# Patient Record
Sex: FEMALE | Race: WHITE | NOT HISPANIC OR LATINO | Employment: OTHER | URBAN - METROPOLITAN AREA
[De-identification: names, ages, dates, MRNs, and addresses within clinical notes are randomized per-mention and may not be internally consistent; named-entity substitution may affect disease eponyms.]

---

## 2023-06-05 ENCOUNTER — APPOINTMENT (OUTPATIENT)
Dept: CT IMAGING | Facility: CLINIC | Age: 63
End: 2023-06-05
Attending: EMERGENCY MEDICINE
Payer: COMMERCIAL

## 2023-06-05 ENCOUNTER — HOSPITAL ENCOUNTER (INPATIENT)
Facility: CLINIC | Age: 63
LOS: 13 days | Discharge: HOME | End: 2023-06-18
Attending: EMERGENCY MEDICINE | Admitting: INTERNAL MEDICINE
Payer: COMMERCIAL

## 2023-06-05 DIAGNOSIS — R11.0 NAUSEA: ICD-10-CM

## 2023-06-05 DIAGNOSIS — K56.600 PARTIAL SMALL BOWEL OBSTRUCTION (H): ICD-10-CM

## 2023-06-05 DIAGNOSIS — K52.9 IBD (INFLAMMATORY BOWEL DISEASE): Primary | ICD-10-CM

## 2023-06-05 DIAGNOSIS — R10.31 RLQ ABDOMINAL PAIN: ICD-10-CM

## 2023-06-05 LAB
ALBUMIN SERPL BCG-MCNC: 4.2 G/DL (ref 3.5–5.2)
ALP SERPL-CCNC: 81 U/L (ref 35–104)
ALT SERPL W P-5'-P-CCNC: 12 U/L (ref 10–35)
ANION GAP SERPL CALCULATED.3IONS-SCNC: 14 MMOL/L (ref 7–15)
AST SERPL W P-5'-P-CCNC: 13 U/L (ref 10–35)
BASOPHILS # BLD AUTO: 0.1 10E3/UL (ref 0–0.2)
BASOPHILS NFR BLD AUTO: 0 %
BILIRUB SERPL-MCNC: 0.3 MG/DL
BUN SERPL-MCNC: 14.7 MG/DL (ref 8–23)
CALCIUM SERPL-MCNC: 9.1 MG/DL (ref 8.8–10.2)
CHLORIDE SERPL-SCNC: 102 MMOL/L (ref 98–107)
CREAT SERPL-MCNC: 1.26 MG/DL (ref 0.51–0.95)
DEPRECATED HCO3 PLAS-SCNC: 24 MMOL/L (ref 22–29)
EOSINOPHIL # BLD AUTO: 0 10E3/UL (ref 0–0.7)
EOSINOPHIL NFR BLD AUTO: 0 %
ERYTHROCYTE [DISTWIDTH] IN BLOOD BY AUTOMATED COUNT: 15 % (ref 10–15)
GFR SERPL CREATININE-BSD FRML MDRD: 48 ML/MIN/1.73M2
GLUCOSE SERPL-MCNC: 131 MG/DL (ref 70–99)
HCT VFR BLD AUTO: 42.1 % (ref 35–47)
HGB BLD-MCNC: 13.1 G/DL (ref 11.7–15.7)
HOLD SPECIMEN: NORMAL
HOLD SPECIMEN: NORMAL
IMM GRANULOCYTES # BLD: 0.2 10E3/UL
IMM GRANULOCYTES NFR BLD: 2 %
LYMPHOCYTES # BLD AUTO: 1.2 10E3/UL (ref 0.8–5.3)
LYMPHOCYTES NFR BLD AUTO: 8 %
MCH RBC QN AUTO: 27 PG (ref 26.5–33)
MCHC RBC AUTO-ENTMCNC: 31.1 G/DL (ref 31.5–36.5)
MCV RBC AUTO: 87 FL (ref 78–100)
MONOCYTES # BLD AUTO: 0.5 10E3/UL (ref 0–1.3)
MONOCYTES NFR BLD AUTO: 4 %
NEUTROPHILS # BLD AUTO: 12.2 10E3/UL (ref 1.6–8.3)
NEUTROPHILS NFR BLD AUTO: 86 %
NRBC # BLD AUTO: 0 10E3/UL
NRBC BLD AUTO-RTO: 0 /100
PLATELET # BLD AUTO: 404 10E3/UL (ref 150–450)
POTASSIUM SERPL-SCNC: 4.7 MMOL/L (ref 3.4–5.3)
PROT SERPL-MCNC: 6.8 G/DL (ref 6.4–8.3)
RBC # BLD AUTO: 4.85 10E6/UL (ref 3.8–5.2)
SODIUM SERPL-SCNC: 140 MMOL/L (ref 136–145)
WBC # BLD AUTO: 14.2 10E3/UL (ref 4–11)

## 2023-06-05 PROCEDURE — 250N000011 HC RX IP 250 OP 636: Performed by: EMERGENCY MEDICINE

## 2023-06-05 PROCEDURE — 36415 COLL VENOUS BLD VENIPUNCTURE: CPT | Performed by: EMERGENCY MEDICINE

## 2023-06-05 PROCEDURE — 85025 COMPLETE CBC W/AUTO DIFF WBC: CPT | Performed by: EMERGENCY MEDICINE

## 2023-06-05 PROCEDURE — 74177 CT ABD & PELVIS W/CONTRAST: CPT

## 2023-06-05 PROCEDURE — 80053 COMPREHEN METABOLIC PANEL: CPT | Performed by: EMERGENCY MEDICINE

## 2023-06-05 PROCEDURE — 120N000004 HC R&B MS OVERFLOW

## 2023-06-05 PROCEDURE — 86704 HEP B CORE ANTIBODY TOTAL: CPT | Performed by: NURSE PRACTITIONER

## 2023-06-05 PROCEDURE — 86706 HEP B SURFACE ANTIBODY: CPT | Performed by: NURSE PRACTITIONER

## 2023-06-05 PROCEDURE — 258N000003 HC RX IP 258 OP 636: Performed by: EMERGENCY MEDICINE

## 2023-06-05 PROCEDURE — 99285 EMERGENCY DEPT VISIT HI MDM: CPT | Mod: 25

## 2023-06-05 PROCEDURE — 99223 1ST HOSP IP/OBS HIGH 75: CPT | Performed by: INTERNAL MEDICINE

## 2023-06-05 PROCEDURE — 250N000009 HC RX 250: Performed by: EMERGENCY MEDICINE

## 2023-06-05 PROCEDURE — 87340 HEPATITIS B SURFACE AG IA: CPT | Performed by: NURSE PRACTITIONER

## 2023-06-05 RX ORDER — MORPHINE SULFATE 4 MG/ML
4 INJECTION, SOLUTION INTRAMUSCULAR; INTRAVENOUS ONCE
Status: COMPLETED | OUTPATIENT
Start: 2023-06-05 | End: 2023-06-05

## 2023-06-05 RX ORDER — PROCHLORPERAZINE 25 MG
25 SUPPOSITORY, RECTAL RECTAL EVERY 12 HOURS PRN
Status: DISCONTINUED | OUTPATIENT
Start: 2023-06-05 | End: 2023-06-18 | Stop reason: HOSPADM

## 2023-06-05 RX ORDER — ENOXAPARIN SODIUM 100 MG/ML
40 INJECTION SUBCUTANEOUS EVERY 24 HOURS
Status: DISCONTINUED | OUTPATIENT
Start: 2023-06-06 | End: 2023-06-06

## 2023-06-05 RX ORDER — METHYLPREDNISOLONE SODIUM SUCCINATE 125 MG/2ML
60 INJECTION, POWDER, LYOPHILIZED, FOR SOLUTION INTRAMUSCULAR; INTRAVENOUS EVERY 8 HOURS
Status: COMPLETED | OUTPATIENT
Start: 2023-06-06 | End: 2023-06-08

## 2023-06-05 RX ORDER — ONDANSETRON 2 MG/ML
4 INJECTION INTRAMUSCULAR; INTRAVENOUS ONCE
Status: COMPLETED | OUTPATIENT
Start: 2023-06-05 | End: 2023-06-05

## 2023-06-05 RX ORDER — LIDOCAINE 40 MG/G
CREAM TOPICAL
Status: DISCONTINUED | OUTPATIENT
Start: 2023-06-05 | End: 2023-06-18 | Stop reason: HOSPADM

## 2023-06-05 RX ORDER — SODIUM CHLORIDE 9 MG/ML
INJECTION, SOLUTION INTRAVENOUS CONTINUOUS
Status: DISCONTINUED | OUTPATIENT
Start: 2023-06-06 | End: 2023-06-09

## 2023-06-05 RX ORDER — IOPAMIDOL 755 MG/ML
500 INJECTION, SOLUTION INTRAVASCULAR ONCE
Status: COMPLETED | OUTPATIENT
Start: 2023-06-05 | End: 2023-06-05

## 2023-06-05 RX ADMIN — ONDANSETRON 4 MG: 2 INJECTION INTRAMUSCULAR; INTRAVENOUS at 22:36

## 2023-06-05 RX ADMIN — IOPAMIDOL 75 ML: 755 INJECTION, SOLUTION INTRAVENOUS at 22:51

## 2023-06-05 RX ADMIN — MORPHINE SULFATE 4 MG: 4 INJECTION, SOLUTION INTRAMUSCULAR; INTRAVENOUS at 22:42

## 2023-06-05 RX ADMIN — SODIUM CHLORIDE 1000 ML: 9 INJECTION, SOLUTION INTRAVENOUS at 22:45

## 2023-06-05 RX ADMIN — SODIUM CHLORIDE 59 ML: 9 INJECTION, SOLUTION INTRAVENOUS at 22:51

## 2023-06-05 RX ADMIN — MORPHINE SULFATE 4 MG: 4 INJECTION, SOLUTION INTRAMUSCULAR; INTRAVENOUS at 23:38

## 2023-06-05 ASSESSMENT — ACTIVITIES OF DAILY LIVING (ADL): ADLS_ACUITY_SCORE: 35

## 2023-06-05 NOTE — ED TRIAGE NOTES
Diarrhea and lower abdominal pain. Colonoscopy done last month that shows crohns. Sent here for IV steroids.

## 2023-06-06 LAB
ALBUMIN SERPL BCG-MCNC: 3.6 G/DL (ref 3.5–5.2)
ALP SERPL-CCNC: 68 U/L (ref 35–104)
ALT SERPL W P-5'-P-CCNC: 8 U/L (ref 10–35)
ANION GAP SERPL CALCULATED.3IONS-SCNC: 10 MMOL/L (ref 7–15)
AST SERPL W P-5'-P-CCNC: 13 U/L (ref 10–35)
BILIRUB SERPL-MCNC: 0.2 MG/DL
BUN SERPL-MCNC: 16.9 MG/DL (ref 8–23)
CALCIUM SERPL-MCNC: 8.7 MG/DL (ref 8.8–10.2)
CHLORIDE SERPL-SCNC: 105 MMOL/L (ref 98–107)
CREAT SERPL-MCNC: 1.1 MG/DL (ref 0.51–0.95)
DEPRECATED HCO3 PLAS-SCNC: 25 MMOL/L (ref 22–29)
ERYTHROCYTE [DISTWIDTH] IN BLOOD BY AUTOMATED COUNT: 15.1 % (ref 10–15)
GFR SERPL CREATININE-BSD FRML MDRD: 56 ML/MIN/1.73M2
GLUCOSE SERPL-MCNC: 142 MG/DL (ref 70–99)
HCT VFR BLD AUTO: 39.8 % (ref 35–47)
HGB BLD-MCNC: 12.1 G/DL (ref 11.7–15.7)
MCH RBC QN AUTO: 26.8 PG (ref 26.5–33)
MCHC RBC AUTO-ENTMCNC: 30.4 G/DL (ref 31.5–36.5)
MCV RBC AUTO: 88 FL (ref 78–100)
PLATELET # BLD AUTO: 313 10E3/UL (ref 150–450)
POTASSIUM SERPL-SCNC: 5.1 MMOL/L (ref 3.4–5.3)
PROT SERPL-MCNC: 5.7 G/DL (ref 6.4–8.3)
RBC # BLD AUTO: 4.52 10E6/UL (ref 3.8–5.2)
SODIUM SERPL-SCNC: 140 MMOL/L (ref 136–145)
WBC # BLD AUTO: 12.3 10E3/UL (ref 4–11)

## 2023-06-06 PROCEDURE — 250N000011 HC RX IP 250 OP 636: Performed by: INTERNAL MEDICINE

## 2023-06-06 PROCEDURE — 250N000009 HC RX 250: Performed by: INTERNAL MEDICINE

## 2023-06-06 PROCEDURE — 36415 COLL VENOUS BLD VENIPUNCTURE: CPT | Performed by: INTERNAL MEDICINE

## 2023-06-06 PROCEDURE — 80053 COMPREHEN METABOLIC PANEL: CPT | Performed by: INTERNAL MEDICINE

## 2023-06-06 PROCEDURE — 250N000011 HC RX IP 250 OP 636: Performed by: HOSPITALIST

## 2023-06-06 PROCEDURE — 86481 TB AG RESPONSE T-CELL SUSP: CPT | Performed by: NURSE PRACTITIONER

## 2023-06-06 PROCEDURE — 120N000004 HC R&B MS OVERFLOW

## 2023-06-06 PROCEDURE — 258N000003 HC RX IP 258 OP 636: Performed by: INTERNAL MEDICINE

## 2023-06-06 PROCEDURE — 36415 COLL VENOUS BLD VENIPUNCTURE: CPT | Performed by: NURSE PRACTITIONER

## 2023-06-06 PROCEDURE — 99232 SBSQ HOSP IP/OBS MODERATE 35: CPT | Performed by: INTERNAL MEDICINE

## 2023-06-06 PROCEDURE — 85027 COMPLETE CBC AUTOMATED: CPT | Performed by: INTERNAL MEDICINE

## 2023-06-06 RX ORDER — NALOXONE HYDROCHLORIDE 0.4 MG/ML
0.2 INJECTION, SOLUTION INTRAMUSCULAR; INTRAVENOUS; SUBCUTANEOUS
Status: DISCONTINUED | OUTPATIENT
Start: 2023-06-06 | End: 2023-06-18 | Stop reason: HOSPADM

## 2023-06-06 RX ORDER — NORTRIPTYLINE HCL 25 MG
25 CAPSULE ORAL AT BEDTIME
COMMUNITY

## 2023-06-06 RX ORDER — SENNOSIDES 8.6 MG
650 CAPSULE ORAL AT BEDTIME
Status: ON HOLD | COMMUNITY
End: 2024-03-23

## 2023-06-06 RX ORDER — ALBUTEROL SULFATE 90 UG/1
2 AEROSOL, METERED RESPIRATORY (INHALATION) EVERY 6 HOURS PRN
COMMUNITY

## 2023-06-06 RX ORDER — NALOXONE HYDROCHLORIDE 0.4 MG/ML
0.4 INJECTION, SOLUTION INTRAMUSCULAR; INTRAVENOUS; SUBCUTANEOUS
Status: DISCONTINUED | OUTPATIENT
Start: 2023-06-06 | End: 2023-06-18 | Stop reason: HOSPADM

## 2023-06-06 RX ORDER — ESOMEPRAZOLE MAGNESIUM 40 MG/1
40 CAPSULE, DELAYED RELEASE ORAL
COMMUNITY

## 2023-06-06 RX ORDER — OXYCODONE HYDROCHLORIDE 5 MG/1
5 TABLET ORAL EVERY 4 HOURS PRN
Status: DISCONTINUED | OUTPATIENT
Start: 2023-06-06 | End: 2023-06-09

## 2023-06-06 RX ORDER — PREDNISONE 5 MG/1
TABLET ORAL SEE ADMIN INSTRUCTIONS
Status: ON HOLD | COMMUNITY
End: 2023-06-18

## 2023-06-06 RX ORDER — MONTELUKAST SODIUM 10 MG/1
10 TABLET ORAL AT BEDTIME
COMMUNITY

## 2023-06-06 RX ORDER — HYDROMORPHONE HYDROCHLORIDE 1 MG/ML
0.3 INJECTION, SOLUTION INTRAMUSCULAR; INTRAVENOUS; SUBCUTANEOUS
Status: DISCONTINUED | OUTPATIENT
Start: 2023-06-06 | End: 2023-06-10

## 2023-06-06 RX ORDER — LIDOCAINE 40 MG/G
CREAM TOPICAL DAILY PRN
Status: COMPLETED | OUTPATIENT
Start: 2023-06-06 | End: 2023-06-06

## 2023-06-06 RX ORDER — NORTRIPTYLINE HCL 25 MG
25 CAPSULE ORAL AT BEDTIME
Status: DISCONTINUED | OUTPATIENT
Start: 2023-06-06 | End: 2023-06-18 | Stop reason: HOSPADM

## 2023-06-06 RX ORDER — TRAZODONE HYDROCHLORIDE 100 MG/1
200 TABLET ORAL AT BEDTIME
COMMUNITY

## 2023-06-06 RX ORDER — TRAZODONE HYDROCHLORIDE 100 MG/1
200 TABLET ORAL AT BEDTIME
Status: DISCONTINUED | OUTPATIENT
Start: 2023-06-06 | End: 2023-06-18 | Stop reason: HOSPADM

## 2023-06-06 RX ORDER — ONDANSETRON 4 MG/1
4-8 TABLET, ORALLY DISINTEGRATING ORAL EVERY 8 HOURS PRN
Status: ON HOLD | COMMUNITY
End: 2023-07-13

## 2023-06-06 RX ORDER — FAMOTIDINE 40 MG/1
40 TABLET, FILM COATED ORAL AT BEDTIME
COMMUNITY

## 2023-06-06 RX ADMIN — PROCHLORPERAZINE EDISYLATE 10 MG: 5 INJECTION INTRAMUSCULAR; INTRAVENOUS at 22:26

## 2023-06-06 RX ADMIN — METHYLPREDNISOLONE SODIUM SUCCINATE 62.5 MG: 125 INJECTION INTRAMUSCULAR; INTRAVENOUS at 01:22

## 2023-06-06 RX ADMIN — HYDROMORPHONE HYDROCHLORIDE 0.3 MG: 1 INJECTION, SOLUTION INTRAMUSCULAR; INTRAVENOUS; SUBCUTANEOUS at 14:26

## 2023-06-06 RX ADMIN — HYDROMORPHONE HYDROCHLORIDE 0.3 MG: 1 INJECTION, SOLUTION INTRAMUSCULAR; INTRAVENOUS; SUBCUTANEOUS at 22:19

## 2023-06-06 RX ADMIN — METHYLPREDNISOLONE SODIUM SUCCINATE 62.5 MG: 125 INJECTION INTRAMUSCULAR; INTRAVENOUS at 17:09

## 2023-06-06 RX ADMIN — SODIUM CHLORIDE: 900 INJECTION INTRAVENOUS at 16:35

## 2023-06-06 RX ADMIN — PROCHLORPERAZINE EDISYLATE 10 MG: 5 INJECTION INTRAMUSCULAR; INTRAVENOUS at 01:18

## 2023-06-06 RX ADMIN — HYDROMORPHONE HYDROCHLORIDE 0.3 MG: 1 INJECTION, SOLUTION INTRAMUSCULAR; INTRAVENOUS; SUBCUTANEOUS at 04:43

## 2023-06-06 RX ADMIN — HYDROMORPHONE HYDROCHLORIDE 0.3 MG: 1 INJECTION, SOLUTION INTRAMUSCULAR; INTRAVENOUS; SUBCUTANEOUS at 16:35

## 2023-06-06 RX ADMIN — METHYLPREDNISOLONE SODIUM SUCCINATE 62.5 MG: 125 INJECTION INTRAMUSCULAR; INTRAVENOUS at 09:07

## 2023-06-06 RX ADMIN — LIDOCAINE: 40 CREAM TOPICAL at 21:05

## 2023-06-06 RX ADMIN — ENOXAPARIN SODIUM 40 MG: 40 INJECTION SUBCUTANEOUS at 08:30

## 2023-06-06 RX ADMIN — SODIUM CHLORIDE: 900 INJECTION INTRAVENOUS at 09:08

## 2023-06-06 RX ADMIN — HYDROMORPHONE HYDROCHLORIDE 0.3 MG: 1 INJECTION, SOLUTION INTRAMUSCULAR; INTRAVENOUS; SUBCUTANEOUS at 09:16

## 2023-06-06 RX ADMIN — SODIUM CHLORIDE: 900 INJECTION INTRAVENOUS at 00:21

## 2023-06-06 ASSESSMENT — ACTIVITIES OF DAILY LIVING (ADL)
ADLS_ACUITY_SCORE: 20
ADLS_ACUITY_SCORE: 35
ADLS_ACUITY_SCORE: 20

## 2023-06-06 NOTE — ED PROVIDER NOTES
"  History     Chief Complaint:  Abdominal Pain       The history is provided by the patient.      Devi Salas is a 62 year old female with history of Crohn's disease who presents with lower abdominal pain and diarrhea for a few weeks now with 14 episodes of diarrhea today. She was seen for a follow-up at [facility] today and referred here for IV steroids. She has been on 40 mg prednisone for 10 days without relief. Denies blood in stool. Reports when she eats, she immediately passes watery stool. Denies vomiting.    Independent Historian:    None - Patient Only    Review of External Notes:  Reviewed [facility] notes. Reviewed ED note from [facility] on 5/15. She had imaging performed at that time, results summarized below.    CT Abdomen Pelvis from 5/15/23 showed mcuosal hyperemia of small bowel suggesting chronic inflammatory bowel disease.     ROS:  See HPI    Allergies:  Demerol Hcl [Meperidine]  Oxycodone  Percocet [Oxycodone-Acetaminophen]     Physical Exam     Patient Vitals for the past 24 hrs:   BP Temp Temp src Pulse Resp SpO2 Height Weight   06/06/23 0052 (!) 141/92 98  F (36.7  C) Oral 82 18 98 % 1.6 m (5' 3\") 68.9 kg (152 lb)   06/05/23 1847 (!) 140/98 98.2  F (36.8  C) Temporal 100 20 98 % -- --        Physical Exam  Constitutional: Vital signs reviewed as above.   HENT:    Head: No external signs of trauma noted.   Eyes: Conjunctivae are normal. Pupils are equal, round, and reactive to light.   Cardiovascular:    Tachycardic rate, regular rhythm and normal heart sounds.     Exam reveals no friction rub.     No murmur heard.  Pulmonary/Chest:    Effort normal and breath sounds normal.    No respiratory distress.    There are no wheezes.    There are no rales.   Gastrointestinal:    Soft.    Bowel sounds normal.    There is no distension.    There is significant RLQ tenderness.    There is no rebound or guarding.   Musculoskeletal:    Normal range of motion.    Normal Tone  Neurological: " Patient is alert and oriented to person, place, and time.   Skin: Skin is warm and dry. Patient is not diaphoretic  Psychiatric: The patient appears calm      Emergency Department Course   Imaging:  CT Abdomen Pelvis w Contrast   Final Result   IMPRESSION:    1.  There are segments of narrowed distal small bowel causing a partial small bowel obstruction. No definite active inflammation. No abscess formation.         Report per radiology    Laboratory:  Labs Ordered and Resulted from Time of ED Arrival to Time of ED Departure   COMPREHENSIVE METABOLIC PANEL - Abnormal       Result Value    Sodium 140      Potassium 4.7      Chloride 102      Carbon Dioxide (CO2) 24      Anion Gap 14      Urea Nitrogen 14.7      Creatinine 1.26 (*)     Calcium 9.1      Glucose 131 (*)     Alkaline Phosphatase 81      AST 13      ALT 12      Protein Total 6.8      Albumin 4.2      Bilirubin Total 0.3      GFR Estimate 48 (*)    CBC WITH PLATELETS AND DIFFERENTIAL - Abnormal    WBC Count 14.2 (*)     RBC Count 4.85      Hemoglobin 13.1      Hematocrit 42.1      MCV 87      MCH 27.0      MCHC 31.1 (*)     RDW 15.0      Platelet Count 404      % Neutrophils 86      % Lymphocytes 8      % Monocytes 4      % Eosinophils 0      % Basophils 0      % Immature Granulocytes 2      NRBCs per 100 WBC 0      Absolute Neutrophils 12.2 (*)     Absolute Lymphocytes 1.2      Absolute Monocytes 0.5      Absolute Eosinophils 0.0      Absolute Basophils 0.1      Absolute Immature Granulocytes 0.2      Absolute NRBCs 0.0        Emergency Department Course & Assessments:       Interventions:  Medications   lidocaine 1 % 0.1-1 mL (has no administration in time range)   lidocaine (LMX4) cream (has no administration in time range)   sodium chloride (PF) 0.9% PF flush 3 mL (3 mLs Intracatheter Not Given 6/6/23 0000)   sodium chloride (PF) 0.9% PF flush 3 mL (has no administration in time range)   melatonin tablet 1 mg (has no administration in time range)    enoxaparin ANTICOAGULANT (LOVENOX) injection 40 mg (has no administration in time range)   sodium chloride 0.9% infusion ( Intravenous $New Bag 6/6/23 0021)   methylPREDNISolone sodium succinate (solu-MEDROL) injection 62.5 mg (has no administration in time range)   prochlorperazine (COMPAZINE) injection 10 mg (has no administration in time range)     Or   prochlorperazine (COMPAZINE) suppository 25 mg (has no administration in time range)   0.9% sodium chloride BOLUS (0 mLs Intravenous Stopped 6/5/23 2358)   morphine (PF) injection 4 mg (4 mg Intravenous $Given 6/5/23 2242)   ondansetron (ZOFRAN) injection 4 mg (4 mg Intravenous $Given 6/5/23 2236)   iopamidol (ISOVUE-370) solution 500 mL (75 mLs Intravenous $Given 6/5/23 2251)   CT scan flush (59 mLs Intravenous $Given 6/5/23 2251)   morphine (PF) injection 4 mg (4 mg Intravenous $Given 6/5/23 2338)        Assessments, Independent Interpretation, Consult/Discussion of ManagementTests:  ED Course as of 06/06/23 0110   Mon Jun 05, 2023 2220 I obtained the history and examined the patient as noted above.    2228 D/W Dr. Huggins ([facility]). Would like repeat CT. If no abscess/surgical problem, would start solumedrol 20mg IV q8h   2235 I consulted with Dr. Bruce, of the hospitalist team, regarding the patient. They accepted the patient for admission.    2306 I rechecked and updated the patient.     2325 I rechecked and updated the patient.    2332 I spoke with Dr. Bruce once again.       Social Determinants of Health affecting care:  None    Disposition:  The patient was admitted to the hospital under the care of Dr. rBuce.     Impression & Plan    CMS Diagnoses: None    Code Status: Full Code    Medical Decision Making:  This 62-year-old female patient presents to the ED due to abdominal pain.  Please see the HPI and exam for specifics.  The patient was referred here from GI due to concerns for terminal ileitis.  Lab results and CT results are noted above.  Findings are  most notable for leukocytosis, increased creatinine, and a partial small bowel obstruction.  I discussed the case with gastroenterology and the hospitalist service and we will admit the patient for ongoing monitoring and care.    Critical Care time:  was 0 minutes for this patient excluding procedures.    Diagnosis:    ICD-10-CM    1. RLQ abdominal pain  R10.31       2. Partial small bowel obstruction (H)  K56.600           Scribe Disclosure:  I, Jacqueline Alarcon, am serving as a scribe at 10:15 PM on 6/5/2023 to document services personally performed by Familia Grigsby DO based on my observations and the provider's statements to me.    6/5/2023   Familia Grigsby DO Burns, Bradley Joseph, DO  06/06/23 0111

## 2023-06-06 NOTE — CONSULTS
GASTROENTEROLOGY CONSULTATION      Devi Salas  [address] [address]  62 year old female     Admission Date/Time: 6/5/2023  Primary Care Provider: No Ref-Primary, Physician     We were asked to see the patient in consultation by Dr. Bruce for evaluation of Crohn's disease.    CC:  Abdominal pain, diarrhea      HPI:  Devi Salas is a 62 year old female who recently underwent work-up at [facility] with findings suggestive of Crohn's disease and was seen in IBD clinic yesterday where she was advised to come to the emergency room due to the severity of her symptoms and treatment failure with oral steroids.  CT scan upon arrival noted segments of narrowed distal small bowel causing a partial small bowel obstruction.  No definite active inflammation or abscess formation noted.  CBC with mildly elevated WBC at 14.2, hemoglobin normal at 13.1.    The patient began having symptoms of nausea, vomiting, diarrhea on March 23.  She underwent a colonoscopy on May 23 which showed inflammation with ulceration and stenosis at the terminal ileum.  Terminal ileum biopsy showed nonspecific mild active chronic ileitis.  Right and left colon biopsies were normal.  Following her procedure she was started on prednisone 40 mg daily which she took for 10 days.  Unfortunately she did not find any improvement with the prednisone.  In fact her symptoms have continued to worsen.  Over the past several weeks she reports an average of 10-15 urgent and urgent and watery bowel movements per day.  Denies blood in her stool. She also has had significant and severe abdominal pain that is diffuse and exacerbated by oral intake.  She has not yet have a bowel movement today.  Her pain has improved with analgesics.    Of note, she had been using Aleve twice daily for years but discontinued this as advised in April.    She does have a nephew and nieces with Crohn's disease.    She has undergone an extensive work-up for her symptoms as  outlined below:    EGD 4/7/2023 was endoscopically normal.  Esophageal and duodenal biopsies normal.  Gastric biopsies showed chronic gastritis, negative for H. Pylori.    CT A/P on 4/4/2023 showed wall thickening in the distal and terminal ileum with some mucosal hyperemia, evidence of prior cholecystectomy with minimal dilation of biliary tree and borderline diameter of CBD.    CT A/P on 5/15/2023 showed stable hyperemia of the distal small bowel loops suggestive of chronic inflammation, fluid-filled colon suggestive of diarrheal illness, enlarged mesenteric lymph nodes that were stable, improved appearance of biliary tree without CBD stone.    Prior negative enteric pathogen panel and C. Diff testing.     PAST MEDICAL HISTORY:  Patient Active Problem List    Diagnosis Date Noted     RLQ abdominal pain 06/05/2023     Priority: Medium          ROS: A comprehensive ten point review of systems was negative aside from those in mentioned in the HPI.       MEDICATIONS:   Prior to Admission medications    Medication Sig Start Date End Date Taking? Authorizing Provider   acetaminophen (TYLENOL) 650 MG CR tablet Take 650 mg by mouth At Bedtime   Yes Unknown, Entered By History   albuterol (PROAIR HFA/PROVENTIL HFA/VENTOLIN HFA) 108 (90 Base) MCG/ACT inhaler Inhale 2 puffs into the lungs every 6 hours as needed for shortness of breath, wheezing or cough   Yes Unknown, Entered By History   esomeprazole (NEXIUM) 40 MG DR capsule Take 40 mg by mouth 2 times daily (before meals) Take 30-60 minutes before eating.   Yes Unknown, Entered By History   famotidine (PEPCID) 40 MG tablet Take 40 mg by mouth At Bedtime   Yes Unknown, Entered By History   montelukast (SINGULAIR) 10 MG tablet Take 10 mg by mouth At Bedtime   Yes Unknown, Entered By History   nortriptyline (PAMELOR) 25 MG capsule Take 25 mg by mouth At Bedtime   Yes Unknown, Entered By History   ondansetron (ZOFRAN ODT) 4 MG ODT tab Take 4-8 mg by mouth every 8 hours as  "needed for nausea   Yes Unknown, Entered By History   predniSONE (DELTASONE) 5 MG tablet Take by mouth See Admin Instructions   Yes Unknown, Entered By History   traZODone (DESYREL) 100 MG tablet Take 200 mg by mouth At Bedtime   Yes Unknown, Entered By History        ALLERGIES:   Allergies   Allergen Reactions     Latex Hives     Penicillin G Anaphylaxis     Sumatriptan Palpitations     Aspirin Nausea and Vomiting     Demerol Hcl [Meperidine] Nausea and Vomiting     N/V     Oxycodone Nausea and Vomiting     N/V     Percocet [Oxycodone-Acetaminophen] Nausea and Vomiting     N/V     Codeine Nausea and Vomiting     Mold         SOCIAL HISTORY:        FAMILY HISTORY:  No family history on file.     PHYSICAL EXAM:   /77   Pulse 78   Temp 97.8  F (36.6  C) (Oral)   Resp 16   Ht 1.6 m (5' 3\")   Wt 68.9 kg (152 lb)   SpO2 97%   BMI 26.93 kg/m       PHYSICAL EXAM:  General: alert, oriented, NAD  SKIN: no suspicious lesions, rashes, jaundice, or spider angiomas  HEAD: Normocephalic. No masses, lesions, tenderness or abnormalities  EYES: No scleral icterus  ENT: ENT exam normal, no neck nodes or sinus tenderness  RESPIRATORY: negative, Good diaphragmatic excursion. Lungs clear  CARDIOVASCULAR: negative, PMI normal. No lifts, heaves, or thrills. RRR. No murmurs, clicks gallops or rub  GASTROINTESTINAL: abdomen rounded, soft, marked tenderness to palpation of lower quadrants, hypoactive bowel sounds.  JOINT/EXTREMITIES: extremities normal- no gross deformities noted, gait normal and normal muscle tone  NEURO: alert and oriented   PSYCH: appropriate      LABS:  I reviewed the patient's new clinical lab test results.   Recent Labs   Lab Test 06/06/23  0700 06/05/23  1854   WBC 12.3* 14.2*   HGB 12.1 13.1   MCV 88 87    404     Recent Labs   Lab Test 06/06/23  0700 06/05/23  1854    140   POTASSIUM 5.1 4.7   CHLORIDE 105 102   CO2 25 24   BUN 16.9 14.7   ANIONGAP 10 14   GIORGIO 8.7* 9.1     Recent Labs   Lab " Test 06/06/23  0700 06/05/23  1854   ALBUMIN 3.6 4.2   BILITOTAL 0.2 0.3   ALT 8* 12   AST 13 13   ALKPHOS 68 81     IMAGING  I personally reviewed the patient's new imaging results    CT A/P 6/5/2023  1.  There are segments of narrowed distal small bowel causing a partial small bowel obstruction. No definite active inflammation. No abscess formation.      CONSULTATION ASSESSMENT AND PLAN:    Crohn's Disease  Abdominal Pain  Diarrhea  Partial SBO    This patient is a 62-year-old female who has had gastrointestinal symptoms including nausea, vomiting, unintended weight loss, abdominal pain, and diarrhea in March for which she has undergone an extensive work-up.  Recent colonoscopy was performed that showed stenosis as well as an ulceration at the ileum, pathology showed nonspecific mildly active chronic ileitis.  Presentation and work-up is most consistent with ileal Crohn's disease, though do note that she had been using NSAIDs regularly which may affect findings.    She was advised to come to the ED yesterday due to the severity of her symptoms and as she had not responded to oral prednisone.  Imaging on arrival showed narrowed distal small bowel causing a partial small bowel obstruction. No definite active inflammation noted on CT scan, though she has had inflammation noted on recent imaging.  It is likely that her symptoms are a combination of inflammation related to her underlying Crohn's disease as well as ileal stenosis seen during her colonoscopy. She may benefit from colorectal surgery consult if she does not respond to steroids.  We did briefly discuss long-term management of Crohn's disease with biologic therapy as well today.    Plan  --NPO for now.  --Pain management per primary team.   --IV Solumedrol Q8H.  --Hepatitis B serologies, quantiferon gold in anticipation of starting biologic therapy.  --Consider Colorectal Surgery Consultation if she does not respond to steroids.   --We will continue to  follow.     I will discuss her case with Dr. Mathur, GI staff physician.    Total time spent:  Approximately, 45 minutes was spent providing patient care, including patient evaluation, reviewing documentation/test results, and . Thank you for asking us to participate in the care of this patient.      Shwetha Lei, CNP  [facility] ([facility])  [phone]

## 2023-06-06 NOTE — PLAN OF CARE
Shift Summary 0100-0730-  Pt reports she has not slept well since arriving to floor, but has been trying to rest. Dilaudid given x1 for abdominal discomfort. Compazine given x1 for nausea. No diarrhea episodes. VSS. Afebrile. Pt remains NPO. Will continue to monitor pts comfort level, intake/output, vital signs, and provide interventions as needed.

## 2023-06-06 NOTE — PROGRESS NOTES
SPIRITUAL HEALTH SERVICES Progress Note  Peds (Adult patient)    Saw pt Devi Salas per admission request for visit.  Daughter present.    Patient/Family Understanding of Illness and Goals of Care - Patient understands that she is at  due to complications from Crohn's disease.    Distress and Loss - She is concerned that she might need surgery.    Strengths, Coping, and Resources -    Her daughters are loving and supportive.    Meaning, Beliefs and Spirituality - She is [employer] and finds comfort in reading scripture.    Plan of Care - No spiritual needs at this time.  SHS Remains available.    Rev. CHRIS Nieto.Ansley.  Staff   Phone [phone]

## 2023-06-06 NOTE — PROVIDER NOTIFICATION
Paged at 6900: Pt requesting her Trazodone and Nortiptyline scheduled at 2100 d/t these are home medications she takes consistently to help her sleep and with her headaches.

## 2023-06-06 NOTE — PROGRESS NOTES
"[facility]    Medicine Progress Note - Hospitalist Service    Date of Admission:  6/5/2023    Assessment & Plan     Devi Salas is a 62 year old female with a past medical history significant for Crohn's disease, asthma, and migraines.  She presented to emergency room with diarrhea, nausea, and abdominal pain.  Found to have acute Crohn's exacerbation causing partial small bowel obstruction.    Partial small bowel obstruction.  -NPO.  -Continuous IV fluids.  -Gastroenterology consult.    Acute Crohn's exacerbation.  -Gastroenterology consult.  -IV fluids.  -IV methylprednisolone.  -Pain medications as needed.    Acute kidney injury.  -Creatinine initially elevated at 1.3.  -Likely prerenal due to dehydration from profuse diarrhea.  -Creatinine improved 1.1 today.  -Continue IV fluids.  -Avoid nephrotoxins as able.  -Recheck metabolic panel tomorrow.       Diet: NPO for Medical/Clinical Reasons Except for: Ice Chips    DVT Prophylaxis: Pneumatic Compression Devices  Monae Catheter: Not present  Lines: None     Cardiac Monitoring: None  Code Status: Full Code      Clinically Significant Risk Factors Present on Admission                       # Overweight: Estimated body mass index is 26.93 kg/m  as calculated from the following:    Height as of this encounter: 1.6 m (5' 3\").    Weight as of this encounter: 68.9 kg (152 lb).            Disposition Plan      Expected Discharge Date: 06/07/2023                  Markel Lamas,   Hospitalist Service  [facility]  Securely message with [employer] (more info)  Text page via [employer] Paging/Directory   ______________________________________________________________________    Interval History   Having abdominal pain.  No diarrhea since last night.  Occasional nausea.  Felt chills.  Denies chest pain, shortness of breath, fevers.    Physical Exam   Vital Signs: Temp: 97.8  F (36.6  C) Temp src: Oral BP: 132/77 Pulse: 78   Resp: " 16 SpO2: 97 % O2 Device: None (Room air)    Weight: 152 lbs 0 oz    Gen:  NAD, A&Ox3.  Eyes:  PERRL, sclera anicteric.  OP:  MMM, no lesions.  Neck:  Supple.  CV:  Regular, no murmurs.  Lung:  CTA b/l, normal effort.  Ab:  +BS, soft.  Skin:  Warm, dry to touch.  No rash.  Ext:  No pitting edema LE b/l.      Medical Decision Making       32 MINUTES SPENT BY ME on the date of service doing chart review, history, exam, documentation & further activities per the note.      Data     I have personally reviewed the following data over the past 24 hrs:    12.3 (H)  \   12.1   / 313     140 105 16.9 /  142 (H)   5.1 25 1.10 (H) \       ALT: 8 (L) AST: 13 AP: 68 TBILI: 0.2   ALB: 3.6 TOT PROTEIN: 5.7 (L) LIPASE: N/A       Imaging results reviewed over the past 24 hrs:   Recent Results (from the past 24 hour(s))   CT Abdomen Pelvis w Contrast    Narrative    EXAM: CT ABDOMEN PELVIS W CONTRAST  LOCATION: [facility]  DATE/TIME: 6/5/2023 10:57 PM CDT    INDICATION: RLQ pain; Crohn's.  COMPARISON: None.  TECHNIQUE: CT scan of the abdomen and pelvis was performed following injection of IV contrast. Multiplanar reformats were obtained. Dose reduction techniques were used.  CONTRAST: 75 mL Isovue 370    FINDINGS:   LOWER CHEST: Normal.    HEPATOBILIARY: The gallbladder is absent.    PANCREAS: Normal.    SPLEEN: Normal.    ADRENAL GLANDS: Normal.    KIDNEYS/BLADDER: The right kidney is absent. The left kidney is unremarkable.    BOWEL: There are several narrowed segments of ileum with resulting small bowel dilatation proximally. No definite active inflammatory changes. There is no free intraperitoneal gas. Trace amount of free pelvic fluid.    LYMPH NODES: Normal.    VASCULATURE: Atherosclerotic calcification of the aorta and its branches. No aneurysm.    PELVIC ORGANS: The uterus is absent. There is no adnexal mass.    MUSCULOSKELETAL: Degenerative disease in the spine.      Impression    IMPRESSION:   1.   There are segments of narrowed distal small bowel causing a partial small bowel obstruction. No definite active inflammation. No abscess formation.

## 2023-06-06 NOTE — PHARMACY-ADMISSION MEDICATION HISTORY
Pharmacist Admission Medication History    Admission medication history is complete. The information provided in this note is only as accurate as the sources available at the time of the update.    Medication reconciliation/reorder completed by provider prior to medication history? No    Information Source(s): Patient via written list    Changes made to PTA medication list:    Added: all medications    Deleted: None    Changed: None    Medication Affordability:  Not including over the counter (OTC) medications, was there a time in the past 3 months when you did not take your medications as prescribed because of cost?: No    Allergies reviewed with patient and updates made in EHR: yes    Medication History Completed By: Hossein Way [facility] 6/6/2023 11:44 AM    Prior to Admission medications    Medication Sig Last Dose Taking? Auth Provider Long Term End Date   acetaminophen (TYLENOL) 650 MG CR tablet Take 650 mg by mouth At Bedtime 6/4/2023 at hs Yes Unknown, Entered By History     albuterol (PROAIR HFA/PROVENTIL HFA/VENTOLIN HFA) 108 (90 Base) MCG/ACT inhaler Inhale 2 puffs into the lungs every 6 hours as needed for shortness of breath, wheezing or cough  Yes Unknown, Entered By History Yes    esomeprazole (NEXIUM) 40 MG DR capsule Take 40 mg by mouth 2 times daily (before meals) Take 30-60 minutes before eating. 6/5/2023 at am Yes Unknown, Entered By History     famotidine (PEPCID) 40 MG tablet Take 40 mg by mouth At Bedtime 6/4/2023 at hs Yes Unknown, Entered By History     montelukast (SINGULAIR) 10 MG tablet Take 10 mg by mouth At Bedtime 6/4/2023 at hs Yes Unknown, Entered By History Yes    nortriptyline (PAMELOR) 25 MG capsule Take 25 mg by mouth At Bedtime 6/4/2023 at hs Yes Unknown, Entered By History Yes    ondansetron (ZOFRAN ODT) 4 MG ODT tab Take 4-8 mg by mouth every 8 hours as needed for nausea  Yes Unknown, Entered By History     predniSONE (DELTASONE) 5 MG tablet Take by mouth See Admin  Instructions 6/5/2023 at am Yes Unknown, Entered By History     traZODone (DESYREL) 100 MG tablet Take 200 mg by mouth At Bedtime 6/4/2023 at hs Yes Unknown, Entered By History Yes

## 2023-06-06 NOTE — PLAN OF CARE
Goal Outcome Evaluation:    Orientation: Alert and oriented x4.   VSS. 97% on room air.   LS: Clear and equal bilaterally.   GI: Patient is not passing gas. Last BM yesterday per patient. Denies N/V.   : Adequate urine output. Tracking input and output.   Skin: C/d/I.   Activity: SBA. Pt ambulated to bathroom with SBA as needed.   Pain: 2-7/10 pain in lower abdomen. PRN Dilaudid given x2 with relief per pt. Patient complaining of hunger frequently, this nurse provided education on NPO status and patient verbalized understanding.   Updates/Plan: Continue NPO status. IVF. Manage pain. IV Solumedrol Q8H. Continue with current cares.

## 2023-06-06 NOTE — H&P
[facility]    History and Physical - Hospitalist Service       Date of Admission:  6/5/2023    Assessment & Plan      Devi Salas is a 62 year old female admitted on 6/5/2023.     Moderately severe exacerbation of Crohn's disease  CT scan shows segments of narrowing distal small bowel causing a partial bowel obstruction  Patient comes with nausea vomiting abdominal pain and diarrhea  Has failed oral prednisone therapy of 40 mg daily as outpatient and continue to have up to 14 loose stools.  Will give IV Solu-Medrol  IV fluids  Nausea medication Compazine which could also help in her headache  Pain medication  Keep patient n.p.o.  Consult GI  Give Lovenox for DVT prophylax    Asthma  Without any acute exacerbation  Give ProAir as needed    Migraine  We will give Compazine which will have both nausea as well as migraine    Acute kidney injury  Probably from dehydration from having 14 loose stools    Insomnia  Depression  Trazodone will be continued after medication reconciliation is done and patient can eat    Medication reconciliation has not been completed as yet     Diet:  NPO  DVT Prophylaxis: Enoxaparin (Lovenox) SQ  Monae Catheter: Not present  Lines: None     Cardiac Monitoring: None  Code Status:  Full code    Clinically Significant Risk Factors Present on Admission                                Disposition Plan    Inpatient admission    Josh Bruce MD  Hospitalist Service  [facility]  Securely message with [employer] (more info)  Text page via [employer] Paging/Directory     ______________________________________________________________________    Chief Complaint   Diarrhea    History is obtained from the patient, medical records and my discussion with emergency department physician      History of Present Illness   Dvei Salas is a 62 year old lady with history of migraine, asthma,?  Depression/insomnia came into [facility] 6/5/2023 with symptoms  of diarrhea which has been ongoing for the past 10 days for more.  She was started on prednisone 10 days ago increased from 5 mg daily to 40 mg daily.  This has not decreased her symptoms but rather has increased abdominal pain which she describes as 10/10 in the lower abdomen suprapubic area.  She had 14 loose stools on the day of admission.  There was no blood in it it was somewhat dark in color.  She denies any fever because she does not check it nevertheless had chills on and off.  She has nausea and vomited many times since March.  She vomited a day prior to admission.      Past Medical History    Asthma  Migraine  Crohn's disease diagnosed in colonoscopy that was done 5/24/2023    Past Surgical History   No past surgical history on file.    Prior to Admission Medications   Patient said that she takes Lasix 40 mg daily  Nortriptyline 25 mg daily for migraine prednisone 5 mg daily that was increased as mentioned above  ProAir inhaler for asthma  Zofran as needed  Trazodone 200 mg daily            Review of Systems    Has had a headache especially since contrast was given for CT scan, there is no blurring of vision or double vision, neck pain jaw pain or shoulder pain, chest pain, shortness of breath, cough or increased sputum production, otherwise as mentioned above has nausea, vomiting, abdominal pain, diarrhea.  Denies any urinary symptoms of burning or increased frequency. Denies any weakness of upper or lower extremities or any seizure activity. Fever but has chills.  Denies bleeding from anywhere else.  No rashes or cellulitis.      Social History   Denies any smoking rarely drinks alcohol lives with her     Family History   Patient's nieces and nephews have Crohn's disease    Allergies   Allergies   Allergen Reactions     Demerol Hcl [Meperidine]      N/V     Oxycodone      N/V     Percocet [Oxycodone-Acetaminophen]      N/V        Physical Exam   Vital Signs: Temp: 98.2  F (36.8  C) Temp src:  Temporal BP: (!) 140/98 Pulse: 100   Resp: 20 SpO2: 98 %      Weight: 0 lbs 0 oz      GENERAL: Patient does not look in any acute distress  HEENT: EOM+, Conjunctiva is clear   NECK:  no Jugular Venous distention  HEART: S1 S2 regular  Rate and Rhythm,  no murmur,    LUNGS: Respirations are not laboured, Lungs are clear to auscultation Crepitations or Wheezing   ABDOMEN: Soft, there is RLQ  tenderness, Bowel Sounds are Positive   LOWER LIMBS: no Pedal Edema  Bilaterally   CNS:  Alert,  Oriented x 3, Moving all the Four Limbs         Data   Imaging results reviewed over the past 24 hrs:   Recent Results (from the past 24 hour(s))   CT Abdomen Pelvis w Contrast    Narrative    EXAM: CT ABDOMEN PELVIS W CONTRAST  LOCATION: [facility]  DATE/TIME: 6/5/2023 10:57 PM CDT    INDICATION: RLQ pain; Crohn's.  COMPARISON: None.  TECHNIQUE: CT scan of the abdomen and pelvis was performed following injection of IV contrast. Multiplanar reformats were obtained. Dose reduction techniques were used.  CONTRAST: 75 mL Isovue 370    FINDINGS:   LOWER CHEST: Normal.    HEPATOBILIARY: The gallbladder is absent.    PANCREAS: Normal.    SPLEEN: Normal.    ADRENAL GLANDS: Normal.    KIDNEYS/BLADDER: The right kidney is absent. The left kidney is unremarkable.    BOWEL: There are several narrowed segments of ileum with resulting small bowel dilatation proximally. No definite active inflammatory changes. There is no free intraperitoneal gas. Trace amount of free pelvic fluid.    LYMPH NODES: Normal.    VASCULATURE: Atherosclerotic calcification of the aorta and its branches. No aneurysm.    PELVIC ORGANS: The uterus is absent. There is no adnexal mass.    MUSCULOSKELETAL: Degenerative disease in the spine.      Impression    IMPRESSION:   1.  There are segments of narrowed distal small bowel causing a partial small bowel obstruction. No definite active inflammation. No abscess formation.     Most Recent 3 CBC's:Recent  Labs   Lab Test 06/05/23 1854   WBC 14.2*   HGB 13.1   MCV 87        Most Recent 3 BMP's:Recent Labs   Lab Test 06/05/23 1854      POTASSIUM 4.7   CHLORIDE 102   CO2 24   BUN 14.7   CR 1.26*   ANIONGAP 14   GIORGIO 9.1   *     Most Recent 2 LFT's:Recent Labs   Lab Test 06/05/23 1854   AST 13   ALT 12   ALKPHOS 81   BILITOTAL 0.3

## 2023-06-06 NOTE — ED NOTES
[facility]  ED Nurse Handoff Report    ED Chief complaint: Abdominal Pain  . ED Diagnosis:   Final diagnoses:   RLQ abdominal pain       Allergies:   Allergies   Allergen Reactions     Demerol Hcl [Meperidine]      N/V     Oxycodone      N/V     Percocet [Oxycodone-Acetaminophen]      N/V       Code Status: Full Code    Activity level - Baseline/Home:  independent.  Activity Level - Current:   assist of 1.   Lift room needed: No.   Bariatric: No   Needed: No   Isolation: No.   Infection: Not Applicable.     Respiratory status: Room air    Vital Signs (within 30 minutes):   Vitals:    06/05/23 1847   BP: (!) 140/98   Pulse: 100   Resp: 20   Temp: 98.2  F (36.8  C)   TempSrc: Temporal   SpO2: 98%       Cardiac Rhythm:  ,      Pain level:    Patient confused: No.   Patient Falls Risk: nonskid shoes/slippers when out of bed and patient and family education.   Elimination Status: Has voided     Patient Report - Initial Complaint: Abdominal pain.   Focused Assessment:   ED arrival note:  Diarrhea and lower abdominal pain. Colonoscopy done last month that shows crohns. Sent here for IV steroids.    Devi Salas is a 62 year old female with history of Crohn's disease who presents with lower abdominal pain and diarrhea for a few weeks now with 14 episodes of diarrhea today. She was seen for a follow-up at [facility] today and referred here for IV steroids. She has been on 40 mg prednisone for 10 days without relief. Denies blood in stool. Reports when she eats, she immediately passes watery stool. Denies vomiting.    Abnormal Results:   Labs Ordered and Resulted from Time of ED Arrival to Time of ED Departure   COMPREHENSIVE METABOLIC PANEL - Abnormal       Result Value    Sodium 140      Potassium 4.7      Chloride 102      Carbon Dioxide (CO2) 24      Anion Gap 14      Urea Nitrogen 14.7      Creatinine 1.26 (*)     Calcium 9.1      Glucose 131 (*)     Alkaline Phosphatase 81      AST 13       ALT 12      Protein Total 6.8      Albumin 4.2      Bilirubin Total 0.3      GFR Estimate 48 (*)    CBC WITH PLATELETS AND DIFFERENTIAL - Abnormal    WBC Count 14.2 (*)     RBC Count 4.85      Hemoglobin 13.1      Hematocrit 42.1      MCV 87      MCH 27.0      MCHC 31.1 (*)     RDW 15.0      Platelet Count 404      % Neutrophils 86      % Lymphocytes 8      % Monocytes 4      % Eosinophils 0      % Basophils 0      % Immature Granulocytes 2      NRBCs per 100 WBC 0      Absolute Neutrophils 12.2 (*)     Absolute Lymphocytes 1.2      Absolute Monocytes 0.5      Absolute Eosinophils 0.0      Absolute Basophils 0.1      Absolute Immature Granulocytes 0.2      Absolute NRBCs 0.0          CT Abdomen Pelvis w Contrast    (Results Pending)       Treatments provided: See MAR  Family Comments:  at bedside  OBS brochure/video discussed/provided to patient:  Yes  ED Medications:   Medications   0.9% sodium chloride BOLUS (1,000 mLs Intravenous $New Bag 6/5/23 2241)   iopamidol (ISOVUE-370) solution 500 mL (has no administration in time range)   CT scan flush (has no administration in time range)   morphine (PF) injection 4 mg (4 mg Intravenous $Given 6/5/23 2242)   ondansetron (ZOFRAN) injection 4 mg (4 mg Intravenous $Given 6/5/23 2236)       Drips infusing:  No  For the majority of the shift this patient was Green.   Interventions performed were N/A.    Sepsis treatment initiated: No    Cares/treatment/interventions/medications to be completed following ED care: N/A    ED Nurse Name: Corin Busch RN  10:49 PM  RECEIVING UNIT ED HANDOFF REVIEW    Above ED Nurse Handoff Report was reviewed: Yes  Reviewed by: Francisca Lazo RN on June 6, 2023 at 12:29 AM

## 2023-06-06 NOTE — PROGRESS NOTES
Pt arrived to floor, from ED, at this time. Pt transported via cart and escorted by ED staff. Pt ambulated from cart to scale, then to bed, independently. Pt rating abdominal pain 5 (0-10 scale), and reports it is less than earlier. Pt is complaining of intermittent nausea. Pt oriented to room, floor, and plan for shift. Pt given scheduled solumedrol and prn compazine. Pts allergies reviewed and allergy band placed on right wrist. Pt became tearful when she stated it was recently her 20th anniversary and they were to be in [place] at this time. Pt accepted spiritual health services visit while here. Will continue to monitor pt comfort level, intake/output, vital signs, and provide interventions as needed.

## 2023-06-07 LAB
ANION GAP SERPL CALCULATED.3IONS-SCNC: 9 MMOL/L (ref 7–15)
BUN SERPL-MCNC: 14.2 MG/DL (ref 8–23)
C DIFF TOX B STL QL: NEGATIVE
CALCIUM SERPL-MCNC: 9.3 MG/DL (ref 8.8–10.2)
CHLORIDE SERPL-SCNC: 105 MMOL/L (ref 98–107)
CREAT SERPL-MCNC: 0.93 MG/DL (ref 0.51–0.95)
DEPRECATED HCO3 PLAS-SCNC: 26 MMOL/L (ref 22–29)
ERYTHROCYTE [DISTWIDTH] IN BLOOD BY AUTOMATED COUNT: 14.6 % (ref 10–15)
GFR SERPL CREATININE-BSD FRML MDRD: 69 ML/MIN/1.73M2
GLUCOSE SERPL-MCNC: 130 MG/DL (ref 70–99)
HBV CORE AB SERPL QL IA: NONREACTIVE
HBV SURFACE AB SERPL IA-ACNC: 0.79 M[IU]/ML
HBV SURFACE AB SERPL IA-ACNC: NONREACTIVE M[IU]/ML
HBV SURFACE AG SERPL QL IA: NONREACTIVE
HCT VFR BLD AUTO: 41.2 % (ref 35–47)
HGB BLD-MCNC: 12.9 G/DL (ref 11.7–15.7)
MCH RBC QN AUTO: 26.8 PG (ref 26.5–33)
MCHC RBC AUTO-ENTMCNC: 31.3 G/DL (ref 31.5–36.5)
MCV RBC AUTO: 86 FL (ref 78–100)
PLATELET # BLD AUTO: 363 10E3/UL (ref 150–450)
POTASSIUM SERPL-SCNC: 5.2 MMOL/L (ref 3.4–5.3)
RBC # BLD AUTO: 4.82 10E6/UL (ref 3.8–5.2)
SODIUM SERPL-SCNC: 140 MMOL/L (ref 136–145)
WBC # BLD AUTO: 15.2 10E3/UL (ref 4–11)

## 2023-06-07 PROCEDURE — 87493 C DIFF AMPLIFIED PROBE: CPT | Performed by: HOSPITALIST

## 2023-06-07 PROCEDURE — 250N000011 HC RX IP 250 OP 636: Performed by: INTERNAL MEDICINE

## 2023-06-07 PROCEDURE — 80048 BASIC METABOLIC PNL TOTAL CA: CPT | Performed by: INTERNAL MEDICINE

## 2023-06-07 PROCEDURE — 250N000011 HC RX IP 250 OP 636: Performed by: HOSPITALIST

## 2023-06-07 PROCEDURE — 120N000004 HC R&B MS OVERFLOW

## 2023-06-07 PROCEDURE — 36415 COLL VENOUS BLD VENIPUNCTURE: CPT | Performed by: INTERNAL MEDICINE

## 2023-06-07 PROCEDURE — 258N000003 HC RX IP 258 OP 636: Performed by: INTERNAL MEDICINE

## 2023-06-07 PROCEDURE — 99232 SBSQ HOSP IP/OBS MODERATE 35: CPT | Performed by: HOSPITALIST

## 2023-06-07 PROCEDURE — 85027 COMPLETE CBC AUTOMATED: CPT | Performed by: INTERNAL MEDICINE

## 2023-06-07 RX ORDER — DIPHENHYDRAMINE HYDROCHLORIDE 50 MG/ML
25 INJECTION INTRAMUSCULAR; INTRAVENOUS EVERY 6 HOURS PRN
Status: DISCONTINUED | OUTPATIENT
Start: 2023-06-07 | End: 2023-06-10

## 2023-06-07 RX ADMIN — SODIUM CHLORIDE: 900 INJECTION INTRAVENOUS at 05:26

## 2023-06-07 RX ADMIN — SODIUM CHLORIDE: 900 INJECTION INTRAVENOUS at 15:00

## 2023-06-07 RX ADMIN — SODIUM CHLORIDE: 900 INJECTION INTRAVENOUS at 14:41

## 2023-06-07 RX ADMIN — DIPHENHYDRAMINE HYDROCHLORIDE 25 MG: 50 INJECTION INTRAMUSCULAR; INTRAVENOUS at 12:31

## 2023-06-07 RX ADMIN — PROCHLORPERAZINE EDISYLATE 10 MG: 5 INJECTION INTRAMUSCULAR; INTRAVENOUS at 21:06

## 2023-06-07 RX ADMIN — HYDROMORPHONE HYDROCHLORIDE 0.3 MG: 1 INJECTION, SOLUTION INTRAMUSCULAR; INTRAVENOUS; SUBCUTANEOUS at 15:37

## 2023-06-07 RX ADMIN — PROCHLORPERAZINE EDISYLATE 10 MG: 5 INJECTION INTRAMUSCULAR; INTRAVENOUS at 15:36

## 2023-06-07 RX ADMIN — DIPHENHYDRAMINE HYDROCHLORIDE 25 MG: 50 INJECTION INTRAMUSCULAR; INTRAVENOUS at 21:06

## 2023-06-07 RX ADMIN — PROCHLORPERAZINE EDISYLATE 10 MG: 5 INJECTION INTRAMUSCULAR; INTRAVENOUS at 08:47

## 2023-06-07 RX ADMIN — METHYLPREDNISOLONE SODIUM SUCCINATE 62.5 MG: 125 INJECTION INTRAMUSCULAR; INTRAVENOUS at 08:48

## 2023-06-07 RX ADMIN — METHYLPREDNISOLONE SODIUM SUCCINATE 62.5 MG: 125 INJECTION INTRAMUSCULAR; INTRAVENOUS at 16:54

## 2023-06-07 RX ADMIN — METHYLPREDNISOLONE SODIUM SUCCINATE 62.5 MG: 125 INJECTION INTRAMUSCULAR; INTRAVENOUS at 01:19

## 2023-06-07 ASSESSMENT — ACTIVITIES OF DAILY LIVING (ADL)
ADLS_ACUITY_SCORE: 20

## 2023-06-07 NOTE — PROGRESS NOTES
[facility]    Medicine Progress Note - Hospitalist Service    Date of Admission:  6/5/2023    Assessment & Plan   Devi Salas is a 62 year old female with a past medical history significant for Crohn's disease, asthma, and migraines.  She presented to emergency room with diarrhea, nausea, and abdominal pain.  Found to have acute Crohn's exacerbation causing partial small bowel obstruction.     Partial small bowel obstruction.  -noted on CT, tolerating some ice chips  -GI following, advance diet per them     Acute Crohn's exacerbation.  -Gastroenterology following  -tolerating IV steroids well, await further improvement     Acute kidney injury.  -Creatinine initially elevated at 1.3.  -Likely prerenal due to dehydration from profuse diarrhea.  -now improved, cont IVF until oral intake improved    Leukocytosis  -likely from steroids, ruling out C dif as below  -afebrile, monitor off antibiotics for now    Diarrhea  -check C dif     Diet: NPO for Medical/Clinical Reasons Except for: Ice Chips    DVT Prophylaxis: Pneumatic Compression Devices  Monae Catheter: Not present  Lines: None     Cardiac Monitoring: None  Code Status: Full Code      Disposition Plan   Await improvement, likely another 1-2 days at least       Puma Madera DO  Hospitalist Service  [facility]  Securely message with [employer] (more info)  Text page via [employer] Paging/Directory   ______________________________________________________________________    Interval History   She's hungry but only tolerating ice chips currently. Severe abd pain with palpation but not in much pain at rest. Afebrile overnight, did have some diarrhea overnight-appx 8 stools and C dif is ordered    Physical Exam   Vital Signs: Temp: 98.1  F (36.7  C) Temp src: Oral BP: (!) 153/90 Pulse: 76   Resp: 16 SpO2: 98 % O2 Device: None (Room air)    Weight: 147 lbs 12.8 oz    Constitutional: awake, alert and cooperative  Eyes: pupils  equal, round and reactive to light and conjunctiva normal  ENT: normocepalic, without obvious abnormality, atramatic  Respiratory: no increased work of breathing, good air exchange and clear to auscultation  Cardiovascular: regular rate and rhythm and no murmur noted  GI: exquisitely tender to palpation rather diffusely, decreased but present bowel sounds  Skin: no bruising or bleeding  Neurologic: alert, oriented, no focal deficits    45 MINUTES SPENT BY ME on the date of service doing chart review, history, exam, documentation & further activities per the note.      Data   ------------------------- PAST 24 HR DATA REVIEWED -----------------------------------------------    I have personally reviewed the following data over the past 24 hrs:    15.2 (H)  \   12.9   / 363     140 105 14.2 /  130 (H)   5.2 26 0.93 \       Imaging results reviewed over the past 24 hrs:   No results found for this or any previous visit (from the past 24 hour(s)).

## 2023-06-07 NOTE — PROGRESS NOTES
"[facility]  [facility]  Gastroenterology Progress note    Interval History:    Patient seen in the morning. RLQ pain persists but has improved. 8 bowel movements since 1 AM (notes that as an outpatient she had been having 15 BMs per 24 hours). Some waves of nausea, no vomiting. Ice chips with some increased pain.     Vital Signs:      /83   Pulse 88   Temp 98  F (36.7  C) (Oral)   Resp 16   Ht 1.6 m (5' 3\")   Wt 68.9 kg (152 lb)   SpO2 98%   BMI 26.93 kg/m    Temp (24hrs), Av.1  F (36.7  C), Min:97.8  F (36.6  C), Max:98.2  F (36.8  C)    Patient Vitals for the past 72 hrs:   Weight   23 0052 68.9 kg (152 lb)       Intake/Output Summary (Last 24 hours) at 2023 1012  Last data filed at 2023 0527  Gross per 24 hour   Intake 2201.25 ml   Output 2300 ml   Net -98.75 ml         Constitutional: NAD, comfortable, laying in bed, wearing street clothes  Cardiovascular: RRR   Respiratory: non-labored  Abdomen: soft, non-distended, +BS, tender RLQ    Additional Comments:  ROS, FH, SH: See initial GI consult for details.    Laboratory Data:  Recent Labs   Lab Test 23  0858 23  0700 23  1854   WBC 15.2* 12.3* 14.2*   HGB 12.9 12.1 13.1   MCV 86 88 87    313 404     Recent Labs   Lab Test 23  0858 23  0700 23  1854    140 140   POTASSIUM 5.2 5.1 4.7   CHLORIDE 105 105 102   CO2 26 25 24   BUN 14.2 16.9 14.7   CR 0.93 1.10* 1.26*   ANIONGAP 9 10 14   GIORGIO 9.3 8.7* 9.1     Recent Labs   Lab Test 23  0700 23  1854   ALBUMIN 3.6 4.2   BILITOTAL 0.2 0.3   ALT 8* 12   AST 13 13   ALKPHOS 68 81     CT A/P 2023  1.  There are segments of narrowed distal small bowel causing a partial small bowel obstruction. No definite active inflammation. No abscess formation.     ASSESSMENT AND PLAN:    Crohn's Disease  Abdominal Pain  Diarrhea  Partial SBO     This patient is a 62-year-old female who has had " gastrointestinal symptoms including nausea, vomiting, unintended weight loss, abdominal pain, and diarrhea in March for which she has undergone an extensive work-up.  Recent colonoscopy was performed that showed stenosis as well as an ulceration at the ileum, pathology showed nonspecific mildly active chronic ileitis.  Presentation and work-up is most consistent with ileal Crohn's disease, though do note that she had been using NSAIDs regularly which may affect findings.     She was advised to come to the ED yesterday due to the severity of her symptoms and as she had not responded to oral prednisone.  Imaging on arrival showed narrowed distal small bowel causing a partial small bowel obstruction. No definite active inflammation noted on CT scan, though she has had inflammation noted on recent imaging.  It is likely that her symptoms are a combination of inflammation related to her underlying Crohn's disease as well as ileal stenosis seen during her colonoscopy. She may benefit from colorectal surgery consult if she does not respond to steroids.  We did briefly discuss long-term management of Crohn's disease with biologic therapy as well today.    IV steroids started on 6/6.     Plan  --NPO for now.  --C diff pending.   --Pain management per primary team.   --IV Solumedrol Q8H.  --Hepatitis B serologies, quantiferon gold pending   --Will consider CRS consult if she does not respond to steroids- would give the IV steroids at least another day.    Will discuss with Dr. Mathur.    20 minutes spent on patient care including chart review, patient visit, documentation, coordination.     Millicent Swain PA-C  [facility]  Cell: [phone] until 12PM  Office: [phone]

## 2023-06-07 NOTE — PROGRESS NOTES
Orientation: Alert and oriented.  VSS. RA.   LS: Clear  GI: Abdominal discomfort/tenderness. Not Passing gas. No BM. Denies N/V.   : Adequate urine output.  Skin: WDL  Activity: Independent. SBA with unplugging PIV pole.  Pain: 3-6/10. 0.3 mg IV dilaudid x1. Heat applied.  Updates/Plan: IVF. IV solumedrol. Continue with current cares.

## 2023-06-07 NOTE — PROVIDER NOTIFICATION
Paged at [phone]: PATO when trouble shooting pt IV, flushed and pt had severe pain at insertion site that traveled up her arm about 2 inches. IV removed with pain intermittently present. Cold applied intermittently. New IV placed.

## 2023-06-07 NOTE — PROGRESS NOTES
Shift Summary 1900-0730-  Pt vital sign stable and afebrile. Pt has appeared to have rested throughout shift, but reports she was unable to sleep much at all. Pt did receive a new PIV to right hand, and IV fluids continue to infuse per orders. Pt remains NPO, except for ice chips. Pt received dilaudid x1 and compazine x1 this shift. Many visitors at beginning of shift, but no calls/visits since 2030. Passing flatus. Pt up to bathroom to void and reports several loose/liqud stools since around 0100. Will continue to monitor pts comfort level, intake/output, vital signs, and provide interventions as needed.

## 2023-06-08 LAB
CREAT SERPL-MCNC: 0.99 MG/DL (ref 0.51–0.95)
GAMMA INTERFERON BACKGROUND BLD IA-ACNC: 0 IU/ML
GFR SERPL CREATININE-BSD FRML MDRD: 64 ML/MIN/1.73M2
M TB IFN-G BLD-IMP: NEGATIVE
M TB IFN-G CD4+ BCKGRND COR BLD-ACNC: 3.46 IU/ML
MITOGEN IGNF BCKGRD COR BLD-ACNC: 0 IU/ML
MITOGEN IGNF BCKGRD COR BLD-ACNC: 0 IU/ML
PLATELET # BLD AUTO: 327 10E3/UL (ref 150–450)
QUANTIFERON MITOGEN: 3.46 IU/ML
QUANTIFERON NIL TUBE: 0 IU/ML
QUANTIFERON TB1 TUBE: 0 IU/ML
QUANTIFERON TB2 TUBE: 0

## 2023-06-08 PROCEDURE — 120N000004 HC R&B MS OVERFLOW

## 2023-06-08 PROCEDURE — 82565 ASSAY OF CREATININE: CPT | Performed by: INTERNAL MEDICINE

## 2023-06-08 PROCEDURE — 250N000011 HC RX IP 250 OP 636: Performed by: HOSPITALIST

## 2023-06-08 PROCEDURE — 258N000003 HC RX IP 258 OP 636: Performed by: INTERNAL MEDICINE

## 2023-06-08 PROCEDURE — 36415 COLL VENOUS BLD VENIPUNCTURE: CPT | Performed by: INTERNAL MEDICINE

## 2023-06-08 PROCEDURE — 85049 AUTOMATED PLATELET COUNT: CPT | Performed by: INTERNAL MEDICINE

## 2023-06-08 PROCEDURE — 250N000011 HC RX IP 250 OP 636: Performed by: INTERNAL MEDICINE

## 2023-06-08 PROCEDURE — 99232 SBSQ HOSP IP/OBS MODERATE 35: CPT | Performed by: HOSPITALIST

## 2023-06-08 PROCEDURE — 250N000013 HC RX MED GY IP 250 OP 250 PS 637: Performed by: INTERNAL MEDICINE

## 2023-06-08 PROCEDURE — 258N000003 HC RX IP 258 OP 636: Performed by: HOSPITALIST

## 2023-06-08 RX ADMIN — DIPHENHYDRAMINE HYDROCHLORIDE 25 MG: 50 INJECTION INTRAMUSCULAR; INTRAVENOUS at 22:04

## 2023-06-08 RX ADMIN — PROCHLORPERAZINE EDISYLATE 10 MG: 5 INJECTION INTRAMUSCULAR; INTRAVENOUS at 04:44

## 2023-06-08 RX ADMIN — SODIUM CHLORIDE: 900 INJECTION INTRAVENOUS at 03:04

## 2023-06-08 RX ADMIN — METHYLPREDNISOLONE SODIUM SUCCINATE 62.5 MG: 125 INJECTION INTRAMUSCULAR; INTRAVENOUS at 01:24

## 2023-06-08 RX ADMIN — HYDROMORPHONE HYDROCHLORIDE 0.3 MG: 1 INJECTION, SOLUTION INTRAMUSCULAR; INTRAVENOUS; SUBCUTANEOUS at 04:45

## 2023-06-08 RX ADMIN — METHYLPREDNISOLONE SODIUM SUCCINATE 62.5 MG: 125 INJECTION INTRAMUSCULAR; INTRAVENOUS at 17:55

## 2023-06-08 RX ADMIN — SODIUM CHLORIDE: 900 INJECTION INTRAVENOUS at 18:11

## 2023-06-08 RX ADMIN — NORTRIPTYLINE HYDROCHLORIDE 25 MG: 25 CAPSULE ORAL at 22:03

## 2023-06-08 RX ADMIN — METHYLPREDNISOLONE SODIUM SUCCINATE 62.5 MG: 125 INJECTION INTRAMUSCULAR; INTRAVENOUS at 09:10

## 2023-06-08 RX ADMIN — PROCHLORPERAZINE EDISYLATE 10 MG: 5 INJECTION INTRAMUSCULAR; INTRAVENOUS at 22:04

## 2023-06-08 ASSESSMENT — ACTIVITIES OF DAILY LIVING (ADL)
ADLS_ACUITY_SCORE: 20

## 2023-06-08 NOTE — PLAN OF CARE
VS: Afebrile.   Respiratory: WDL.   GI:  Audible bowel sounds. 2 loose stools overnight per pt; passing gas. Intermittent nausea; IV compazine x1 given. Abdominal discomfort.   : Voiding.  Activity: Independent in room.  Pain: Rated pain 4-6. IV Dilaudid x1 given.   Lines: L PIV infusing NS @ 75 mL/hr.   Plan: Pain management. IV fluids. Promote pt comfort.

## 2023-06-08 NOTE — PLAN OF CARE
Goal Outcome Evaluation [id number]:    Orientation: Alert and oriented x4.   VSS except for hypertension. 99% on room air.   LS: Clear and equal bilaterally.   GI: Patient is passing gas. No BM this shift. Denies N/V. Audible bowel sounds. Advanced to a clear liquid diet.   : Adequate urine output. Patient ambulating to the bathroom independently as needed.   Skin: C/d/i.   Activity: Independent. Patient took a shower this shift. Pt rested comfortably throughout shift between cares.   Pain: 0/10. Patient denies any pain.    Updates/Plan: Advanced to clear liquid diet, monitor tolerance. IV steroids. Monitor vital signs. Manage pain. Continue with current cares.

## 2023-06-08 NOTE — PROGRESS NOTES
"  [facility]  [facility]  Gastroenterology Progress note    Interval History:    Doing much better- feels \"like a new person\". No abdominal pain even with palpation. Stools seem to be forming up somewhat. About 8 bowel movements per 24 hours. No bloody or black stools. No n/v.     Vital Signs:      BP (!) 145/86 (BP Location: Right arm)   Pulse 74   Temp 98.2  F (36.8  C) (Oral)   Resp 13   Ht 1.6 m (5' 3\")   Wt 67 kg (147 lb 12.8 oz)   SpO2 97%   BMI 26.18 kg/m    Temp (24hrs), Av.1  F (36.7  C), Min:98.1  F (36.7  C), Max:98.2  F (36.8  C)    Patient Vitals for the past 72 hrs:   Weight   23 1203 67 kg (147 lb 12.8 oz)   23 0052 68.9 kg (152 lb)       Intake/Output Summary (Last 24 hours) at 2023 1254  Last data filed at 2023 0400  Gross per 24 hour   Intake --   Output 1000 ml   Net -1000 ml         Constitutional: NAD, comfortable, sitting up in bed. Is in street clothes- t shirt and shorts.   Cardiovascular: RRR   Respiratory: Non-labored  Abdomen: soft, non-tender, nondistended, +BS    Additional Comments:  ROS, FH, SH: See initial GI consult for details.    Laboratory Data:  Recent Labs   Lab Test 23  0656 23  0858 23  0700 23  1854   WBC  --  15.2* 12.3* 14.2*   HGB  --  12.9 12.1 13.1   MCV  --  86 88 87    363 313 404     Recent Labs   Lab Test 23  0656 23  0858 23  0700 23  1854   NA  --  140 140 140   POTASSIUM  --  5.2 5.1 4.7   CHLORIDE  --  105 105 102   CO2  --  26 25 24   BUN  --  14.2 16.9 14.7   CR 0.99* 0.93 1.10* 1.26*   ANIONGAP  --  9 10 14   GIORGIO  --  9.3 8.7* 9.1     Recent Labs   Lab Test 23  0700 23  1854   ALBUMIN 3.6 4.2   BILITOTAL 0.2 0.3   ALT 8* 12   AST 13 13   ALKPHOS 68 81       CT A/P 2023  1.  There are segments of narrowed distal small bowel causing a partial small bowel obstruction. No definite active inflammation. No abscess " formation.     ASSESSMENT AND PLAN:    Crohn's Disease  Abdominal Pain  Diarrhea  Partial SBO     This patient is a 62-year-old female who has had gastrointestinal symptoms including nausea, vomiting, unintended weight loss, abdominal pain, and diarrhea in March for which she has undergone an extensive work-up.  Recent colonoscopy was performed that showed stenosis as well as an ulceration at the ileum, pathology showed nonspecific mildly active chronic ileitis.  Presentation and work-up is most consistent with ileal Crohn's disease, though do note that she had been using NSAIDs regularly which may affect findings.     She was advised to come to the ED due to the severity of her symptoms and as she had not responded to oral prednisone.  Imaging on arrival showed narrowed distal small bowel causing a partial small bowel obstruction. No definite active inflammation noted on CT scan, though she has had inflammation noted on recent imaging.  It is likely that her symptoms are a combination of inflammation related to her underlying Crohn's disease as well as ileal stenosis seen during her colonoscopy. She may benefit from colorectal surgery consult if she does not respond to steroids-- hepatitis B negative, TB quant pending. C. Diff negative.  Steroids started 6/6 with resolution of abdominal pain and improvement of diarrhea.      Plan  --Advance to clear liquids and if going well, can advance as tolerated to low fiber  --Pain management per primary team.   --IV Solumedrol Q8H. Will likely switch to oral steroid taper tomorrow.   --Quantiferon gold pending     Will discuss with Dr. Mathur.    20 minutes spent on patient care including chart review, patient visit, documentation, coordination.     Millicent Swain PA-C  [facility]  Cell: [phone] until 12PM  Office: [phone]

## 2023-06-08 NOTE — PROGRESS NOTES
[id number]  Orientation: Alert and oriented.  VSS. Htn. RA.   LS: Clear  GI: Abdominal discomfort and tenderness. Per pt improved since yesterday. Passing gas. BM x5. Continuous nausea. Compazine x3 with relief. Per pt when has ice chips she feels she has a BM shortly after. C-diff tested, negative.  : Adequate urine output.  Skin: WDL  Activity: Independent. Pt attempting to sleep between cares. IV benadryl x2 for sleep.  Pain: 2-6/10. IV dilaudid x1  Updates/Plan: Pt got new IV in left forearm, intermittently tender. Hot compress applied with decrease in IV rate to 75 mL/hr with relief. Continue with IVF, Q8 IV solumedrol. Pain/nausea management. Monitor I&Os.

## 2023-06-08 NOTE — PROGRESS NOTES
[facility]    Medicine Progress Note - Hospitalist Service    Date of Admission:  6/5/2023    Assessment & Plan   Devi Salas is a 62 year old female with a past medical history significant for Crohn's disease, asthma, and migraines.  She presented to emergency room with diarrhea, nausea, and abdominal pain.  Found to have acute Crohn's exacerbation causing partial small bowel obstruction.     Partial small bowel obstruction.  -noted on CT, 2/2 acute crohns dx  -seems to be slowly improving as abd pain much improved although nausea and diarrhea persist  -GI advanced diet to clears today, monitor response  -decrease IVF to 75 until consistently tolerating oral intake     Acute Crohn's exacerbation.  -Gastroenterology following  -tolerating IV steroids well, await further improvement     Acute kidney injury.  -Creatinine initially elevated at 1.3.  -Likely prerenal due to dehydration from profuse diarrhea  -CLD and fluids as above    Leukocytosis  -likely from steroids, ruling out C dif as below  -afebrile, monitor off antibiotics for now    Diarrhea  -negative for C dif     Diet: NPO for Medical/Clinical Reasons Except for: Ice Chips    DVT Prophylaxis: Pneumatic Compression Devices  Monae Catheter: Not present  Lines: None     Cardiac Monitoring: None  Code Status: Full Code      Disposition Plan    await improvement, unclear timetable    Puma Madera,   Hospitalist Service  [facility]  Securely message with [email or web address] (more info)  Text page via [employer] Paging/Directory   ______________________________________________________________________    Interval History   No significant overnight events, still with some pain but seems more positional and able to press on her own abdomen today. Still with some persistent nausea and diarrhea but no vomiting. No fever or chills    Physical Exam   Vital Signs: Temp: 98.2  F (36.8  C) Temp src: Oral BP: (!) 145/86 Pulse: 74   Resp:  13 SpO2: 97 % O2 Device: None (Room air)    Weight: 147 lbs 12.8 oz  Constitutional: awake, alert and cooperative  Eyes: pupils equal, round and reactive to light and conjunctiva normal  ENT: normocepalic, without obvious abnormality, atramatic  Respiratory: no increased work of breathing, good air exchange and clear to auscultation  Cardiovascular: regular rate and rhythm and no murmur noted  GI: markedly improved abd tenderness, no rebound, improved bowel sounds  Skin: no bruising or bleeding  Neurologic: alert, oriented, no focal deficits    55 MINUTES SPENT BY ME on the date of service doing chart review, history, exam, documentation & further activities per the note.      Data   ------------------------- PAST 24 HR DATA REVIEWED -----------------------------------------------    I have personally reviewed the following data over the past 24 hrs:    N/A  \   N/A   / 327     N/A N/A N/A /  N/A   N/A N/A 0.99 (H) \       Imaging results reviewed over the past 24 hrs:   No results found for this or any previous visit (from the past 24 hour(s)).

## 2023-06-09 LAB
ANION GAP SERPL CALCULATED.3IONS-SCNC: 8 MMOL/L (ref 7–15)
BUN SERPL-MCNC: 18.1 MG/DL (ref 8–23)
CALCIUM SERPL-MCNC: 9 MG/DL (ref 8.8–10.2)
CHLORIDE SERPL-SCNC: 106 MMOL/L (ref 98–107)
CREAT SERPL-MCNC: 1.06 MG/DL (ref 0.51–0.95)
DEPRECATED HCO3 PLAS-SCNC: 26 MMOL/L (ref 22–29)
ERYTHROCYTE [DISTWIDTH] IN BLOOD BY AUTOMATED COUNT: 14.6 % (ref 10–15)
GFR SERPL CREATININE-BSD FRML MDRD: 59 ML/MIN/1.73M2
GLUCOSE SERPL-MCNC: 100 MG/DL (ref 70–99)
HCT VFR BLD AUTO: 39.3 % (ref 35–47)
HGB BLD-MCNC: 12.4 G/DL (ref 11.7–15.7)
MCH RBC QN AUTO: 27.1 PG (ref 26.5–33)
MCHC RBC AUTO-ENTMCNC: 31.6 G/DL (ref 31.5–36.5)
MCV RBC AUTO: 86 FL (ref 78–100)
PLATELET # BLD AUTO: 323 10E3/UL (ref 150–450)
POTASSIUM SERPL-SCNC: 4.2 MMOL/L (ref 3.4–5.3)
RBC # BLD AUTO: 4.57 10E6/UL (ref 3.8–5.2)
SODIUM SERPL-SCNC: 140 MMOL/L (ref 136–145)
WBC # BLD AUTO: 13.5 10E3/UL (ref 4–11)

## 2023-06-09 PROCEDURE — 85018 HEMOGLOBIN: CPT | Performed by: HOSPITALIST

## 2023-06-09 PROCEDURE — 250N000011 HC RX IP 250 OP 636: Performed by: HOSPITALIST

## 2023-06-09 PROCEDURE — 250N000013 HC RX MED GY IP 250 OP 250 PS 637: Performed by: STUDENT IN AN ORGANIZED HEALTH CARE EDUCATION/TRAINING PROGRAM

## 2023-06-09 PROCEDURE — 80048 BASIC METABOLIC PNL TOTAL CA: CPT | Performed by: HOSPITALIST

## 2023-06-09 PROCEDURE — 36415 COLL VENOUS BLD VENIPUNCTURE: CPT | Performed by: HOSPITALIST

## 2023-06-09 PROCEDURE — 120N000004 HC R&B MS OVERFLOW

## 2023-06-09 PROCEDURE — 99232 SBSQ HOSP IP/OBS MODERATE 35: CPT | Performed by: HOSPITALIST

## 2023-06-09 PROCEDURE — 250N000013 HC RX MED GY IP 250 OP 250 PS 637: Performed by: INTERNAL MEDICINE

## 2023-06-09 PROCEDURE — 250N000012 HC RX MED GY IP 250 OP 636 PS 637: Performed by: PHYSICIAN ASSISTANT

## 2023-06-09 RX ORDER — PREDNISONE 20 MG/1
40 TABLET ORAL DAILY
Status: DISCONTINUED | OUTPATIENT
Start: 2023-06-09 | End: 2023-06-12

## 2023-06-09 RX ORDER — ONDANSETRON 2 MG/ML
4 INJECTION INTRAMUSCULAR; INTRAVENOUS EVERY 6 HOURS PRN
Status: DISCONTINUED | OUTPATIENT
Start: 2023-06-09 | End: 2023-06-10

## 2023-06-09 RX ORDER — ACETAMINOPHEN 325 MG/1
650 TABLET ORAL EVERY 4 HOURS PRN
Status: DISCONTINUED | OUTPATIENT
Start: 2023-06-09 | End: 2023-06-17

## 2023-06-09 RX ADMIN — ONDANSETRON 4 MG: 2 INJECTION INTRAMUSCULAR; INTRAVENOUS at 22:36

## 2023-06-09 RX ADMIN — ONDANSETRON 4 MG: 2 INJECTION INTRAMUSCULAR; INTRAVENOUS at 16:11

## 2023-06-09 RX ADMIN — TRAZODONE HYDROCHLORIDE 200 MG: 100 TABLET ORAL at 22:28

## 2023-06-09 RX ADMIN — PREDNISONE 40 MG: 20 TABLET ORAL at 12:25

## 2023-06-09 RX ADMIN — ACETAMINOPHEN 650 MG: 325 TABLET, FILM COATED ORAL at 22:32

## 2023-06-09 RX ADMIN — NORTRIPTYLINE HYDROCHLORIDE 25 MG: 25 CAPSULE ORAL at 22:29

## 2023-06-09 ASSESSMENT — ACTIVITIES OF DAILY LIVING (ADL)
ADLS_ACUITY_SCORE: 20

## 2023-06-09 NOTE — PROGRESS NOTES
"[facility]  [facility]  Gastroenterology Progress note    Interval History:    No abdominal pain. 4 small loose (not liquid) bowel movements. No rectal bleeding or melena. No n/v. Tolerating low fiber diet.     Vital Signs:      BP (!) 146/76 (BP Location: Left arm)   Pulse 69   Temp 97.9  F (36.6  C) (Oral)   Resp 16   Ht 1.6 m (5' 3\")   Wt 67 kg (147 lb 12.8 oz)   SpO2 97%   BMI 26.18 kg/m    Temp (24hrs), Av  F (36.7  C), Min:97.9  F (36.6  C), Max:98.1  F (36.7  C)    Patient Vitals for the past 72 hrs:   Weight   23 1203 67 kg (147 lb 12.8 oz)       Intake/Output Summary (Last 24 hours) at 2023 1001  Last data filed at 2023 0732  Gross per 24 hour   Intake 680 ml   Output 600 ml   Net 80 ml         Constitutional: NAD, comfortable, just showered, sitting in bed in street clothes   Respiratory: non-labored  Abdomen: soft, non-tender, nondistended, +BS    Additional Comments:  ROS, FH, SH: See initial GI consult for details.    Laboratory Data:  Recent Labs   Lab Test 23  0639 23  0656 23  0858 23  0700   WBC 13.5*  --  15.2* 12.3*   HGB 12.4  --  12.9 12.1   MCV 86  --  86 88    327 363 313     Recent Labs   Lab Test 23  0639 23  0656 23  0858 23  0700     --  140 140   POTASSIUM 4.2  --  5.2 5.1   CHLORIDE 106  --  105 105   CO2 26  --  26 25   BUN 18.1  --  14.2 16.9   CR 1.06* 0.99* 0.93 1.10*   ANIONGAP 8  --  9 10   GIORGIO 9.0  --  9.3 8.7*     Recent Labs   Lab Test 23  0700 23  1854   ALBUMIN 3.6 4.2   BILITOTAL 0.2 0.3   ALT 8* 12   AST 13 13   ALKPHOS 68 81     CT A/P 2023  1.  There are segments of narrowed distal small bowel causing a partial small bowel obstruction. No definite active inflammation. No abscess formation.     ASSESSMENT AND PLAN:    Crohn's Disease  Abdominal Pain  Diarrhea  Partial SBO     This patient is a 62-year-old female who has had " gastrointestinal symptoms including nausea, vomiting, unintended weight loss, abdominal pain, and diarrhea in March for which she has undergone an extensive work-up.  Recent colonoscopy was performed that showed stenosis as well as an ulceration at the ileum, pathology showed nonspecific mildly active chronic ileitis.  Presentation and work-up is most consistent with ileal Crohn's disease, though do note that she had been using NSAIDs regularly which may affect findings.      She was advised to come to the ED due to the severity of her symptoms and as she had not responded to oral prednisone.  Imaging on arrival showed narrowed distal small bowel causing a partial small bowel obstruction. No definite active inflammation noted on CT scan, though she has had inflammation noted on recent imaging.  It is likely that her symptoms are a combination of inflammation related to her underlying Crohn's disease as well as ileal stenosis seen during her colonoscopy.     Hepatitis B and TB quant gold negative. C. Diff negative.  Steroids started 6/6 with resolution of abdominal pain and improvement of diarrhea-- switched back to oral steroid taper on 6/9     Plan  -- Will start on oral prednisone taper-- start with 40mg daily x 7 days and decrease by 5mg every 7 days.   -- Has [facility] follow up already scheduled at the end of June, continue with this visit.   -- Ok for discharge from GI perspective. GI will sign off, please call with questions/concerns.     Discussed with Dr. Mathur.    15 minutes spent on patient care including chart review, patient visit, documentation, coordination.     Millicent Swain PA-C  [facility]  Cell: [phone] until 12PM  Office: [phone]

## 2023-06-09 NOTE — PROGRESS NOTES
[facility]    Medicine Progress Note - Hospitalist Service    Date of Admission:  6/5/2023    Assessment & Plan   Devi Salas is a 62 year old female with a past medical history significant for Crohn's disease, asthma, and migraines.  She presented to emergency room with diarrhea, nausea, and abdominal pain.  Found to have acute Crohn's exacerbation causing partial small bowel obstruction.     Partial small bowel obstruction.  -noted on CT, 2/2 acute crohns dx  -seems to be slowly improving as abd pain much improved although nausea and diarrhea persist  -GI advanced diet to clears, patient has been hesitant to advance further so far but obstructive symptoms seem to be resolved  -fluids stopped     Acute Crohn's exacerbation.  -Gastroenterology following  -completed 3 days IV steroids, defer start of oral steroids to GI team     Acute kidney injury.  -Creatinine initially elevated at 1.3.  -Likely prerenal due to dehydration from profuse diarrhea  -oral intake improving, stop fluids and follow-up BMP in am    Leukocytosis  -likely from steroids, ruling out C dif as below  -afebrile, monitor off antibiotics for now    Diarrhea  -negative for C dif, sounds like chronic issue for her w/SBO possibly contributing     Diet: Advance Diet as Tolerated: Low Fiber; Low Fiber    DVT Prophylaxis: Pneumatic Compression Devices  Monae Catheter: Not present  Lines: None     Cardiac Monitoring: None  Code Status: Full Code         Disposition Plan   Possibly next 1-3 days       Puma Madera DO  Hospitalist Service  [facility]  Securely message with [employer] (more info)  Text page via [employer] Paging/Directory   ______________________________________________________________________    Interval History   No overnight events. Passing gas and liquid stool but no significant abd pain. Tolerating clear liquids, some nausea but seemed more psychological as didn't like the chicken broth  apparently.     Physical Exam   Vital Signs: Temp: 97.9  F (36.6  C) Temp src: Oral BP: (!) 146/76 Pulse: 69   Resp: 16 SpO2: 97 % O2 Device: None (Room air)    Weight: 147 lbs 12.8 oz  Constitutional: awake, alert and cooperative  Eyes: pupils equal, round and reactive to light and conjunctiva normal  ENT: normocepalic, without obvious abnormality, atramatic  Respiratory: no increased work of breathing, good air exchange and clear to auscultation  Cardiovascular: regular rate and rhythm and no murmur noted  GI: markedly improved abd tenderness, no rebound, improved bowel sounds  Skin: no bruising or bleeding  Neurologic: alert, oriented, no focal deficits    45 MINUTES SPENT BY ME on the date of service doing chart review, history, exam, documentation & further activities per the note.      Data   ------------------------- PAST 24 HR DATA REVIEWED -----------------------------------------------    I have personally reviewed the following data over the past 24 hrs:    13.5 (H)  \   12.4   / 323     140 106 18.1 /  100 (H)   4.2 26 1.06 (H) \       Imaging results reviewed over the past 24 hrs:   No results found for this or any previous visit (from the past 24 hour(s)).

## 2023-06-09 NOTE — PLAN OF CARE
Goal Outcome Evaluation: Pt up ad patrick in halls. Denies pain or nausea. Able to tolerate clears today and small amount low fiber foods for late breakfast. Hyper bowel sounds. Voiding well and 1 loose stool today with with red tinge but had red jello so is switching to orange jello. PO prednisone begun today. Alert and oriented, pleasant. Showered today. Will monitor.

## 2023-06-09 NOTE — PROGRESS NOTES
"CLINICAL NUTRITION SERVICES  -  ASSESSMENT NOTE    Recommendations Ordered by Registered Dietitian (RD):   Diet per GI - currently on a low fiber diet    Malnutrition Diagnosis: Unable to determine due to lack of weight hx, nutrition hx and nutrition focused physical exam        REASON FOR ASSESSMENT  Devi Salas is a 62 year old female seen by Registered Dietitian for Admission Nutrition Risk Screen for positive    Past medical history: migraine, asthma,?  Depression/insomnia     Admitted for: Moderately severe exacerbation of Crohn's disease    NUTRITION HISTORY  - Information obtained from chart - attempted to see patient x 2, patient not available   - Food allergies: NKFA    CURRENT NUTRITION ORDERS  Diet Order:     ADAT: Low Fiber     Current Intake/Tolerance:  No information recorded in the flowsheets to comment on. NPO/clear liquids x 2-3 days.     Obtained from Chart/Interdisciplinary Team:  - GI following   - Reviewed stooling patterns  - No documented PI    ANTHROPOMETRICS  Height: 5' 3\"  Weight: 67 kg ( 147 lbs 12.8 oz)   Body mass index is 26.18 kg/m .  Weight Status:  Overweight BMI 25-29.9  Weight History: No weight history to comment   Wt Readings from Last 10 Encounters:   06/07/23 67 kg (147 lb 12.8 oz)     LABS  Labs reviewed    Labs:  Electrolytes  Potassium (mmol/L)   Date Value   06/09/2023 4.2   06/07/2023 5.2   06/06/2023 5.1    Blood Glucose  Glucose (mg/dL)   Date Value   06/09/2023 100 (H)   06/07/2023 130 (H)   06/06/2023 142 (H)   06/05/2023 131 (H)    Inflammatory Markers  WBC Count (10e3/uL)   Date Value   06/09/2023 13.5 (H)   06/07/2023 15.2 (H)   06/06/2023 12.3 (H)     Albumin (g/dL)   Date Value   06/06/2023 3.6   06/05/2023 4.2      Sodium (mmol/L)   Date Value   06/09/2023 140   06/07/2023 140   06/06/2023 140    Renal  Urea Nitrogen (mg/dL)   Date Value   06/09/2023 18.1   06/07/2023 14.2   06/06/2023 16.9     Creatinine (mg/dL)   Date Value   06/09/2023 1.06 (H) "   06/08/2023 0.99 (H)   06/07/2023 0.93     Additional  Ketones Urine (mg/dL)   Date Value   03/25/2005 Negative        MEDICATIONS  Medications reviewed      nortriptyline  25 mg Oral At Bedtime     sodium chloride (PF)  3 mL Intracatheter Q8H     traZODone  200 mg Oral At Bedtime         diphenhydrAMINE, HYDROmorphone, lidocaine 4%, lidocaine (buffered or not buffered), melatonin, naloxone **OR** naloxone **OR** naloxone **OR** naloxone, oxyCODONE, prochlorperazine **OR** prochlorperazine, sodium chloride (PF)     ASSESSED NUTRITION NEEDS PER APPROVED PRACTICE GUIDELINES:    Dosing Weight 67 kg   Estimated Energy Needs: 2106-3665 kcals (25-30 Kcal/Kg)  Justification: maintenance  Estimated Protein Needs: 67-80 grams protein (1-1.2 g pro/Kg)  Justification: maintenance  Estimated Fluid Needs: per MD     NUTRITION DIAGNOSIS:  Predicted inadequate nutrient intake related to potential for PO intake to decline pending clinical course - diet tolerance     NUTRITION INTERVENTIONS  Recommendations / Nutrition Prescription  Diet per GI - currently on a low fiber diet     Implementation  Nutrition education: patient not available in the room with 2 attempts, left low fiber handouts in her room.     Nutrition Goals  Patient to consume 75% of meals or oral nutritional supplements ordered TID    MONITORING AND EVALUATION:  Progress towards goals will be monitored and evaluated per protocol and Practice Guidelines    Helena Rivero RD, LD  Clinical Dietitian     3rd floor/ICU: [phone]  All other floors: [phone]  Weekend/holiday: [phone]  Office: [phone]

## 2023-06-09 NOTE — PLAN OF CARE
Goal Outcome Evaluation:  Updates: VSS, Bps slighlty elevated. Independent in room. Ambulated in dietz x1. Pt rested comfortably between cares. Took PTA nortriptyline for HA this evening. Continues to decline trazodone until she is able to tolerate more PO, so IV benadryl given at bedtime at pt's request. Reports back soreness, requested staff give back rub.  Resp: WDL  GI/: + flatus, 1 loose BM overnight at 2100, hyperactive BS. Abdominal tenderness to R/LLQ on palpation, nondistended. Mild nausea, compazine given x1 with good effect. Tolerating clear liquid diet ok, some increased abdominal pain after lemon ice and taking too large of sips of water. Tolerated about 500mL throughout the night. Voiding, UO WDL.  IV: New IV placed by flyer in R forearm. WDL, infusing NS@50.  Pain/Comfort: 0/10.  Skin: WDL  Plan: Possible oral steroids today, goal is to ADAT to low fiber, AM labs pending, and provide for comfort and needs.

## 2023-06-09 NOTE — PLAN OF CARE
Goal Outcome Evaluation:      Plan of Care Reviewed With: patient    Overall Patient Progress: improvingOverall Patient Progress: improving         Elevated BP.  Denies pain.  Tolerating clears.  IV solumedrol complete; plan to transition to orals in morning.

## 2023-06-10 LAB
ANION GAP SERPL CALCULATED.3IONS-SCNC: 10 MMOL/L (ref 7–15)
BUN SERPL-MCNC: 22.6 MG/DL (ref 8–23)
CALCIUM SERPL-MCNC: 8.7 MG/DL (ref 8.8–10.2)
CHLORIDE SERPL-SCNC: 104 MMOL/L (ref 98–107)
CREAT SERPL-MCNC: 1.16 MG/DL (ref 0.51–0.95)
DEPRECATED HCO3 PLAS-SCNC: 24 MMOL/L (ref 22–29)
ERYTHROCYTE [DISTWIDTH] IN BLOOD BY AUTOMATED COUNT: 14.6 % (ref 10–15)
GFR SERPL CREATININE-BSD FRML MDRD: 53 ML/MIN/1.73M2
GLUCOSE SERPL-MCNC: 115 MG/DL (ref 70–99)
HCT VFR BLD AUTO: 39.9 % (ref 35–47)
HGB BLD-MCNC: 12.5 G/DL (ref 11.7–15.7)
MCH RBC QN AUTO: 27.1 PG (ref 26.5–33)
MCHC RBC AUTO-ENTMCNC: 31.3 G/DL (ref 31.5–36.5)
MCV RBC AUTO: 87 FL (ref 78–100)
PLATELET # BLD AUTO: 303 10E3/UL (ref 150–450)
POTASSIUM SERPL-SCNC: 3.2 MMOL/L (ref 3.4–5.3)
RBC # BLD AUTO: 4.61 10E6/UL (ref 3.8–5.2)
SODIUM SERPL-SCNC: 138 MMOL/L (ref 136–145)
WBC # BLD AUTO: 14.3 10E3/UL (ref 4–11)

## 2023-06-10 PROCEDURE — 36415 COLL VENOUS BLD VENIPUNCTURE: CPT | Performed by: HOSPITALIST

## 2023-06-10 PROCEDURE — 99232 SBSQ HOSP IP/OBS MODERATE 35: CPT | Performed by: HOSPITALIST

## 2023-06-10 PROCEDURE — 250N000011 HC RX IP 250 OP 636: Performed by: HOSPITALIST

## 2023-06-10 PROCEDURE — 85014 HEMATOCRIT: CPT | Performed by: HOSPITALIST

## 2023-06-10 PROCEDURE — 250N000013 HC RX MED GY IP 250 OP 250 PS 637: Performed by: STUDENT IN AN ORGANIZED HEALTH CARE EDUCATION/TRAINING PROGRAM

## 2023-06-10 PROCEDURE — 250N000013 HC RX MED GY IP 250 OP 250 PS 637: Performed by: HOSPITALIST

## 2023-06-10 PROCEDURE — 250N000012 HC RX MED GY IP 250 OP 636 PS 637: Performed by: PHYSICIAN ASSISTANT

## 2023-06-10 PROCEDURE — 250N000013 HC RX MED GY IP 250 OP 250 PS 637: Performed by: INTERNAL MEDICINE

## 2023-06-10 PROCEDURE — 80048 BASIC METABOLIC PNL TOTAL CA: CPT | Performed by: HOSPITALIST

## 2023-06-10 PROCEDURE — 250N000011 HC RX IP 250 OP 636: Performed by: INTERNAL MEDICINE

## 2023-06-10 PROCEDURE — 120N000004 HC R&B MS OVERFLOW

## 2023-06-10 RX ORDER — TRAMADOL HYDROCHLORIDE 50 MG/1
50 TABLET ORAL EVERY 6 HOURS PRN
Status: DISCONTINUED | OUTPATIENT
Start: 2023-06-10 | End: 2023-06-18 | Stop reason: HOSPADM

## 2023-06-10 RX ORDER — MONTELUKAST SODIUM 10 MG/1
10 TABLET ORAL AT BEDTIME
Status: DISCONTINUED | OUTPATIENT
Start: 2023-06-10 | End: 2023-06-18 | Stop reason: HOSPADM

## 2023-06-10 RX ORDER — FAMOTIDINE 20 MG/1
40 TABLET, FILM COATED ORAL AT BEDTIME
Status: DISCONTINUED | OUTPATIENT
Start: 2023-06-10 | End: 2023-06-11

## 2023-06-10 RX ORDER — ONDANSETRON 4 MG/1
4 TABLET, ORALLY DISINTEGRATING ORAL EVERY 6 HOURS PRN
Status: DISCONTINUED | OUTPATIENT
Start: 2023-06-10 | End: 2023-06-18 | Stop reason: HOSPADM

## 2023-06-10 RX ADMIN — PROCHLORPERAZINE EDISYLATE 10 MG: 5 INJECTION INTRAMUSCULAR; INTRAVENOUS at 00:18

## 2023-06-10 RX ADMIN — NORTRIPTYLINE HYDROCHLORIDE 25 MG: 25 CAPSULE ORAL at 22:04

## 2023-06-10 RX ADMIN — ONDANSETRON 4 MG: 4 TABLET, ORALLY DISINTEGRATING ORAL at 22:18

## 2023-06-10 RX ADMIN — MONTELUKAST 10 MG: 10 TABLET, FILM COATED ORAL at 22:01

## 2023-06-10 RX ADMIN — ACETAMINOPHEN 650 MG: 325 TABLET, FILM COATED ORAL at 11:40

## 2023-06-10 RX ADMIN — HYDROMORPHONE HYDROCHLORIDE 0.3 MG: 1 INJECTION, SOLUTION INTRAMUSCULAR; INTRAVENOUS; SUBCUTANEOUS at 00:19

## 2023-06-10 RX ADMIN — ONDANSETRON 4 MG: 4 TABLET, ORALLY DISINTEGRATING ORAL at 11:40

## 2023-06-10 RX ADMIN — FAMOTIDINE 40 MG: 20 TABLET, FILM COATED ORAL at 22:01

## 2023-06-10 RX ADMIN — TRAZODONE HYDROCHLORIDE 200 MG: 100 TABLET ORAL at 22:03

## 2023-06-10 RX ADMIN — PREDNISONE 40 MG: 20 TABLET ORAL at 09:32

## 2023-06-10 ASSESSMENT — ACTIVITIES OF DAILY LIVING (ADL)
ADLS_ACUITY_SCORE: 20

## 2023-06-10 NOTE — PLAN OF CARE
Goal Outcome Evaluation:      Plan of Care Reviewed With: patient    Overall Patient Progress: improvingOverall Patient Progress: improving  Vital Signs: Afebrile, VSS  Pain/Comfort: Having some abdominal cramping after eating low fiber diet, tyl given x1  Assessment: having intermittent nausea, zofran IV given x2 for good relief, rolo low fiber diet although having some cramping after eating, liq stool x1, good bowel sounds heard although states is not passing gas  Diet:ate about half of low fiber diet  Output:voiding well, stool x1  Activity/Ambulation: Up ad patrick in halls and in room  Social: family here to visit  Plan: will continue to monitor pain, toleration of diet

## 2023-06-10 NOTE — PLAN OF CARE
"Goal Outcome Evaluation:    Orientation: Alert and oriented x4  VSS. 99% on RA. Afebrile.   LS: clear and equal bilaterally.   GI: denies Passing gas. no BM. Intermittent nausea. Compazine x1 with relief in symptoms  : Adequate urine output.   Skin: intact  Activity: Independent. Pt slept comfortably throughout shift.   Pain: 5/10 abdominal pain. Well controlled with IV pain medication. Dilaudid x1 with relief in symptoms.  Updates/Plan: new IV placed and set to TKO to maintain patency of line. Pain control and nausea control as needed. Pt states increased pain attributed to \"overdoing it at dinner\". Continue with current cares.     "

## 2023-06-10 NOTE — PLAN OF CARE
"Goal Outcome Evaluation: Pt up ad patrick in room. Alert and oriented. IV at TKO to maintain access. Oral zofran today and also tylenol for headache with relief. She feels like she needs her nexium and pepcid added to meds as is feeling some acid reflux with eating and so MD restarted pepcid.  present today. Tolerating low fiber diet. She feels \"off\" today and that she wants to stay one more night due to symptoms. Tolerating her own bottled water as she does not like the water at the hospital. No loose stools. Will monitor.                        "

## 2023-06-10 NOTE — PROGRESS NOTES
[facility]    Medicine Progress Note - Hospitalist Service    Date of Admission:  6/5/2023    Assessment & Plan   Devi Salas is a 62 year old female with a past medical history significant for Crohn's disease, asthma, and migraines.  She presented to emergency room with diarrhea, nausea, and abdominal pain.  Found to have acute Crohn's exacerbation causing partial small bowel obstruction.     Partial small bowel obstruction.  -noted on CT, 2/2 acute crohns dx  -seems to be slowly improving as abd pain much improved although nausea and diarrhea persist  -diet advanced and overall tolerating well, some mild discomfort while eating rapidly  -ok to discharge when patient agreeable, wishes to wait additional day  -stop IV narcotics, due to drug allergies ok for tramadol     Acute Crohn's exacerbation.  -Gastroenterology following  -completed 3 days IV steroids, started on long oral prednisone taper 6/9  -on PPI     Acute kidney injury.  -Creatinine initially elevated at 1.3.  -Likely prerenal due to dehydration from profuse diarrhea  -off fluids, Cr stable    Leukocytosis  -likely from steroids, c dif negative    Diarrhea  -negative for C dif, sounds like chronic issue for her w/SBO possibly contributing     Diet: Advance Diet as Tolerated: Low Fiber; Low Fiber    DVT Prophylaxis: Pneumatic Compression Devices  Monae Catheter: Not present  Lines: None     Cardiac Monitoring: None  Code Status: Full Code      Disposition Plan   Ok to discharge from medical perspective, patient wishes to be monitored additional day       Puma Madera DO  Hospitalist Service  [facility]  Securely message with [employer] (more info)  Text page via [employer] Paging/Directory   ______________________________________________________________________    Interval History   Mild abd pain, occasional nausea but tolerating diet ok. Reports eating too hastily last night and had some recurrence of  discomfort. Afebrile and tolerating steroids well    Physical Exam   Vital Signs: Temp: 97.7  F (36.5  C) Temp src: Oral BP: 111/82 Pulse: 76   Resp: 20 SpO2: 98 % O2 Device: None (Room air)    Weight: 147 lbs 12.8 oz  Constitutional: awake, alert and cooperative  Eyes: pupils equal, round and reactive to light and conjunctiva normal  ENT: normocepalic, without obvious abnormality, atramatic  Respiratory: no increased work of breathing, good air exchange and clear to auscultation  Cardiovascular: regular rate and rhythm and no murmur noted  GI: markedly improved abd tenderness, no rebound, improved bowel sounds  Skin: no bruising or bleeding  Neurologic: alert, oriented, no focal deficits    45 MINUTES SPENT BY ME on the date of service doing chart review, history, exam, documentation & further activities per the note.      Data   ------------------------- PAST 24 HR DATA REVIEWED -----------------------------------------------    I have personally reviewed the following data over the past 24 hrs:    14.3 (H)  \   12.5   / 303     138 104 22.6 /  115 (H)   3.2 (L) 24 1.16 (H) \       Imaging results reviewed over the past 24 hrs:   No results found for this or any previous visit (from the past 24 hour(s)).

## 2023-06-11 LAB
CREAT SERPL-MCNC: 1.13 MG/DL (ref 0.51–0.95)
GFR SERPL CREATININE-BSD FRML MDRD: 54 ML/MIN/1.73M2
PLATELET # BLD AUTO: 283 10E3/UL (ref 150–450)

## 2023-06-11 PROCEDURE — 120N000004 HC R&B MS OVERFLOW

## 2023-06-11 PROCEDURE — 85049 AUTOMATED PLATELET COUNT: CPT | Performed by: INTERNAL MEDICINE

## 2023-06-11 PROCEDURE — 99232 SBSQ HOSP IP/OBS MODERATE 35: CPT | Performed by: HOSPITALIST

## 2023-06-11 PROCEDURE — 250N000012 HC RX MED GY IP 250 OP 636 PS 637: Performed by: PHYSICIAN ASSISTANT

## 2023-06-11 PROCEDURE — 250N000013 HC RX MED GY IP 250 OP 250 PS 637: Performed by: STUDENT IN AN ORGANIZED HEALTH CARE EDUCATION/TRAINING PROGRAM

## 2023-06-11 PROCEDURE — 250N000013 HC RX MED GY IP 250 OP 250 PS 637: Performed by: INTERNAL MEDICINE

## 2023-06-11 PROCEDURE — 250N000013 HC RX MED GY IP 250 OP 250 PS 637: Performed by: HOSPITALIST

## 2023-06-11 PROCEDURE — 82565 ASSAY OF CREATININE: CPT | Performed by: INTERNAL MEDICINE

## 2023-06-11 PROCEDURE — 36415 COLL VENOUS BLD VENIPUNCTURE: CPT | Performed by: INTERNAL MEDICINE

## 2023-06-11 RX ORDER — FAMOTIDINE 20 MG/1
20 TABLET, FILM COATED ORAL AT BEDTIME
Status: DISCONTINUED | OUTPATIENT
Start: 2023-06-11 | End: 2023-06-17

## 2023-06-11 RX ADMIN — NORTRIPTYLINE HYDROCHLORIDE 25 MG: 25 CAPSULE ORAL at 21:03

## 2023-06-11 RX ADMIN — MONTELUKAST 10 MG: 10 TABLET, FILM COATED ORAL at 21:03

## 2023-06-11 RX ADMIN — TRAMADOL HYDROCHLORIDE 50 MG: 50 TABLET, COATED ORAL at 09:45

## 2023-06-11 RX ADMIN — PREDNISONE 40 MG: 20 TABLET ORAL at 08:38

## 2023-06-11 RX ADMIN — TRAMADOL HYDROCHLORIDE 50 MG: 50 TABLET, COATED ORAL at 01:07

## 2023-06-11 RX ADMIN — ACETAMINOPHEN 650 MG: 325 TABLET, FILM COATED ORAL at 18:00

## 2023-06-11 RX ADMIN — FAMOTIDINE 20 MG: 20 TABLET, FILM COATED ORAL at 21:03

## 2023-06-11 RX ADMIN — TRAZODONE HYDROCHLORIDE 200 MG: 100 TABLET ORAL at 21:03

## 2023-06-11 RX ADMIN — TRAMADOL HYDROCHLORIDE 50 MG: 50 TABLET, COATED ORAL at 21:24

## 2023-06-11 ASSESSMENT — ACTIVITIES OF DAILY LIVING (ADL)
ADLS_ACUITY_SCORE: 20

## 2023-06-11 NOTE — PLAN OF CARE
Goal Outcome Evaluation:      Plan of Care Reviewed With: patient    Vital Signs: VSS. Afebrile.  Pain/Comfort: Pain this morning a 6/10 after breakfast and 3 cups of coffee.  Ultram given. Pain now 3/10.  Assessment: Bowel sounds active in upper quadrants. Not audible in lower quadrants. Abdomen soft, slightly rounded. Tender.   Diet: Tolerating low fiber diet without nausea.  Output: Voiding. No gas. No stool  Activity/Ambulation: Ambulating frequently. Sitting up in chair.  Social:  here to visit.

## 2023-06-11 NOTE — PROGRESS NOTES
GI Brief Note    Contacted this evening about request for GI re-evaluation. GI had signed off 6/9.  Patient admitted 6/6 with new, recent diagnosis of Crohn's and CT showing partial SBO.  Transitioned to oral steroids 6/9, patient with minimal stool output and flatus still having abdominal pain. Is tolerating some low fiber diet and ambulating.  Notes reviewed, ongoing abdominal tenderness.     Last dilaudid 6/10.       Cr trending up slightly.  May be having some ileus from narcotics v. Ongoing symptoms from Crohns.     - if worsening of pain tonight of fever make NPO and consider repeat imaging  - repeat BMP and CBC with morning labs  - if no improvement in symptoms tomorrow and creatinine stable could consider repeating imaging    We will plan to formally see patient tomorrow, please call in the meantime with questions.    Umu Castellon MD  [facility]  [phone]

## 2023-06-11 NOTE — PLAN OF CARE
"Goal Outcome Evaluation:      Plan of Care Reviewed With: patient    Overall Patient Progress: no changeOverall Patient Progress: no change  Vital Signs: afebrile, VSS  Pain/Comfort:Is having mild discomfort after eating supper, feels like it is \"blocked\" in mid upper abdomen. Bowel sounds present but not passing gas  Assessment: continuing to have some abdominal pain after eating and has stated she will eat very light tomorrow, zofran x1 this evening for nausea (2830)  Diet: mild discomfort after eating low fiber diet, good fluid intake  Output: No stools, voiding well  Activity/Ambulation:up frequently this evening walking in halls  Plan: will continue with present cares, update pt on plan of care           "

## 2023-06-11 NOTE — PROGRESS NOTES
[facility]    Medicine Progress Note - Hospitalist Service    Date of Admission:  6/5/2023    Assessment & Plan   Devi Salas is a 62 year old female with a past medical history significant for Crohn's disease, asthma, and migraines.  She presented to emergency room with diarrhea, nausea, and abdominal pain.  Found to have acute Crohn's exacerbation causing partial small bowel obstruction.    Admitted and initiated on IV solumedrol and had been improving and transitioned to oral prednisone on 6/9. Since then has had increased post prandial abd pain, nausea and decreased gas and no BM since Friday. GI signed off Friday but will reconsult today     Partial small bowel obstruction.  -noted on CT, 2/2 acute crohns dx  -stop IV narcotics, due to drug allergies ok for tramadol  -seemed to have resolved but worsening symptoms past 48 hours are concerning     Acute Crohn's exacerbation.  -Gastroenterology previously following, on PPI  -completed 3 days IV steroids, started on long oral prednisone taper 6/9 but has had worsening symptoms since transition. Preceding this admission had treatment failure after transitioning to oral prednisone and is concerned could be happening again  -reconsult GI to evaluate, unclear if developing treatment failure. ? If repeat CT would be beneficial    Acute kidney injury.  -Creatinine initially elevated at 1.3.  -Likely prerenal due to dehydration from profuse diarrhea  -off fluids, Cr stable    Leukocytosis  -likely from steroids, c dif negative    Diarrhea  -negative for C dif, sounds like chronic issue for her w/SBO possibly contributing     Diet: Advance Diet as Tolerated: Low Fiber; Low Fiber    DVT Prophylaxis: Pneumatic Compression Devices  Monae Catheter: Not present  Lines: None     Cardiac Monitoring: None  Code Status: Full Code      Disposition Plan    await GI reevaluation and improvement, unclear timetable    Puma Madera DO  Hospitalist  [facility]  Securely message with [employer] (more info)  Text page via [facility] Paging/Directory   ______________________________________________________________________    Interval History   Continues to have some post prandial abd discomfort, nausea but no vomiting. Oral intake has been variable but poorer last 24 hours. No fever or chills but has not been passing gas and no BM for a few days. She is concerned about going home on the oral steroids as she had to be readmitted last time and symptoms have gotten worse since we transitioned from IV to PO steroids.    Physical Exam   Vital Signs: Temp: (P) 98.8  F (37.1  C) Temp src: (P) Oral BP: (P) 119/70 Pulse: (P) 83   Resp: (P) 18 SpO2: (P) 96 % O2 Device: (P) None (Room air)    Weight: 147 lbs 12.8 oz  Constitutional: awake, alert and cooperative  Eyes: pupils equal, round and reactive to light and conjunctiva normal  ENT: normocepalic, without obvious abnormality, atramatic  Respiratory: no increased work of breathing, good air exchange and clear to auscultation  Cardiovascular: regular rate and rhythm and no murmur noted  GI: exquisitely tender to palpation RLQ and diffusely as well, rebound tenderness, diminished bowel sounds  Skin: no bruising or bleeding  Neurologic: alert, oriented, no focal deficits    45 MINUTES SPENT BY ME on the date of service doing chart review, history, exam, documentation & further activities per the note.      Data   ------------------------- PAST 24 HR DATA REVIEWED -----------------------------------------------    I have personally reviewed the following data over the past 24 hrs:    N/A  \   N/A   / 283     N/A N/A N/A /  N/A   N/A N/A 1.13 (H) \       Imaging results reviewed over the past 24 hrs:   No results found for this or any previous visit (from the past 24 hour(s)).

## 2023-06-11 NOTE — PLAN OF CARE
Goal Outcome Evaluation:    Orientation: Alert and oriented x4  VSS. 97% on RA. Afebrile.   LS: clear and equal bilaterally.   GI: denies Passing gas. no BM. Denies N/V.   : Adequate urine output.   Skin: scattered bruising to bilateral forearms from prior IV sites.   Activity: Independent. Pt slept comfortably throughout shift.   Pain: 4/10 abdominal pain radiating to back. Tramadol x1.   Updates/Plan: possible discharge home later today. Pt anxious about this as she has not passed gas yet. Continue with current cares.

## 2023-06-12 ENCOUNTER — APPOINTMENT (OUTPATIENT)
Dept: GENERAL RADIOLOGY | Facility: CLINIC | Age: 63
End: 2023-06-12
Attending: NURSE PRACTITIONER
Payer: COMMERCIAL

## 2023-06-12 ENCOUNTER — APPOINTMENT (OUTPATIENT)
Dept: GENERAL RADIOLOGY | Facility: CLINIC | Age: 63
End: 2023-06-12
Payer: COMMERCIAL

## 2023-06-12 ENCOUNTER — APPOINTMENT (OUTPATIENT)
Dept: ULTRASOUND IMAGING | Facility: CLINIC | Age: 63
End: 2023-06-12
Attending: INTERNAL MEDICINE
Payer: COMMERCIAL

## 2023-06-12 LAB
ALBUMIN SERPL BCG-MCNC: 3.1 G/DL (ref 3.5–5.2)
ALP SERPL-CCNC: 57 U/L (ref 35–104)
ALT SERPL W P-5'-P-CCNC: 15 U/L (ref 10–35)
ANION GAP SERPL CALCULATED.3IONS-SCNC: 8 MMOL/L (ref 7–15)
AST SERPL W P-5'-P-CCNC: 10 U/L (ref 10–35)
BASOPHILS # BLD AUTO: 0 10E3/UL (ref 0–0.2)
BASOPHILS NFR BLD AUTO: 0 %
BILIRUB SERPL-MCNC: 0.4 MG/DL
BUN SERPL-MCNC: 18.4 MG/DL (ref 8–23)
CALCIUM SERPL-MCNC: 8.4 MG/DL (ref 8.8–10.2)
CHLORIDE SERPL-SCNC: 105 MMOL/L (ref 98–107)
CREAT SERPL-MCNC: 1.12 MG/DL (ref 0.51–0.95)
DEPRECATED HCO3 PLAS-SCNC: 26 MMOL/L (ref 22–29)
EOSINOPHIL # BLD AUTO: 0.1 10E3/UL (ref 0–0.7)
EOSINOPHIL NFR BLD AUTO: 1 %
ERYTHROCYTE [DISTWIDTH] IN BLOOD BY AUTOMATED COUNT: 14.7 % (ref 10–15)
GFR SERPL CREATININE-BSD FRML MDRD: 55 ML/MIN/1.73M2
GLUCOSE SERPL-MCNC: 89 MG/DL (ref 70–99)
HCT VFR BLD AUTO: 36.7 % (ref 35–47)
HGB BLD-MCNC: 11.5 G/DL (ref 11.7–15.7)
IMM GRANULOCYTES # BLD: 0.2 10E3/UL
IMM GRANULOCYTES NFR BLD: 2 %
LYMPHOCYTES # BLD AUTO: 2.4 10E3/UL (ref 0.8–5.3)
LYMPHOCYTES NFR BLD AUTO: 25 %
MCH RBC QN AUTO: 27.3 PG (ref 26.5–33)
MCHC RBC AUTO-ENTMCNC: 31.3 G/DL (ref 31.5–36.5)
MCV RBC AUTO: 87 FL (ref 78–100)
MONOCYTES # BLD AUTO: 0.8 10E3/UL (ref 0–1.3)
MONOCYTES NFR BLD AUTO: 8 %
NEUTROPHILS # BLD AUTO: 6.2 10E3/UL (ref 1.6–8.3)
NEUTROPHILS NFR BLD AUTO: 64 %
NRBC # BLD AUTO: 0 10E3/UL
NRBC BLD AUTO-RTO: 0 /100
PLATELET # BLD AUTO: 239 10E3/UL (ref 150–450)
POTASSIUM SERPL-SCNC: 3.8 MMOL/L (ref 3.4–5.3)
PROT SERPL-MCNC: 5 G/DL (ref 6.4–8.3)
RBC # BLD AUTO: 4.22 10E6/UL (ref 3.8–5.2)
SODIUM SERPL-SCNC: 139 MMOL/L (ref 136–145)
WBC # BLD AUTO: 9.6 10E3/UL (ref 4–11)

## 2023-06-12 PROCEDURE — 99233 SBSQ HOSP IP/OBS HIGH 50: CPT | Performed by: INTERNAL MEDICINE

## 2023-06-12 PROCEDURE — 250N000011 HC RX IP 250 OP 636: Performed by: NURSE PRACTITIONER

## 2023-06-12 PROCEDURE — 80053 COMPREHEN METABOLIC PANEL: CPT | Performed by: INTERNAL MEDICINE

## 2023-06-12 PROCEDURE — 250N000011 HC RX IP 250 OP 636: Performed by: HOSPITALIST

## 2023-06-12 PROCEDURE — 250N000013 HC RX MED GY IP 250 OP 250 PS 637: Performed by: STUDENT IN AN ORGANIZED HEALTH CARE EDUCATION/TRAINING PROGRAM

## 2023-06-12 PROCEDURE — 93971 EXTREMITY STUDY: CPT | Mod: RT

## 2023-06-12 PROCEDURE — 999N000127 HC STATISTIC PERIPHERAL IV START W US GUIDANCE

## 2023-06-12 PROCEDURE — 84134 ASSAY OF PREALBUMIN: CPT

## 2023-06-12 PROCEDURE — 258N000003 HC RX IP 258 OP 636: Performed by: INTERNAL MEDICINE

## 2023-06-12 PROCEDURE — 36415 COLL VENOUS BLD VENIPUNCTURE: CPT | Performed by: INTERNAL MEDICINE

## 2023-06-12 PROCEDURE — 250N000013 HC RX MED GY IP 250 OP 250 PS 637: Performed by: INTERNAL MEDICINE

## 2023-06-12 PROCEDURE — 85014 HEMATOCRIT: CPT | Performed by: INTERNAL MEDICINE

## 2023-06-12 PROCEDURE — 74019 RADEX ABDOMEN 2 VIEWS: CPT

## 2023-06-12 PROCEDURE — 74018 RADEX ABDOMEN 1 VIEW: CPT

## 2023-06-12 PROCEDURE — 120N000004 HC R&B MS OVERFLOW

## 2023-06-12 PROCEDURE — 250N000013 HC RX MED GY IP 250 OP 250 PS 637: Performed by: HOSPITALIST

## 2023-06-12 PROCEDURE — 250N000012 HC RX MED GY IP 250 OP 636 PS 637: Performed by: PHYSICIAN ASSISTANT

## 2023-06-12 RX ORDER — METHYLPREDNISOLONE SODIUM SUCCINATE 40 MG/ML
20 INJECTION, POWDER, LYOPHILIZED, FOR SOLUTION INTRAMUSCULAR; INTRAVENOUS EVERY 8 HOURS
Status: DISCONTINUED | OUTPATIENT
Start: 2023-06-12 | End: 2023-06-16

## 2023-06-12 RX ORDER — DEXTROSE MONOHYDRATE, SODIUM CHLORIDE, AND POTASSIUM CHLORIDE 50; 1.49; 4.5 G/1000ML; G/1000ML; G/1000ML
INJECTION, SOLUTION INTRAVENOUS CONTINUOUS
Status: DISCONTINUED | OUTPATIENT
Start: 2023-06-12 | End: 2023-06-17

## 2023-06-12 RX ADMIN — METHYLPREDNISOLONE SODIUM SUCCINATE 20 MG: 40 INJECTION INTRAMUSCULAR; INTRAVENOUS at 13:56

## 2023-06-12 RX ADMIN — TRAMADOL HYDROCHLORIDE 50 MG: 50 TABLET, COATED ORAL at 22:21

## 2023-06-12 RX ADMIN — DIATRIZOATE MEGLUMINE AND DIATRIZOATE SODIUM 75 ML: 660; 100 SOLUTION ORAL; RECTAL at 13:31

## 2023-06-12 RX ADMIN — ACETAMINOPHEN 650 MG: 325 TABLET, FILM COATED ORAL at 09:28

## 2023-06-12 RX ADMIN — METHYLPREDNISOLONE SODIUM SUCCINATE 20 MG: 40 INJECTION INTRAMUSCULAR; INTRAVENOUS at 22:07

## 2023-06-12 RX ADMIN — TRAZODONE HYDROCHLORIDE 200 MG: 100 TABLET ORAL at 22:03

## 2023-06-12 RX ADMIN — TRAMADOL HYDROCHLORIDE 50 MG: 50 TABLET, COATED ORAL at 12:26

## 2023-06-12 RX ADMIN — PREDNISONE 40 MG: 20 TABLET ORAL at 09:20

## 2023-06-12 RX ADMIN — FAMOTIDINE 20 MG: 20 TABLET, FILM COATED ORAL at 22:03

## 2023-06-12 RX ADMIN — MONTELUKAST 10 MG: 10 TABLET, FILM COATED ORAL at 22:03

## 2023-06-12 RX ADMIN — POTASSIUM CHLORIDE, DEXTROSE MONOHYDRATE AND SODIUM CHLORIDE: 150; 5; 450 INJECTION, SOLUTION INTRAVENOUS at 15:53

## 2023-06-12 RX ADMIN — ACETAMINOPHEN 650 MG: 325 TABLET, FILM COATED ORAL at 19:29

## 2023-06-12 RX ADMIN — NORTRIPTYLINE HYDROCHLORIDE 25 MG: 25 CAPSULE ORAL at 22:04

## 2023-06-12 RX ADMIN — ONDANSETRON 4 MG: 4 TABLET, ORALLY DISINTEGRATING ORAL at 09:28

## 2023-06-12 ASSESSMENT — ACTIVITIES OF DAILY LIVING (ADL)
ADLS_ACUITY_SCORE: 20

## 2023-06-12 NOTE — PROGRESS NOTES
"[facility]    Medicine Progress Note - Hospitalist Service    Date of Admission:  6/5/2023    Assessment & Plan   Devi Salas is a 62 year old female with hx Crohn's disease diagnosed early this year, asthma, and migraines.  She presented to emergency room with diarrhea, nausea, and abdominal pain.  Working dx is Crohn's disease, unclear whether acutely inflammed v scarring causing partial small bowel obstruction.    Admitted and initiated on IV solumedrol and had been improving and transitioned to oral prednisone on 6/9. Since then has had increased post prandial abd pain, nausea and decreased gas and no BM since Friday. GI signed off Friday but re-consulted GI on 6/11.      Note past medical history includes renal cell cancer that was managed surgically with right nephrectomy in 2009.  In addition, patient underwent hysterectomy in the remote past though she did not tell me the indication.  Otherwise, the patient has been quite healthy until this past year when she was having abdominal complaints and was ultimately diagnosed with chron's disease.  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  This was also seen on CT scan dated 6/5/2023.    Partial small bowel obstruction, persistent pain and low stool output.  -noted on CT \"segments of narrowed distal small bowel causing a partial SBO.  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  -possible contribution of narcotics (though the patient has only received about 100 mg tramadol in the last 24 hours).   -Refractory to multiple courses of steroid.  -Colorectal surgery consultation is requested     Possible Crohn's exacerbation.  -Gastroenterology following, on PPI  -completed 3 days IV steroids, started on long oral prednisone taper 6/9 " but has had worsening symptoms since transition. Preceding this admission, also had treatment failure after transitioning to oral prednisone   -reconsult GI to evaluate, unclear if developing treatment failure.     Acute kidney injury.  -Creatinine initially elevated at 1.3.  -Likely prerenal due to dehydration from profuse diarrhea  -off fluids, Cr stable    Leukocytosis  -likely from steroids, c dif negative    Diarrhea  -negative for C dif, sounds like chronic issue for her w/SBO possibly contributing     Diet: Advance Diet as Tolerated: Low Fiber; Low Fiber    DVT Prophylaxis: Pneumatic Compression Devices  Monae Catheter: Not present  Lines: None     Cardiac Monitoring: None  Code Status: Full Code      Disposition Plan   To be determined.  Patient continues to have incompletely managed medical issues.    Rafiq Davalos MD   Hospitalist Service  [facility]  Securely message with [employer] (more info)  Text page via [facility] Paging/Directory   ______________________________________________________________________    Interval History   Chart reviewed, pt interviewed.      Pt admitted on 6/5 and started on IV solumedrol with equivocal improvement. In the past couple of days improvement has stalled.    I communicated with Dr. Rai Blandon today who indicated that it was appropriate to include colorectal surgery and considerations.  I later spoke with Dr. Marleny Mendez who already completed a consultation today.  Gastrografin small bowel follow-through is pending.    The patient incidentally noted right calf pain without significant swelling.    Physical Exam   Vital Signs: Temp: 97.6  F (36.4  C) Temp src: Oral BP: 113/70 Pulse: 79   Resp: 20 SpO2: 97 % O2 Device: None (Room air)    Weight: 147 lbs 7.8 oz  Constitutional: awake, alert and cooperative  Eyes: pupils equal, round and reactive to light and conjunctiva normal  ENT: normocepalic, without obvious abnormality, atramatic  Respiratory: no  increased work of breathing, good air exchange and clear to auscultation  Cardiovascular: regular rate and rhythm and no murmur noted  GI: tender to palpation RLQ and milder discomfort diffusely as well.  No rebound tenderness, diminished bowel sounds  Skin: no bruising or bleeding  Neurologic: alert, oriented, no focal deficits    50 MINUTES SPENT BY ME on the date of service doing chart review, history, exam, documentation & further activities per the note.      Data   ------------------------- PAST 24 HR DATA REVIEWED -----------------------------------------------     Imaging results reviewed over the past 24 hrs:   Results for orders placed or performed during the hospital encounter of 06/05/23 (from the past 24 hour(s))   CBC with Platelets & Differential    Narrative    The following orders were created for panel order CBC with Platelets & Differential.  Procedure                               Abnormality         Status                     ---------                               -----------         ------                     CBC with platelets and d...[[id number]]  Abnormal            Final result                 Please view results for these tests on the individual orders.   Comprehensive metabolic panel   Result Value Ref Range    Sodium 139 136 - 145 mmol/L    Potassium 3.8 3.4 - 5.3 mmol/L    Chloride 105 98 - 107 mmol/L    Carbon Dioxide (CO2) 26 22 - 29 mmol/L    Anion Gap 8 7 - 15 mmol/L    Urea Nitrogen 18.4 8.0 - 23.0 mg/dL    Creatinine 1.12 (H) 0.51 - 0.95 mg/dL    Calcium 8.4 (L) 8.8 - 10.2 mg/dL    Glucose 89 70 - 99 mg/dL    Alkaline Phosphatase 57 35 - 104 U/L    AST 10 10 - 35 U/L    ALT 15 10 - 35 U/L    Protein Total 5.0 (L) 6.4 - 8.3 g/dL    Albumin 3.1 (L) 3.5 - 5.2 g/dL    Bilirubin Total 0.4 <=1.2 mg/dL    GFR Estimate 55 (L) >60 mL/min/1.73m2   CBC with platelets and differential   Result Value Ref Range    WBC Count 9.6 4.0 - 11.0 10e3/uL    RBC Count 4.22 3.80 - 5.20 10e6/uL     Hemoglobin 11.5 (L) 11.7 - 15.7 g/dL    Hematocrit 36.7 35.0 - 47.0 %    MCV 87 78 - 100 fL    MCH 27.3 26.5 - 33.0 pg    MCHC 31.3 (L) 31.5 - 36.5 g/dL    RDW 14.7 10.0 - 15.0 %    Platelet Count 239 150 - 450 10e3/uL    % Neutrophils 64 %    % Lymphocytes 25 %    % Monocytes 8 %    % Eosinophils 1 %    % Basophils 0 %    % Immature Granulocytes 2 %    NRBCs per 100 WBC 0 <1 /100    Absolute Neutrophils 6.2 1.6 - 8.3 10e3/uL    Absolute Lymphocytes 2.4 0.8 - 5.3 10e3/uL    Absolute Monocytes 0.8 0.0 - 1.3 10e3/uL    Absolute Eosinophils 0.1 0.0 - 0.7 10e3/uL    Absolute Basophils 0.0 0.0 - 0.2 10e3/uL    Absolute Immature Granulocytes 0.2 <=0.4 10e3/uL    Absolute NRBCs 0.0 10e3/uL   XR Abdomen 2 Views    Narrative    ABDOMEN TWO VIEWS   6/12/2023 1:04 PM     HISTORY: Admit with partial small bowel obstruction re Crohn's,  recurrent symptoms. Evaluate for obstruction vs ileus.    COMPARISON: CT abdomen and pelvis on 6/5/2023.      Impression    IMPRESSION: Upright and supine views of the abdomen and pelvis were  obtained. Nonobstructive bowel gas pattern. No free peritoneal or  portal venous gas. Surgical clips in the right upper quadrant and  right mid abdomen. Multiple pelvic phleboliths.    AMANDA VALENTINO MD         SYSTEM ID:  [id number]   US Lower Extremity Venous Duplex Right    Narrative    VENOUS ULTRASOUND RIGHT LOWER EXTREMITY  6/12/2023 2:32 PM     HISTORY: Pain without obvious swelling. Question of deep vein  thrombosis (DVT)    COMPARISON: None.    Technique: Color Doppler and spectral waveform analysis performed  throughout the deep veins of the right lower extremity.    FINDINGS: The right common femoral, proximal greater saphenous,  femoral, and popliteal veins demonstrate normal blood flow,  compression, and augmentation. Posterior tibial and peroneal veins are  compressible. Contralateral left common femoral vein is patent.      Impression    IMPRESSION: Negative for DVT in the right lower  extremity.    KRISTIN WISEMAN MD         SYSTEM ID:  [id number]

## 2023-06-12 NOTE — PROGRESS NOTES
"[facility]  [facility]  Gastroenterology Progress note    Interval History:    Called back  for recurrent symptoms on PO prednisone. Stopped stooling Friday morning (had scant stool x1 yesterday). Nausea got worse this weekend. No vomiting. Moderate RLQ pain.     Vital Signs:      /85 (BP Location: Right arm, Patient Position: Semi-Bain's, Cuff Size: Adult Regular)   Pulse 73   Temp 97.9  F (36.6  C) (Oral)   Resp 18   Ht 1.6 m (5' 3\")   Wt 66.9 kg (147 lb 7.8 oz)   SpO2 96%   BMI 26.13 kg/m    Temp (24hrs), Av  F (36.7  C), Min:97.9  F (36.6  C), Max:98.1  F (36.7  C)    Patient Vitals for the past 72 hrs:   Weight   23 1532 66.9 kg (147 lb 7.8 oz)       Intake/Output Summary (Last 24 hours) at 2023 1001  Last data filed at 2023 0732  Gross per 24 hour   Intake 680 ml   Output 600 ml   Net 80 ml         Constitutional: NAD with heating pad on   Respiratory: non-labored  Abdomen: soft, moderate RLQ pain without rebound nondistended, extremely rare BS    Additional Comments:  ROS, FH, SH: See initial GI consult for details.    Laboratory Data:  Recent Labs   Lab Test 23  0721 23  0558 06/10/23  1011 23  0639   WBC 9.6  --  14.3* 13.5*   HGB 11.5*  --  12.5 12.4   MCV 87  --  87 86    283 303 323     Recent Labs   Lab Test 23  0721 23  0558 06/10/23  1011 23  0639     --  138 140   POTASSIUM 3.8  --  3.2* 4.2   CHLORIDE 105  --  104 106   CO2 26  --  24 26   BUN 18.4  --  22.6 18.1   CR 1.12* 1.13* 1.16* 1.06*   ANIONGAP 8  --  10 8   GIORGIO 8.4*  --  8.7* 9.0     Recent Labs   Lab Test 23  0721 23  0700 23  1854   ALBUMIN 3.1* 3.6 4.2   BILITOTAL 0.4 0.2 0.3   ALT 15 8* 12   AST 10 13 13   ALKPHOS 57 68 81     CT A/P 2023  1.  There are segments of narrowed distal small bowel causing a partial small bowel obstruction. No definite active inflammation. No abscess " formation.     ASSESSMENT AND PLAN:    Crohn's Disease  Abdominal Pain  Diarrhea  Partial SBO     This patient is a 62-year-old female who has had gastrointestinal symptoms including nausea, vomiting, unintended weight loss, abdominal pain, and diarrhea in March for which she has undergone an extensive work-up.  Recent colonoscopy was performed that showed stenosis as well as an ulceration at the ileum, pathology showed nonspecific mildly active chronic ileitis.  Presentation and work-up is most consistent with ileal Crohn's disease, though do note that she had been using NSAIDs regularly which may affect findings.      She was advised to come to the ED due to the severity of her symptoms and as she had not responded to oral prednisone.  Imaging on arrival showed narrowed distal small bowel causing a partial small bowel obstruction. No definite active inflammation noted on CT scan, though she has had inflammation noted on recent imaging.  It is likely that her symptoms are a combination of inflammation related to her underlying Crohn's disease as well as ileal stenosis seen during her colonoscopy.     Hepatitis B and TB quant gold negative. C. Diff negative.  Steroids started 6/6 with resolution of abdominal pain and improvement of diarrhea-- switched back to oral steroid taper on 6/9. Failing PO steroids after being switched on 6/9 with recurrent obstructive symptoms. May need surgery vs initiation of biologic while inpatient.      Plan  -- NPO, ice chips OK  -- Stop oral prednisone. Restart IV solumedrol   -- Repeat imaging-- creat slightly up. Will discuss XR vs CT with Dr. Blandon   -- Consult CRS after imaging     Addendum: Discussed with Dr. Blandon. XR showed nonobstructive bowel gas pattern. Will plan on starting Remicade tomorrow after discussion with patient. Will send note to our biologics department to get approval for outpatient coverage.     25 minutes spent on patient care including chart review,  patient visit, documentation, coordination.     Francisca Ledezma CNP  [facility]  Cell: [phone]   Office: [phone]

## 2023-06-12 NOTE — PLAN OF CARE
Goal Outcome Evaluation:      Plan of Care Reviewed With: patient    Overall Patient Progress: improvingOverall Patient Progress: improving     VSS. Pt has been NPO since last night. Up ind in room. No BM and denies passing flatus this shift. RLQ abd pain, given tylenol and tramadol. C/o intermittent nausea, given zofran x1. New IV placed by vascular access this afternoon.  Pt completed gastrografin at 1330, plan on Xray at 2130.

## 2023-06-12 NOTE — PLAN OF CARE
"Goal Outcome Evaluation:      Plan of Care Reviewed With: patient    Overall Patient Progress: no changeOverall Patient Progress: no change  [id number]  Afebrile, VSS, lungs clear, bowel sounds heard in all 4 quadrants. Pt denies passing gas but had 1 small liq stool. Up walking frequently to \"try to get things moving\". GI reconsulted today, unable to come today but will see in am. Dr. Castellon (GI) did order labs for am. Pt continues on low fiber diet but does state she has some mild pain after eating. Pain in RLQ down to groin. Pain to a 5 and tylenol given. Will continue to assess pain and toleration of diet. Keep pt updated on plan of care.           "

## 2023-06-12 NOTE — PLAN OF CARE
Patient vitals stable. Patient up and walking halls this evening and up in room chair. Patient has been up to the restroom. Patient reports intermittent nausea and RLQ tenderness. Patient states that she is not passing gas or belching. BS audible and active in al 4 quadrants. States that she hasn't had a BM in 4 days, except for a small liquid BM earlier today. Patient requested her IV be removed due to discomfort and she is aware that she may need another one, if an IV is required tomorrow, she would rather have removed now and another placed, if need be. Patient resting comfortably overnight. Plan is for GI consult tomorrow morning.     Goal Outcome Evaluation:      Plan of Care Reviewed With: patient    Overall Patient Progress: improvingOverall Patient Progress: improving

## 2023-06-12 NOTE — CONSULTS
[facility]  Colon and Rectal Surgery Consult Note  Name: Devi Salas    MRN: [id number]  YOB: 1960    Age: 62 year old  Date of admission: 6/5/2023  Primary care provider: No Ref-Primary, Physician     Requesting Physician:  Dr. Davalos  Reason for consult:  SBO           History of Present Illness:   Devi Salas is a 62 year old female with a recent diagnosis of Crohn's, seen at the request of Dr. Davalos, presents with abdominal pain and nausea.     Patient has been having ongoing abdominal symptoms since March. She then had a colonoscopy on May 23 which revealed inflammation with ulceration and stenosis at the terminal ileum. Biopsies showed mild active chronic ileitis at the TI. She was started on oral steroids for 10 days for which she did not have any improvement. She was then recommended to present to the ER given ongoing symptoms for possible IV steroids. In the ER, she was afebrile and vital signs were within normal limits. Labs were remarkable for creatinine 1.26, WBC 14.2, and a hgb 13.1. A CT scan of the abdomen/pelvis revealed segments of narrowed distal small bowel causing a partial SBO. She was admitted for further management. GI was consulted and she was started on IV steroids. Initially she showed improvement and was advanced to a low fiber diet. Unfortunately, after starting the oral steroids on 6/9 she started to have repeat obstructive symptoms including increased nausea and abdominal pain. GI was re-consulted and are planning to restart her on IV steroids.     Patient was resting comfortably in her chair. She is still having abdominal pain that is worse with any oral intake. She is nauseous, but has not had any episodes of vomiting. Her last true bowel movement was on Friday (4 days ago). She had a small loose stool yesterday. She has not passed any gas recently.     Colonoscopy History:  5/23/23: Inflammation with ulceration and stenosis at the terminal ileum.  "Biopsies showed mild active chronic ileitis at the TI.    Surgical History: Right nephrectomy, appendectomy, cholecystectomy            Past Medical History:   No past medical history on file.          Past Surgical History:   No past surgical history on file.            Social History:     Social History     Tobacco Use     Smoking status: Not on file     Smokeless tobacco: Not on file   Substance Use Topics     Alcohol use: Not on file             Family History:   No family history on file.          Allergies:     Allergies   Allergen Reactions     Latex Hives     Oxycodone Anaphylaxis, Hives and Swelling     Throat swelling, per pt     Penicillin G Anaphylaxis     Sumatriptan Palpitations     Aspirin Nausea and Vomiting     Demerol Hcl [Meperidine] Nausea and Vomiting     N/V     Percocet [Oxycodone-Acetaminophen] Nausea and Vomiting     N/V     Codeine Nausea and Vomiting     Mold              Medications:       famotidine  20 mg Oral At Bedtime     methylPREDNISolone  20 mg Intravenous Q8H     montelukast  10 mg Oral At Bedtime     nortriptyline  25 mg Oral At Bedtime     sodium chloride (PF)  3 mL Intracatheter Q8H     traZODone  200 mg Oral At Bedtime             Review of Systems:   A comprehensive greater than 10 system review of systems was carried out.  Pertinent positives and negatives are noted above.  Otherwise negative for contributory info.            Physical Exam:     Blood pressure 119/85, pulse 73, temperature 97.9  F (36.6  C), temperature source Oral, resp. rate 18, height 1.6 m (5' 3\"), weight 66.1 kg (145 lb 11.2 oz), SpO2 96 %.    Intake/Output Summary (Last 24 hours) at 6/12/2023 1255  Last data filed at 6/11/2023 1800  Gross per 24 hour   Intake 120 ml   Output --   Net 120 ml     EXAM:  GEN: Awake alert and oriented, appears stated age  PULM: Non-labored breathing with normal respiratory effort  CVS: reg rate and rhythm, no peripheral edema  ABD: Soft, right sided tenderness, non " distended. No rebound or guarding   RECTAL: Rectal exam was deferred  NEURO: CN II-XII grossly intact  MSK: extremeties with no clubbing, cyanosis or edema; able to ambulate  PSYCH: responsive, alert, cooperative; oriented x3; appropriate mood and affect  EXT/SKIN: inspection reveals no rashes, lesions or ulcers, normal coloring         Data Reviewed:     Results for orders placed or performed during the hospital encounter of 06/05/23   CT Abdomen Pelvis w Contrast    Narrative    EXAM: CT ABDOMEN PELVIS W CONTRAST  LOCATION: [facility]  DATE/TIME: 6/5/2023 10:57 PM CDT    INDICATION: RLQ pain; Crohn's.  COMPARISON: None.  TECHNIQUE: CT scan of the abdomen and pelvis was performed following injection of IV contrast. Multiplanar reformats were obtained. Dose reduction techniques were used.  CONTRAST: 75 mL Isovue 370    FINDINGS:   LOWER CHEST: Normal.    HEPATOBILIARY: The gallbladder is absent.    PANCREAS: Normal.    SPLEEN: Normal.    ADRENAL GLANDS: Normal.    KIDNEYS/BLADDER: The right kidney is absent. The left kidney is unremarkable.    BOWEL: There are several narrowed segments of ileum with resulting small bowel dilatation proximally. No definite active inflammatory changes. There is no free intraperitoneal gas. Trace amount of free pelvic fluid.    LYMPH NODES: Normal.    VASCULATURE: Atherosclerotic calcification of the aorta and its branches. No aneurysm.    PELVIC ORGANS: The uterus is absent. There is no adnexal mass.    MUSCULOSKELETAL: Degenerative disease in the spine.      Impression    IMPRESSION:   1.  There are segments of narrowed distal small bowel causing a partial small bowel obstruction. No definite active inflammation. No abscess formation.       Recent Labs   Lab 06/12/23  0721 06/11/23  0558 06/10/23  1011 06/09/23  0639   WBC 9.6  --  14.3* 13.5*   HGB 11.5*  --  12.5 12.4   HCT 36.7  --  39.9 39.3   MCV 87  --  87 86    283 303 323     Recent Labs   Lab  06/12/23  0721 06/11/23  0558 06/10/23  1011 06/09/23  0639 06/07/23  0858 06/06/23  0700 06/05/23  1854     --  138 140   < > 140 140   POTASSIUM 3.8  --  3.2* 4.2   < > 5.1 4.7   CHLORIDE 105  --  104 106   < > 105 102   CO2 26  --  24 26   < > 25 24   ANIONGAP 8  --  10 8   < > 10 14   GLC 89  --  115* 100*   < > 142* 131*   BUN 18.4  --  22.6 18.1   < > 16.9 14.7   CR 1.12* 1.13* 1.16* 1.06*   < > 1.10* 1.26*   GFRESTIMATED 55* 54* 53* 59*   < > 56* 48*   GIORGIO 8.4*  --  8.7* 9.0   < > 8.7* 9.1   PROTTOTAL 5.0*  --   --   --   --  5.7* 6.8   ALBUMIN 3.1*  --   --   --   --  3.6 4.2   BILITOTAL 0.4  --   --   --   --  0.2 0.3   ALKPHOS 57  --   --   --   --  68 81   AST 10  --   --   --   --  13 13   ALT 15  --   --   --   --  8* 12    < > = values in this interval not displayed.     No results for input(s): INR in the last 168 hours.  No results for input(s): LACT in the last 168 hours.  No results for input(s): COLOR, APPEARANCE, URINEGLC, URINEBILI, URINEKETONE, SG, UBLD, URINEPH, PROTEIN, UROBILINOGEN, NITRITE, LEUKEST, RBCU, WBCU in the last 168 hours.      Assessment and Plan:   Devi Salas is a 62 year old female with a recent diagnosis of Crohn's, seen at the request of Dr. Davalos, presents with abdominal pain and nausea. CT scan of the abdomen pelvis revealed segments of narrowed distal small bowel causing a partial SBO. She has had a recent colonoscopy on 5/23/23 that revealed inflammation with ulceration and stenosis at the terminal ileum. Pathology showed mild active chronic ileitis at the TI. She has been on oral steroids twice now within the past month without much improvement in her symptoms. She is now restarting IV steroids per GI. She is currently hemodynamically stable, so no emergent surgery is indicated at this time. Recommend to continue conservative management at this time including bowel rest and IV steroids. A gastrografin challenge was ordered to further evaluate the obstruction.  Briefly discussed that if she continues to not improve with conservative management, she may require surgical resection of this area which may result in a temporary stoma. Patient understood. Appreciate GI assistance with the medical management of her Crohn's disease. Will await the results of the gastrografin challenge.       Plan:  1. Surgery: No emergent surgery indicated  2. Diet: NPO  3. IV Fluids: Per hospitalist  4. Antibiotics:  Not indicated  5. Medications: Continue home meds per hospitalist  6. I&O s:  strict I&O s  7. Labs:   - Reviewed:   - Ordered: Prealbumin   8. Imaging:   - Dr. Mendez and myself have personally viewed: CT abd/pelvis  - Ordered:  Gastrografin challenge  9. Activity: ambulate as tolerated, encourage OOB  10. DVT prophylaxis: SCD s,   11. This plan has been discussed with Dr. Mendez    Patient specific identified risk factors considered as part of today s evaluation include: Recent Crohn's diagnosis, persistent SBO, previous abdominal surgeries including a right nephrectomy      Additional history obtained from chart and patient. Time spent on consultation: 50 minutes, greater than 50 percent of the total encounter time is spent in counseling and/or coordination of care          Yanci Araujo PA-C  [facility]  Phone:  [phone]

## 2023-06-13 LAB — PREALB SERPL IA-MCNC: 32 MG/DL (ref 15–45)

## 2023-06-13 PROCEDURE — 99232 SBSQ HOSP IP/OBS MODERATE 35: CPT | Performed by: INTERNAL MEDICINE

## 2023-06-13 PROCEDURE — 250N000013 HC RX MED GY IP 250 OP 250 PS 637: Performed by: HOSPITALIST

## 2023-06-13 PROCEDURE — 250N000011 HC RX IP 250 OP 636: Performed by: INTERNAL MEDICINE

## 2023-06-13 PROCEDURE — 250N000013 HC RX MED GY IP 250 OP 250 PS 637: Performed by: STUDENT IN AN ORGANIZED HEALTH CARE EDUCATION/TRAINING PROGRAM

## 2023-06-13 PROCEDURE — 250N000013 HC RX MED GY IP 250 OP 250 PS 637: Performed by: INTERNAL MEDICINE

## 2023-06-13 PROCEDURE — 250N000011 HC RX IP 250 OP 636: Performed by: PHYSICIAN ASSISTANT

## 2023-06-13 PROCEDURE — 258N000003 HC RX IP 258 OP 636: Performed by: INTERNAL MEDICINE

## 2023-06-13 PROCEDURE — 250N000011 HC RX IP 250 OP 636: Performed by: NURSE PRACTITIONER

## 2023-06-13 PROCEDURE — 120N000004 HC R&B MS OVERFLOW

## 2023-06-13 PROCEDURE — 258N000003 HC RX IP 258 OP 636: Performed by: PHYSICIAN ASSISTANT

## 2023-06-13 PROCEDURE — 250N000011 HC RX IP 250 OP 636: Performed by: HOSPITALIST

## 2023-06-13 RX ORDER — PROCHLORPERAZINE MALEATE 10 MG
10 TABLET ORAL EVERY 6 HOURS PRN
Status: DISCONTINUED | OUTPATIENT
Start: 2023-06-13 | End: 2023-06-18 | Stop reason: HOSPADM

## 2023-06-13 RX ADMIN — PROCHLORPERAZINE EDISYLATE 10 MG: 5 INJECTION INTRAMUSCULAR; INTRAVENOUS at 12:29

## 2023-06-13 RX ADMIN — METHYLPREDNISOLONE SODIUM SUCCINATE 20 MG: 40 INJECTION INTRAMUSCULAR; INTRAVENOUS at 06:58

## 2023-06-13 RX ADMIN — FAMOTIDINE 20 MG: 20 TABLET, FILM COATED ORAL at 22:20

## 2023-06-13 RX ADMIN — INFLIXIMAB 300 MG: 100 INJECTION, POWDER, LYOPHILIZED, FOR SOLUTION INTRAVENOUS at 12:40

## 2023-06-13 RX ADMIN — PROCHLORPERAZINE EDISYLATE 10 MG: 5 INJECTION INTRAMUSCULAR; INTRAVENOUS at 22:20

## 2023-06-13 RX ADMIN — POTASSIUM CHLORIDE, DEXTROSE MONOHYDRATE AND SODIUM CHLORIDE: 150; 5; 450 INJECTION, SOLUTION INTRAVENOUS at 16:31

## 2023-06-13 RX ADMIN — ACETAMINOPHEN 650 MG: 325 TABLET, FILM COATED ORAL at 02:24

## 2023-06-13 RX ADMIN — MONTELUKAST 10 MG: 10 TABLET, FILM COATED ORAL at 22:20

## 2023-06-13 RX ADMIN — ONDANSETRON 4 MG: 4 TABLET, ORALLY DISINTEGRATING ORAL at 03:32

## 2023-06-13 RX ADMIN — TRAZODONE HYDROCHLORIDE 200 MG: 100 TABLET ORAL at 22:20

## 2023-06-13 RX ADMIN — POTASSIUM CHLORIDE, DEXTROSE MONOHYDRATE AND SODIUM CHLORIDE: 150; 5; 450 INJECTION, SOLUTION INTRAVENOUS at 02:21

## 2023-06-13 RX ADMIN — METHYLPREDNISOLONE SODIUM SUCCINATE 20 MG: 40 INJECTION INTRAMUSCULAR; INTRAVENOUS at 16:27

## 2023-06-13 RX ADMIN — TRAMADOL HYDROCHLORIDE 50 MG: 50 TABLET, COATED ORAL at 03:26

## 2023-06-13 RX ADMIN — NORTRIPTYLINE HYDROCHLORIDE 25 MG: 25 CAPSULE ORAL at 22:20

## 2023-06-13 ASSESSMENT — ACTIVITIES OF DAILY LIVING (ADL)
ADLS_ACUITY_SCORE: 20

## 2023-06-13 NOTE — PLAN OF CARE
"/73 (BP Location: Right arm)   Pulse 68   Temp 97.8  F (36.6  C) (Oral)   Resp 16   Ht 1.6 m (5' 3\")   Wt 66.1 kg (145 lb 11.2 oz)   SpO2 95%   BMI 25.81 kg/m       Problem: Plan of Care - These are the overarching goals to be used throughout the patient stay.    Goal: Absence of Hospital-Acquired Illness or Injury  Outcome: Progressing  Intervention: Prevent Skin Injury  Recent Flowsheet Documentation  Taken 6/13/2023 1829 by Ana Maria Neville RN  Body Position: position changed independently  Intervention: Prevent and Manage VTE (Venous Thromboembolism) Risk  Recent Flowsheet Documentation  Taken 6/13/2023 1829 by Ana Maria Neville RN  VTE Prevention/Management: SCDs (sequential compression devices) off, pt up and independent in the room  Taken 6/13/2023 0807 by Ana Maria Neville RN  VTE Prevention/Management: SCDs (sequential compression devices) off     Problem: Intestinal Obstruction  Goal: Optimal Bowel Function  Intervention: Promote Bowel Function    Recent Flowsheet Documentation  Taken 6/13/2023 1829 by Ana Maria Neville RN  Body Position: position changed independently, up walking in the hallways  Head of Bed (HOB) Positioning: HOB at 30-45 degrees  Positioning/Transfer Devices:    pillows    in use  Chair: Shifted weight   Goal Outcome Evaluation:                        "

## 2023-06-13 NOTE — PLAN OF CARE
Goal Outcome Evaluation:    Patient alert and oriented x4, vital sign stubble, Pt on a room air, Pt has been NPO since last night. Up ind in room. X2 BM and Passing flatus this shift. RLQ abd pain, given tylenol, tramadol and Zofran.  Pt c/o intermittent nausea, given zofran x1. New IV placed by vascular access this afternoon shift. Pt slept throughout the night shift. Will continue to provide supportive cares.

## 2023-06-13 NOTE — PLAN OF CARE
Goal Outcome Evaluation:       Patient now returned to clear liquids. Added diet appropriate oral nutritional supplements.    Helena Rivero RD, LD  Clinical Dietitian     3rd floor/ICU: [phone]  All other floors: [phone]  Weekend/holiday: [phone]  Office: [phone]

## 2023-06-13 NOTE — CONSULTS
"CLINICAL NUTRITION SERVICES - REASSESSMENT NOTE    Recommendations Ordered by Registered Dietitian (RD):   Diet advancement per MD - if unable to advance the diet and considering alternative nutrition supplementation please consult with \"pharmacy/nutrition to start and manage TPN\"     Ordered Ensure Clear BID between meals (berry), discussed changing to more calorically dense option of ensure enlive once diet advances. Kirsten prefers chocolate.    Malnutrition:   % Weight Loss:  > 2% in 1 week (severe malnutrition)  % Intake:  </= 75% for >/= 7 days (moderatemalnutrition)  Subcutaneous Fat Loss:  None observed  Muscle Loss:  None observed  Fluid Retention:  None noted    Malnutrition Diagnosis: Moderate malnutrition  In Context of:  Acute illness or injury  Chronic illness or disease     EVALUATION OF PROGRESS TOWARD GOALS   Diet: Clear Liquids     Intake/Tolerance: Patient NPO/clear liquids x 3 days, then advanced to low fiber on 6/9. Downgraded to NPO on 6/12 given recurrence of obstructive symptoms. Increased back to clear liquids on 6/13. Patient consuming bites-50% of meals ordered when diet order allowed. Overall consuming <50-75% of nutritional needs during admission.     Spoke with patient, she was eating as usual PTA - usually around 1 meal per day given total lack of appetite at baseline. Was consuming a \"meal replacer\" at the request of her daughter. NKFA. Answered questions surrounding low fiber diet handout provided on 6/9.     ASSESSED NUTRITION NEEDS:     Dosing Weight 67 kg   Estimated Energy Needs: 9455-2046 kcals (25-30 Kcal/Kg)  Justification: maintenance  Estimated Protein Needs: 67-80 grams protein (1-1.2 g pro/Kg)  Justification: maintenance  Estimated Fluid Needs: per MD      NEW FINDINGS:   - GI following   - Received nutrition education consult with the comment \"pt with Crohns and related partial SBO.  Needs info about supplements as well as ultimate plan for low fiber diet.\" RD provided " handouts on the low fiber diet on 6/9.   - labs:  Labs:  Electrolytes  Potassium (mmol/L)   Date Value   06/12/2023 3.8   06/10/2023 3.2 (L)   06/09/2023 4.2    Blood Glucose  Glucose (mg/dL)   Date Value   06/12/2023 89   06/10/2023 115 (H)   06/09/2023 100 (H)   06/07/2023 130 (H)   06/06/2023 142 (H)    Inflammatory Markers  WBC Count (10e3/uL)   Date Value   06/12/2023 9.6   06/10/2023 14.3 (H)   06/09/2023 13.5 (H)     Albumin (g/dL)   Date Value   06/12/2023 3.1 (L)   06/06/2023 3.6   06/05/2023 4.2      Sodium (mmol/L)   Date Value   06/12/2023 139   06/10/2023 138   06/09/2023 140    Renal  Urea Nitrogen (mg/dL)   Date Value   06/12/2023 18.4   06/10/2023 22.6   06/09/2023 18.1     Creatinine (mg/dL)   Date Value   06/12/2023 1.12 (H)   06/11/2023 1.13 (H)   06/10/2023 1.16 (H)     Additional  Ketones Urine (mg/dL)   Date Value   03/25/2005 Negative        - medications:    famotidine  20 mg Oral At Bedtime     inFLIXimab  300 mg Intravenous Once     methylPREDNISolone  20 mg Intravenous Q8H     montelukast  10 mg Oral At Bedtime     nortriptyline  25 mg Oral At Bedtime     sodium chloride (PF)  3 mL Intracatheter Q8H     traZODone  200 mg Oral At Bedtime        dextrose 5% and 0.45% NaCl + KCl 20 mEq/L 100 mL/hr at 06/13/23 0816      acetaminophen **OR** acetaminophen, lidocaine 4%, lidocaine (buffered or not buffered), melatonin, naloxone **OR** naloxone **OR** naloxone **OR** naloxone, ondansetron, prochlorperazine **OR** prochlorperazine, prochlorperazine, sodium chloride (PF), traMADol     - weight: Patient endorses weight loss PTA from 186 lbs since March 2023. Weight loss of 2.8 kg (4%) since admission   Vitals:    06/06/23 0052 06/07/23 1203 06/11/23 1532 06/12/23 1044   Weight: 68.9 kg (152 lb) 67 kg (147 lb 12.8 oz) 66.9 kg (147 lb 7.8 oz) 66.1 kg (145 lb 11.2 oz)     Previous Goals:   Patient to consume 75% of meals or oral nutritional supplements ordered TID  Evaluation: Not met    Previous  "Nutrition Diagnosis:   Predicted inadequate nutrient intake related to potential for PO intake to decline pending clinical course - diet tolerance   Evaluation: Declining    CURRENT NUTRITION DIAGNOSIS  Inadequate oral intake related to altered GI function as evidenced by patient likely consuming <50-75% of nutritional needs x 8 days, currently on clear liquids     INTERVENTIONS  Recommendations / Nutrition Prescription  Diet advancement per MD - if unable to advance the diet and considering alternative nutrition supplementation please consult with \"pharmacy/nutrition to start and manage TPN\"     Ordered Ensure Clear BID between meals (berry), discussed changing to more calorically dense option of ensure enlive once diet advances. Kirsten prefers chocolate.     Implementation  Medical Food Supplement: as above    Goals  Diet advancement to >/=full liquids in the next 24-48 hours vs consideration of nutrition support     MONITORING AND EVALUATION:  Progress towards goals will be monitored and evaluated per protocol and Practice Guidelines    Helena Rivero RD, LD  Clinical Dietitian     3rd floor/ICU: [phone]  All other floors: [phone]  Weekend/holiday: [phone]  Office: [phone]  "

## 2023-06-13 NOTE — PLAN OF CARE
Goal Outcome Evaluation:      Plan of Care Reviewed With: patient    Overall Patient Progress: improvingOverall Patient Progress: improving  Afebrile, VSS, abdomen soft, bowel sounds heard, pt NPO t/o shift, did go down to gastrografin x ray at 2130. IV fluids continue at 100/hr. Pt received IV solumedrol as ordered this evening. Pt did c/o some R lower quadrant pain at HS rating pain to a 7, ultram given and pt resting comfortably when rounding at 2330. Did state she had passed small amount of liquid stool (2300), denied passing gas. Will continue to monitor pain, continue with present plan.

## 2023-06-13 NOTE — PROGRESS NOTES
"[facility] [place]    Medicine Progress Note - Hospitalist Service    Date of Admission:  6/5/2023    Assessment & Plan   Devi Salas is a 62 year old female with hx Crohn's disease diagnosed early this year, asthma, and migraines.  She presented to emergency room with diarrhea, nausea, and abdominal pain.  Working dx is Crohn's disease, unclear whether acutely inflammed v scarring causing partial small bowel obstruction.    Note past medical history includes renal cell cancer that was managed surgically with right nephrectomy in 2009.  In addition, patient underwent hysterectomy in the remote past though she did not tell me the indication.  Otherwise, the patient has been quite healthy until this past year when she was having abdominal complaints and was ultimately diagnosed with chron's disease.  Colonoscopy completed through [facility] showed a distal ileal stricture due to scar.  This was also seen on CT scan dated 6/5/2023.    Ms. Salas was admitted and initiated on IV solumedrol and had been improving when she transitioned to oral prednisone on 6/9. Following the discontinuation of IV steroids, she developed increased post prandial abd pain, nausea, decreased gas and no BM since Friday. GI had signed off Friday 6/9, but re-consulted GI on 6/11.      Since admission on 6/5. Ms. Salas had seen minimal improvement until 6/12 at which point she was again started on IV methylprednisolone.  CRS was ultimately included in the evaluation on 6/12.  At this time, the plan is for the patient to undergo a trial of Remicade in a hope to avoid surgery.       Partial small bowel obstruction, persistent pain and low stool output  Ileal stricture identified on prior colonoscopy and confirmed on CT.  Unclear to what degree this is scar v reversible inflammatory   -noted on CT \"segments of narrowed distal small bowel causing a partial SBO.  No definite active " "inflammation...\"  -Records from [facility] indicates \"stenosis of her terminal ileum\" which I believe was identified on May 23, 2023 on colonoscopy.  -CRS completed a gastrografin small bowel study which continues to show lengths of small intestinal narrowing. GI and CRS have decided to pursue medical therapy today with initiation of Remicade for therapeutic trial.       Possible Crohn's exacerbation.  -Gastroenterology following, on PPI  -completed 3 days IV steroids, started on long oral prednisone taper 6/9 but had worsening symptoms since transition. Preceding this admission, also had treatment failure after transitioning to oral prednisone     Acute kidney injury.  -Creatinine initially elevated at 1.3.  -Likely prerenal due to dehydration from profuse diarrhea  -off fluids, Cr stable    Leukocytosis  -likely from steroids, c dif negative    Diarrhea  -negative for C dif, sounds like chronic issue for her w/SBO possibly contributing     Diet: Advance Diet as Tolerated: Low Fiber; Low Fiber    DVT Prophylaxis: Pneumatic Compression Devices  Monae Catheter: Not present  Lines: None     Cardiac Monitoring: None  Code Status: Full Code      Disposition Plan   To be determined.  Patient continues to have incompletely managed medical issues.    Rafiq Davalos MD   Hospitalist Service  [facility]  Securely message with Plated (more info)  Text page via [employer] Paging/Directory   ______________________________________________________________________    Interval History   Stable night. GI has decided to proceed with Remicade.     I returned to the pt after Remicade infusion which evidently went well.     Minimal stool and gas output. Some nausea with oral intake. Still on clear liquid diet only.   Denies SOB.  Up and ambulating in halls.     Physical Exam   Vital Signs: Temp: 97.6  F (36.4  C) Temp src: Oral BP: 113/70 Pulse: 79   Resp: 20 SpO2: 97 % O2 Device: None (Room air)  "   Weight: 147 lbs 7.8 oz  Constitutional: awake, alert and cooperative  Eyes: pupils equal, round and reactive to light and conjunctiva normal  ENT: normocepalic, without obvious abnormality, atramatic  Respiratory: no increased work of breathing, good air exchange and clear to auscultation  Cardiovascular: regular rate and rhythm and no murmur noted  GI: tender to palpation RLQ with milder discomfort diffusely.  No rebound tenderness, diminished bowel sounds  Skin: no bruising or bleeding  Neurologic: alert, oriented, no focal deficits    40 MINUTES SPENT BY ME on the date of service doing chart review, history, exam, documentation & further activities per the note.      Data   ------------------------- PAST 24 HR DATA REVIEWED -----------------------------------------------  Results for orders placed or performed during the hospital encounter of 06/05/23 (from the past 24 hour(s))   XR Gastrografin  Challenge    Narrative    XR GASTROGRAFIN CHALLENGE   6/12/2023 9:45 PM     HISTORY: Persistent SBO    COMPARISON: Same date abdominal radiograph, CT abdomen/pelvis 6/5/2023      Impression    IMPRESSION: Mildly dilated distal small bowel, similar to prior CT  scan same day radiograph. Administered oral contrast reached the colon  by 8 hours. Cholecystectomy. Multiple pelvic phleboliths. Bones are  unchanged.    KRISTIN CESPEDES MD         SYSTEM ID:  [id number]

## 2023-06-13 NOTE — PROGRESS NOTES
"GASTROENTEROLOGY PROGRESS NOTE        SUBJECTIVE:  Continued abdominal pain of 3 out of 10.  This is tolerable for her.  No nausea or vomiting.  She has not been passing any gas or stool.  She is tolerating clear liquids.     OBJECTIVE:    /77 (BP Location: Right arm, Patient Position: Semi-Bain's, Cuff Size: Adult Regular)   Pulse 70   Temp 97.9  F (36.6  C) (Oral)   Resp 16   Ht 1.6 m (5' 3\")   Wt 66.1 kg (145 lb 11.2 oz)   SpO2 95%   BMI 25.81 kg/m    Temp (24hrs), Av.7  F (36.5  C), Min:97.3  F (36.3  C), Max:97.9  F (36.6  C)    Patient Vitals for the past 72 hrs:   Weight   23 1044 66.1 kg (145 lb 11.2 oz)   23 1532 66.9 kg (147 lb 7.8 oz)     No intake or output data in the 24 hours ending 23 1310     PHYSICAL EXAM     Constitutional: NAD    Abdomen: soft, RLQ tenderness, no rebound or guarding         Additional Comments:  ROS, FH, SH: See initial GI consult for details.     I have reviewed the patient's new clinical lab results:     Recent Labs   Lab Test 23  0721 23  0558 06/10/23  1011 23  0639   WBC 9.6  --  14.3* 13.5*   HGB 11.5*  --  12.5 12.4   MCV 87  --  87 86    283 303 323     Recent Labs   Lab Test 23  0721 06/10/23  1011 23  0639   POTASSIUM 3.8 3.2* 4.2   CHLORIDE 105 104 106   CO2 26 24 26   BUN 18.4 22.6 18.1   ANIONGAP 8 10 8     Recent Labs   Lab Test 23  0721 23  0700 23  1854   ALBUMIN 3.1* 3.6 4.2   BILITOTAL 0.4 0.2 0.3   ALT 15 8* 12   AST 10 13 13       ASSESSMENT/ PLAN  Devi Salas is a 62-year-old female recently diagnosed with ileal Crohn's disease presenting with worsening symptoms of nausea, vomiting, abdominal pain, unintentional weight loss and diarrhea.  Additional medical history includes nephrectomy for kidney cancer, appendectomy, and cholecystectomy.    1.  Ileal Crohn's disease: Patient has undergone extensive work-up in March.  Recent colonoscopy was performed that showed " inflammation, stenosis as well as an ulceration at the ileum, pathology showed nonspecific mildly active chronic ileitis.  Presentation and work-up is most consistent with ileal Crohn's disease, though do note that she had been using NSAIDs regularly which may affect findings.     The patient's symptoms had not been responding to outpatient prednisone.  Imaging on arrival showed narrowed distal small bowel causing a partial small bowel obstruction. No definite active inflammation noted on CT scan, though she has had inflammation noted on recent imaging.  It is likely that her symptoms are a combination of inflammation related to her underlying Crohn's disease as well as ileal stenosis seen during her colonoscopy.      --No evidence of abscess/fistula on imaging.  -- As patient was failing steroids, colorectal surgery was consulted. Concern for fibrostenotic component of her disease given lack of improvement on IV steroids.    -- Gastrografin study 6/12 revealed mildly dilated small bowel with oral contrast reaching the colon.  No plans for immediate surgery per colorectal team.  May need to be considered in the future.  -- Plan to maximize medical therapy with initiation of Remicade today, 5 mg/kg. Patient does have insurance coverage for Remicade.  We will give urgent rescue dose today.  My office will contact patient for her next dose in 2 weeks.  QuantiFERON negative, hepatitis B negative.      We will continue to follow.   Discussed with Dr. Barber Romero PA-C  [facility] ( [facility])

## 2023-06-13 NOTE — PROGRESS NOTES
COLON & RECTAL SURGERY  PROGRESS NOTE    June 13, 2023    SUBJECTIVE:  Still reporting some right sided abdominal pain. Has had 2 small loose stools yesterday. No flatus. Some mild nausea, no vomiting.     OBJECTIVE:  Temp:  [97.3  F (36.3  C)-97.9  F (36.6  C)] 97.5  F (36.4  C)  Pulse:  [68-98] 98  Resp:  [18] 18  BP: (118-123)/(75-86) 123/86  SpO2:  [96 %-98 %] 98 %  No intake or output data in the 24 hours ending 06/13/23 0831    GENERAL:  Awake, alert, no acute distress, lying in bed  HEAD: Nomocephalic atraumatic  SCLERA: anicteric  EXTREMITIES: warm and well perfused  ABDOMEN:  Soft, mild right-sided tenderness, mildly distended, no rebound or guarding, no peritoneal signs    LABS:  Lab Results   Component Value Date    WBC 9.6 06/12/2023     Lab Results   Component Value Date    HGB 11.5 06/12/2023     Lab Results   Component Value Date    HCT 36.7 06/12/2023     Lab Results   Component Value Date     06/12/2023     Last Basic Metabolic Panel:  Lab Results   Component Value Date     06/12/2023      Lab Results   Component Value Date    POTASSIUM 3.8 06/12/2023     Lab Results   Component Value Date    CHLORIDE 105 06/12/2023     Lab Results   Component Value Date    GIORGIO 8.4 06/12/2023     Lab Results   Component Value Date    CO2 26 06/12/2023     Lab Results   Component Value Date    BUN 18.4 06/12/2023     Lab Results   Component Value Date    CR 1.12 06/12/2023     Lab Results   Component Value Date    GLC 89 06/12/2023       ASSESSMENT/PLAN:  Devi is a 62 year old woman with a recent diagnosis of Crohn's that was admitted on 6/6 with abdominal pain and nausea. A CT scan of the abdomen pelvis revealed segments of narrowed distal small bowel causing a SBO. She was intitially started on IV steroids and had some improvement. She was transitioned to oral steroids on 6/9. Unfortunately, she had a reoccurrence of obstructive symptoms so was restarted on IV steroids on 6/12. Gastrografin study  6/12 revealed mildly dilated distal small bowel with oral contrast reaching the colon.    - Clear liquids. If worsening pain, nausea, or vomiting would back down to NPO.  - Pain management as needed  - Medical management of Crohn's per GI  - No current plans for surgery    Will discuss with Dr. Mendez    For questions/paging, please contact the [facility] office at [phone].    Yanci Araujo PA-C  [facility]  Phone: [phone]    Colon and Rectal Surgery Attending Note    Patient seen and examined independently.  Agree with above assessment and plan.  Feeling better. + small liquid stool after SBFT.  abd soft, non-tender  Plan  Per GI Remicade about to start.  No plan for urgent surgery at this time  Diet recommendations per GI.    Marleny Mendez MD  [facility]  [address], [address]  [place] [address]  T: [phone]  F: [phone]   [email or web address]

## 2023-06-14 LAB
ANION GAP SERPL CALCULATED.3IONS-SCNC: 7 MMOL/L (ref 7–15)
BUN SERPL-MCNC: 12 MG/DL (ref 8–23)
CALCIUM SERPL-MCNC: 8.8 MG/DL (ref 8.8–10.2)
CHLORIDE SERPL-SCNC: 104 MMOL/L (ref 98–107)
CREAT SERPL-MCNC: 1.01 MG/DL (ref 0.51–0.95)
DEPRECATED HCO3 PLAS-SCNC: 26 MMOL/L (ref 22–29)
ERYTHROCYTE [DISTWIDTH] IN BLOOD BY AUTOMATED COUNT: 14.6 % (ref 10–15)
GFR SERPL CREATININE-BSD FRML MDRD: 62 ML/MIN/1.73M2
GLUCOSE SERPL-MCNC: 119 MG/DL (ref 70–99)
HCT VFR BLD AUTO: 38.6 % (ref 35–47)
HGB BLD-MCNC: 11.7 G/DL (ref 11.7–15.7)
HOLD SPECIMEN: NORMAL
MCH RBC QN AUTO: 26.8 PG (ref 26.5–33)
MCHC RBC AUTO-ENTMCNC: 30.3 G/DL (ref 31.5–36.5)
MCV RBC AUTO: 89 FL (ref 78–100)
PLATELET # BLD AUTO: 222 10E3/UL (ref 150–450)
POTASSIUM SERPL-SCNC: 4.4 MMOL/L (ref 3.4–5.3)
RBC # BLD AUTO: 4.36 10E6/UL (ref 3.8–5.2)
SODIUM SERPL-SCNC: 137 MMOL/L (ref 136–145)
WBC # BLD AUTO: 8.2 10E3/UL (ref 4–11)

## 2023-06-14 PROCEDURE — 99232 SBSQ HOSP IP/OBS MODERATE 35: CPT | Performed by: INTERNAL MEDICINE

## 2023-06-14 PROCEDURE — 250N000013 HC RX MED GY IP 250 OP 250 PS 637: Performed by: STUDENT IN AN ORGANIZED HEALTH CARE EDUCATION/TRAINING PROGRAM

## 2023-06-14 PROCEDURE — 250N000013 HC RX MED GY IP 250 OP 250 PS 637: Performed by: INTERNAL MEDICINE

## 2023-06-14 PROCEDURE — 258N000003 HC RX IP 258 OP 636: Performed by: INTERNAL MEDICINE

## 2023-06-14 PROCEDURE — 250N000011 HC RX IP 250 OP 636: Performed by: NURSE PRACTITIONER

## 2023-06-14 PROCEDURE — 250N000013 HC RX MED GY IP 250 OP 250 PS 637: Performed by: HOSPITALIST

## 2023-06-14 PROCEDURE — 250N000011 HC RX IP 250 OP 636: Performed by: HOSPITALIST

## 2023-06-14 PROCEDURE — 80048 BASIC METABOLIC PNL TOTAL CA: CPT | Performed by: INTERNAL MEDICINE

## 2023-06-14 PROCEDURE — 120N000004 HC R&B MS OVERFLOW

## 2023-06-14 PROCEDURE — 36415 COLL VENOUS BLD VENIPUNCTURE: CPT | Performed by: INTERNAL MEDICINE

## 2023-06-14 PROCEDURE — 85027 COMPLETE CBC AUTOMATED: CPT | Performed by: INTERNAL MEDICINE

## 2023-06-14 PROCEDURE — 999N000127 HC STATISTIC PERIPHERAL IV START W US GUIDANCE

## 2023-06-14 RX ADMIN — ONDANSETRON 4 MG: 4 TABLET, ORALLY DISINTEGRATING ORAL at 11:51

## 2023-06-14 RX ADMIN — NORTRIPTYLINE HYDROCHLORIDE 25 MG: 25 CAPSULE ORAL at 22:06

## 2023-06-14 RX ADMIN — FAMOTIDINE 20 MG: 20 TABLET, FILM COATED ORAL at 22:06

## 2023-06-14 RX ADMIN — METHYLPREDNISOLONE SODIUM SUCCINATE 20 MG: 40 INJECTION INTRAMUSCULAR; INTRAVENOUS at 23:59

## 2023-06-14 RX ADMIN — POTASSIUM CHLORIDE, DEXTROSE MONOHYDRATE AND SODIUM CHLORIDE: 150; 5; 450 INJECTION, SOLUTION INTRAVENOUS at 03:47

## 2023-06-14 RX ADMIN — ONDANSETRON 4 MG: 4 TABLET, ORALLY DISINTEGRATING ORAL at 19:17

## 2023-06-14 RX ADMIN — METHYLPREDNISOLONE SODIUM SUCCINATE 20 MG: 40 INJECTION INTRAMUSCULAR; INTRAVENOUS at 00:03

## 2023-06-14 RX ADMIN — ACETAMINOPHEN 650 MG: 325 TABLET, FILM COATED ORAL at 22:05

## 2023-06-14 RX ADMIN — METHYLPREDNISOLONE SODIUM SUCCINATE 20 MG: 40 INJECTION INTRAMUSCULAR; INTRAVENOUS at 17:50

## 2023-06-14 RX ADMIN — POTASSIUM CHLORIDE, DEXTROSE MONOHYDRATE AND SODIUM CHLORIDE: 150; 5; 450 INJECTION, SOLUTION INTRAVENOUS at 20:18

## 2023-06-14 RX ADMIN — TRAZODONE HYDROCHLORIDE 200 MG: 100 TABLET ORAL at 22:06

## 2023-06-14 RX ADMIN — METHYLPREDNISOLONE SODIUM SUCCINATE 20 MG: 40 INJECTION INTRAMUSCULAR; INTRAVENOUS at 09:42

## 2023-06-14 RX ADMIN — MONTELUKAST 10 MG: 10 TABLET, FILM COATED ORAL at 22:06

## 2023-06-14 ASSESSMENT — ACTIVITIES OF DAILY LIVING (ADL)
ADLS_ACUITY_SCORE: 20

## 2023-06-14 NOTE — PLAN OF CARE
Pt. A/O x4. VSS. Slight pain and discomfort in abdomen. Frequent diarrhea. Tolerating clears throughout the morning- needed PRN Zofran x1. Pt advanced to low fiber diet this afternoon. Ate half of scrambled eggs and some cooked carrots at 1800 with increased discomfort in stomach. Heat pack applied. Pt on IV solumedrol. IVF running at 75ml/hr. Independent in the room. [facility] following. Plan to continue IV steroids and advance on low fiber diet as tolerated. Will continue to provide supportive cares.

## 2023-06-14 NOTE — PROVIDER NOTIFICATION
Paged at [phone] - FYI - pt felt like IV was feeling tender, decreased fluids from 100 to 75 mL with relief per pt.

## 2023-06-14 NOTE — PROGRESS NOTES
"GASTROENTEROLOGY PROGRESS NOTE       SUBJECTIVE:  Pain is gone since getting Remicade. Still has cramping and diarrhea. Ready to advance diet.     OBJECTIVE:  /86 (BP Location: Right arm)   Pulse 63   Temp 98.2  F (36.8  C) (Oral)   Resp 18   Ht 1.6 m (5' 3\")   Wt 66.1 kg (145 lb 11.2 oz)   SpO2 98%   BMI 25.81 kg/m    Temp (24hrs), Av.9  F (36.6  C), Min:97.5  F (36.4  C), Max:98.2  F (36.8  C)    Patient Vitals for the past 72 hrs:   Weight   23 1044 66.1 kg (145 lb 11.2 oz)   23 1532 66.9 kg (147 lb 7.8 oz)       Intake/Output Summary (Last 24 hours) at 2023 1436  Last data filed at 2023 0900  Gross per 24 hour   Intake 1445 ml   Output --   Net 1445 ml        PHYSICAL EXAM     Cardiovascular: Normal  Respiratory: Normal  Gastrointestinal: Active BS, soft, minimal tenderness RLQ  JOINT/EXTREMITIES: No calf tenderness      Recent Labs   Lab Test 23  0522 23  0721 23  0558 06/10/23  1011   WBC 8.2 9.6  --  14.3*   HGB 11.7 11.5*  --  12.5   MCV 89 87  --  87    239 283 303     Recent Labs   Lab Test 23  0522 23  0721 06/10/23  1011   POTASSIUM 4.4 3.8 3.2*   CHLORIDE 104 105 104   CO2 26 26 24   BUN 12.0 18.4 22.6   ANIONGAP 7 8 10     Recent Labs   Lab Test 23  0721 23  0700 23  1854   ALBUMIN 3.1* 3.6 4.2   BILITOTAL 0.4 0.2 0.3   ALT 15 8* 12   AST 10 13 13           Principal Problem:  Ileal Crohn's    Assessment: Pain is gone since Remicade. Hope that there will be continued improvement in the narrowing at the ileocecal area.     Plan: Advance diet. Continue IV steroids. Hope to transition to oral prednisone soon.          Kel Blandon MD  [facility]  Office:  [phone]  "

## 2023-06-14 NOTE — PROGRESS NOTES
Orientation: Alert and oriented.  VSS. RA.   LS: Clear  GI: Abdominal discomfort and tenderness. Denies passing gas. No BM. Low constant nausea, IV compazine x1 with relief.   : Adequate urine output.  Skin: Bilateral bruising on forearms from previous PIV sites.  Activity: Independent. Pt slept comfortably throughout shift.   Pain: 0-6/10.  Updates/Plan: Pt tolerated water intake with slight increase in nausea, ate a popsicle and reported increase in pain, nausea, and bloating but resolved over time. Continue with current cares.

## 2023-06-14 NOTE — PROGRESS NOTES
"Colon & Rectal Surgery Progress Note             Interval History:   Remicade given yesterday.  Overall feeling better.  Several small loose stools.  No bleeding.                Medications:   I have reviewed this patient's current medications               Physical Exam:   Blood pressure 129/80, pulse 62, temperature 97.5  F (36.4  C), temperature source Oral, resp. rate 18, height 1.6 m (5' 3\"), weight 66.1 kg (145 lb 11.2 oz), SpO2 98 %.    Intake/Output Summary (Last 24 hours) at 6/14/2023 0851  Last data filed at 6/13/2023 2233  Gross per 24 hour   Intake 1325 ml   Output --   Net 1325 ml     GEN: Alert  ABD: Soft, nontender, nondistended         Data:        Lab Results   Component Value Date     06/14/2023    Lab Results   Component Value Date    CHLORIDE 104 06/14/2023    Lab Results   Component Value Date    BUN 12.0 06/14/2023      Lab Results   Component Value Date    POTASSIUM 4.4 06/14/2023    Lab Results   Component Value Date    CO2 26 06/14/2023    Lab Results   Component Value Date    CR 1.01 06/14/2023    CR 1.12 06/12/2023        Lab Results   Component Value Date    HGB 11.7 06/14/2023    HGB 11.5 (L) 06/12/2023     Lab Results   Component Value Date     06/14/2023     06/12/2023     Lab Results   Component Value Date    WBC 8.2 06/14/2023    WBC 9.6 06/12/2023            Assessment and Plan:   No plans for urgent surgery at this time.  Diet per GI. we will follow more peripherally as GI is managing her Crohn's disease medically.  Please call if there is any concerns relevant to the colorectal surgery team.     Marleny Mendez MD  [facility]  Office Phone # [phone]    "

## 2023-06-14 NOTE — PROGRESS NOTES
[facility]    Medicine Progress Note - Hospitalist Service    Date of Admission:  6/5/2023    Assessment & Plan   eDvi Salas is a 62 year old female with hx Crohn's disease diagnosed early this year, asthma, and migraines.  She presented to emergency room with diarrhea, nausea, and abdominal pain.  Working dx is Crohn's disease, unclear whether acutely inflammed v scarring causing partial small bowel obstruction.    Note past medical history includes renal cell cancer that was managed surgically with right nephrectomy in 2009.  In addition, patient underwent hysterectomy in the remote past though she did not tell me the indication.  Otherwise, the patient has been quite healthy until this past year when she was having abdominal complaints and was ultimately diagnosed with chron's disease.  Colonoscopy completed through [facility] showed a distal ileal stricture due to scar.  This was also seen on CT scan dated 6/5/2023.    Ms. Salas was admitted and initiated on IV solumedrol and had been improving when she transitioned to oral prednisone on 6/9. Following the discontinuation of IV steroids, she developed increased post prandial abd pain, nausea, decreased gas and no BM since Friday. GI had signed off Friday 6/9, but re-consulted GI on 6/11.      Since admission on 6/5. Ms. Salas had seen minimal improvement until 6/12 at which point she was again started on IV methylprednisolone.  CRS was included in the evaluation on 6/12 and pt was seen by Dr. Castro.  On 6/13, Ms. Salas was started on Remicade and appears to have tolerated that well. Pain is now improved, but she continues to have decreased flatus and stool output.  Nausea also persists.     DX:  1.  Partial SBO.  This appears to be due to the Crohns' associated areas of small intestinal narrowing.  2.  Recent dx Crohns with evidence of ileal stricture.  It is not fully clear to what degree the stricture is  still inflammatory (and reversible) v whether it is scar (and irreversible).  Pain is improved after initiation of Remicade, but no change yet regarding the stool output, etc.   3.  LIDIA, improved.  Probably due to dehydration on admission; baseline creat is 0.9.  4.  Leukocytosis, thought to be demargination primarily related to steroids.        PLAN:  1.  Continues steroid taper per gastroenterology.    2.  Cont IVF until pt is able to take adequate volume of food. Advanced to Low Fiber diet today.  3.  Remicade and steroids per [facility].        Diet: Advance Diet as Tolerated: Low Fiber; Low Fiber    DVT Prophylaxis: Pneumatic Compression Devices  Monae Catheter: Not present  Lines: None     Cardiac Monitoring: None  Code Status: Full Code      Disposition Plan   TBD.  Pt will need to able to take adequate oral intake prior to discharge.    Rafiq Davalos MD   Hospitalist Service  [facility]  Securely message with [employer] (more info)  Text page via [employer] Paging/Directory   ______________________________________________________________________    Interval History   Stable night. No major changes overnight. No flatus or stool, no emesis but continues to have mild nausea.     Pt very hungry. RLQ pain is improved.    Physical Exam   Vital Signs: Temp: 97.6  F (36.4  C) Temp src: Oral BP: 113/70 Pulse: 79   Resp: 20 SpO2: 97 % O2 Device: None (Room air)    Weight: 147 lbs 7.8 oz  Constitutional: awake, alert and cooperative  Eyes: pupils equal, round and reactive to light and conjunctiva normal  ENT: normocepalic, without obvious abnormality, atramatic  Respiratory: no increased work of breathing, good air exchange and clear to auscultation  Cardiovascular: regular rate and rhythm and no murmur noted  GI: tender to palpation RLQ with milder discomfort diffusely.  No rebound tenderness, diminished bowel sounds  Skin: no bruising or bleeding  Neurologic: alert, oriented, no focal deficits    40  MINUTES SPENT BY ME on the date of service doing chart review, history, exam, documentation & further activities per the note.      Data   ------------------------- PAST 24 HR DATA REVIEWED -----------------------------------------------  Results for orders placed or performed during the hospital encounter of 06/05/23 (from the past 24 hour(s))   CBC with platelets   Result Value Ref Range    WBC Count 8.2 4.0 - 11.0 10e3/uL    RBC Count 4.36 3.80 - 5.20 10e6/uL    Hemoglobin 11.7 11.7 - 15.7 g/dL    Hematocrit 38.6 35.0 - 47.0 %    MCV 89 78 - 100 fL    MCH 26.8 26.5 - 33.0 pg    MCHC 30.3 (L) 31.5 - 36.5 g/dL    RDW 14.6 10.0 - 15.0 %    Platelet Count 222 150 - 450 10e3/uL   Extra Tube    Narrative    The following orders were created for panel order Extra Tube.  Procedure                               Abnormality         Status                     ---------                               -----------         ------                     Extra Green Top (Lithium...[[id number]]                      Final result                 Please view results for these tests on the individual orders.   Extra Green Top (Lithium Heparin) Tube   Result Value Ref Range    Hold Specimen [facility]    Basic metabolic panel   Result Value Ref Range    Sodium 137 136 - 145 mmol/L    Potassium 4.4 3.4 - 5.3 mmol/L    Chloride 104 98 - 107 mmol/L    Carbon Dioxide (CO2) 26 22 - 29 mmol/L    Anion Gap 7 7 - 15 mmol/L    Urea Nitrogen 12.0 8.0 - 23.0 mg/dL    Creatinine 1.01 (H) 0.51 - 0.95 mg/dL    Calcium 8.8 8.8 - 10.2 mg/dL    Glucose 119 (H) 70 - 99 mg/dL    GFR Estimate 62 >60 mL/min/1.73m2

## 2023-06-15 ENCOUNTER — APPOINTMENT (OUTPATIENT)
Dept: CT IMAGING | Facility: CLINIC | Age: 63
End: 2023-06-15
Attending: NURSE PRACTITIONER
Payer: COMMERCIAL

## 2023-06-15 LAB
ANION GAP SERPL CALCULATED.3IONS-SCNC: 9 MMOL/L (ref 7–15)
BUN SERPL-MCNC: 14.8 MG/DL (ref 8–23)
CALCIUM SERPL-MCNC: 9 MG/DL (ref 8.8–10.2)
CHLORIDE SERPL-SCNC: 104 MMOL/L (ref 98–107)
CREAT SERPL-MCNC: 1.06 MG/DL (ref 0.51–0.95)
DEPRECATED HCO3 PLAS-SCNC: 25 MMOL/L (ref 22–29)
GFR SERPL CREATININE-BSD FRML MDRD: 59 ML/MIN/1.73M2
GLUCOSE SERPL-MCNC: 154 MG/DL (ref 70–99)
POTASSIUM SERPL-SCNC: 4.4 MMOL/L (ref 3.4–5.3)
SODIUM SERPL-SCNC: 138 MMOL/L (ref 136–145)

## 2023-06-15 PROCEDURE — 250N000013 HC RX MED GY IP 250 OP 250 PS 637: Performed by: INTERNAL MEDICINE

## 2023-06-15 PROCEDURE — 250N000011 HC RX IP 250 OP 636: Performed by: HOSPITALIST

## 2023-06-15 PROCEDURE — 250N000011 HC RX IP 250 OP 636: Performed by: NURSE PRACTITIONER

## 2023-06-15 PROCEDURE — 250N000013 HC RX MED GY IP 250 OP 250 PS 637: Performed by: HOSPITALIST

## 2023-06-15 PROCEDURE — 82310 ASSAY OF CALCIUM: CPT | Performed by: INTERNAL MEDICINE

## 2023-06-15 PROCEDURE — 258N000003 HC RX IP 258 OP 636: Performed by: INTERNAL MEDICINE

## 2023-06-15 PROCEDURE — 250N000011 HC RX IP 250 OP 636: Performed by: INTERNAL MEDICINE

## 2023-06-15 PROCEDURE — 36415 COLL VENOUS BLD VENIPUNCTURE: CPT | Performed by: INTERNAL MEDICINE

## 2023-06-15 PROCEDURE — 99232 SBSQ HOSP IP/OBS MODERATE 35: CPT | Performed by: STUDENT IN AN ORGANIZED HEALTH CARE EDUCATION/TRAINING PROGRAM

## 2023-06-15 PROCEDURE — 250N000013 HC RX MED GY IP 250 OP 250 PS 637: Performed by: STUDENT IN AN ORGANIZED HEALTH CARE EDUCATION/TRAINING PROGRAM

## 2023-06-15 PROCEDURE — 120N000004 HC R&B MS OVERFLOW

## 2023-06-15 PROCEDURE — 74177 CT ABD & PELVIS W/CONTRAST: CPT

## 2023-06-15 RX ORDER — IOPAMIDOL 755 MG/ML
500 INJECTION, SOLUTION INTRAVASCULAR ONCE
Status: COMPLETED | OUTPATIENT
Start: 2023-06-15 | End: 2023-06-15

## 2023-06-15 RX ADMIN — ACETAMINOPHEN 650 MG: 325 TABLET, FILM COATED ORAL at 10:47

## 2023-06-15 RX ADMIN — ACETAMINOPHEN 650 MG: 325 TABLET, FILM COATED ORAL at 06:19

## 2023-06-15 RX ADMIN — POTASSIUM CHLORIDE, DEXTROSE MONOHYDRATE AND SODIUM CHLORIDE: 150; 5; 450 INJECTION, SOLUTION INTRAVENOUS at 21:54

## 2023-06-15 RX ADMIN — NORTRIPTYLINE HYDROCHLORIDE 25 MG: 25 CAPSULE ORAL at 21:02

## 2023-06-15 RX ADMIN — TRAZODONE HYDROCHLORIDE 200 MG: 100 TABLET ORAL at 21:04

## 2023-06-15 RX ADMIN — METHYLPREDNISOLONE SODIUM SUCCINATE 20 MG: 40 INJECTION INTRAMUSCULAR; INTRAVENOUS at 16:01

## 2023-06-15 RX ADMIN — ONDANSETRON 4 MG: 4 TABLET, ORALLY DISINTEGRATING ORAL at 18:12

## 2023-06-15 RX ADMIN — METHYLPREDNISOLONE SODIUM SUCCINATE 20 MG: 40 INJECTION INTRAMUSCULAR; INTRAVENOUS at 08:59

## 2023-06-15 RX ADMIN — IOPAMIDOL 58 ML: 755 INJECTION, SOLUTION INTRAVENOUS at 11:34

## 2023-06-15 RX ADMIN — ONDANSETRON 4 MG: 4 TABLET, ORALLY DISINTEGRATING ORAL at 06:19

## 2023-06-15 RX ADMIN — FAMOTIDINE 20 MG: 20 TABLET, FILM COATED ORAL at 21:01

## 2023-06-15 RX ADMIN — POTASSIUM CHLORIDE, DEXTROSE MONOHYDRATE AND SODIUM CHLORIDE: 150; 5; 450 INJECTION, SOLUTION INTRAVENOUS at 09:21

## 2023-06-15 RX ADMIN — SODIUM CHLORIDE 100 ML: 9 INJECTION, SOLUTION INTRAVENOUS at 11:32

## 2023-06-15 RX ADMIN — MONTELUKAST 10 MG: 10 TABLET, FILM COATED ORAL at 21:02

## 2023-06-15 RX ADMIN — ACETAMINOPHEN 650 MG: 325 TABLET, FILM COATED ORAL at 16:06

## 2023-06-15 ASSESSMENT — ACTIVITIES OF DAILY LIVING (ADL)
ADLS_ACUITY_SCORE: 20

## 2023-06-15 NOTE — PLAN OF CARE
Goal Outcome Evaluation:  Updates: Theresa is doing well today. VSS. Independent in room/hallways. Pt rested comfortably between cares. Repeat CT done today.  Resp: WDL  GI/: Last liquid stool 6/14 evening before having dinner. Since dinner, no BMs today. Still - flatus. Feels distended, abd tender in RLQ. No N/V. Tolerating ensures and some chips, not tolerating low fiber diet well today. Going to try more low fiber food for dinner. Voiding, UO WDL.  IV: WDL R forearm, infusing D5 1/2NS +20 KCL@75. Monitor closely. Will need midline per VAT if this IV infiltrates.  Pain/Comfort: 4/10. Tylenol providing adequate pain management per pt.  Skin: WDL x bruises scattered on arms from multiple needle sticks  Social: Friends visited today  Plan: IV solumedrol q8h, monitor diet tolerance and GI symptoms, and provide for comfort and needs.

## 2023-06-15 NOTE — PROGRESS NOTES
[facility]  Medicine Progress Note - Hospitalist Service  Date of Admission:  6/5/2023    Assessment & Plan   Ms. Devi Salas is a 62 year old female with hx Crohn's disease diagnosed early this year, asthma, migraines and renal cell cancer that was managed surgically with right nephrectomy in 2009 who presented to emergency room with diarrhea, nausea, and abdominal pain.  Working dx is Crohn's disease, unclear whether acutely inflammed v scarring causing partial small bowel obstruction.GI following, no ongoing clinical improvement.    Partial SBO.  This appears to be due to the Crohns' associated areas of small intestinal narrowing.  Recent dx Crohns with evidence of ileal stricture.  It is not fully clear to what degree the stricture is still inflammatory (and reversible) v whether it is scar (and irreversible).  Pain is improved after initiation of Remicade, but no change yet regarding the stool output, etc.   Leukocytosis, thought to be demargination primarily related to steroids.  Presented to emergency room with diarrhea, nausea, and abdominal pain. Ms. Salas was admitted and initiated on IV solumedrol and had been improving when she transitioned to oral prednisone on 6/9. Following the discontinuation of IV steroids, she developed increased post prandial abd pain, nausea, decreased gas and no BM since Friday. GI had signed off Friday 6/9, but re-consulted GI on 6/11. On 6/12 at which point she was again started on IV methylprednisolone.  CRS was included in the evaluation on 6/12 and pt was seen by Dr. Castro.  On 6/13, Ms. Salas was started on Remicade and appears to have tolerated that well. Pain is now improved, but she continues to have decreased flatus and stool output.  Nausea also persists.  Of note, colonoscopy completed through [place] [facility] showed a distal ileal stricture due to scar.  This was also seen on CT scan dated 6/5/2023.  -GI following    -  On IV steroid   - Received remicade on 6/13   - Colorectal surgery following, no surgery planned at this time.    - CT abdomen and pelvis due to ongoing symptoms.   -Cont IVF until pt is able to take adequate volume of food. Advanced to Low Fiber diet today.      LIDIA.  Probably due to dehydration on admission; baseline creat is 0.9.   -monitor BMP      Diet: Advance Diet as Tolerated: Low Fiber; Low Fiber    DVT Prophylaxis: Pneumatic Compression Devices  Monae Catheter: Not present  Lines: None     Cardiac Monitoring: None  Code Status: Full Code      Disposition Plan: TBD.  Pt will need to able to take adequate oral intake prior to discharge.    Vikki Duran, DO   Hospitalist Service  [facility]  Securely message with [employer] (more info)  Text page via [employer] Paging/Directory   ______________________________________________________________________    Interval History   Stable night. No major changes overnight.     She reports that she tried to eat scrambled eggs last night and had nausea with this, she again tried to eat some breakfast today and had a couple bites with recurrent symptoms    But frustrated with her symptoms but understands that she needs to stay in the hospital to figure out what is going on and so that she can tolerate p.o. intake    She does not report any new fever or chills, otherwise symptoms are stable and she reports no other new concerns or complaints    Physical Exam   Vital Signs: Temp: 97.6  F (36.4  C) Temp src: Oral BP: 113/70 Pulse: 79   Resp: 20 SpO2: 97 % O2 Device: None (Room air)    Weight: 147 lbs 7.8 oz    Constitutional: awake, alert and cooperative  HEENT: normocepalic, atramatic  Respiratory: no increased work of breathing, good air exchange and clear to auscultation  Cardiovascular: regular rate and rhythm and no murmur noted  GI: tender to palpation RLQ with milder discomfort diffusely.  No rebound tenderness, mildly distended   Skin: no rashes or  lesions on exposed skin   Neurologic: alert, oriented, no focal deficits noted     Data   ------------------------- PAST 24 HR DATA REVIEWED -----------------------------------------------  Results for orders placed or performed during the hospital encounter of 06/05/23 (from the past 24 hour(s))   Basic metabolic panel   Result Value Ref Range    Sodium 138 136 - 145 mmol/L    Potassium 4.4 3.4 - 5.3 mmol/L    Chloride 104 98 - 107 mmol/L    Carbon Dioxide (CO2) 25 22 - 29 mmol/L    Anion Gap 9 7 - 15 mmol/L    Urea Nitrogen 14.8 8.0 - 23.0 mg/dL    Creatinine 1.06 (H) 0.51 - 0.95 mg/dL    Calcium 9.0 8.8 - 10.2 mg/dL    Glucose 154 (H) 70 - 99 mg/dL    GFR Estimate 59 (L) >60 mL/min/1.73m2   CT Abdomen Pelvis w Contrast    Narrative    CT ABDOMEN AND PELVIS WITH CONTRAST 6/15/2023 11:36 AM    CLINICAL HISTORY: Abdominal pain.    TECHNIQUE: CT scan of the abdomen and pelvis was performed following  injection of IV contrast. Multiplanar reformats were obtained. Dose  reduction techniques were used.  CONTRAST: 58mL Isovue-370  COMPARISON: CT of the abdomen and pelvis performed 6/5/2023.    FINDINGS:   LOWER CHEST: Small calcified granuloma in the right lower lobe.    HEPATOBILIARY: The gallbladder is not seen, and is likely surgically  absent. No hepatic masses are seen.    PANCREAS: Normal.    SPLEEN: Normal.    ADRENAL GLANDS: Normal.    KIDNEYS/BLADDER: Right nephrectomy. Left kidney is unremarkable. No  hydronephrosis.    BOWEL: Several narrowed segments of distal ileum demonstrate mucosal  hyperenhancement. These overall have a similar appearance to the  previous exam (for example, series 3 image 160). Short segments of  apparent narrowing in the distal sigmoid colon were not as well seen  on the previous exam, but also likely unchanged with no convincing  associated mucosal hyperenhancement. No evidence for associated bowel  obstruction. The appendix is not visualized, however, there are no  secondary signs of  appendicitis.    PELVIC ORGANS: Hysterectomy. No free fluid in the pelvis.    LYMPH NODES: No enlarged lymph nodes are identified in the abdomen or  pelvis.    VASCULATURE: Mild atherosclerotic aortoiliac calcification.    ADDITIONAL FINDINGS: Degenerative changes in the visualized  thoracolumbar spine.    MUSCULOSKELETAL: Unremarkable.      Impression    IMPRESSION:   1.  Several segments of luminal narrowing and mucosal hyperenhancement  in the distal ileum suggest acute on chronic Crohn's ileitis.  2.  There are also apparent narrowed short segments in the distal  sigmoid colon.  3.  No evidence for associated bowel obstruction.    RYAN ROSAS MD         SYSTEM ID:  [id number]

## 2023-06-15 NOTE — PROGRESS NOTES
"GASTROENTEROLOGY PROGRESS NOTE       SUBJECTIVE:  On low residue diet. Concerned about inadequate oral intake.  Reports nausea which responds to Zofran. Not passing gas. No bowel movements.      OBJECTIVE:  General Appearance:  Sitting on he recliner  /71   Pulse 100   Temp 97.9  F (36.6  C) (Oral)   Resp 20   Ht 1.6 m (5' 3\")   Wt 66.1 kg (145 lb 11.2 oz)   SpO2 98%   BMI 25.81 kg/m    Temp (24hrs), Av.9  F (36.6  C), Min:97.6  F (36.4  C), Max:98.2  F (36.8  C)    No data found.    Intake/Output Summary (Last 24 hours) at 6/15/2023 0842  Last data filed at 2023 2200  Gross per 24 hour   Intake 740 ml   Output --   Net 740 ml        PHYSICAL EXAM  General: alert, oriented, NAD  SKIN: no suspicious lesions, rashes, jaundice, or spider angiomas   EYES: No scleral icterus   RESPIRATORY: Non labored breathing  GASTROINTESTINAL: Active bowel sounds, soft  NEURO:Grossly WNL.     Additional Comments:  ROS, FH, SH: See initial GI consult for details.     I have reviewed the patient's new clinical lab results:     Recent Labs   Lab Test 23  0523  0723  0558 06/10/23  1011   WBC 8.2 9.6  --  14.3*   HGB 11.7 11.5*  --  12.5   MCV 89 87  --  87    239 283 303     Recent Labs   Lab Test 06/15/23  0658 23  0522 23  0721   POTASSIUM 4.4 4.4 3.8   CHLORIDE 104 104 105   CO2 25 26 26   BUN 14.8 12.0 18.4   ANIONGAP 9 7 8     Recent Labs   Lab Test 23  0721 23  0700 23  1854   ALBUMIN 3.1* 3.6 4.2   BILITOTAL 0.4 0.2 0.3   ALT 15 8* 12   AST 10 13 13     Creatinine   Date Value Ref Range Status   06/15/2023 1.06 (H) 0.51 - 0.95 mg/dL Final        Imaging and procedures:    XR GASTROGRAFIN CHALLENGE   2023 9:45 PM      HISTORY: Persistent SBO     COMPARISON: Same date abdominal radiograph, CT abdomen/pelvis 2023                                                                    IMPRESSION: Mildly dilated distal small bowel, similar to prior " CT  scan same day radiograph. Administered oral contrast reached the colon  by 8 hours. Cholecystectomy. Multiple pelvic phleboliths. Bones are  Unchanged.    CT scan 6/15    BOWEL: Several narrowed segments of distal ileum demonstrate mucosal  hyperenhancement. These overall have a similar appearance to the  previous exam (for example, series 3 image 160). Short segments of  apparent narrowing in the distal sigmoid colon were not as well seen  on the previous exam, but also likely unchanged with no convincing  associated mucosal hyperenhancement. No evidence for associated bowel  obstruction. The appendix is not visualized, however, there are no  secondary signs of appendicitis.                                                                 IMPRESSION:   1.  Several segments of luminal narrowing and mucosal hyperenhancement  in the distal ileum suggest acute on chronic Crohn's ileitis.  2.  There are also apparent narrowed short segments in the distal  sigmoid colon.  3.  No evidence for associated bowel obstruction.    Problem list pertaining to GI:   Crohn's disease    Assessment: This is a 62 y-o female with Crohn' disease. Who was sent to ED for an evaluation of worsening nausea or vomiting. Imaging on arrival showed narrowed distal small bowel causing a partial small bowel obstruction. No definite active inflammation noted on CT scan, though she has had inflammation noted on recent imaging. Colonoscopy in May 2023 showed stenosis as well as an ulceration at the ileum, pathology showed nonspecific mildly active chronic ileitis. Repeat CT yesterday showed chronic ileitis, negative for evidence of SBO.    EGD 4/7/2023 was endoscopically normal.  Esophageal and duodenal biopsies normal.  Gastric biopsies showed chronic gastritis, negative for H. Pylori.    Plan:  - Oral steroids  - Received remicade on 6/13, next infusions are scheduled.   - Colorectal surgery following, no surgery planned at this time.   - CT  shows chronic ileitis, negative for evidence of SBO.   - Discontinue IVF, strict Intake and out put  - Possible discharge tomorrow    I will discuss with Dr. Blandon    Time spent: 30 minutes, greater than 50% of the visit was spent in counseling/coordination of care.     Jaleesa Hoyt CNP  [facility] ([facility])  [phone]

## 2023-06-15 NOTE — PLAN OF CARE
Goal Outcome Evaluation:    Orientation: Alert and oriented.  VSS. RA.   LS: clear  GI: Abdominal discomfort, bloating per pt. Not passing gas. No BM. Intermittent nausea, PO zofran x1  : Adequate urine output.  Skin: Bilateral scattered bruises on forearms from previous PIVs  Activity: Independent. Pt slept comfortably throughout shift.   Pain: 3/10. Tylenol x1  Updates/Plan: IVF, pain/nausea management. Continue with current cares.

## 2023-06-16 LAB
ANION GAP SERPL CALCULATED.3IONS-SCNC: 7 MMOL/L (ref 7–15)
BUN SERPL-MCNC: 14 MG/DL (ref 8–23)
CALCIUM SERPL-MCNC: 9.1 MG/DL (ref 8.8–10.2)
CHLORIDE SERPL-SCNC: 104 MMOL/L (ref 98–107)
CREAT SERPL-MCNC: 1.09 MG/DL (ref 0.51–0.95)
DEPRECATED HCO3 PLAS-SCNC: 28 MMOL/L (ref 22–29)
ERYTHROCYTE [DISTWIDTH] IN BLOOD BY AUTOMATED COUNT: 14.9 % (ref 10–15)
GFR SERPL CREATININE-BSD FRML MDRD: 57 ML/MIN/1.73M2
GLUCOSE SERPL-MCNC: 116 MG/DL (ref 70–99)
HCT VFR BLD AUTO: 40.2 % (ref 35–47)
HGB BLD-MCNC: 12.5 G/DL (ref 11.7–15.7)
MAGNESIUM SERPL-MCNC: 2.3 MG/DL (ref 1.7–2.3)
MCH RBC QN AUTO: 27.2 PG (ref 26.5–33)
MCHC RBC AUTO-ENTMCNC: 31.1 G/DL (ref 31.5–36.5)
MCV RBC AUTO: 88 FL (ref 78–100)
PHOSPHATE SERPL-MCNC: 3.8 MG/DL (ref 2.5–4.5)
PLATELET # BLD AUTO: 238 10E3/UL (ref 150–450)
POTASSIUM SERPL-SCNC: 4.8 MMOL/L (ref 3.4–5.3)
RBC # BLD AUTO: 4.59 10E6/UL (ref 3.8–5.2)
SODIUM SERPL-SCNC: 139 MMOL/L (ref 136–145)
WBC # BLD AUTO: 12.1 10E3/UL (ref 4–11)

## 2023-06-16 PROCEDURE — 120N000004 HC R&B MS OVERFLOW

## 2023-06-16 PROCEDURE — 250N000011 HC RX IP 250 OP 636: Performed by: HOSPITALIST

## 2023-06-16 PROCEDURE — 250N000013 HC RX MED GY IP 250 OP 250 PS 637: Performed by: INTERNAL MEDICINE

## 2023-06-16 PROCEDURE — 36415 COLL VENOUS BLD VENIPUNCTURE: CPT | Performed by: STUDENT IN AN ORGANIZED HEALTH CARE EDUCATION/TRAINING PROGRAM

## 2023-06-16 PROCEDURE — 250N000013 HC RX MED GY IP 250 OP 250 PS 637: Performed by: HOSPITALIST

## 2023-06-16 PROCEDURE — 250N000013 HC RX MED GY IP 250 OP 250 PS 637: Performed by: STUDENT IN AN ORGANIZED HEALTH CARE EDUCATION/TRAINING PROGRAM

## 2023-06-16 PROCEDURE — 82310 ASSAY OF CALCIUM: CPT | Performed by: STUDENT IN AN ORGANIZED HEALTH CARE EDUCATION/TRAINING PROGRAM

## 2023-06-16 PROCEDURE — 83735 ASSAY OF MAGNESIUM: CPT | Performed by: STUDENT IN AN ORGANIZED HEALTH CARE EDUCATION/TRAINING PROGRAM

## 2023-06-16 PROCEDURE — 250N000012 HC RX MED GY IP 250 OP 636 PS 637: Performed by: NURSE PRACTITIONER

## 2023-06-16 PROCEDURE — 85018 HEMOGLOBIN: CPT | Performed by: STUDENT IN AN ORGANIZED HEALTH CARE EDUCATION/TRAINING PROGRAM

## 2023-06-16 PROCEDURE — 250N000011 HC RX IP 250 OP 636: Performed by: NURSE PRACTITIONER

## 2023-06-16 PROCEDURE — 258N000003 HC RX IP 258 OP 636: Performed by: INTERNAL MEDICINE

## 2023-06-16 PROCEDURE — 84100 ASSAY OF PHOSPHORUS: CPT | Performed by: STUDENT IN AN ORGANIZED HEALTH CARE EDUCATION/TRAINING PROGRAM

## 2023-06-16 PROCEDURE — 99232 SBSQ HOSP IP/OBS MODERATE 35: CPT | Performed by: STUDENT IN AN ORGANIZED HEALTH CARE EDUCATION/TRAINING PROGRAM

## 2023-06-16 PROCEDURE — 250N000013 HC RX MED GY IP 250 OP 250 PS 637: Performed by: NURSE PRACTITIONER

## 2023-06-16 RX ORDER — POLYETHYLENE GLYCOL 3350 17 G/17G
17 POWDER, FOR SOLUTION ORAL DAILY
Status: DISCONTINUED | OUTPATIENT
Start: 2023-06-16 | End: 2023-06-18 | Stop reason: HOSPADM

## 2023-06-16 RX ORDER — BISACODYL 10 MG
10 SUPPOSITORY, RECTAL RECTAL ONCE
Status: COMPLETED | OUTPATIENT
Start: 2023-06-16 | End: 2023-06-16

## 2023-06-16 RX ORDER — PREDNISONE 20 MG/1
40 TABLET ORAL DAILY
Status: DISCONTINUED | OUTPATIENT
Start: 2023-06-16 | End: 2023-06-18 | Stop reason: HOSPADM

## 2023-06-16 RX ORDER — BISACODYL 10 MG
10 SUPPOSITORY, RECTAL RECTAL 2 TIMES DAILY PRN
Status: DISCONTINUED | OUTPATIENT
Start: 2023-06-16 | End: 2023-06-18 | Stop reason: HOSPADM

## 2023-06-16 RX ORDER — SENNOSIDES 8.6 MG
8.6 TABLET ORAL 2 TIMES DAILY PRN
Status: DISCONTINUED | OUTPATIENT
Start: 2023-06-16 | End: 2023-06-18 | Stop reason: HOSPADM

## 2023-06-16 RX ADMIN — TRAZODONE HYDROCHLORIDE 200 MG: 100 TABLET ORAL at 22:47

## 2023-06-16 RX ADMIN — PREDNISONE 40 MG: 20 TABLET ORAL at 16:46

## 2023-06-16 RX ADMIN — METHYLPREDNISOLONE SODIUM SUCCINATE 20 MG: 40 INJECTION INTRAMUSCULAR; INTRAVENOUS at 07:49

## 2023-06-16 RX ADMIN — FAMOTIDINE 20 MG: 20 TABLET, FILM COATED ORAL at 22:48

## 2023-06-16 RX ADMIN — ACETAMINOPHEN 650 MG: 325 TABLET, FILM COATED ORAL at 22:49

## 2023-06-16 RX ADMIN — ACETAMINOPHEN 650 MG: 325 TABLET, FILM COATED ORAL at 00:03

## 2023-06-16 RX ADMIN — BISACODYL 10 MG: 10 SUPPOSITORY RECTAL at 12:35

## 2023-06-16 RX ADMIN — STANDARDIZED SENNA CONCENTRATE 8.6 MG: 8.6 TABLET ORAL at 16:46

## 2023-06-16 RX ADMIN — ONDANSETRON 4 MG: 4 TABLET, ORALLY DISINTEGRATING ORAL at 07:48

## 2023-06-16 RX ADMIN — ONDANSETRON 4 MG: 4 TABLET, ORALLY DISINTEGRATING ORAL at 14:04

## 2023-06-16 RX ADMIN — POLYETHYLENE GLYCOL 3350 17 G: 17 POWDER, FOR SOLUTION ORAL at 12:36

## 2023-06-16 RX ADMIN — POTASSIUM CHLORIDE, DEXTROSE MONOHYDRATE AND SODIUM CHLORIDE: 150; 5; 450 INJECTION, SOLUTION INTRAVENOUS at 11:29

## 2023-06-16 RX ADMIN — ONDANSETRON 4 MG: 4 TABLET, ORALLY DISINTEGRATING ORAL at 19:40

## 2023-06-16 RX ADMIN — MONTELUKAST 10 MG: 10 TABLET, FILM COATED ORAL at 22:49

## 2023-06-16 RX ADMIN — METHYLPREDNISOLONE SODIUM SUCCINATE 20 MG: 40 INJECTION INTRAMUSCULAR; INTRAVENOUS at 00:04

## 2023-06-16 RX ADMIN — NORTRIPTYLINE HYDROCHLORIDE 25 MG: 25 CAPSULE ORAL at 22:48

## 2023-06-16 ASSESSMENT — ACTIVITIES OF DAILY LIVING (ADL)
ADLS_ACUITY_SCORE: 20

## 2023-06-16 NOTE — PROGRESS NOTES
[facility]  Medicine Progress Note - Hospitalist Service  Date of Admission:  6/5/2023    Assessment & Plan   Ms. Devi Salas is a 62 year old female with hx Crohn's disease diagnosed early this year, asthma, migraines and renal cell cancer that was managed surgically with right nephrectomy in 2009 who presented to emergency room with diarrhea, nausea, and abdominal pain.  Working dx is Crohn's disease, unclear whether acutely inflammed v scarring causing partial small bowel obstruction.GI following, pending ongoing clinical improvement and passage of gas/bowel movement.     Partial SBO.  This appears to be due to the Crohns' associated areas of small intestinal narrowing.  Recent dx Crohns with evidence of ileal stricture.  It is not fully clear to what degree the stricture is still inflammatory (and reversible) v whether it is scar (and irreversible).  Pain is improved after initiation of Remicade, but no change yet regarding the stool output, etc.   Leukocytosis, thought to be demargination primarily related to steroids, ongoing but stable   Presented to emergency room with diarrhea, nausea, and abdominal pain. Ms. Salas was admitted and initiated on IV solumedrol and had been improving when she transitioned to oral prednisone on 6/9. Following the discontinuation of IV steroids, she developed increased post prandial abd pain, nausea, decreased gas and no BM since Friday. GI signed off Friday 6/9, but re-consulted on 6/11. On 6/12 she was again started on IV methylprednisolone.  CRS was included in the evaluation on 6/12 and pt was seen by Dr. Castro.  On 6/13, started on Remicade and appears to have tolerated that well. 06/16 Pain is now improved, but she continues to have decreased flatus and stool output.  Nausea also persists especially with meals, is premedicating with Zofran.  Of note, colonoscopy completed through [place] [facility] showed a distal ileal  stricture due to scar.  This was also seen on CT scan dated 6/5/2023.  -CT scan on 6/15 - Several segments of luminal narrowing and mucosal hyperenhancement in the distal ileum suggest acute on chronic Crohn's ileitis, apparent narrowed short segments in the distal sigmoid colon, no SBO   -GI following    - transitioned to PO steroids   - Received remicade on 6/13   - PRN suppository    - Colorectal surgery with no surgery planned at this time.   -Cont IVF to TKO until pt is able to take adequate volume of food  -Low Fiber diet today.    LIDIA, persistent   Probably due to dehydration on admission; baseline creat is 0.9.   -monitor BMP     Moderate Protein-Calorie Malnutrition     Diet: Advance Diet as Tolerated: Low Fiber; Low Fiber    DVT Prophylaxis: Pneumatic Compression Devices  Monae Catheter: Not present  Lines: None     Cardiac Monitoring: None  Code Status: Full Code      Disposition Plan: TBD.  Pt will need to able to take adequate oral intake prior to discharge, pass gas/bowel movement as well     Vikki Duran DO   Hospitalist Service  [facility] [place]  Securely message with [employer] (more info)  Text page via [email or web address] Paging/Directory   ______________________________________________________________________    Interval History   Stable night. No major changes overnight.     Patient reports trying to eat dinner without any premedication of Zofran last night, this resulted in pretty significant nausea for her, this morning she premedicated her breakfast with Zofran and reports that her nausea was much improved    She has not had a bowel movement or passed gas, abdominal pain is well managed    She remains frustrated with the fact that nothing is moving from her bottom, but she does not report any new fever or chills, otherwise symptoms are stable and she reports no other new concerns or complaints today     Physical Exam   Vital Signs: Temp: 97.6  F (36.4  C) Temp src: Oral BP: 113/70  Pulse: 79   Resp: 20 SpO2: 97 % O2 Device: None (Room air)    Weight: 147 lbs 7.8 oz    Constitutional: awake, alert and cooperative  HEENT: normocepalic, atramatic  Respiratory: no increased work of breathing, good air exchange and clear to auscultation  Cardiovascular: regular rate and rhythm and no murmur noted   GI: minimally tender and distended to abdomen.  No rebound tenderness, mildly distended   Skin: no rashes or lesions on exposed skin   Neurologic: alert, oriented, no focal deficits noted     Data   ------------------------- PAST 24 HR DATA REVIEWED -----------------------------------------------  Results for orders placed or performed during the hospital encounter of 06/05/23 (from the past 24 hour(s))   CBC with platelets   Result Value Ref Range    WBC Count 12.1 (H) 4.0 - 11.0 10e3/uL    RBC Count 4.59 3.80 - 5.20 10e6/uL    Hemoglobin 12.5 11.7 - 15.7 g/dL    Hematocrit 40.2 35.0 - 47.0 %    MCV 88 78 - 100 fL    MCH 27.2 26.5 - 33.0 pg    MCHC 31.1 (L) 31.5 - 36.5 g/dL    RDW 14.9 10.0 - 15.0 %    Platelet Count 238 150 - 450 10e3/uL   Basic metabolic panel   Result Value Ref Range    Sodium 139 136 - 145 mmol/L    Potassium 4.8 3.4 - 5.3 mmol/L    Chloride 104 98 - 107 mmol/L    Carbon Dioxide (CO2) 28 22 - 29 mmol/L    Anion Gap 7 7 - 15 mmol/L    Urea Nitrogen 14.0 8.0 - 23.0 mg/dL    Creatinine 1.09 (H) 0.51 - 0.95 mg/dL    Calcium 9.1 8.8 - 10.2 mg/dL    Glucose 116 (H) 70 - 99 mg/dL    GFR Estimate 57 (L) >60 mL/min/1.73m2   Magnesium   Result Value Ref Range    Magnesium 2.3 1.7 - 2.3 mg/dL   Phosphorus   Result Value Ref Range    Phosphorus 3.8 2.5 - 4.5 mg/dL

## 2023-06-16 NOTE — PROGRESS NOTES
Clinical Nutrition Brief Note     Chart check on patient being followed by nutrition services. Spoke with patient about PO intake and oral nutritional supplements. Patient reports that she is feeling full after consuming AM ensure, not feeling hungry for lunch given this. Low appetite at baseline. Encouraged small more frequent meals, plan to change oral nutritional supplements to half portions BID thinned out with skim milk and placed over ice. Discussed low fiber options on the menu.     Helena Rivero RD, LD  Clinical Dietitian     3rd floor/ICU: [phone]  All other floors: [phone]  Weekend/holiday: [phone]  Office: [phone]

## 2023-06-16 NOTE — PLAN OF CARE
Goal Outcome Evaluation: 0700-1930  Plan of Care Reviewed With: patient  Overall Patient Progress: no change    A&O x3, vital signs WDL for patient. Denies abdominal pain, describes it more as discomfort and nausea post meal. Well managed with PRN Zofran before meals. Eating small portions of low fiber diet, encouraging protein intake and fluids. IV running at TKO for PO trial; good PO intake today. Transitioned to PO Prednisone today. Started on Miralax and suppository this morning, small hard BM this afternoon x2 and passing gas. Ambulating independently in the dietz. Patient teary and frustrated this morning due to longer than expected hospital stay, however, feeling hopeful now that she's had a BM. Family visit this afternoon. No additional concerns at this time.

## 2023-06-16 NOTE — PROGRESS NOTES
SPIRITUAL HEALTH SERVICES Progress Note  [facility] (Adults Overflow)    I met with Devi per consult request. I introduced myself and my role and Devi indicated that she did not need a  at this time. She indicated that she would let the nurse know if she wanted to see a .    Plan: I and other chaplains remain available for further support.    Jennie Mcintyre   Intern    [facility] routine referrals *[phone]  [facility] available 24/7 for emergent requests/referrals, either by paging the on-call  or by entering an ASAP/STAT consult in Epic (this will also page the on-call ).

## 2023-06-16 NOTE — PLAN OF CARE
"Goal Outcome Evaluation:           Overall Patient Progress: no changeOverall Patient Progress: no change  Vital Signs: Afebrile, VSS  Pain/Comfort: Pt continues to have discomfort after eating, feeling like she had a heavy \"blockage\" in mid upper stomach, given po zofran and pt started to feel better  Assessment: Not passing gas, bowel sounds present, no stool, Overall feels pain has improved  Diet: Ate 100% of tray (low fiber diet-soft mushy food), was hungry, but then felt discomfort after eating  Output: voiding, not passing gas  Activity/Ambulation: Up walking frequently  Social:  here to visit  Plan: Continue with IV solumedrol, continue with present cares, keep pt updated on plan of care.           "

## 2023-06-16 NOTE — PLAN OF CARE
Goal Outcome Evaluation:    Orientation: Alert and oriented x4  VSS. 98% on RA. afebrile.   LS: clear and equal bilaterally.   GI: denies Passing gas. no BM. Denies N/V. Hypoactive bowel sounds on L, active bowel sounds on R.   : Adequate urine output.   Skin: scattered bruising to bilateral arms from prior IV access  Activity: Independent. Pt slept comfortably throughout shift.   Pain: 4/10 abdominal cramping. Tylenol x1. Heating pad for comfort.   Updates/Plan: frustrated but understanding with hospitalization. Expressed desires to get home soon but that she knows she's where she needs to be to get the best care possible. Continue with current cares.

## 2023-06-17 LAB
ANION GAP SERPL CALCULATED.3IONS-SCNC: 8 MMOL/L (ref 7–15)
BUN SERPL-MCNC: 20.2 MG/DL (ref 8–23)
CALCIUM SERPL-MCNC: 9.2 MG/DL (ref 8.8–10.2)
CHLORIDE SERPL-SCNC: 102 MMOL/L (ref 98–107)
CREAT SERPL-MCNC: 1.1 MG/DL (ref 0.51–0.95)
DEPRECATED HCO3 PLAS-SCNC: 30 MMOL/L (ref 22–29)
ERYTHROCYTE [DISTWIDTH] IN BLOOD BY AUTOMATED COUNT: 15 % (ref 10–15)
GFR SERPL CREATININE-BSD FRML MDRD: 56 ML/MIN/1.73M2
GLUCOSE SERPL-MCNC: 91 MG/DL (ref 70–99)
HCT VFR BLD AUTO: 40.5 % (ref 35–47)
HGB BLD-MCNC: 12.5 G/DL (ref 11.7–15.7)
MAGNESIUM SERPL-MCNC: 2.4 MG/DL (ref 1.7–2.3)
MCH RBC QN AUTO: 27.2 PG (ref 26.5–33)
MCHC RBC AUTO-ENTMCNC: 30.9 G/DL (ref 31.5–36.5)
MCV RBC AUTO: 88 FL (ref 78–100)
PHOSPHATE SERPL-MCNC: 3.5 MG/DL (ref 2.5–4.5)
PLATELET # BLD AUTO: 244 10E3/UL (ref 150–450)
POTASSIUM SERPL-SCNC: 4.8 MMOL/L (ref 3.4–5.3)
RBC # BLD AUTO: 4.6 10E6/UL (ref 3.8–5.2)
SODIUM SERPL-SCNC: 140 MMOL/L (ref 136–145)
WBC # BLD AUTO: 13.5 10E3/UL (ref 4–11)

## 2023-06-17 PROCEDURE — 84100 ASSAY OF PHOSPHORUS: CPT | Performed by: INTERNAL MEDICINE

## 2023-06-17 PROCEDURE — 85027 COMPLETE CBC AUTOMATED: CPT | Performed by: STUDENT IN AN ORGANIZED HEALTH CARE EDUCATION/TRAINING PROGRAM

## 2023-06-17 PROCEDURE — 120N000004 HC R&B MS OVERFLOW

## 2023-06-17 PROCEDURE — 250N000013 HC RX MED GY IP 250 OP 250 PS 637: Performed by: NURSE PRACTITIONER

## 2023-06-17 PROCEDURE — 80048 BASIC METABOLIC PNL TOTAL CA: CPT | Performed by: INTERNAL MEDICINE

## 2023-06-17 PROCEDURE — 250N000012 HC RX MED GY IP 250 OP 636 PS 637: Performed by: NURSE PRACTITIONER

## 2023-06-17 PROCEDURE — 36415 COLL VENOUS BLD VENIPUNCTURE: CPT | Performed by: STUDENT IN AN ORGANIZED HEALTH CARE EDUCATION/TRAINING PROGRAM

## 2023-06-17 PROCEDURE — 99232 SBSQ HOSP IP/OBS MODERATE 35: CPT | Performed by: STUDENT IN AN ORGANIZED HEALTH CARE EDUCATION/TRAINING PROGRAM

## 2023-06-17 PROCEDURE — 250N000011 HC RX IP 250 OP 636: Performed by: HOSPITALIST

## 2023-06-17 PROCEDURE — 250N000013 HC RX MED GY IP 250 OP 250 PS 637: Performed by: INTERNAL MEDICINE

## 2023-06-17 PROCEDURE — 250N000013 HC RX MED GY IP 250 OP 250 PS 637: Performed by: HOSPITALIST

## 2023-06-17 PROCEDURE — 250N000013 HC RX MED GY IP 250 OP 250 PS 637: Performed by: STUDENT IN AN ORGANIZED HEALTH CARE EDUCATION/TRAINING PROGRAM

## 2023-06-17 PROCEDURE — 83735 ASSAY OF MAGNESIUM: CPT | Performed by: INTERNAL MEDICINE

## 2023-06-17 RX ORDER — ACETAMINOPHEN 325 MG/1
975 TABLET ORAL 3 TIMES DAILY PRN
Status: DISCONTINUED | OUTPATIENT
Start: 2023-06-17 | End: 2023-06-18 | Stop reason: HOSPADM

## 2023-06-17 RX ORDER — PANTOPRAZOLE SODIUM 40 MG/1
40 TABLET, DELAYED RELEASE ORAL AT BEDTIME
Status: DISCONTINUED | OUTPATIENT
Start: 2023-06-17 | End: 2023-06-17

## 2023-06-17 RX ORDER — CALCIUM CARBONATE 500 MG/1
500 TABLET, CHEWABLE ORAL DAILY PRN
Status: DISCONTINUED | OUTPATIENT
Start: 2023-06-17 | End: 2023-06-18 | Stop reason: HOSPADM

## 2023-06-17 RX ORDER — PANTOPRAZOLE SODIUM 40 MG/1
40 TABLET, DELAYED RELEASE ORAL
Status: DISCONTINUED | OUTPATIENT
Start: 2023-06-17 | End: 2023-06-17

## 2023-06-17 RX ORDER — SIMETHICONE 80 MG
80 TABLET,CHEWABLE ORAL EVERY 6 HOURS PRN
Status: DISCONTINUED | OUTPATIENT
Start: 2023-06-17 | End: 2023-06-18 | Stop reason: HOSPADM

## 2023-06-17 RX ORDER — FAMOTIDINE 20 MG/1
40 TABLET, FILM COATED ORAL 2 TIMES DAILY
Status: DISCONTINUED | OUTPATIENT
Start: 2023-06-17 | End: 2023-06-17

## 2023-06-17 RX ORDER — METHOCARBAMOL 500 MG/1
500 TABLET, FILM COATED ORAL 4 TIMES DAILY PRN
Status: DISCONTINUED | OUTPATIENT
Start: 2023-06-17 | End: 2023-06-18 | Stop reason: HOSPADM

## 2023-06-17 RX ORDER — ESOMEPRAZOLE MAGNESIUM 40 MG/1
40 CAPSULE, DELAYED RELEASE ORAL
Status: DISCONTINUED | OUTPATIENT
Start: 2023-06-17 | End: 2023-06-18 | Stop reason: HOSPADM

## 2023-06-17 RX ADMIN — STANDARDIZED SENNA CONCENTRATE 8.6 MG: 8.6 TABLET ORAL at 16:19

## 2023-06-17 RX ADMIN — ACETAMINOPHEN 975 MG: 325 TABLET, FILM COATED ORAL at 21:46

## 2023-06-17 RX ADMIN — CALCIUM CARBONATE (ANTACID) CHEW TAB 500 MG 500 MG: 500 CHEW TAB at 14:43

## 2023-06-17 RX ADMIN — MONTELUKAST 10 MG: 10 TABLET, FILM COATED ORAL at 21:48

## 2023-06-17 RX ADMIN — PREDNISONE 40 MG: 20 TABLET ORAL at 08:36

## 2023-06-17 RX ADMIN — METHOCARBAMOL 500 MG: 500 TABLET ORAL at 13:09

## 2023-06-17 RX ADMIN — STANDARDIZED SENNA CONCENTRATE 8.6 MG: 8.6 TABLET ORAL at 11:46

## 2023-06-17 RX ADMIN — SIMETHICONE 80 MG: 80 TABLET, CHEWABLE ORAL at 09:55

## 2023-06-17 RX ADMIN — ONDANSETRON 4 MG: 4 TABLET, ORALLY DISINTEGRATING ORAL at 06:21

## 2023-06-17 RX ADMIN — PANTOPRAZOLE SODIUM 40 MG: 40 TABLET, DELAYED RELEASE ORAL at 09:55

## 2023-06-17 RX ADMIN — TRAZODONE HYDROCHLORIDE 200 MG: 100 TABLET ORAL at 21:47

## 2023-06-17 RX ADMIN — ACETAMINOPHEN 975 MG: 325 TABLET, FILM COATED ORAL at 11:45

## 2023-06-17 RX ADMIN — ACETAMINOPHEN 650 MG: 325 TABLET, FILM COATED ORAL at 08:36

## 2023-06-17 RX ADMIN — POLYETHYLENE GLYCOL 3350 17 G: 17 POWDER, FOR SOLUTION ORAL at 08:36

## 2023-06-17 RX ADMIN — ESOMEPRAZOLE MAGNESIUM 40 MG: 40 CAPSULE, DELAYED RELEASE ORAL at 20:25

## 2023-06-17 RX ADMIN — ONDANSETRON 4 MG: 4 TABLET, ORALLY DISINTEGRATING ORAL at 14:43

## 2023-06-17 RX ADMIN — NORTRIPTYLINE HYDROCHLORIDE 25 MG: 25 CAPSULE ORAL at 21:48

## 2023-06-17 ASSESSMENT — ACTIVITIES OF DAILY LIVING (ADL)
ADLS_ACUITY_SCORE: 20

## 2023-06-17 NOTE — PLAN OF CARE
VS: VSS. Afebrile.   Respiratory: WDL.  GI: Tender in RLQ area and general abdominal discomfort. Normoactive bowel sounds. Intermittent nausea and reflux when ambulating; PRN Zofran x1 given. Occasionally passing gas.   : Voiding.   Skin: Brusing on R and L arms from previous IV pokes.  Activity: Independent. Showered.   Pain: Rated pain 2-6. PRN Tylenol x1 given.  Lines: IV removed due to pain at site.   Plan: Pain management. Encourage oral intake.

## 2023-06-17 NOTE — PROGRESS NOTES
[facility]  Medicine Progress Note - Hospitalist Service  Date of Admission:  6/5/2023    Assessment & Plan   Ms. Devi Salas is a 62 year old female with hx Crohn's disease diagnosed early this year, asthma, migraines and renal cell cancer that was managed surgically with right nephrectomy in 2009 who presented to emergency room with diarrhea, nausea, and abdominal pain.  Working dx is Crohn's disease, unclear whether acutely inflammed v scarring causing partial small bowel obstruction.GI following, pending ongoing clinical improvement and passage of gas/bowel movement. Likely discharge 6/18.     Partial SBO.  This appears to be due to the Crohns' associated areas of small intestinal narrowing.  Recent dx Crohns with evidence of ileal stricture.  It is not fully clear to what degree the stricture is still inflammatory (and reversible) v whether it is scar (and irreversible).  Pain is improved after initiation of Remicade, but no change yet regarding the stool output, etc.   Leukocytosis, thought to be demargination primarily related to steroids, ongoing but stable   She presented to emergency room with diarrhea, nausea, and abdominal pain. Ms. Salas was admitted and initiated on IV solumedrol and had been improving when she transitioned to oral prednisone on 6/9. Following the discontinuation of IV steroids, she developed increased post prandial abd pain, nausea, decreased gas and no BM since Friday. GI signed off Friday 6/9, but re-consulted on 6/11. On 6/12 she was again started on IV methylprednisolone.  CRS was included in the evaluation on 6/12 and pt was seen by Dr. Castro.  On 6/13, started on Remicade and appears to have tolerated that well. 06/17 Pain is now improved, she has had flatus and 2 small BMs in the last 24 hours.  Having some gas pain and reporting symptoms of GERD on interview.   Of note, colonoscopy completed through [place] [facility] showed a  distal ileal stricture due to scar.  This was also seen on CT scan dated 6/5/2023.  -CT scan on 6/15 - Several segments of luminal narrowing and mucosal hyperenhancement in the distal ileum suggest acute on chronic Crohn's ileitis, apparent narrowed short segments in the distal sigmoid colon, no SBO   -GI following    - transitioned to PO steroids, prednisone 40 mg daily on 6/16    - Received remicade on 6/13   - PRN suppository    - Colorectal surgery with no surgery planned at this time.   -Low Fiber diet  -start PRN simethicone, robaxin for pain   -continue PRN zofran for nausea , TUMS added for GERD     LIDIA, persistent   Probably due to dehydration on admission; baseline creat is 0.9.   -BMP in AM     Moderate Protein-Calorie Malnutrition    GERD - PTA pantoprazole     MDD - PTA nortriptyline     Insomnia - PTA trazodone      Diet: Advance Diet as Tolerated: Low Fiber; Low Fiber    DVT Prophylaxis: Pneumatic Compression Devices  Monae Catheter: Not present  Lines: None     Cardiac Monitoring: None  Code Status: Full Code      Disposition Plan: likely 6/18/23     Vikki Duran DO   Hospitalist Service  [facility]  Securely message with [employer] (more info)  Text page via [employer] Paging/Directory   ______________________________________________________________________    Interval History   Stable night. Patient passed gas and had a bowel movement yesterday   Had some GERD symptoms this morning in addition to some RLQ pain consistent with gas pains   She has no melena, hematochezia, no other bleeding   Reports no other new concerns or complaints     Physical Exam   Vital Signs: Temp: 97.6  F (36.4  C) Temp src: Oral BP: 113/70 Pulse: 79   Resp: 20 SpO2: 97 % O2 Device: None (Room air)    Weight: 147 lbs 7.8 oz    Constitutional: awake, alert and cooperative  HEENT: normocepalic, atramatic  Respiratory: no increased work of breathing, good air exchange and clear to  auscultation  Cardiovascular: regular rate and rhythm and no murmur noted   GI: minimally tender to RLQ with slightly distended to abdomen.  No rebound tenderness   Skin: no rashes or lesions on exposed skin   Neurologic: alert, oriented, no focal deficits noted     Data   ------------------------- PAST 24 HR DATA REVIEWED -----------------------------------------------  Results for orders placed or performed during the hospital encounter of 06/05/23 (from the past 24 hour(s))   CBC with platelets   Result Value Ref Range    WBC Count 13.5 (H) 4.0 - 11.0 10e3/uL    RBC Count 4.60 3.80 - 5.20 10e6/uL    Hemoglobin 12.5 11.7 - 15.7 g/dL    Hematocrit 40.5 35.0 - 47.0 %    MCV 88 78 - 100 fL    MCH 27.2 26.5 - 33.0 pg    MCHC 30.9 (L) 31.5 - 36.5 g/dL    RDW 15.0 10.0 - 15.0 %    Platelet Count 244 150 - 450 10e3/uL   Magnesium   Result Value Ref Range    Magnesium 2.4 (H) 1.7 - 2.3 mg/dL   Phosphorus   Result Value Ref Range    Phosphorus 3.5 2.5 - 4.5 mg/dL

## 2023-06-17 NOTE — PLAN OF CARE
Goal Outcome Evaluation: [id number]  Plan of Care Reviewed With: patient  Overall Patient Progress: improving    A&O x3, vitals WDL for patient. Complaining of sharp abd pain in RLQ, thinking gas pains. Well managed with PRN Tylenol and simethicone. Continues to eat small frequent meals, drinking fluids without difficulty. Giving PRN Zofran before meals. Active bowel sounds, passing gas. No BM since last night, giving scheduled Miralax and PRN Senna. Encouraging frequent ambulation today, patient going for several walks in the halls/outside. No additional concerns at this time.

## 2023-06-17 NOTE — PROGRESS NOTES
"[facility] Progress Note     IMPRESSION:  Ileitis, non-diagnostic pathology from colonoscopy 2023 in the setting of chronic NSAIDs, being treated as Crohn's ileitis with steroids and Remicade, first dose .    RECOMMENDATIONS:  -May be able to discharge tomorrow pending diet tolerance and additional BMs.   -Suggest IBD clinic follow up within 2-4 wks.  -Prednisone taper down by 10mg/wk, until at 20mg, then down by 5mg per wk.    Please call with questions, will sign off.     Total time spent in chart review, direct medical discussion, examination, and documentation was 20 minutes    Reji Stokes DO   [facility]  Cell [phone]    ________________________________________________________________________      SUBJECTIVE:  Feeling somewhat better, tolerating po, no BM today.      OBJECTIVE:  BP (!) 113/90 (BP Location: Right arm, Patient Position: Semi-Bain's, Cuff Size: Adult Regular)   Pulse 86   Temp 97.8  F (36.6  C) (Oral)   Resp 16   Ht 1.6 m (5' 3\")   Wt 64.7 kg (142 lb 11.2 oz)   SpO2 97%   BMI 25.28 kg/m    Temp (24hrs), Av.9  F (36.6  C), Min:97.8  F (36.6  C), Max:98  F (36.7  C)    Patient Vitals for the past 72 hrs:   Weight   23 0759 64.7 kg (142 lb 11.2 oz)       Intake/Output Summary (Last 24 hours) at 2023 1448  Last data filed at 2023 1100  Gross per 24 hour   Intake 2830 ml   Output 2550 ml   Net 280 ml        PHYSICAL EXAM  GEN: Alert, oriented x3, communicative and in NAD.    ABD: ND, +BS, mildly TTP throughout.  SKIN: No rash, jaundice or spider angiomata      Additional Data:  I have reviewed the patient's new clinical lab results:     Recent Labs   Lab Test 23  0649 23  0643 23  0522   WBC 13.5* 12.1* 8.2   HGB 12.5 12.5 11.7   MCV 88 88 89    238 222     Recent Labs   Lab Test 23  0649 23  0643 06/15/23  0658   POTASSIUM 4.8 4.8 4.4   CHLORIDE 102 104 104   CO2 30* 28 25   BUN 20.2 14.0 14.8 "   ANIONGAP 8 7 9     Recent Labs   Lab Test 06/12/23  0721 06/06/23  0700 06/05/23  1854   ALBUMIN 3.1* 3.6 4.2   BILITOTAL 0.4 0.2 0.3   ALT 15 8* 12   AST 10 13 13

## 2023-06-18 VITALS
RESPIRATION RATE: 18 BRPM | OXYGEN SATURATION: 99 % | SYSTOLIC BLOOD PRESSURE: 125 MMHG | WEIGHT: 142.7 LBS | BODY MASS INDEX: 25.29 KG/M2 | HEIGHT: 63 IN | DIASTOLIC BLOOD PRESSURE: 82 MMHG | TEMPERATURE: 97.9 F | HEART RATE: 83 BPM

## 2023-06-18 LAB
ANION GAP SERPL CALCULATED.3IONS-SCNC: 13 MMOL/L (ref 7–15)
BUN SERPL-MCNC: 20 MG/DL (ref 8–23)
CALCIUM SERPL-MCNC: 8.9 MG/DL (ref 8.8–10.2)
CHLORIDE SERPL-SCNC: 101 MMOL/L (ref 98–107)
CREAT SERPL-MCNC: 1.11 MG/DL (ref 0.51–0.95)
DEPRECATED HCO3 PLAS-SCNC: 23 MMOL/L (ref 22–29)
GFR SERPL CREATININE-BSD FRML MDRD: 56 ML/MIN/1.73M2
GLUCOSE SERPL-MCNC: 118 MG/DL (ref 70–99)
MAGNESIUM SERPL-MCNC: 2.3 MG/DL (ref 1.7–2.3)
PHOSPHATE SERPL-MCNC: 2.8 MG/DL (ref 2.5–4.5)
POTASSIUM SERPL-SCNC: 4 MMOL/L (ref 3.4–5.3)
POTASSIUM SERPL-SCNC: 4 MMOL/L (ref 3.4–5.3)
SODIUM SERPL-SCNC: 137 MMOL/L (ref 136–145)

## 2023-06-18 PROCEDURE — 84132 ASSAY OF SERUM POTASSIUM: CPT | Performed by: INTERNAL MEDICINE

## 2023-06-18 PROCEDURE — 83735 ASSAY OF MAGNESIUM: CPT | Performed by: INTERNAL MEDICINE

## 2023-06-18 PROCEDURE — 99239 HOSP IP/OBS DSCHRG MGMT >30: CPT | Performed by: STUDENT IN AN ORGANIZED HEALTH CARE EDUCATION/TRAINING PROGRAM

## 2023-06-18 PROCEDURE — 84100 ASSAY OF PHOSPHORUS: CPT | Performed by: INTERNAL MEDICINE

## 2023-06-18 PROCEDURE — 250N000012 HC RX MED GY IP 250 OP 636 PS 637: Performed by: NURSE PRACTITIONER

## 2023-06-18 PROCEDURE — 250N000013 HC RX MED GY IP 250 OP 250 PS 637: Performed by: NURSE PRACTITIONER

## 2023-06-18 PROCEDURE — 82310 ASSAY OF CALCIUM: CPT | Performed by: STUDENT IN AN ORGANIZED HEALTH CARE EDUCATION/TRAINING PROGRAM

## 2023-06-18 PROCEDURE — 250N000013 HC RX MED GY IP 250 OP 250 PS 637: Performed by: STUDENT IN AN ORGANIZED HEALTH CARE EDUCATION/TRAINING PROGRAM

## 2023-06-18 PROCEDURE — 250N000011 HC RX IP 250 OP 636: Performed by: HOSPITALIST

## 2023-06-18 PROCEDURE — 36415 COLL VENOUS BLD VENIPUNCTURE: CPT | Performed by: INTERNAL MEDICINE

## 2023-06-18 RX ORDER — POLYETHYLENE GLYCOL 3350 17 G/17G
17 POWDER, FOR SOLUTION ORAL DAILY
Qty: 510 G | Refills: 3 | Status: SHIPPED | OUTPATIENT
Start: 2023-06-18 | End: 2023-10-09

## 2023-06-18 RX ORDER — SENNOSIDES 8.6 MG
1-2 TABLET ORAL 2 TIMES DAILY PRN
Qty: 90 TABLET | Refills: 1 | Status: SHIPPED | OUTPATIENT
Start: 2023-06-18 | End: 2023-10-09

## 2023-06-18 RX ORDER — ONDANSETRON 4 MG/1
4 TABLET, ORALLY DISINTEGRATING ORAL EVERY 8 HOURS PRN
Qty: 15 TABLET | Refills: 2 | Status: ON HOLD | OUTPATIENT
Start: 2023-06-18 | End: 2024-04-02

## 2023-06-18 RX ORDER — METHOCARBAMOL 500 MG/1
500 TABLET, FILM COATED ORAL 4 TIMES DAILY PRN
Qty: 15 TABLET | Refills: 1 | Status: SHIPPED | OUTPATIENT
Start: 2023-06-18

## 2023-06-18 RX ORDER — BISACODYL 10 MG
10 SUPPOSITORY, RECTAL RECTAL 2 TIMES DAILY PRN
Qty: 10 SUPPOSITORY | Refills: 1 | Status: ON HOLD | OUTPATIENT
Start: 2023-06-18 | End: 2024-06-25

## 2023-06-18 RX ORDER — SIMETHICONE 80 MG
80 TABLET,CHEWABLE ORAL EVERY 6 HOURS PRN
Qty: 30 TABLET | Refills: 1 | Status: SHIPPED | OUTPATIENT
Start: 2023-06-18 | End: 2023-06-18

## 2023-06-18 RX ORDER — PREDNISONE 5 MG/1
TABLET ORAL
Qty: 42 TABLET | Refills: 0 | Status: ON HOLD | OUTPATIENT
Start: 2023-07-06 | End: 2023-07-19

## 2023-06-18 RX ORDER — PREDNISONE 10 MG/1
TABLET ORAL
Qty: 47 TABLET | Refills: 0 | Status: SHIPPED | OUTPATIENT
Start: 2023-06-19 | End: 2023-07-06

## 2023-06-18 RX ADMIN — ACETAMINOPHEN 975 MG: 325 TABLET, FILM COATED ORAL at 05:40

## 2023-06-18 RX ADMIN — STANDARDIZED SENNA CONCENTRATE 8.6 MG: 8.6 TABLET ORAL at 04:18

## 2023-06-18 RX ADMIN — POLYETHYLENE GLYCOL 3350 17 G: 17 POWDER, FOR SOLUTION ORAL at 08:20

## 2023-06-18 RX ADMIN — METHOCARBAMOL 500 MG: 500 TABLET ORAL at 04:18

## 2023-06-18 RX ADMIN — ESOMEPRAZOLE MAGNESIUM 40 MG: 40 CAPSULE, DELAYED RELEASE ORAL at 08:20

## 2023-06-18 RX ADMIN — ONDANSETRON 4 MG: 4 TABLET, ORALLY DISINTEGRATING ORAL at 08:43

## 2023-06-18 RX ADMIN — PREDNISONE 40 MG: 20 TABLET ORAL at 08:20

## 2023-06-18 ASSESSMENT — ACTIVITIES OF DAILY LIVING (ADL)
ADLS_ACUITY_SCORE: 20

## 2023-06-18 NOTE — DISCHARGE SUMMARY
"[facility]  Hospitalist Discharge Summary      Date of Admission:  6/5/2023  Date of Discharge:  6/18/2023  Discharging Provider: Vikki Duran DO  Discharge Service: Hospitalist Service    Discharge Diagnoses   Partial SBO, resolved   Recent dx Crohns with evidence of ileal stricture.  Leukocytosis, thought to be demargination primarily related to steroids, ongoing but stable   LIDIA  Moderate Protein-Calorie Malnutrition   GERD   MDD  Insomnia    Clinically Significant Risk Factors     # Overweight: Estimated body mass index is 25.28 kg/m  as calculated from the following:    Height as of this encounter: 1.6 m (5' 3\").    Weight as of this encounter: 64.7 kg (142 lb 11.2 oz).  # Moderate Malnutrition: based on nutrition assessment      Follow-ups Needed After Discharge   Follow-up Appointments     Follow-up and recommended labs and tests       Follow up with primary care provider, Physician No Ref-Primary, within 7   days for hospital follow- up.    IBD clinic follow up within 2-4 wks.             Discharge Disposition   Discharged to home  Condition at discharge: Stable    Hospital Course   Ms. Devi Salas is a 62 year old female with hx Crohn's disease diagnosed early this year, asthma, migraines and renal cell cancer that was managed surgically with right nephrectomy in 2009 who presented to emergency room with diarrhea, nausea, and abdominal pain.  Working dx is Crohn's disease, unclear whether acutely inflammed v scarring causing partial small bowel obstruction.GI following while inpatient and recommended a steroid taper and close [facility] and IBD clinic follow. Patient understood and agreed to the plan. All questions were answered with plan as outlined below.      Partial SBO, resolved.  This appears to be due to the Crohns' associated areas of small intestinal narrowing.  Recent dx Crohns with evidence of ileal stricture.  It is not fully clear to what degree the stricture is " still inflammatory (and reversible) v whether it is scar (and irreversible).  Pain is improved after initiation of Remicade, but no change yet regarding the stool output, etc.   Leukocytosis, thought to be demargination primarily related to steroids, ongoing but stable   She presented to emergency room with diarrhea, nausea, and abdominal pain. Ms. Salas was admitted and initiated on IV solumedrol and had been improving when she transitioned to oral prednisone on 6/9. Following the discontinuation of IV steroids, she developed increased post prandial abd pain, nausea, decreased gas and no BM since Friday. GI signed off Friday 6/9, but re-consulted on 6/11. On 6/12 she was again started on IV methylprednisolone.  CRS was included in the evaluation on 6/12 and pt was seen by Dr. Castro.  On 6/13, started on Remicade and appears to have tolerated that well. 06/17 Pain was improved, she had had flatus and 2 small BMs in the last 24 hours.  Had some gas pain and reported symptoms of GERD on interview.   Of note, colonoscopy completed through [place] [facility] showed a distal ileal stricture due to scar.  This was also seen on CT scan dated 6/5/2023. CT scan on 6/15 - Several segments of luminal narrowing and mucosal hyperenhancement in the distal ileum suggest acute on chronic Crohn's ileitis, apparent narrowed short segments in the distal sigmoid colon, no SBO   -GI following                 - transitioned to PO steroids, taper down by 10mg/wk, until at 20mg, then down by 5mg per wk.                - Received remicade on 6/13, outpatient infusion already scheduled                 - PRN suppository                 - Colorectal surgery with no surgery planned at this time.   -Low Fiber diet  -PRN robaxin for pain   -PRN zofran for nausea  -bowel regimen with miralax, senna and suppository - titrate to BMs daily or every other day      LIDIA,  Probably due to dehydration on admission; baseline creat is  0.9.   -outpatient BMP in follow up      Moderate Protein-Calorie Malnutrition     GERD - PTA pantoprazole      MDD - PTA nortriptyline      Insomnia - PTA trazodone     Consultations This Hospital Stay   GASTROENTEROLOGY IP CONSULT  SPIRITUAL HEALTH SERVICES IP CONSULT  GASTROENTEROLOGY IP CONSULT  COLORECTAL SURGERY IP CONSULT  NUTRITION SERVICES ADULT IP CONSULT  SPIRITUAL HEALTH SERVICES IP CONSULT    Code Status   Full Code    Time Spent on this Encounter   I, Vikki Duran DO, personally saw the patient today and spent greater than 30 minutes discharging this patient.       Vikki Duran DO  [facility]  [address] [facility] [address]  Phone: [phone]  Fax: [phone]  ______________________________________________________________________    Physical Exam   Vital Signs: Temp: 97.9  F (36.6  C) Temp src: Oral BP: 125/82 Pulse: 83   Resp: 18 SpO2: 99 % O2 Device: None (Room air)    Weight: 142 lbs 11.2 oz     Constitutional: awake, alert and cooperative  HEENT: normocepalic, atramatic  Respiratory: no increased work of breathing, good air exchange and clear to auscultation  Cardiovascular: regular rate and rhythm and no murmur noted   GI: soft and non tender, slightly distended to abdomen.  No rebound tenderness   Skin: no rashes or lesions on exposed skin   Neurologic: alert, oriented, no focal deficits noted     Primary Care Physician   Physician No Ref-Primary    Discharge Orders      Basic metabolic panel     Reason for your hospital stay    IBD flare  Partial SBO.  This appears to be due to the Crohns' associated areas of small intestinal narrowing.  Recent dx Crohns with evidence of ileal stricture     Follow-up and recommended labs and tests     Follow up with primary care provider, Physician No Ref-Primary, within 7 days for hospital follow- up.    IBD clinic follow up within 2-4 wks.     Activity    Your activity upon discharge: activity as  tolerated     Miscellaneous DME Order    DME Documentation:   Describe the reason for need to support medical necessity: IBD, chronic chrons with stricture causing intermittent abdominal pain and cramping.     I, the undersigned, certify that the above prescribed supplies are medically necessary for this patient and is both reasonable and necessary in reference to accepted standards of medical and necessary in reference to accepted standards of medical practice in the treatment of this patient's condition and is not prescribed as a convenience.     Diet    Follow this diet upon discharge: Orders Placed This Encounter      Snacks/Supplements Adult: Ensure Enlive; Between Meals      Low Fiber Diet       Significant Results and Procedures   Most Recent 3 CBC's:  Recent Labs   Lab Test 06/17/23  0649 06/16/23  0643 06/14/23  0522   WBC 13.5* 12.1* 8.2   HGB 12.5 12.5 11.7   MCV 88 88 89    238 222     Most Recent 3 BMP's:  Recent Labs   Lab Test 06/18/23  0648 06/17/23  0649 06/16/23  0643 06/15/23  0658   NA  --  140 139 138   POTASSIUM 4.0 4.8 4.8 4.4   CHLORIDE  --  102 104 104   CO2  --  30* 28 25   BUN  --  20.2 14.0 14.8   CR  --  1.10* 1.09* 1.06*   ANIONGAP  --  8 7 9   GIORGIO  --  9.2 9.1 9.0   GLC  --  91 116* 154*     Most Recent 2 LFT's:  Recent Labs   Lab Test 06/12/23  0721 06/06/23  0700   AST 10 13   ALT 15 8*   ALKPHOS 57 68   BILITOTAL 0.4 0.2     7-Day Micro Results     No results found for the last 168 hours.      ,   Results for orders placed or performed during the hospital encounter of 06/05/23   CT Abdomen Pelvis w Contrast    Narrative    EXAM: CT ABDOMEN PELVIS W CONTRAST  LOCATION: [facility]  DATE/TIME: 6/5/2023 10:57 PM CDT    INDICATION: RLQ pain; Crohn's.  COMPARISON: None.  TECHNIQUE: CT scan of the abdomen and pelvis was performed following injection of IV contrast. Multiplanar reformats were obtained. Dose reduction techniques were used.  CONTRAST: 75 mL  Isovue 370    FINDINGS:   LOWER CHEST: Normal.    HEPATOBILIARY: The gallbladder is absent.    PANCREAS: Normal.    SPLEEN: Normal.    ADRENAL GLANDS: Normal.    KIDNEYS/BLADDER: The right kidney is absent. The left kidney is unremarkable.    BOWEL: There are several narrowed segments of ileum with resulting small bowel dilatation proximally. No definite active inflammatory changes. There is no free intraperitoneal gas. Trace amount of free pelvic fluid.    LYMPH NODES: Normal.    VASCULATURE: Atherosclerotic calcification of the aorta and its branches. No aneurysm.    PELVIC ORGANS: The uterus is absent. There is no adnexal mass.    MUSCULOSKELETAL: Degenerative disease in the spine.      Impression    IMPRESSION:   1.  There are segments of narrowed distal small bowel causing a partial small bowel obstruction. No definite active inflammation. No abscess formation.   US Lower Extremity Venous Duplex Right    Narrative    VENOUS ULTRASOUND RIGHT LOWER EXTREMITY  6/12/2023 2:32 PM     HISTORY: Pain without obvious swelling. Question of deep vein  thrombosis (DVT)    COMPARISON: None.    Technique: Color Doppler and spectral waveform analysis performed  throughout the deep veins of the right lower extremity.    FINDINGS: The right common femoral, proximal greater saphenous,  femoral, and popliteal veins demonstrate normal blood flow,  compression, and augmentation. Posterior tibial and peroneal veins are  compressible. Contralateral left common femoral vein is patent.      Impression    IMPRESSION: Negative for DVT in the right lower extremity.    KRISTIN WISEMAN MD         SYSTEM ID:  [id number]   XR Abdomen 2 Views    Narrative    ABDOMEN TWO VIEWS   6/12/2023 1:04 PM     HISTORY: Admit with partial small bowel obstruction re Crohn's,  recurrent symptoms. Evaluate for obstruction vs ileus.    COMPARISON: CT abdomen and pelvis on 6/5/2023.      Impression    IMPRESSION: Upright and supine views of the abdomen and  pelvis were  obtained. Nonobstructive bowel gas pattern. No free peritoneal or  portal venous gas. Surgical clips in the right upper quadrant and  right mid abdomen. Multiple pelvic phleboliths.    AMANDA VALENTINO MD         SYSTEM ID:  [id number]   XR Gastrografin  Challenge    Narrative    XR GASTROGRAFIN CHALLENGE   6/12/2023 9:45 PM     HISTORY: Persistent SBO    COMPARISON: Same date abdominal radiograph, CT abdomen/pelvis 6/5/2023      Impression    IMPRESSION: Mildly dilated distal small bowel, similar to prior CT  scan same day radiograph. Administered oral contrast reached the colon  by 8 hours. Cholecystectomy. Multiple pelvic phleboliths. Bones are  unchanged.    KRISTIN CESPEDES MD         SYSTEM ID:  [id number]   CT Abdomen Pelvis w Contrast    Narrative    CT ABDOMEN AND PELVIS WITH CONTRAST 6/15/2023 11:36 AM    CLINICAL HISTORY: Abdominal pain.    TECHNIQUE: CT scan of the abdomen and pelvis was performed following  injection of IV contrast. Multiplanar reformats were obtained. Dose  reduction techniques were used.  CONTRAST: 58mL Isovue-370  COMPARISON: CT of the abdomen and pelvis performed 6/5/2023.    FINDINGS:   LOWER CHEST: Small calcified granuloma in the right lower lobe.    HEPATOBILIARY: The gallbladder is not seen, and is likely surgically  absent. No hepatic masses are seen.    PANCREAS: Normal.    SPLEEN: Normal.    ADRENAL GLANDS: Normal.    KIDNEYS/BLADDER: Right nephrectomy. Left kidney is unremarkable. No  hydronephrosis.    BOWEL: Several narrowed segments of distal ileum demonstrate mucosal  hyperenhancement. These overall have a similar appearance to the  previous exam (for example, series 3 image 160). Short segments of  apparent narrowing in the distal sigmoid colon were not as well seen  on the previous exam, but also likely unchanged with no convincing  associated mucosal hyperenhancement. No evidence for associated bowel  obstruction. The appendix is not visualized, however,  there are no  secondary signs of appendicitis.    PELVIC ORGANS: Hysterectomy. No free fluid in the pelvis.    LYMPH NODES: No enlarged lymph nodes are identified in the abdomen or  pelvis.    VASCULATURE: Mild atherosclerotic aortoiliac calcification.    ADDITIONAL FINDINGS: Degenerative changes in the visualized  thoracolumbar spine.    MUSCULOSKELETAL: Unremarkable.      Impression    IMPRESSION:   1.  Several segments of luminal narrowing and mucosal hyperenhancement  in the distal ileum suggest acute on chronic Crohn's ileitis.  2.  There are also apparent narrowed short segments in the distal  sigmoid colon.  3.  No evidence for associated bowel obstruction.    RYAN ROSAS MD         SYSTEM ID:  [id number]       Discharge Medications   Current Discharge Medication List      START taking these medications    Details   bisacodyl (DULCOLAX) 10 MG suppository Place 1 suppository (10 mg) rectally 2 times daily as needed for constipation  Qty: 10 suppository, Refills: 1    Associated Diagnoses: RLQ abdominal pain; Partial small bowel obstruction (H)      methocarbamol (ROBAXIN) 500 MG tablet Take 1 tablet (500 mg) by mouth 4 times daily as needed for muscle spasms  Qty: 15 tablet, Refills: 1    Associated Diagnoses: RLQ abdominal pain; Partial small bowel obstruction (H); IBD (inflammatory bowel disease); Nausea      !! ondansetron (ZOFRAN ODT) 4 MG ODT tab Take 1 tablet (4 mg) by mouth every 8 hours as needed for nausea  Qty: 15 tablet, Refills: 2    Associated Diagnoses: IBD (inflammatory bowel disease); Nausea      polyethylene glycol (MIRALAX) 17 GM/Dose powder Take 17 g by mouth daily  Qty: 510 g, Refills: 3    Associated Diagnoses: RLQ abdominal pain; Partial small bowel obstruction (H)      sennosides (SENOKOT) 8.6 MG tablet Take 1-2 tablets by mouth 2 times daily as needed for constipation  Qty: 90 tablet, Refills: 1    Associated Diagnoses: RLQ abdominal pain; Partial small bowel obstruction (H)       !!  - Potential duplicate medications found. Please discuss with provider.      CONTINUE these medications which have CHANGED    Details   !! predniSONE (DELTASONE) 10 MG tablet Take 4 tablets (40 mg) by mouth daily for 3 days, THEN 3 tablets (30 mg) daily for 7 days, THEN 2 tablets (20 mg) daily for 7 days.  Qty: 47 tablet, Refills: 0    Associated Diagnoses: IBD (inflammatory bowel disease)      !! predniSONE (DELTASONE) 5 MG tablet Take 3 tablets (15 mg) by mouth daily for 7 days, THEN 2 tablets (10 mg) daily for 7 days, THEN 1 tablet (5 mg) daily for 7 days.  Qty: 42 tablet, Refills: 0    Associated Diagnoses: IBD (inflammatory bowel disease)       !! - Potential duplicate medications found. Please discuss with provider.      CONTINUE these medications which have NOT CHANGED    Details   acetaminophen (TYLENOL) 650 MG CR tablet Take 650 mg by mouth At Bedtime      albuterol (PROAIR HFA/PROVENTIL HFA/VENTOLIN HFA) 108 (90 Base) MCG/ACT inhaler Inhale 2 puffs into the lungs every 6 hours as needed for shortness of breath, wheezing or cough      esomeprazole (NEXIUM) 40 MG DR capsule Take 40 mg by mouth 2 times daily (before meals) Take 30-60 minutes before eating.      famotidine (PEPCID) 40 MG tablet Take 40 mg by mouth At Bedtime      montelukast (SINGULAIR) 10 MG tablet Take 10 mg by mouth At Bedtime      nortriptyline (PAMELOR) 25 MG capsule Take 25 mg by mouth At Bedtime      !! ondansetron (ZOFRAN ODT) 4 MG ODT tab Take 4-8 mg by mouth every 8 hours as needed for nausea      traZODone (DESYREL) 100 MG tablet Take 200 mg by mouth At Bedtime       !! - Potential duplicate medications found. Please discuss with provider.        Allergies   Allergies   Allergen Reactions     Latex Hives     Oxycodone Anaphylaxis, Hives and Swelling     Throat swelling, per pt     Penicillin G Anaphylaxis     Sumatriptan Palpitations     Aspirin Nausea and Vomiting     Demerol Hcl [Meperidine] Nausea and Vomiting     N/V     Percocet  [Oxycodone-Acetaminophen] Nausea and Vomiting     N/V     Codeine Nausea and Vomiting     Mold

## 2023-06-18 NOTE — PLAN OF CARE
Goal Outcome Evaluation: [id number]  Plan of Care Reviewed With: patient  Overall Patient Progress: improving    A&O x3, vital signs WDL for patient. Continues to have RLQ cramping pain, well managed with PRN Tylenol and heat. Eating a low fat diet, small frequent meals. Heartburn improved with home medication. Passing gas, had another small BM this morning. Voiding without difficulty. Walking in the halls. All discharge goals met. Discharge education, follow-up, and medications reviewed with patient and spouse per MD orders; both verbalizing understanding. Patient discharged home with spouse at 1240. No additional concerns at this time.

## 2023-06-18 NOTE — PLAN OF CARE
VS: Afebrile. Occasional hypertension.  Respiratory: WDL.   GI: Normoactive bowel sounds. Intermittent nausea. Pain and tenderness located in RLQ. Pt reports not passing gas. PRN senna x1 given.   : Voiding.   Activity: Independent. Pt walking frequently in room and in the halls.  Pain: Rated pain 3-6. PRN Tylenol x2 and PRN Robaxin x1 given.  Plan: Encourage oral intake. Encourage ambulation.

## 2023-06-18 NOTE — DISCHARGE INSTRUCTIONS
Crohn s Disease    Crohn s disease is inflammation of the intestinal tract that comes and goes in flare-ups. This is a chronic (long-term) illness. During a flare-up, intense abdominal (belly) pain and fever may be felt. Mucus, blood, or pus may appear in the stool. Between flare-ups, inflammation lessens and there usually are no symptoms. Crohn s disease is a form of inflammatory bowel disease (IBD).    Symptoms of Crohn's disease may include:  Abdominal cramps and pain  Diarrhea, sometimes bloody, occasionally alternating with constipation  Mucus in stools  Rectal bleeding  Rectal pain  Fever  Low energy  Decreased appetite and weight loss  Bloating or swollen abdomen  Nausea or vomiting  Joint aches  Sores in mouth  Red, painful eye problems  Rashes in anal area or other anal problems  Abscess (infection) or fistula (abnormal opening in the digestive tract)  No one knows exactly what causes Crohn's disease, and there is no cure. The goal of treatment is to control and relieve symptoms and prevent complications, so you can lead a full and active life. No single treatment works for everyone, but many things can be done to help.   Diet  Foods did not cause your Crohn's disease. But they can affect it. Unfortunately, there is no one diet that works for everyone. Keep a food log to figure out what you are sensitive to. Below are some things to try.    Eat more slowly and smaller amounts at a time, but more often. Remember, you can always eat more if you are still hungry. But you can t eat less once you ve eaten too much.  High fiber foods are complicated. While they may help constipation and diarrhea, they can make the bloating, cramping, and gas worse.  If your Crohn's disease involves the small intestine, staying away from lactose-containing dairy products can help relieve some symptoms.  Try limiting spicy or fatty foods. Consume lean protein meats, such as fish and poultry.  Eat less sugar.  Bloating or passing  "excess gas may be controlled. Stay away from \"gassy\" foods, such as beans, cabbage, broccoli, and cauliflower.  Stay away from certain types of fruits and fruit juices, such as pear, peach, and prune.  Caffeine, alcohol, and stimulants may make symptoms worse. These include coffee, tea, sodas, energy drinks, and chocolate.  Lifestyle  Although stress does not cause Crohn's, it's often a factor in flare-ups. It can also affect how you feel about and cope with your health problem.   Look for things that seem to make your symptoms worse, such as stress and emotions.  Counseling can often help relieve stress. So can self-help measures, such as exercise, yoga, and meditation.  Depression can be a part of this illness and antidepressants may be prescribed. This may actually help with diarrhea, constipation, and cramping, as well as depression.  Smoking doesn't cause Crohn's. But it can make the symptoms worse. It can also make the disease more severe and harder to control. It's very important to stop smoking if you have Crohn's disease.  Medicines  Your healthcare provider may prescribe medicines. If so, take them as directed. In Crohn's disease, long-term medicine is usually needed. This is because control of the disease is very important to prevent complications in the future. Control is called remission. The goal of treatment is remission. Complications can include continued inflammation, a blockage (obstruction), a communication between different organs (a fistula), a pocket of pus (abscess), and anal problems. This is why it's very important to follow your treatment plan carefully. For acute flare-ups, prescription medicine can be prescribed. Call your healthcare provider if you need this.   Ask your healthcare provider before taking any antidiarrheal medicines.  Medicines to control Crohn's disease include pills, intravenous infusions, and injections. Your healthcare provider can discuss the pros and cons of " different medicines with you.  Don t take anti-inflammatory medicines like ibuprofen or naproxen.  Consider nutritional supplements. This is especially true if the diarrhea is prolonged, or you aren't eating or are losing weight.   If you need supplements, talk it over with your healthcare provider for the best recommendation. Stay away from herbal supplements. They are not evaluated by the FDA. Because of this, there is no way to be certain of their safety and purity.   Follow-up care  Follow up with your healthcare provider, or as advised. If a stool sample was taken or cultures were done, you will be advised if your treatment plan needs to change. You may also need blood tests, procedures, or imaging studies. Call as directed for the results.   Support  Joining a support group for people with Crohn s disease can be a source of useful information on how others are coping with this illness. They are available in person, on the phone, or on the Web. Contact the following resources for more information:   Crohn s and Colitis [employer] 125.255.8096, www.ccfa.org  National Digestive Diseases Information Clearinghouse (NDDIC), www.digestive.niddk.nih.gov  When to get medical advice  Call your healthcare provider right away if any of the following occur:   Fever of 100.4 F (38 C) or higher, or as directed by your healthcare provider  Abdominal (belly) pain that doesn't get better when you take the usual measures  Mucus, pus, or small amounts of blood in the stool (dark or bright red)  Repeated vomiting  Abdominal swelling and pain that doesn t go away after a few hours  Call 911  Call 911 if any of the following occur:   Trouble breathing  Confusion  Extreme sleepiness or trouble waking up  Fainting or loss of consciousness  Rapid heart rate  Large amount of blood in the stool (dark or bright red)  Ld last reviewed this educational content on 10/1/2022    5111-2189 [employer] All  rights reserved. This information is not intended as a substitute for professional medical care. Always follow your healthcare professional's instructions.

## 2023-07-12 ENCOUNTER — HOSPITAL ENCOUNTER (INPATIENT)
Facility: CLINIC | Age: 63
LOS: 7 days | Discharge: HOME | End: 2023-07-19
Attending: EMERGENCY MEDICINE | Admitting: INTERNAL MEDICINE
Payer: COMMERCIAL

## 2023-07-12 ENCOUNTER — APPOINTMENT (OUTPATIENT)
Dept: CT IMAGING | Facility: CLINIC | Age: 63
End: 2023-07-12
Attending: EMERGENCY MEDICINE
Payer: COMMERCIAL

## 2023-07-12 DIAGNOSIS — K50.119 CROHN'S DISEASE OF COLON WITH COMPLICATION (H): ICD-10-CM

## 2023-07-12 DIAGNOSIS — R10.9 INTRACTABLE ABDOMINAL PAIN: ICD-10-CM

## 2023-07-12 LAB
ALBUMIN SERPL BCG-MCNC: 4.3 G/DL (ref 3.5–5.2)
ALBUMIN UR-MCNC: NEGATIVE MG/DL
ALP SERPL-CCNC: 66 U/L (ref 35–104)
ALT SERPL W P-5'-P-CCNC: 17 U/L (ref 0–50)
ANION GAP SERPL CALCULATED.3IONS-SCNC: 11 MMOL/L (ref 7–15)
APPEARANCE UR: CLEAR
AST SERPL W P-5'-P-CCNC: 18 U/L (ref 0–45)
BILIRUB DIRECT SERPL-MCNC: <0.2 MG/DL (ref 0–0.3)
BILIRUB SERPL-MCNC: 0.5 MG/DL
BILIRUB UR QL STRIP: NEGATIVE
BUN SERPL-MCNC: 10 MG/DL (ref 8–23)
CALCIUM SERPL-MCNC: 9.4 MG/DL (ref 8.8–10.2)
CHLORIDE SERPL-SCNC: 101 MMOL/L (ref 98–107)
COLOR UR AUTO: ABNORMAL
CREAT SERPL-MCNC: 1.14 MG/DL (ref 0.51–0.95)
DEPRECATED HCO3 PLAS-SCNC: 26 MMOL/L (ref 22–29)
ERYTHROCYTE [DISTWIDTH] IN BLOOD BY AUTOMATED COUNT: 16.5 % (ref 10–15)
GFR SERPL CREATININE-BSD FRML MDRD: 53 ML/MIN/1.73M2
GLUCOSE SERPL-MCNC: 102 MG/DL (ref 70–99)
GLUCOSE UR STRIP-MCNC: NEGATIVE MG/DL
HCT VFR BLD AUTO: 41.4 % (ref 35–47)
HGB BLD-MCNC: 12.7 G/DL (ref 11.7–15.7)
HGB UR QL STRIP: NEGATIVE
HOLD SPECIMEN: NORMAL
HOLD SPECIMEN: NORMAL
KETONES UR STRIP-MCNC: NEGATIVE MG/DL
LACTATE SERPL-SCNC: 1 MMOL/L (ref 0.7–2)
LACTATE SERPL-SCNC: 2.1 MMOL/L (ref 0.7–2)
LEUKOCYTE ESTERASE UR QL STRIP: NEGATIVE
MCH RBC QN AUTO: 27.9 PG (ref 26.5–33)
MCHC RBC AUTO-ENTMCNC: 30.7 G/DL (ref 31.5–36.5)
MCV RBC AUTO: 91 FL (ref 78–100)
MUCOUS THREADS #/AREA URNS LPF: PRESENT /LPF
NITRATE UR QL: NEGATIVE
PH UR STRIP: 5.5 [PH] (ref 5–7)
PLATELET # BLD AUTO: 294 10E3/UL (ref 150–450)
POTASSIUM SERPL-SCNC: 4.7 MMOL/L (ref 3.4–5.3)
PROT SERPL-MCNC: 6.5 G/DL (ref 6.4–8.3)
RBC # BLD AUTO: 4.56 10E6/UL (ref 3.8–5.2)
RBC URINE: <1 /HPF
SODIUM SERPL-SCNC: 138 MMOL/L (ref 136–145)
SP GR UR STRIP: 1 (ref 1–1.03)
UROBILINOGEN UR STRIP-MCNC: NORMAL MG/DL
WBC # BLD AUTO: 9.8 10E3/UL (ref 4–11)
WBC URINE: 0 /HPF

## 2023-07-12 PROCEDURE — 74177 CT ABD & PELVIS W/CONTRAST: CPT

## 2023-07-12 PROCEDURE — 99285 EMERGENCY DEPT VISIT HI MDM: CPT | Mod: 25

## 2023-07-12 PROCEDURE — 250N000009 HC RX 250: Performed by: EMERGENCY MEDICINE

## 2023-07-12 PROCEDURE — 82248 BILIRUBIN DIRECT: CPT | Performed by: EMERGENCY MEDICINE

## 2023-07-12 PROCEDURE — 250N000011 HC RX IP 250 OP 636: Performed by: EMERGENCY MEDICINE

## 2023-07-12 PROCEDURE — 36415 COLL VENOUS BLD VENIPUNCTURE: CPT | Performed by: EMERGENCY MEDICINE

## 2023-07-12 PROCEDURE — 99223 1ST HOSP IP/OBS HIGH 75: CPT | Mod: AI | Performed by: PHYSICIAN ASSISTANT

## 2023-07-12 PROCEDURE — 85027 COMPLETE CBC AUTOMATED: CPT | Performed by: EMERGENCY MEDICINE

## 2023-07-12 PROCEDURE — 120N000001 HC R&B MED SURG/OB

## 2023-07-12 PROCEDURE — 80053 COMPREHEN METABOLIC PANEL: CPT | Performed by: EMERGENCY MEDICINE

## 2023-07-12 PROCEDURE — 258N000003 HC RX IP 258 OP 636: Performed by: EMERGENCY MEDICINE

## 2023-07-12 PROCEDURE — 96361 HYDRATE IV INFUSION ADD-ON: CPT

## 2023-07-12 PROCEDURE — 96375 TX/PRO/DX INJ NEW DRUG ADDON: CPT

## 2023-07-12 PROCEDURE — 96374 THER/PROPH/DIAG INJ IV PUSH: CPT | Mod: 59

## 2023-07-12 PROCEDURE — 81001 URINALYSIS AUTO W/SCOPE: CPT | Performed by: EMERGENCY MEDICINE

## 2023-07-12 PROCEDURE — 83605 ASSAY OF LACTIC ACID: CPT | Performed by: EMERGENCY MEDICINE

## 2023-07-12 RX ORDER — ONDANSETRON 4 MG/1
4 TABLET, ORALLY DISINTEGRATING ORAL ONCE
Status: COMPLETED | OUTPATIENT
Start: 2023-07-12 | End: 2023-07-12

## 2023-07-12 RX ORDER — IOPAMIDOL 755 MG/ML
500 INJECTION, SOLUTION INTRAVASCULAR ONCE
Status: COMPLETED | OUTPATIENT
Start: 2023-07-12 | End: 2023-07-12

## 2023-07-12 RX ORDER — HYDROMORPHONE HYDROCHLORIDE 1 MG/ML
0.5 INJECTION, SOLUTION INTRAMUSCULAR; INTRAVENOUS; SUBCUTANEOUS EVERY 30 MIN PRN
Status: DISCONTINUED | OUTPATIENT
Start: 2023-07-12 | End: 2023-07-13

## 2023-07-12 RX ORDER — ONDANSETRON 2 MG/ML
4 INJECTION INTRAMUSCULAR; INTRAVENOUS EVERY 30 MIN PRN
Status: DISCONTINUED | OUTPATIENT
Start: 2023-07-12 | End: 2023-07-13

## 2023-07-12 RX ADMIN — HYDROMORPHONE HYDROCHLORIDE 1 MG: 1 INJECTION, SOLUTION INTRAMUSCULAR; INTRAVENOUS; SUBCUTANEOUS at 21:30

## 2023-07-12 RX ADMIN — ONDANSETRON 4 MG: 2 INJECTION INTRAMUSCULAR; INTRAVENOUS at 19:06

## 2023-07-12 RX ADMIN — IOPAMIDOL 71 ML: 755 INJECTION, SOLUTION INTRAVENOUS at 20:31

## 2023-07-12 RX ADMIN — SODIUM CHLORIDE 58 ML: 9 INJECTION, SOLUTION INTRAVENOUS at 20:31

## 2023-07-12 RX ADMIN — HYDROMORPHONE HYDROCHLORIDE 0.5 MG: 1 INJECTION, SOLUTION INTRAMUSCULAR; INTRAVENOUS; SUBCUTANEOUS at 19:07

## 2023-07-12 RX ADMIN — SODIUM CHLORIDE 1000 ML: 9 INJECTION, SOLUTION INTRAVENOUS at 19:07

## 2023-07-12 RX ADMIN — ONDANSETRON 4 MG: 4 TABLET, ORALLY DISINTEGRATING ORAL at 15:25

## 2023-07-12 ASSESSMENT — ACTIVITIES OF DAILY LIVING (ADL)
ADLS_ACUITY_SCORE: 35

## 2023-07-12 NOTE — ED TRIAGE NOTES
"Pt arrives for \"Chrons flair up\" x2 bloody stools yesterdat, emesis x3 yesterday. 4 diarrhea episodes today without blood. Abd cramping and pain, back pain, nausea. ABCs intact A&Ox4       "

## 2023-07-12 NOTE — ED PROVIDER NOTES
History     Chief Complaint:  Abdominal Pain       The history is provided by the patient.      Devi Salas is a 63 year old female who has a history of Crohn's and presents with lower abdominal pain. Patient states that she has had diarrhea and has had 8 episodes. States that she has noticed blood in a few of them. Also notices that she well get the sweats and then the next minute have the chills. She also recently started throwing up. She takes prednisone and then infusions of remicade. Last time she was in the hospital was June 5th.       Independent Historian:   None - Patient Only    Review of External Notes:   None     Medications:    Albuterol   Bisacodyl   Esomeprazole   Famotidine   Methocarbamol   Montelukast   Nortriptyline   Ondansetron    Polyethylene glycol   Prednisone   Sennosides  Trazodone     Past Medical History:    Kidney cancer   Graves disease   Allergic rhinitis   Kidney cancer       Physical Exam     Patient Vitals for the past 24 hrs:   BP Temp Temp src Pulse Resp SpO2   07/12/23 1944 (!) 140/82 -- -- 74 -- --   07/12/23 1517 (!) 123/97 98.2  F (36.8  C) Oral 98 18 100 %        Physical Exam  Constitutional:       Appearance: She is well-developed.   HENT:      Right Ear: External ear normal.      Left Ear: External ear normal.      Mouth/Throat:      Mouth: Mucous membranes are moist.      Pharynx: Oropharynx is clear. No oropharyngeal exudate or posterior oropharyngeal erythema.   Eyes:      General: No scleral icterus.     Conjunctiva/sclera: Conjunctivae normal.      Pupils: Pupils are equal, round, and reactive to light.   Cardiovascular:      Rate and Rhythm: Regular rhythm. Tachycardia present.      Heart sounds: Normal heart sounds. No murmur heard.     No friction rub. No gallop.   Pulmonary:      Effort: Pulmonary effort is normal. No respiratory distress.      Breath sounds: Normal breath sounds. No wheezing or rales.   Abdominal:      General: Bowel sounds are normal.  There is no distension.      Palpations: Abdomen is soft. There is no mass.      Tenderness: There is abdominal tenderness. There is guarding. There is no right CVA tenderness or left CVA tenderness.      Comments: Diffuse abd TTP   Musculoskeletal:         General: Normal range of motion.      Cervical back: Normal range of motion and neck supple.   Skin:     General: Skin is warm and dry.      Capillary Refill: Capillary refill takes less than 2 seconds.      Findings: No rash.   Neurological:      Mental Status: She is alert.           Emergency Department Course     Imaging:  CT Abdomen Pelvis w Contrast   Final Result   IMPRESSION:    1.  No significant change in 3 segments of terminal ileum demonstrating acute on chronic ileitis with luminal narrowing. No bowel obstruction. No abscess.         Report per radiology    Laboratory:  Labs Ordered and Resulted from Time of ED Arrival to Time of ED Departure   CBC WITH PLATELETS - Abnormal       Result Value    WBC Count 9.8      RBC Count 4.56      Hemoglobin 12.7      Hematocrit 41.4      MCV 91      MCH 27.9      MCHC 30.7 (*)     RDW 16.5 (*)     Platelet Count 294     BASIC METABOLIC PANEL - Abnormal    Sodium 138      Potassium 4.7      Chloride 101      Carbon Dioxide (CO2) 26      Anion Gap 11      Urea Nitrogen 10.0      Creatinine 1.14 (*)     Calcium 9.4      Glucose 102 (*)     GFR Estimate 53 (*)    LACTIC ACID WHOLE BLOOD - Abnormal    Lactic Acid 2.1 (*)    ROUTINE UA WITH MICROSCOPIC REFLEX TO CULTURE - Abnormal    Color Urine Straw      Appearance Urine Clear      Glucose Urine Negative      Bilirubin Urine Negative      Ketones Urine Negative      Specific Gravity Urine 1.005      Blood Urine Negative      pH Urine 5.5      Protein Albumin Urine Negative      Urobilinogen Urine Normal      Nitrite Urine Negative      Leukocyte Esterase Urine Negative      Mucus Urine Present (*)     RBC Urine <1      WBC Urine 0     HEPATIC FUNCTION PANEL - Normal     Protein Total 6.5      Albumin 4.3      Bilirubin Total 0.5      Alkaline Phosphatase 66      AST 18      ALT 17      Bilirubin Direct <0.20            Emergency Department Course & Assessments:       Interventions:  Medications   ondansetron (ZOFRAN) injection 4 mg (4 mg Intravenous $Given 7/12/23 1906)   HYDROmorphone (PF) (DILAUDID) injection 0.5 mg (0.5 mg Intravenous Not Given 7/12/23 1957)   ondansetron (ZOFRAN ODT) ODT tab 4 mg (4 mg Oral $Given 7/12/23 1525)   0.9% sodium chloride BOLUS (1,000 mLs Intravenous $New Bag 7/12/23 1907)   iopamidol (ISOVUE-370) solution 500 mL (71 mLs Intravenous $Given 7/12/23 2031)   CT scan flush (58 mLs Intravenous $Given 7/12/23 2031)        Assessments:  1802 I obtained history and examined the patient as noted above.    Independent Interpretation (X-rays, CTs, rhythm strip):  None    Consultations/Discussion of Management or Tests:  Hospitalist APC Bartlett, admitting for        Social Determinants of Health affecting care:   None    Disposition:  The patient was admitted to the hospital under the care of Dr. Ro.     Impression & Plan    CMS Diagnoses: None    Medical Decision Making:  Patient presents today for evaluation of severe abdominal pain.  She had a long hospital stay in June for Crohn's flare.  She is having recurrent pain with vomiting and nausea.  She has not had surgery yet.  Her evaluation showed mildly elevated lactic likely due to dehydration.  CT was performed which did not show any acute complication.  She is having severe pain still and requires pain control with IV narcotics.  She also requires GI and colorectal surgery evaluation at this point.  She is agreeable with the plan.  She will continue to receive IV fluids and pain medication.  She is admitted to Dr. Ro.     Diagnosis:    ICD-10-CM    1. Crohn's disease of colon with complication (H)  K50.119       2. Intractable abdominal pain  R10.9                Scribe Disclosure:  Keegan CALLAHAN  Maricel, am serving as a scribe at 6:10 PM on 7/12/2023 to document services personally performed by Gagan Marcos MD based on my observations and the provider's statements to me.   7/12/2023   Gagan Marcos MD Cheng, Wenlan, MD  07/12/23 7021

## 2023-07-13 LAB
ABO/RH(D): NORMAL
ANION GAP SERPL CALCULATED.3IONS-SCNC: 8 MMOL/L (ref 7–15)
ANTIBODY SCREEN: NEGATIVE
BUN SERPL-MCNC: 9.7 MG/DL (ref 8–23)
CALCIUM SERPL-MCNC: 8.5 MG/DL (ref 8.8–10.2)
CHLORIDE SERPL-SCNC: 105 MMOL/L (ref 98–107)
CREAT SERPL-MCNC: 1.04 MG/DL (ref 0.51–0.95)
DEPRECATED HCO3 PLAS-SCNC: 27 MMOL/L (ref 22–29)
ERYTHROCYTE [DISTWIDTH] IN BLOOD BY AUTOMATED COUNT: 16.6 % (ref 10–15)
GFR SERPL CREATININE-BSD FRML MDRD: 60 ML/MIN/1.73M2
GLUCOSE SERPL-MCNC: 87 MG/DL (ref 70–99)
HCT VFR BLD AUTO: 37.3 % (ref 35–47)
HGB BLD-MCNC: 11.5 G/DL (ref 11.7–15.7)
MCH RBC QN AUTO: 28.3 PG (ref 26.5–33)
MCHC RBC AUTO-ENTMCNC: 30.8 G/DL (ref 31.5–36.5)
MCV RBC AUTO: 92 FL (ref 78–100)
PLATELET # BLD AUTO: 242 10E3/UL (ref 150–450)
POTASSIUM SERPL-SCNC: 4.2 MMOL/L (ref 3.4–5.3)
RBC # BLD AUTO: 4.06 10E6/UL (ref 3.8–5.2)
SODIUM SERPL-SCNC: 140 MMOL/L (ref 136–145)
SPECIMEN EXPIRATION DATE: NORMAL
WBC # BLD AUTO: 7.4 10E3/UL (ref 4–11)

## 2023-07-13 PROCEDURE — 250N000013 HC RX MED GY IP 250 OP 250 PS 637: Performed by: PHYSICIAN ASSISTANT

## 2023-07-13 PROCEDURE — 250N000011 HC RX IP 250 OP 636: Performed by: INTERNAL MEDICINE

## 2023-07-13 PROCEDURE — 258N000003 HC RX IP 258 OP 636: Performed by: PHYSICIAN ASSISTANT

## 2023-07-13 PROCEDURE — 999N000198 HC STATISTIC WOC PT EDUCATION, 16-30 MIN

## 2023-07-13 PROCEDURE — 36415 COLL VENOUS BLD VENIPUNCTURE: CPT | Performed by: PHYSICIAN ASSISTANT

## 2023-07-13 PROCEDURE — 250N000011 HC RX IP 250 OP 636: Mod: JZ | Performed by: NURSE PRACTITIONER

## 2023-07-13 PROCEDURE — 36415 COLL VENOUS BLD VENIPUNCTURE: CPT

## 2023-07-13 PROCEDURE — 120N000001 HC R&B MED SURG/OB

## 2023-07-13 PROCEDURE — 86923 COMPATIBILITY TEST ELECTRIC: CPT | Performed by: STUDENT IN AN ORGANIZED HEALTH CARE EDUCATION/TRAINING PROGRAM

## 2023-07-13 PROCEDURE — 250N000013 HC RX MED GY IP 250 OP 250 PS 637

## 2023-07-13 PROCEDURE — 99232 SBSQ HOSP IP/OBS MODERATE 35: CPT | Performed by: INTERNAL MEDICINE

## 2023-07-13 PROCEDURE — 80048 BASIC METABOLIC PNL TOTAL CA: CPT | Performed by: PHYSICIAN ASSISTANT

## 2023-07-13 PROCEDURE — 250N000011 HC RX IP 250 OP 636: Performed by: PHYSICIAN ASSISTANT

## 2023-07-13 PROCEDURE — 86901 BLOOD TYPING SEROLOGIC RH(D): CPT

## 2023-07-13 PROCEDURE — 86850 RBC ANTIBODY SCREEN: CPT

## 2023-07-13 PROCEDURE — 84134 ASSAY OF PREALBUMIN: CPT

## 2023-07-13 PROCEDURE — 85014 HEMATOCRIT: CPT | Performed by: PHYSICIAN ASSISTANT

## 2023-07-13 RX ORDER — NORTRIPTYLINE HCL 25 MG
25 CAPSULE ORAL AT BEDTIME
Status: DISCONTINUED | OUTPATIENT
Start: 2023-07-13 | End: 2023-07-19 | Stop reason: HOSPADM

## 2023-07-13 RX ORDER — ONDANSETRON 2 MG/ML
4 INJECTION INTRAMUSCULAR; INTRAVENOUS EVERY 6 HOURS PRN
Status: DISCONTINUED | OUTPATIENT
Start: 2023-07-13 | End: 2023-07-15

## 2023-07-13 RX ORDER — METHYLPREDNISOLONE SODIUM SUCCINATE 40 MG/ML
20 INJECTION, POWDER, LYOPHILIZED, FOR SOLUTION INTRAMUSCULAR; INTRAVENOUS EVERY 8 HOURS
Status: DISCONTINUED | OUTPATIENT
Start: 2023-07-13 | End: 2023-07-14

## 2023-07-13 RX ORDER — POLYETHYLENE GLYCOL 3350 17 G/17G
17 POWDER, FOR SOLUTION ORAL DAILY
Status: DISCONTINUED | OUTPATIENT
Start: 2023-07-13 | End: 2023-07-15

## 2023-07-13 RX ORDER — POLYETHYLENE GLYCOL 3350 17 G/17G
238 POWDER, FOR SOLUTION ORAL ONCE
Status: COMPLETED | OUTPATIENT
Start: 2023-07-13 | End: 2023-07-13

## 2023-07-13 RX ORDER — HYDROMORPHONE HCL IN WATER/PF 6 MG/30 ML
0.4 PATIENT CONTROLLED ANALGESIA SYRINGE INTRAVENOUS
Status: DISCONTINUED | OUTPATIENT
Start: 2023-07-13 | End: 2023-07-15

## 2023-07-13 RX ORDER — PROCHLORPERAZINE MALEATE 5 MG
10 TABLET ORAL EVERY 6 HOURS PRN
Status: DISCONTINUED | OUTPATIENT
Start: 2023-07-13 | End: 2023-07-19 | Stop reason: HOSPADM

## 2023-07-13 RX ORDER — ESOMEPRAZOLE MAGNESIUM 40 MG/1
40 CAPSULE, DELAYED RELEASE ORAL
Status: DISCONTINUED | OUTPATIENT
Start: 2023-07-13 | End: 2023-07-19 | Stop reason: HOSPADM

## 2023-07-13 RX ORDER — TRAZODONE HYDROCHLORIDE 100 MG/1
200 TABLET ORAL AT BEDTIME
Status: DISCONTINUED | OUTPATIENT
Start: 2023-07-13 | End: 2023-07-19 | Stop reason: HOSPADM

## 2023-07-13 RX ORDER — MONTELUKAST SODIUM 10 MG/1
10 TABLET ORAL AT BEDTIME
Status: DISCONTINUED | OUTPATIENT
Start: 2023-07-13 | End: 2023-07-19 | Stop reason: HOSPADM

## 2023-07-13 RX ORDER — METHOCARBAMOL 500 MG/1
500 TABLET, FILM COATED ORAL 4 TIMES DAILY PRN
Status: DISCONTINUED | OUTPATIENT
Start: 2023-07-13 | End: 2023-07-19 | Stop reason: HOSPADM

## 2023-07-13 RX ORDER — SENNOSIDES 8.6 MG
1-2 TABLET ORAL 2 TIMES DAILY PRN
Status: DISCONTINUED | OUTPATIENT
Start: 2023-07-13 | End: 2023-07-19 | Stop reason: HOSPADM

## 2023-07-13 RX ORDER — ACETAMINOPHEN 325 MG/1
650 TABLET ORAL EVERY 6 HOURS PRN
Status: DISCONTINUED | OUTPATIENT
Start: 2023-07-13 | End: 2023-07-15

## 2023-07-13 RX ORDER — AMOXICILLIN 250 MG
2 CAPSULE ORAL 2 TIMES DAILY
Status: DISCONTINUED | OUTPATIENT
Start: 2023-07-13 | End: 2023-07-15

## 2023-07-13 RX ORDER — ONDANSETRON 4 MG/1
4 TABLET, ORALLY DISINTEGRATING ORAL EVERY 6 HOURS PRN
Status: DISCONTINUED | OUTPATIENT
Start: 2023-07-13 | End: 2023-07-15

## 2023-07-13 RX ORDER — ONDANSETRON 4 MG/1
4-8 TABLET, ORALLY DISINTEGRATING ORAL EVERY 8 HOURS PRN
Status: DISCONTINUED | OUTPATIENT
Start: 2023-07-13 | End: 2023-07-13 | Stop reason: ALTCHOICE

## 2023-07-13 RX ORDER — CYANOCOBALAMIN 1000 UG/ML
1000 INJECTION, SOLUTION INTRAMUSCULAR; SUBCUTANEOUS ONCE
Status: COMPLETED | OUTPATIENT
Start: 2023-07-13 | End: 2023-07-13

## 2023-07-13 RX ORDER — NALOXONE HYDROCHLORIDE 0.4 MG/ML
0.2 INJECTION, SOLUTION INTRAMUSCULAR; INTRAVENOUS; SUBCUTANEOUS
Status: DISCONTINUED | OUTPATIENT
Start: 2023-07-13 | End: 2023-07-19 | Stop reason: HOSPADM

## 2023-07-13 RX ORDER — ACETAMINOPHEN 650 MG/1
650 SUPPOSITORY RECTAL EVERY 6 HOURS PRN
Status: DISCONTINUED | OUTPATIENT
Start: 2023-07-13 | End: 2023-07-15

## 2023-07-13 RX ORDER — PROCHLORPERAZINE 25 MG
25 SUPPOSITORY, RECTAL RECTAL EVERY 12 HOURS PRN
Status: DISCONTINUED | OUTPATIENT
Start: 2023-07-13 | End: 2023-07-19 | Stop reason: HOSPADM

## 2023-07-13 RX ORDER — ACETAMINOPHEN 325 MG/1
650 TABLET ORAL AT BEDTIME
Status: DISCONTINUED | OUTPATIENT
Start: 2023-07-13 | End: 2023-07-14

## 2023-07-13 RX ORDER — AMOXICILLIN 250 MG
1 CAPSULE ORAL 2 TIMES DAILY
Status: DISCONTINUED | OUTPATIENT
Start: 2023-07-13 | End: 2023-07-15

## 2023-07-13 RX ORDER — HYDROMORPHONE HCL IN WATER/PF 6 MG/30 ML
0.2 PATIENT CONTROLLED ANALGESIA SYRINGE INTRAVENOUS
Status: DISCONTINUED | OUTPATIENT
Start: 2023-07-13 | End: 2023-07-15

## 2023-07-13 RX ORDER — ONDANSETRON 4 MG/1
4 TABLET, ORALLY DISINTEGRATING ORAL EVERY 8 HOURS PRN
Status: DISCONTINUED | OUTPATIENT
Start: 2023-07-13 | End: 2023-07-13 | Stop reason: ALTCHOICE

## 2023-07-13 RX ORDER — NALOXONE HYDROCHLORIDE 0.4 MG/ML
0.4 INJECTION, SOLUTION INTRAMUSCULAR; INTRAVENOUS; SUBCUTANEOUS
Status: DISCONTINUED | OUTPATIENT
Start: 2023-07-13 | End: 2023-07-19 | Stop reason: HOSPADM

## 2023-07-13 RX ORDER — SODIUM CHLORIDE 9 MG/ML
INJECTION, SOLUTION INTRAVENOUS CONTINUOUS
Status: DISCONTINUED | OUTPATIENT
Start: 2023-07-13 | End: 2023-07-15

## 2023-07-13 RX ADMIN — MONTELUKAST 10 MG: 10 TABLET, FILM COATED ORAL at 00:53

## 2023-07-13 RX ADMIN — CYANOCOBALAMIN 1000 MCG: 1000 INJECTION, SOLUTION INTRAMUSCULAR at 14:03

## 2023-07-13 RX ADMIN — METHYLPREDNISOLONE SODIUM SUCCINATE 20 MG: 40 INJECTION, POWDER, FOR SOLUTION INTRAMUSCULAR; INTRAVENOUS at 16:45

## 2023-07-13 RX ADMIN — ESOMEPRAZOLE MAGNESIUM 40 MG: 40 CAPSULE, DELAYED RELEASE ORAL at 16:46

## 2023-07-13 RX ADMIN — PROCHLORPERAZINE EDISYLATE 10 MG: 5 INJECTION INTRAMUSCULAR; INTRAVENOUS at 20:56

## 2023-07-13 RX ADMIN — POLYETHYLENE GLYCOL 3350 17 G: 17 POWDER, FOR SOLUTION ORAL at 08:08

## 2023-07-13 RX ADMIN — HYDROMORPHONE HYDROCHLORIDE 0.2 MG: 0.2 INJECTION, SOLUTION INTRAMUSCULAR; INTRAVENOUS; SUBCUTANEOUS at 20:57

## 2023-07-13 RX ADMIN — ACETAMINOPHEN 650 MG: 325 TABLET, FILM COATED ORAL at 16:47

## 2023-07-13 RX ADMIN — SODIUM CHLORIDE: 9 INJECTION, SOLUTION INTRAVENOUS at 15:45

## 2023-07-13 RX ADMIN — TRAZODONE HYDROCHLORIDE 200 MG: 100 TABLET ORAL at 21:35

## 2023-07-13 RX ADMIN — METHYLPREDNISOLONE SODIUM SUCCINATE 20 MG: 40 INJECTION, POWDER, FOR SOLUTION INTRAMUSCULAR; INTRAVENOUS at 08:10

## 2023-07-13 RX ADMIN — TRAZODONE HYDROCHLORIDE 200 MG: 100 TABLET ORAL at 00:20

## 2023-07-13 RX ADMIN — ONDANSETRON 4 MG: 2 INJECTION INTRAMUSCULAR; INTRAVENOUS at 09:35

## 2023-07-13 RX ADMIN — ACETAMINOPHEN 650 MG: 325 TABLET, FILM COATED ORAL at 21:36

## 2023-07-13 RX ADMIN — SODIUM CHLORIDE: 9 INJECTION, SOLUTION INTRAVENOUS at 00:21

## 2023-07-13 RX ADMIN — MONTELUKAST 10 MG: 10 TABLET, FILM COATED ORAL at 21:35

## 2023-07-13 RX ADMIN — HYDROMORPHONE HYDROCHLORIDE 0.2 MG: 0.2 INJECTION, SOLUTION INTRAMUSCULAR; INTRAVENOUS; SUBCUTANEOUS at 04:22

## 2023-07-13 RX ADMIN — POLYETHYLENE GLYCOL 3350 238 G: 17 POWDER, FOR SOLUTION ORAL at 11:58

## 2023-07-13 RX ADMIN — ONDANSETRON 4 MG: 2 INJECTION INTRAMUSCULAR; INTRAVENOUS at 15:46

## 2023-07-13 RX ADMIN — SODIUM CHLORIDE: 9 INJECTION, SOLUTION INTRAVENOUS at 23:58

## 2023-07-13 RX ADMIN — SENNOSIDES AND DOCUSATE SODIUM 1 TABLET: 50; 8.6 TABLET ORAL at 08:08

## 2023-07-13 RX ADMIN — NORTRIPTYLINE HYDROCHLORIDE 25 MG: 25 CAPSULE ORAL at 21:36

## 2023-07-13 RX ADMIN — METHYLPREDNISOLONE SODIUM SUCCINATE 20 MG: 40 INJECTION, POWDER, FOR SOLUTION INTRAMUSCULAR; INTRAVENOUS at 23:58

## 2023-07-13 RX ADMIN — NORTRIPTYLINE HYDROCHLORIDE 25 MG: 25 CAPSULE ORAL at 00:53

## 2023-07-13 RX ADMIN — ACETAMINOPHEN 650 MG: 325 TABLET, FILM COATED ORAL at 00:20

## 2023-07-13 ASSESSMENT — ACTIVITIES OF DAILY LIVING (ADL)
ADLS_ACUITY_SCORE: 35
ADLS_ACUITY_SCORE: 37
ADLS_ACUITY_SCORE: 35
ADLS_ACUITY_SCORE: 37
ADLS_ACUITY_SCORE: 35

## 2023-07-13 NOTE — CONSULTS
GASTROENTEROLOGY CONSULTATION    Devi Salas  [address] [facility] [address]  63 year old female    Admission Date/Time: 7/12/2023  Primary Care Provider: No Ref-Primary, Physician    We were asked to see the patient in consultation by Dr. Bartlett for evaluation of persistent Crohn's ileitis.       HPI: Devi Salas is a 63 year old female with PMH including Crohn's disease, asthma, migraines and renal cell cancer that was managed surgically with right nephrectomy in 2009 who presented to emergency room with complaints of ongoing abdominal pain decreased stool output since her discharge from [facility] about 3 weeks ago for Crohn's flare.    She developed acute GI symptoms of abdominal pain, nausea/vomiting and diarrhea in March of this year while on vacation.     EGD 4/7/2023: endoscopically normal. Gastric biopsy showed chronic gastritis, H pylori neg. Esophageal and duodenal biopsies normal.    Colonoscopy 5/23/2023: narrowing, ulcerations, scarring in TI. Biopsies showed nonspecific mildly active chronic ileitis without specific features. Random right and left colon biopsies normal. 4mm Cecal with SSA. This was in the setting of NSAID use.     She failed outpatient treatment with PO prednisone. At last admission (6/5-6/18/23) she was started on Remicade 5mg/kg on 6/13/23 and has received the first two induction doses. Next dose is scheduled July 25th. She was transitioned to PO prednisone with taper and discharged to home. She states she has continued to have severe RLQ pain. The only time she had any relief of the pain was when she was on IV steroids. This past Tuesday she had increase in her usual bowel movement frequency (baseline 3) to 8 diarrheal stools with associated vomiting, prompting her to come in. She is nauseated without vomiting since admission. Zofran is helping. She feels miserable and frustrated with ongoing symptoms. No NSAID use since her colonoscopy.     Per H&P, she saw Dr. Mendez last Friday  and felt that given her persistent pain and minimal response to steroids and Remicaid, she will likely need to undergo surgery for ileal stricture. This is tentatively planned tomorrow.     Labs: WBC normal 9.8, Hgb 12.7, CMP shows elevated creatinine at 1.14 with otherwise unremarkable CMP. Lactic acid initially elevated at 2.1, 1.0 at recheck.     CT Abdomen 7/12/23: No significant change in 3 segments of terminal ileum demonstrating acute on chronic ileitis with luminal narrowing. No bowel obstruction. No abscess.    She has vitamin B12 deficiency (218) and was started on Vitamin B12 injections two weeks ago (weekly for 1 month, monthly thereafter). IBD profile normal 6/27/2023.     ROS: A comprehensive ten point review of systems was negative aside from those in mentioned in the HPI.      MEDICATIONS: No current facility-administered medications on file prior to encounter.  acetaminophen (TYLENOL) 650 MG CR tablet, Take 650 mg by mouth At Bedtime  albuterol (PROAIR HFA/PROVENTIL HFA/VENTOLIN HFA) 108 (90 Base) MCG/ACT inhaler, Inhale 2 puffs into the lungs every 6 hours as needed for shortness of breath, wheezing or cough  bisacodyl (DULCOLAX) 10 MG suppository, Place 1 suppository (10 mg) rectally 2 times daily as needed for constipation  esomeprazole (NEXIUM) 40 MG DR capsule, Take 40 mg by mouth 2 times daily (before meals) Take 30-60 minutes before eating.  famotidine (PEPCID) 40 MG tablet, Take 40 mg by mouth At Bedtime  methocarbamol (ROBAXIN) 500 MG tablet, Take 1 tablet (500 mg) by mouth 4 times daily as needed for muscle spasms  montelukast (SINGULAIR) 10 MG tablet, Take 10 mg by mouth At Bedtime  nortriptyline (PAMELOR) 25 MG capsule, Take 25 mg by mouth At Bedtime  ondansetron (ZOFRAN ODT) 4 MG ODT tab, Take 1 tablet (4 mg) by mouth every 8 hours as needed for nausea  ondansetron (ZOFRAN ODT) 4 MG ODT tab, Take 4-8 mg by mouth every 8 hours as needed for nausea  polyethylene glycol (MIRALAX) 17  GM/Dose powder, Take 17 g by mouth daily  predniSONE (DELTASONE) 5 MG tablet, Take 3 tablets (15 mg) by mouth daily for 7 days, THEN 2 tablets (10 mg) daily for 7 days, THEN 1 tablet (5 mg) daily for 7 days.  sennosides (SENOKOT) 8.6 MG tablet, Take 1-2 tablets by mouth 2 times daily as needed for constipation  traZODone (DESYREL) 100 MG tablet, Take 200 mg by mouth At Bedtime        ALLERGIES:   Allergies   Allergen Reactions     Latex Hives     Oxycodone Anaphylaxis, Hives and Swelling     Throat swelling, per pt     Penicillin G Anaphylaxis     Sumatriptan Palpitations     Aspirin Nausea and Vomiting     Demerol Hcl [Meperidine] Nausea and Vomiting     N/V     Percocet [Oxycodone-Acetaminophen] Nausea and Vomiting     N/V     Codeine Nausea and Vomiting     Mold        No past medical history on file.    No past surgical history on file.    SOCIAL HISTORY:       FAMILY HISTORY:  No family history on file.    PHYSICAL EXAM:   /78 (BP Location: Left arm)   Pulse 79   Temp 97.6  F (36.4  C) (Oral)   Resp 20   SpO2 98%    Constitutional: healthy, alert, no acute distress  Cardiovascular: regular rate and rhythm, no murmur or edema   Respiratory: clear to auscultation bilaterally  Psychiatric: normal pleasant affect  ENT: mucous membranes are moist, no oral lesions are noted, no scleral icterus   Abdomen: soft, severe RLQ tenderness with rebound, non-distended, normally active bowel sounds  Neuro: Neurologically intact grossly  Skin: warm, dry, no rashes are noted      LABORATORY WORK  Recent Labs   Lab Test 07/12/23  1530 06/17/23  0649 06/16/23  0643   WBC 9.8 13.5* 12.1*   HGB 12.7 12.5 12.5   MCV 91 88 88    244 238     Recent Labs   Lab Test 07/12/23  1530 06/18/23  0648 06/17/23  0649    137 140   POTASSIUM 4.7 4.0  4.0 4.8   CHLORIDE 101 101 102   CO2 26 23 30*   BUN 10.0 20.0 20.2   CR 1.14* 1.11* 1.10*   ANIONGAP 11 13 8   GIORGIO 9.4 8.9 9.2     Recent Labs   Lab Test 07/12/23  4182  07/12/23  1530 06/12/23  0721 06/06/23  0700   ALBUMIN  --  4.3 3.1* 3.6   BILITOTAL  --  0.5 0.4 0.2   DBIL  --  <0.20  --   --    ALT  --  17 15 8*   AST  --  18 10 13   ALKPHOS  --  66 57 68   PROTEIN Negative  --   --   --        IMAGING   EXAM: CT ABDOMEN PELVIS W CONTRAST  LOCATION: [facility]  DATE: 7/12/2023     INDICATION: Abdominal pain. Crohn's disease.  COMPARISON: 06/15/2023  TECHNIQUE: CT scan of the abdomen and pelvis was performed following injection of IV contrast. Multiplanar reformats were obtained. Dose reduction techniques were used.  CONTRAST: 71mL Isovue 370     FINDINGS:   LOWER CHEST: Unremarkable.     HEPATOBILIARY: Liver appears normal. Status post cholecystectomy. No biliary dilatation.     PANCREAS: Normal.     SPLEEN: Unchanged 0.6 cm low-attenuation lesion in the spleen, too small to characterize.     ADRENAL GLANDS: Normal.     KIDNEYS/BLADDER: Status post right nephrectomy. Left kidney appears normal. No hydronephrosis. Urinary bladder appears normal.     BOWEL: No significant change in 3 segments of terminal ileum demonstrating wall thickening and mucosal hyperenhancement with associated luminal narrowing. The mid to distal sigmoid colon is decompressed, which obscures the previously described segments   of luminal narrowing. No definite inflammatory change involving the colon. No bowel obstruction. No abscess. Appendix not visualized, although no secondary signs of acute appendicitis.     LYMPH NODES: No lymphadenopathy.     VASCULATURE: Moderate atherosclerotic calcifications.     PELVIC ORGANS: Status post hysterectomy.     MUSCULOSKELETAL: Multilevel degenerative changes of the spine.                                                                      IMPRESSION:   1.  No significant change in 3 segments of terminal ileum demonstrating acute on chronic ileitis with luminal narrowing. No bowel obstruction. No abscess.    CONSULTATION ASSESSMENT AND  PLAN  63 year old female with PMH including asthma, migraines and renal cell cancer that was managed surgically with right nephrectomy in 2009 who presented to emergency room with complaints of ongoing abdominal pain decreased stool output since her discharge from [facility] about 3 weeks ago for suspected Crohn's ileitis. Colonoscopy in May showed TI ulcerating and stricture. Biopsies showed chronic ileitis with nonspecific features, nondiagnostic in setting of previous chronic Aleve use. No NSAID use since May. Continued symptoms of severe abdominal pain, nausea, occasional vomiting and loose stools since March. Treated for presumed Crohn's ileitis with steroids and initiation of Remicade 6/13/2023 without improvement. She has failed to respond to outpatient treatment. CT on admission shows no significant change in 3 segments of TI showing acute on chronic ileitis with luminal narrowing. She has severe RLQ tenderness on exam.     Plan  -NPO/diet per surgery   -Vitamin B12 today; repeat in 7 days, monthly thereafter   -IV solumedrol 8mg/day (equivelant to 10mg prednisone per day)  -Colorectal surgery has been consulted and is planning tentative resection tomorrow   -IBD clinic follow up after surgery to review path/determine maintenance plan/next colonoscopy timing  -Avoid all NSAIDS    I will discuss the patient plan with Dr. Mathur. Thank you for asking us to participate in the care of this patient.    Total time: 50 minutes     Francisca Ledezma CNP  [facility]  Cell: [phone]  Office: [phone]

## 2023-07-13 NOTE — CONSULTS
"[facility]  WO Nurse Ostomy Marking and Education         Data:     History: 63F with hx of RCC s/p nephrectomy w/ CKD Stage III and Crohn's disease on Remicade with recent hospitalization discharged on steroid taper re-presents with ongoing abdominal pain and diarrhea and found to have ongoing evidence of ileitis on CT scan with luminal narrowing. CRS and GI following, surgical options are being considered.         Pt referred by Dr. Mendez    Who is present for marking and education: Patient    Type of ostomy surgery:  Ileostomy    Abdomen:  Crease to right side near umbilicus. Previous kidney removal with flat scar.           Intervention:       Patient's chart evaluated.      Assessments done today:  Belt line, previous abdominal surgeries and scars, and abdominal muscles.  Pt observed lying, sitting and standing.      Education:  Reviewed upcoming surgery, stoma construction, post-op expectations, and general ostomy cares/concerns.      All patient / family questions answered.    Patient marked with \"x\" using surgical marker then allowed to dry 3 minutes and sealed with 3M Cavilon No Sting barrier film.  Pt marked RUQ         Assessment:       Abdomen marked on the RUQ    Learning needs/ comprehension: Patient familiar with intestinal anatomy. Reviewed stoma creation, pouching, WOC role, and post op expectations.         Plan:       Plan:   ? Surgery scheduled for:  7/14    Will plan to follow patient post operatively       Yuko Brock RN CWOCN  Contact Via [facility] Nurse ([facility])  Dept. Office Number: [phone]              "

## 2023-07-13 NOTE — PROGRESS NOTES
"[facility]  Hospitalist Progress Note     Assessment & Plan     ASSESSMENT    63F with hx of RCC s/p nephrectomy w/ CKD Stage III and Crohn's disease on Remicade with recent hospitalization discharged on steroid taper re-presents with ongoing abdominal pain and diarrhea and found to have ongoing evidence of ileitis on CT scan with luminal narrowing. CRS and GI following, surgical options are being considered.    PLAN    Crohn's Ileitis, Acute on Chronic  -Presents with ongoing abdominal pain 2/2 ileitis that continues after recent adm under similar circumstances where she received IV steroids  -Abdominal pain and diarrhea continue despite steroid taper and Remicade on outpatient basis  -On adm, ongoing evidence of ileitis on CT scan with luminal narrowing  -CRS and GI following, surgical options are being considered  PLAN  -Methylprednisolone 20mg IV q8  -NS@100ml/hr  -CLD  -CRS and GI following, appreciate recommendations    Lactic Acidosis  -Suspect 2/2 hypovolemia, no evidence of sepsis    RCC s/p nephrectomy w/ CKD Stage III  -Cr currently at baseline  -Avoid nephrotoxins    DVT Prophy  -SCDs    Disposition  -Inpatient for another 2-3 more days depending on surgical plans      Marcio Bender MD    Subjective     Seen at bedside along with CRS PA. Considering surgical options and patient seems like she would be agreeable to this.        Objective   Blood pressure 123/70, pulse 75, temperature 98.6  F (37  C), temperature source Oral, resp. rate 20, height 1.6 m (5' 3\"), weight 70.2 kg (154 lb 12.8 oz), SpO2 97 %.    PHYSICAL EXAM  General: In no acute distress  CV: RRR.  Lungs: CTAB. Nl WOB.  Abd: Mild tenderness in BL lower quadrants.  Ext: No edema.    LABS AND IMAGING  Reviewed and pertinent results discussed in assessment and plan.   "

## 2023-07-13 NOTE — PHARMACY-ADMISSION MEDICATION HISTORY
Pharmacist Admission Medication History    Admission medication history is complete. The information provided in this note is only as accurate as the sources available at the time of the update.    Medication reconciliation/reorder completed by provider prior to medication history? No    Information Source(s): Patient via in-person    Pertinent Information:   Patient brought in her esomeprazole because she knows we don't have it and she will want it instead of omeprazole.     Changes made to PTA medication list:    Added: None    Deleted: None    Changed: None    Medication Affordability:  Not including over the counter (OTC) medications, was there a time in the past 3 months when you did not take your medications as prescribed because of cost?: No    Allergies reviewed with patient and updates made in EHR: yes    Medication History Completed By: Pavan Bass RPH 7/13/2023 9:47 AM    Prior to Admission medications    Medication Sig Last Dose Taking? Auth Provider Long Term End Date   acetaminophen (TYLENOL) 650 MG CR tablet Take 650 mg by mouth At Bedtime 7/13/2023 at 0020 Yes Unknown, Entered By History     albuterol (PROAIR HFA/PROVENTIL HFA/VENTOLIN HFA) 108 (90 Base) MCG/ACT inhaler Inhale 2 puffs into the lungs every 6 hours as needed for shortness of breath, wheezing or cough Past Week at prn Yes Unknown, Entered By History Yes    bisacodyl (DULCOLAX) 10 MG suppository Place 1 suppository (10 mg) rectally 2 times daily as needed for constipation Past Month Yes Vikki Duran,      esomeprazole (NEXIUM) 40 MG DR capsule Take 40 mg by mouth 2 times daily (before meals) Take 30-60 minutes before eating. 7/12/2023 at AM Yes Unknown, Entered By History     famotidine (PEPCID) 40 MG tablet Take 40 mg by mouth At Bedtime 7/11/2023 at PM Yes Unknown, Entered By History     methocarbamol (ROBAXIN) 500 MG tablet Take 1 tablet (500 mg) by mouth 4 times daily as needed for muscle spasms 7/11/2023 at prn Yes  Vikki Duran DO     montelukast (SINGULAIR) 10 MG tablet Take 10 mg by mouth At Bedtime 7/13/2023 at 0053 Yes Unknown, Entered By History Yes    nortriptyline (PAMELOR) 25 MG capsule Take 25 mg by mouth At Bedtime 7/13/2023 at 0053 Yes Unknown, Entered By History Yes    ondansetron (ZOFRAN ODT) 4 MG ODT tab Take 1 tablet (4 mg) by mouth every 8 hours as needed for nausea 7/12/2023 at 1525 Yes Vikki Duran DO     polyethylene glycol (MIRALAX) 17 GM/Dose powder Take 17 g by mouth daily 7/13/2023 at 0800 Yes Vikki Duran DO     predniSONE (DELTASONE) 5 MG tablet Take 3 tablets (15 mg) by mouth daily for 7 days, THEN 2 tablets (10 mg) daily for 7 days, THEN 1 tablet (5 mg) daily for 7 days. 7/12/2023 Yes Vikki Duran DO No 7/27/23   sennosides (SENOKOT) 8.6 MG tablet Take 1-2 tablets by mouth 2 times daily as needed for constipation  at prn Yes Vikki Duran DO     traZODone (DESYREL) 100 MG tablet Take 200 mg by mouth At Bedtime 7/13/2023 at 0020 Yes Unknown, Entered By History Yes

## 2023-07-13 NOTE — CONSULTS
[facility]  Colon and Rectal Surgery Consult Note  Name: Devi Salas    MRN: [id number]  YOB: 1960    Age: 63 year old  Date of admission: 7/12/2023  Primary care provider: Marlin Ref-Primary, Physician     Requesting Physician:  Aubree Bartlett PA-C  Reason for consult: Crohn's ileitis with stricture           History of Present Illness:   Devi Salas is a 63 year old female with a history of renal cell carcinoma s/p right nephrectomy in 2009, CKD, Crohn's disease (on Remicade), seen at the request of Aubree Bartlett PA-C, presents with abdominal pain.     Patient was recently hospitalized from 6/6 - 6/18 for Crohn's ileitis with a small bowel obstruction and stricture. She was started on IV steroids and had a Remicade infusion while inpatient. Patient was subsequently discharged on an oral steroid taper. Since discharging from the hospital, she reports having ongoing intermittent abdominal pain. She had another infusion of Remicade on 6/27 and is currently on an oral steroid taper (10mg). She unfortunately had worsening abdominal pain and then developed some nausea with 3 episodes of emesis. She presented to the ER given her worsening symptoms. In the ER, patient was afebrile with normal vital signs. Labs were remarkable for creatinine 1.14, albumin 4.3, WBC 9.8, hgb 12.7. A CT scan of the abdomen/pelvis revealed persistent wall thickening and luminal narrowing in 3 segments of the terminal ileum suggestive of acute on chronic ileitis. Patient was admitted for further management.     She is comfortably resting in bed. She reports ongoing abdominal pain that has slightly improved since being admitted. She has some nausea, but has not had any further emesis. She has had continued daily loose bowel movements. Her last bowel movement was this morning without any blood. She had 4 loose stools yesterday and 8 loose stools the day prior.     Of note, patient was seen in clinic with Dr. Mendez  on 6/30/23 and a laparoscopic ileocolic resection was discussed. At that time, patient wished to think about surgery. In further discussion today, patient is now open to surgical resection.     Colonoscopy History:  5/23/23: Inflammation with ulceration and stenosis at the terminal ileum. Path revealed mild active chronic ileitis at the terminal ileum.    Surgical History: Right nephrectomy, appendectomy, cholecystectomy            Past Medical History:   No past medical history on file.          Past Surgical History:   No past surgical history on file.            Social History:     Social History     Tobacco Use     Smoking status: Not on file     Smokeless tobacco: Not on file   Substance Use Topics     Alcohol use: Not on file             Family History:   No family history on file.          Allergies:     Allergies   Allergen Reactions     Latex Hives     Oxycodone Anaphylaxis, Hives and Swelling     Throat swelling, per pt     Penicillin G Anaphylaxis     Sumatriptan Palpitations     Aspirin Nausea and Vomiting     Demerol Hcl [Meperidine] Nausea and Vomiting     N/V     Percocet [Oxycodone-Acetaminophen] Nausea and Vomiting     N/V     Codeine Nausea and Vomiting     Mold              Medications:       acetaminophen  650 mg Oral At Bedtime     esomeprazole  40 mg Oral BID AC     methylPREDNISolone  20 mg Intravenous Q8H     montelukast  10 mg Oral At Bedtime     nortriptyline  25 mg Oral At Bedtime     polyethylene glycol  17 g Oral Daily     polyethylene glycol  238 g Oral Once     senna-docusate  1 tablet Oral BID    Or     senna-docusate  2 tablet Oral BID     traZODone  200 mg Oral At Bedtime             Review of Systems:   A comprehensive greater than 10 system review of systems was carried out.  Pertinent positives and negatives are noted above.  Otherwise negative for contributory info.            Physical Exam:     Blood pressure 123/70, pulse 75, temperature 98.6  F (37  C), temperature source  "Oral, resp. rate 20, height 1.6 m (5' 3\"), weight 70.2 kg (154 lb 12.8 oz), SpO2 97 %.  No intake or output data in the 24 hours ending 07/13/23 1151  EXAM:  GEN: Awake alert and oriented, appears  stated age  PULM: Non-labored breathing with normal respiratory effort  CVS: reg rate and rhythm, no peripheral edema  ABD: Soft, lower abdominal tenderness greater on the right, non- distended. No rebound or guarding   RECTAL: Rectal exam was deferred  NEURO: CN II-XII grossly intact  MSK: extremeties with no clubbing, cyanosis or edema; able to ambulate  PSYCH: responsive, alert, cooperative; oriented x3; appropriate mood and affect  EXT/SKIN: inspection reveals no rashes, lesions or ulcers, normal coloring         Data Reviewed:     Results for orders placed or performed during the hospital encounter of 07/12/23   CT Abdomen Pelvis w Contrast    Narrative    EXAM: CT ABDOMEN PELVIS W CONTRAST  LOCATION: [facility]  DATE: 7/12/2023    INDICATION: Abdominal pain. Crohn's disease.  COMPARISON: 06/15/2023  TECHNIQUE: CT scan of the abdomen and pelvis was performed following injection of IV contrast. Multiplanar reformats were obtained. Dose reduction techniques were used.  CONTRAST: 71mL Isovue 370    FINDINGS:   LOWER CHEST: Unremarkable.    HEPATOBILIARY: Liver appears normal. Status post cholecystectomy. No biliary dilatation.    PANCREAS: Normal.    SPLEEN: Unchanged 0.6 cm low-attenuation lesion in the spleen, too small to characterize.    ADRENAL GLANDS: Normal.    KIDNEYS/BLADDER: Status post right nephrectomy. Left kidney appears normal. No hydronephrosis. Urinary bladder appears normal.    BOWEL: No significant change in 3 segments of terminal ileum demonstrating wall thickening and mucosal hyperenhancement with associated luminal narrowing. The mid to distal sigmoid colon is decompressed, which obscures the previously described segments   of luminal narrowing. No definite inflammatory " change involving the colon. No bowel obstruction. No abscess. Appendix not visualized, although no secondary signs of acute appendicitis.    LYMPH NODES: No lymphadenopathy.    VASCULATURE: Moderate atherosclerotic calcifications.    PELVIC ORGANS: Status post hysterectomy.    MUSCULOSKELETAL: Multilevel degenerative changes of the spine.      Impression    IMPRESSION:   1.  No significant change in 3 segments of terminal ileum demonstrating acute on chronic ileitis with luminal narrowing. No bowel obstruction. No abscess.       Recent Labs   Lab 07/13/23  0806 07/12/23  1530   WBC 7.4 9.8   HGB 11.5* 12.7   HCT 37.3 41.4   MCV 92 91    294     Recent Labs   Lab 07/13/23  0806 07/12/23  1530    138   POTASSIUM 4.2 4.7   CHLORIDE 105 101   CO2 27 26   ANIONGAP 8 11   GLC 87 102*   BUN 9.7 10.0   CR 1.04* 1.14*   GFRESTIMATED 60* 53*   GIORGIO 8.5* 9.4   PROTTOTAL  --  6.5   ALBUMIN  --  4.3   BILITOTAL  --  0.5   ALKPHOS  --  66   AST  --  18   ALT  --  17     No results for input(s): INR in the last 168 hours.  Recent Labs   Lab 07/12/23  2130 07/12/23  1909   LACT 1.0 2.1*     Recent Labs   Lab 07/12/23  1816   COLOR Straw   APPEARANCE Clear   URINEGLC Negative   URINEBILI Negative   URINEKETONE Negative   SG 1.005   UBLD Negative   URINEPH 5.5   PROTEIN Negative   NITRITE Negative   LEUKEST Negative   RBCU <1   WBCU 0         Assessment and Plan:   Devi Salas is a 63 year old female with a history of renal cell carcinoma s/p right nephrectomy in 2009, CKD, Crohn's disease (on Remicade), seen at the request of Aubree Bartlett PA-C, who was recently hospitalized from 6/6 - 6/18 for Crohn's ileitis with a small bowel obstruction. She was started on IV steroids and had a Remicade infusion while inpatient. Patient was discharged on an oral steroid taper. Since discharging from the hospital, she reports having ongoing intermittent abdominal pain despite being on Remicade and on an oral steroid taper. A CT  scan of the abdomen pelvis revealed persistent wall thickening and luminal narrowing in 3 segments of the terminal ileum suggestive of acute on chronic ileitis. Abdominal exam was soft with some lower abdominal tenderness that was greater on the right. She is currently hemodynamically stable. No emergent surgery is indicated.    Discussed that as she has had persistent symptoms despite Remicade and oral steroids, would recommend surgery this admission. This would likely include a laparoscopic ileocolic resection with a possible stoma. Patient understood and is open to surgery. Surgery is tentatively scheduled for tomorrow afternoon with Dr. Mendez. Will try a slow Miralax bowel prep today. She should be NPO at midnight. WOCN consult for marking of a possible stoma.    Plan:  1. Admit to hospitalist  2. Surgery: No emergent surgery indicated. Laparoscopic ileocolic resection with possible stoma tentatively scheduled for tomorrow afternoon with Dr. Mendez  3. Diet: Clear liquids. NPO at midnight.  4. IV Fluids: Per hospitalist  5. Antibiotics:  Not indicated at this time.  6. Medications: Continue home meds per hospitalist  7. I&O s:  strict I&O s  8. Labs:   - Reviewed  - Ordered: None   9. Imaging:   - Dr. Mendez and myself have personally viewed: CT abd/pelvis  - Ordered:  None  10. Activity: ambulate as tolerated, encourage OOB  11. DVT prophylaxis: SCD s  12. This plan has been discussed with Dr. Mendez    Patient specific identified risk factors considered as part of today s evaluation include: Crohn's disease, on steroids, previous abdominal surgeries including a right nephrectomy     Additional history obtained from chart and patient.  Time spent on consultation: 55 minutes, greater than 50 percent of the total encounter time is spent in counseling and/or coordination of care          Yanci Araujo PA-C  [facility]  Phone:  [phone]

## 2023-07-13 NOTE — H&P
[facility]  Admission History and Physical Examination    NAME: Devi Salas   : 1960   MRN: [id number]     Date of Admission:  2023    Assessment & Plan    Devi Salas is a 63 year old female with a PMH significant for Crohn's disease, asthma, migraines and renal cell cancer that was managed surgically with right nephrectomy in  who presented to emergency room with complaints of ongoing abdominal pain decreased stool output since her discharge from [facility] about 3 weeks ago for Crohn's flare. She was seen by GI and CRS and the plan for was her to see how she responds to steroid taper and continued Remicaid. However since discharge home, she has persistent abd pain, in fact she saw Dr. Mendez last Friday and felt that given her persistent pain and minimal response to steroids and Remicaid, she will likely need to undergo surgery for ileal stricture.      #Acute on chronic ileitis with luminal narrowing without obvious bowel obstruction or abscess  Unfortunately patient has had persistent pain and limited response to steroid taper (currently on 10 mg QW) and Remicade.  She comes in with worsening lower abdominal pain along with nausea and vomiting.  Seen by Dr. Mendez last Friday as well as discussion with GI recommending proceeding with likely surgery.  -Admit as inpatient status  -Continue IV fluid hydration  -Clear liquid diet for now  -Pain control as needed  -GI as well as colorectal surgery consult  -Continue PTA oral steroids for now    #Lactic acidosis  -Resolved after IV fluids, likely related to hypovolemia    #Chronic kidney disease  #Renal cell ca s/p nephrectomy  -Creatinine stable  -Continue follow labs  - Avoid nephrotoxins     #Asthma: no acute exacerbation     Awaiting formal pharmacy reconciliation to resume home medications.     DVT Prophylaxis: Low Risk/Ambulatory with no VTE prophylaxis indicated  Code Status: Full Code       Expected Discharge Date:  07/14/2023               Aubree Bartlett PA-C    Primary Care Physician   Physician No Ref-Primary    Chief Complaint   Abd pain     History is obtained from the patient    Discussed with Dr. Marcos in the ED, full chart review including lab work, imaging, and vital signs were reviewed.     History of Present Illness    Devi Salas is a 63 year old female with a PMH significant for Crohn's disease, asthma, migraines and renal cell cancer that was managed surgically with right nephrectomy in 2009 who presented to emergency room with complaints of ongoing abdominal pain decreased stool output since her discharge from [facility] about 3 weeks ago for Crohn's flare. She was seen by GI and CRS and the plan for was her to see how she responds to steroids and continued Remicaid. However since discharge home, she has persistent abd pain, in fact she saw Dr. Mendez last Friday and felt that given her persistent pain and minimal response to steroids and Remicaid, she will likely need to undergo surgery for ileal stricture.     Past Medical History    I have reviewed this patient's medical history and updated it with pertinent information if needed.   No past medical history on file.    Past Surgical History   I have reviewed this patient's surgical history and updated it with pertinent information if needed.  No past surgical history on file.    Prior to Admission Medications   Prior to Admission Medications   Prescriptions Last Dose Informant Patient Reported? Taking?   acetaminophen (TYLENOL) 650 MG CR tablet   Yes No   Sig: Take 650 mg by mouth At Bedtime   albuterol (PROAIR HFA/PROVENTIL HFA/VENTOLIN HFA) 108 (90 Base) MCG/ACT inhaler   Yes No   Sig: Inhale 2 puffs into the lungs every 6 hours as needed for shortness of breath, wheezing or cough   bisacodyl (DULCOLAX) 10 MG suppository   No No   Sig: Place 1 suppository (10 mg) rectally 2 times daily as needed for constipation   esomeprazole (NEXIUM) 40 MG   capsule   Yes No   Sig: Take 40 mg by mouth 2 times daily (before meals) Take 30-60 minutes before eating.   famotidine (PEPCID) 40 MG tablet   Yes No   Sig: Take 40 mg by mouth At Bedtime   methocarbamol (ROBAXIN) 500 MG tablet   No No   Sig: Take 1 tablet (500 mg) by mouth 4 times daily as needed for muscle spasms   montelukast (SINGULAIR) 10 MG tablet   Yes No   Sig: Take 10 mg by mouth At Bedtime   nortriptyline (PAMELOR) 25 MG capsule   Yes No   Sig: Take 25 mg by mouth At Bedtime   ondansetron (ZOFRAN ODT) 4 MG ODT tab   Yes No   Sig: Take 4-8 mg by mouth every 8 hours as needed for nausea   ondansetron (ZOFRAN ODT) 4 MG ODT tab   No No   Sig: Take 1 tablet (4 mg) by mouth every 8 hours as needed for nausea   polyethylene glycol (MIRALAX) 17 GM/Dose powder   No No   Sig: Take 17 g by mouth daily   predniSONE (DELTASONE) 5 MG tablet   No No   Sig: Take 3 tablets (15 mg) by mouth daily for 7 days, THEN 2 tablets (10 mg) daily for 7 days, THEN 1 tablet (5 mg) daily for 7 days.   sennosides (SENOKOT) 8.6 MG tablet   No No   Sig: Take 1-2 tablets by mouth 2 times daily as needed for constipation   traZODone (DESYREL) 100 MG tablet   Yes No   Sig: Take 200 mg by mouth At Bedtime      Facility-Administered Medications: None     Allergies   Allergies   Allergen Reactions    Latex Hives    Oxycodone Anaphylaxis, Hives and Swelling     Throat swelling, per pt    Penicillin G Anaphylaxis    Sumatriptan Palpitations    Aspirin Nausea and Vomiting    Demerol Hcl [Meperidine] Nausea and Vomiting     N/V    Percocet [Oxycodone-Acetaminophen] Nausea and Vomiting     N/V    Codeine Nausea and Vomiting    Mold        Social History   I have reviewed this patient's social history and updated it with pertinent information if needed. Devi Salas      Family History   I have reviewed this patient's family history and updated it with pertinent information if needed.   No family history on file.    Review of Systems   The 10  point Review of Systems is negative other than noted in the HPI or here.     Physical Exam   Temp: 97.7  F (36.5  C) Temp src: Oral BP: 128/79 Pulse: 83   Resp: 18 SpO2: 100 % O2 Device: None (Room air)    Vital Signs with Ranges  Temp:  [97.7  F (36.5  C)-98.2  F (36.8  C)] 97.7  F (36.5  C)  Pulse:  [74-98] 83  Resp:  [18] 18  BP: (123-140)/(79-97) 128/79  SpO2:  [100 %] 100 %  0 lbs 0 oz    Constitutional: Awake, alert,  no apparent distress.  Eyes: Conjunctiva and pupils examined and normal.  HEENT: Moist mucous membranes, normal dentition.  Respiratory: Clear to auscultation bilaterally, no crackles or wheezing.  Cardiovascular: Regular rate and rhythm, normal S1 and S2, and no murmur noted.  GI: Soft, non-distended, mild tenderness to palpation, bowel sounds present. No rebound tenderness or guarding.  Lymph/Hematologic: No anterior cervical or supraclavicular adenopathy.  Skin: No rashes, no cyanosis, no edema.  Musculoskeletal: No deformities noted.  No erythema or tenderness. Moving all extremities.  Neurologic: No focal deficits noted. Speech is clear. Coordination and strength grossly normal.   Psychiatric: Appropriate affect.    Data   Data reviewed today:      Imaging:   Recent Results (from the past 24 hour(s))   CT Abdomen Pelvis w Contrast    Narrative    EXAM: CT ABDOMEN PELVIS W CONTRAST  LOCATION: [facility]  DATE: 7/12/2023    INDICATION: Abdominal pain. Crohn's disease.  COMPARISON: 06/15/2023  TECHNIQUE: CT scan of the abdomen and pelvis was performed following injection of IV contrast. Multiplanar reformats were obtained. Dose reduction techniques were used.  CONTRAST: 71mL Isovue 370    FINDINGS:   LOWER CHEST: Unremarkable.    HEPATOBILIARY: Liver appears normal. Status post cholecystectomy. No biliary dilatation.    PANCREAS: Normal.    SPLEEN: Unchanged 0.6 cm low-attenuation lesion in the spleen, too small to characterize.    ADRENAL GLANDS:  Normal.    KIDNEYS/BLADDER: Status post right nephrectomy. Left kidney appears normal. No hydronephrosis. Urinary bladder appears normal.    BOWEL: No significant change in 3 segments of terminal ileum demonstrating wall thickening and mucosal hyperenhancement with associated luminal narrowing. The mid to distal sigmoid colon is decompressed, which obscures the previously described segments   of luminal narrowing. No definite inflammatory change involving the colon. No bowel obstruction. No abscess. Appendix not visualized, although no secondary signs of acute appendicitis.    LYMPH NODES: No lymphadenopathy.    VASCULATURE: Moderate atherosclerotic calcifications.    PELVIC ORGANS: Status post hysterectomy.    MUSCULOSKELETAL: Multilevel degenerative changes of the spine.      Impression    IMPRESSION:   1.  No significant change in 3 segments of terminal ileum demonstrating acute on chronic ileitis with luminal narrowing. No bowel obstruction. No abscess.       Recent Labs   Lab 07/12/23  1530   WBC 9.8   HGB 12.7   MCV 91         POTASSIUM 4.7   CHLORIDE 101   CO2 26   BUN 10.0   CR 1.14*   ANIONGAP 11   GIORGIO 9.4   *   ALBUMIN 4.3   PROTTOTAL 6.5   BILITOTAL 0.5   ALKPHOS 66   ALT 17   AST 18           Aubree Bartlett PA-C  [facility]  July 12, 2023  Securely message with the Vocera Web Console (learn more here)  Text page via [employer] Paging/Directory

## 2023-07-13 NOTE — ED NOTES
Bed: ED18  Expected date: 7/12/23  Expected time: 7:24 PM  Means of arrival:   Comments:  [facility] rm 46

## 2023-07-13 NOTE — PLAN OF CARE
Pt is alert and oriented x4. Pt was given Dilaudid for abdominal pain. Pt is on IVF NS @100ml/hr. Pt is on clear liquid diet. Pt is SBA in transfer and cares. VSS. Plan: GI and Colorectal consult. No acute distress noted during the shift. Pt is sleeping in bed and call light within reach. Will continue to monitor and assess pt.

## 2023-07-13 NOTE — PLAN OF CARE
End of Shift Summary  For vital signs and complete assessments, please see documentation flowsheets.     Pertinent assessments: A&Ox4. VSS. Zofran x1. Tylenol x1 for abdominal pain. Abdomen tender. BS hypoactive. Stoma marking done by WOC. CLD, npo at midnight. Up ind.   Major Shift Events: bowel prep finished, good results  Treatment Plan: Surgery 7/14 with CRS.

## 2023-07-13 NOTE — ED NOTES
"[facility]  ED Nurse Handoff Report    ED Chief complaint: Abdominal Pain  . ED Diagnosis:   Final diagnoses:   Crohn's disease of colon with complication (H)   Intractable abdominal pain       Allergies:   Allergies   Allergen Reactions     Latex Hives     Oxycodone Anaphylaxis, Hives and Swelling     Throat swelling, per pt     Penicillin G Anaphylaxis     Sumatriptan Palpitations     Aspirin Nausea and Vomiting     Demerol Hcl [Meperidine] Nausea and Vomiting     N/V     Percocet [Oxycodone-Acetaminophen] Nausea and Vomiting     N/V     Codeine Nausea and Vomiting     Mold        Code Status: Full Code    Activity level - Baseline/Home:  independent.  Activity Level - Current:   standby.   Lift room needed: No.   Bariatric: No   Needed: No   Isolation: No.   Infection: Not Applicable.     Respiratory status: Room air    Vital Signs (within 30 minutes):   Vitals:    07/12/23 1517 07/12/23 1944   BP: (!) 123/97 (!) 140/82   Pulse: 98 74   Resp: 18    Temp: 98.2  F (36.8  C)    TempSrc: Oral    SpO2: 100%        Cardiac Rhythm:  ,      Pain level:    Patient confused: No.   Patient Falls Risk: patient and family education.   Elimination Status: Has voided     Patient Report - Initial Complaint: Abdominal pain.   Focused Assessment: Pt arrives for \"Chrons flair up\" x2 bloody stools yesterdat, emesis x3 yesterday. 4 diarrhea episodes today without blood. Abd cramping and pain, back pain, nausea. ABCs intact A&Ox4     Abnormal Results:   Labs Ordered and Resulted from Time of ED Arrival to Time of ED Departure   CBC WITH PLATELETS - Abnormal       Result Value    WBC Count 9.8      RBC Count 4.56      Hemoglobin 12.7      Hematocrit 41.4      MCV 91      MCH 27.9      MCHC 30.7 (*)     RDW 16.5 (*)     Platelet Count 294     BASIC METABOLIC PANEL - Abnormal    Sodium 138      Potassium 4.7      Chloride 101      Carbon Dioxide (CO2) 26      Anion Gap 11      Urea Nitrogen 10.0      " Creatinine 1.14 (*)     Calcium 9.4      Glucose 102 (*)     GFR Estimate 53 (*)    LACTIC ACID WHOLE BLOOD - Abnormal    Lactic Acid 2.1 (*)    ROUTINE UA WITH MICROSCOPIC REFLEX TO CULTURE - Abnormal    Color Urine Straw      Appearance Urine Clear      Glucose Urine Negative      Bilirubin Urine Negative      Ketones Urine Negative      Specific Gravity Urine 1.005      Blood Urine Negative      pH Urine 5.5      Protein Albumin Urine Negative      Urobilinogen Urine Normal      Nitrite Urine Negative      Leukocyte Esterase Urine Negative      Mucus Urine Present (*)     RBC Urine <1      WBC Urine 0     HEPATIC FUNCTION PANEL - Normal    Protein Total 6.5      Albumin 4.3      Bilirubin Total 0.5      Alkaline Phosphatase 66      AST 18      ALT 17      Bilirubin Direct <0.20     LACTIC ACID WHOLE BLOOD   LACTIC ACID WHOLE BLOOD        CT Abdomen Pelvis w Contrast   Final Result   IMPRESSION:    1.  No significant change in 3 segments of terminal ileum demonstrating acute on chronic ileitis with luminal narrowing. No bowel obstruction. No abscess.          Treatments provided: See MAR  Family Comments: family at bedside  OBS brochure/video discussed/provided to patient:  No  ED Medications:   Medications   ondansetron (ZOFRAN) injection 4 mg (4 mg Intravenous $Given 7/12/23 1906)   HYDROmorphone (PF) (DILAUDID) injection 0.5 mg (0.5 mg Intravenous Not Given 7/12/23 1957)   HYDROmorphone (DILAUDID) injection 1 mg (has no administration in time range)   ondansetron (ZOFRAN ODT) ODT tab 4 mg (4 mg Oral $Given 7/12/23 1525)   0.9% sodium chloride BOLUS (0 mLs Intravenous Stopped 7/12/23 2126)   iopamidol (ISOVUE-370) solution 500 mL (71 mLs Intravenous $Given 7/12/23 2031)   CT scan flush (58 mLs Intravenous $Given 7/12/23 2031)       Drips infusing:  No  For the majority of the shift this patient was Green.   Interventions performed were NA.    Sepsis treatment initiated:  No    Cares/treatment/interventions/medications to be completed following ED care: NA    ED Nurse Name: Laura Aranda RN  9:28 PM  RECEIVING UNIT ED HANDOFF REVIEW    Above ED Nurse Handoff Report was reviewed: Yes  Reviewed by: Tana Ba RN on July 13, 2023 at 8:56 AM

## 2023-07-14 ENCOUNTER — ANESTHESIA (OUTPATIENT)
Dept: SURGERY | Facility: CLINIC | Age: 63
End: 2023-07-14
Payer: COMMERCIAL

## 2023-07-14 ENCOUNTER — ANESTHESIA EVENT (OUTPATIENT)
Dept: SURGERY | Facility: CLINIC | Age: 63
End: 2023-07-14
Payer: COMMERCIAL

## 2023-07-14 LAB
CREAT SERPL-MCNC: 0.99 MG/DL (ref 0.51–0.95)
GFR SERPL CREATININE-BSD FRML MDRD: 63 ML/MIN/1.73M2
GLUCOSE BLDC GLUCOMTR-MCNC: 107 MG/DL (ref 70–99)
PREALB SERPL IA-MCNC: 39 MG/DL (ref 15–45)

## 2023-07-14 PROCEDURE — 360N000076 HC SURGERY LEVEL 3, PER MIN: Performed by: COLON & RECTAL SURGERY

## 2023-07-14 PROCEDURE — 250N000011 HC RX IP 250 OP 636: Performed by: ANESTHESIOLOGY

## 2023-07-14 PROCEDURE — 250N000025 HC SEVOFLURANE, PER MIN: Performed by: COLON & RECTAL SURGERY

## 2023-07-14 PROCEDURE — 250N000009 HC RX 250: Performed by: NURSE ANESTHETIST, CERTIFIED REGISTERED

## 2023-07-14 PROCEDURE — 250N000009 HC RX 250: Performed by: ANESTHESIOLOGY

## 2023-07-14 PROCEDURE — 250N000011 HC RX IP 250 OP 636: Mod: JZ

## 2023-07-14 PROCEDURE — 999N000141 HC STATISTIC PRE-PROCEDURE NURSING ASSESSMENT: Performed by: COLON & RECTAL SURGERY

## 2023-07-14 PROCEDURE — 88307 TISSUE EXAM BY PATHOLOGIST: CPT | Mod: 26 | Performed by: PATHOLOGY

## 2023-07-14 PROCEDURE — 272N000001 HC OR GENERAL SUPPLY STERILE: Performed by: COLON & RECTAL SURGERY

## 2023-07-14 PROCEDURE — 258N000003 HC RX IP 258 OP 636: Performed by: ANESTHESIOLOGY

## 2023-07-14 PROCEDURE — 258N000003 HC RX IP 258 OP 636: Performed by: COLON & RECTAL SURGERY

## 2023-07-14 PROCEDURE — 250N000011 HC RX IP 250 OP 636: Mod: JZ | Performed by: PHYSICIAN ASSISTANT

## 2023-07-14 PROCEDURE — 0DBB0ZZ EXCISION OF ILEUM, OPEN APPROACH: ICD-10-PCS | Performed by: COLON & RECTAL SURGERY

## 2023-07-14 PROCEDURE — 250N000011 HC RX IP 250 OP 636: Performed by: COLON & RECTAL SURGERY

## 2023-07-14 PROCEDURE — 99232 SBSQ HOSP IP/OBS MODERATE 35: CPT | Performed by: INTERNAL MEDICINE

## 2023-07-14 PROCEDURE — 250N000011 HC RX IP 250 OP 636: Mod: JZ | Performed by: NURSE ANESTHETIST, CERTIFIED REGISTERED

## 2023-07-14 PROCEDURE — 250N000009 HC RX 250

## 2023-07-14 PROCEDURE — 710N000009 HC RECOVERY PHASE 1, LEVEL 1, PER MIN: Performed by: COLON & RECTAL SURGERY

## 2023-07-14 PROCEDURE — 250N000011 HC RX IP 250 OP 636: Mod: JZ | Performed by: COLON & RECTAL SURGERY

## 2023-07-14 PROCEDURE — 250N000009 HC RX 250: Performed by: COLON & RECTAL SURGERY

## 2023-07-14 PROCEDURE — 370N000017 HC ANESTHESIA TECHNICAL FEE, PER MIN: Performed by: COLON & RECTAL SURGERY

## 2023-07-14 PROCEDURE — 250N000011 HC RX IP 250 OP 636: Performed by: INTERNAL MEDICINE

## 2023-07-14 PROCEDURE — 82565 ASSAY OF CREATININE: CPT | Performed by: COLON & RECTAL SURGERY

## 2023-07-14 PROCEDURE — 88307 TISSUE EXAM BY PATHOLOGIST: CPT | Mod: TC | Performed by: COLON & RECTAL SURGERY

## 2023-07-14 PROCEDURE — 36415 COLL VENOUS BLD VENIPUNCTURE: CPT | Performed by: COLON & RECTAL SURGERY

## 2023-07-14 PROCEDURE — 258N000003 HC RX IP 258 OP 636: Performed by: NURSE ANESTHETIST, CERTIFIED REGISTERED

## 2023-07-14 PROCEDURE — 120N000001 HC R&B MED SURG/OB

## 2023-07-14 RX ORDER — CLINDAMYCIN PHOSPHATE 900 MG/50ML
900 INJECTION, SOLUTION INTRAVENOUS SEE ADMIN INSTRUCTIONS
Status: DISCONTINUED | OUTPATIENT
Start: 2023-07-14 | End: 2023-07-14 | Stop reason: HOSPADM

## 2023-07-14 RX ORDER — SCOLOPAMINE TRANSDERMAL SYSTEM 1 MG/1
1 PATCH, EXTENDED RELEASE TRANSDERMAL ONCE
Status: COMPLETED | OUTPATIENT
Start: 2023-07-14 | End: 2023-07-15

## 2023-07-14 RX ORDER — DIPHENHYDRAMINE HCL 25 MG
25 CAPSULE ORAL EVERY 6 HOURS PRN
Status: DISCONTINUED | OUTPATIENT
Start: 2023-07-14 | End: 2023-07-14 | Stop reason: HOSPADM

## 2023-07-14 RX ORDER — ALBUTEROL SULFATE 0.83 MG/ML
2.5 SOLUTION RESPIRATORY (INHALATION) EVERY 4 HOURS PRN
Status: DISCONTINUED | OUTPATIENT
Start: 2023-07-14 | End: 2023-07-14 | Stop reason: HOSPADM

## 2023-07-14 RX ORDER — PROPOFOL 10 MG/ML
INJECTION, EMULSION INTRAVENOUS PRN
Status: DISCONTINUED | OUTPATIENT
Start: 2023-07-14 | End: 2023-07-14

## 2023-07-14 RX ORDER — SODIUM CHLORIDE, SODIUM LACTATE, POTASSIUM CHLORIDE, CALCIUM CHLORIDE 600; 310; 30; 20 MG/100ML; MG/100ML; MG/100ML; MG/100ML
INJECTION, SOLUTION INTRAVENOUS CONTINUOUS
Status: DISCONTINUED | OUTPATIENT
Start: 2023-07-14 | End: 2023-07-14 | Stop reason: HOSPADM

## 2023-07-14 RX ORDER — BUPIVACAINE HYDROCHLORIDE AND EPINEPHRINE 2.5; 5 MG/ML; UG/ML
INJECTION, SOLUTION EPIDURAL; INFILTRATION; INTRACAUDAL; PERINEURAL PRN
Status: DISCONTINUED | OUTPATIENT
Start: 2023-07-14 | End: 2023-07-14 | Stop reason: HOSPADM

## 2023-07-14 RX ORDER — CLINDAMYCIN PHOSPHATE 900 MG/50ML
900 INJECTION, SOLUTION INTRAVENOUS
Status: COMPLETED | OUTPATIENT
Start: 2023-07-14 | End: 2023-07-14

## 2023-07-14 RX ORDER — HEPARIN SODIUM 5000 [USP'U]/.5ML
5000 INJECTION, SOLUTION INTRAVENOUS; SUBCUTANEOUS
Status: DISCONTINUED | OUTPATIENT
Start: 2023-07-14 | End: 2023-07-14 | Stop reason: HOSPADM

## 2023-07-14 RX ORDER — ACETAMINOPHEN 325 MG/1
975 TABLET ORAL ONCE
Status: DISCONTINUED | OUTPATIENT
Start: 2023-07-14 | End: 2023-07-14 | Stop reason: HOSPADM

## 2023-07-14 RX ORDER — HYDROMORPHONE HCL IN WATER/PF 6 MG/30 ML
0.4 PATIENT CONTROLLED ANALGESIA SYRINGE INTRAVENOUS
Status: DISCONTINUED | OUTPATIENT
Start: 2023-07-14 | End: 2023-07-19 | Stop reason: HOSPADM

## 2023-07-14 RX ORDER — FENTANYL CITRATE 50 UG/ML
25 INJECTION, SOLUTION INTRAMUSCULAR; INTRAVENOUS EVERY 5 MIN PRN
Status: DISCONTINUED | OUTPATIENT
Start: 2023-07-14 | End: 2023-07-14 | Stop reason: HOSPADM

## 2023-07-14 RX ORDER — LIDOCAINE 40 MG/G
CREAM TOPICAL
Status: DISCONTINUED | OUTPATIENT
Start: 2023-07-14 | End: 2023-07-19 | Stop reason: HOSPADM

## 2023-07-14 RX ORDER — SODIUM CHLORIDE, SODIUM LACTATE, POTASSIUM CHLORIDE, CALCIUM CHLORIDE 600; 310; 30; 20 MG/100ML; MG/100ML; MG/100ML; MG/100ML
INJECTION, SOLUTION INTRAVENOUS CONTINUOUS
Status: DISCONTINUED | OUTPATIENT
Start: 2023-07-14 | End: 2023-07-19 | Stop reason: HOSPADM

## 2023-07-14 RX ORDER — ONDANSETRON 2 MG/ML
4 INJECTION INTRAMUSCULAR; INTRAVENOUS EVERY 6 HOURS PRN
Status: DISCONTINUED | OUTPATIENT
Start: 2023-07-14 | End: 2023-07-19 | Stop reason: HOSPADM

## 2023-07-14 RX ORDER — DIMENHYDRINATE 50 MG/ML
25 INJECTION, SOLUTION INTRAMUSCULAR; INTRAVENOUS
Status: DISCONTINUED | OUTPATIENT
Start: 2023-07-14 | End: 2023-07-14 | Stop reason: HOSPADM

## 2023-07-14 RX ORDER — DIAZEPAM 10 MG/2ML
2.5 INJECTION, SOLUTION INTRAMUSCULAR; INTRAVENOUS
Status: DISCONTINUED | OUTPATIENT
Start: 2023-07-14 | End: 2023-07-14 | Stop reason: HOSPADM

## 2023-07-14 RX ORDER — HYDROMORPHONE HYDROCHLORIDE 2 MG/1
2 TABLET ORAL EVERY 4 HOURS PRN
Status: DISCONTINUED | OUTPATIENT
Start: 2023-07-14 | End: 2023-07-19 | Stop reason: HOSPADM

## 2023-07-14 RX ORDER — HALOPERIDOL 5 MG/ML
1 INJECTION INTRAMUSCULAR
Status: DISCONTINUED | OUTPATIENT
Start: 2023-07-14 | End: 2023-07-14 | Stop reason: HOSPADM

## 2023-07-14 RX ORDER — MAGNESIUM HYDROXIDE 1200 MG/15ML
LIQUID ORAL PRN
Status: DISCONTINUED | OUTPATIENT
Start: 2023-07-14 | End: 2023-07-14 | Stop reason: HOSPADM

## 2023-07-14 RX ORDER — METHYLPREDNISOLONE SODIUM SUCCINATE 40 MG/ML
8 INJECTION, POWDER, LYOPHILIZED, FOR SOLUTION INTRAMUSCULAR; INTRAVENOUS EVERY 24 HOURS
Status: DISCONTINUED | OUTPATIENT
Start: 2023-07-15 | End: 2023-07-14

## 2023-07-14 RX ORDER — LIDOCAINE 40 MG/G
CREAM TOPICAL
Status: DISCONTINUED | OUTPATIENT
Start: 2023-07-14 | End: 2023-07-14 | Stop reason: HOSPADM

## 2023-07-14 RX ORDER — FENTANYL CITRATE 50 UG/ML
50 INJECTION, SOLUTION INTRAMUSCULAR; INTRAVENOUS EVERY 5 MIN PRN
Status: DISCONTINUED | OUTPATIENT
Start: 2023-07-14 | End: 2023-07-14 | Stop reason: HOSPADM

## 2023-07-14 RX ORDER — DIPHENHYDRAMINE HYDROCHLORIDE 50 MG/ML
25 INJECTION INTRAMUSCULAR; INTRAVENOUS EVERY 6 HOURS PRN
Status: DISCONTINUED | OUTPATIENT
Start: 2023-07-14 | End: 2023-07-14 | Stop reason: HOSPADM

## 2023-07-14 RX ORDER — DEXAMETHASONE SODIUM PHOSPHATE 4 MG/ML
INJECTION, SOLUTION INTRA-ARTICULAR; INTRALESIONAL; INTRAMUSCULAR; INTRAVENOUS; SOFT TISSUE PRN
Status: DISCONTINUED | OUTPATIENT
Start: 2023-07-14 | End: 2023-07-14

## 2023-07-14 RX ORDER — HYDROMORPHONE HYDROCHLORIDE 2 MG/1
4 TABLET ORAL EVERY 4 HOURS PRN
Status: DISCONTINUED | OUTPATIENT
Start: 2023-07-14 | End: 2023-07-19 | Stop reason: HOSPADM

## 2023-07-14 RX ORDER — LABETALOL HYDROCHLORIDE 5 MG/ML
10 INJECTION, SOLUTION INTRAVENOUS
Status: DISCONTINUED | OUTPATIENT
Start: 2023-07-14 | End: 2023-07-14 | Stop reason: HOSPADM

## 2023-07-14 RX ORDER — GLYCOPYRROLATE 0.2 MG/ML
INJECTION, SOLUTION INTRAMUSCULAR; INTRAVENOUS PRN
Status: DISCONTINUED | OUTPATIENT
Start: 2023-07-14 | End: 2023-07-14

## 2023-07-14 RX ORDER — ONDANSETRON 4 MG/1
4 TABLET, ORALLY DISINTEGRATING ORAL EVERY 30 MIN PRN
Status: DISCONTINUED | OUTPATIENT
Start: 2023-07-14 | End: 2023-07-14 | Stop reason: HOSPADM

## 2023-07-14 RX ORDER — ACETAMINOPHEN 325 MG/1
650 TABLET ORAL EVERY 4 HOURS PRN
Status: DISCONTINUED | OUTPATIENT
Start: 2023-07-17 | End: 2023-07-19 | Stop reason: HOSPADM

## 2023-07-14 RX ORDER — FENTANYL CITRATE 50 UG/ML
INJECTION, SOLUTION INTRAMUSCULAR; INTRAVENOUS PRN
Status: DISCONTINUED | OUTPATIENT
Start: 2023-07-14 | End: 2023-07-14

## 2023-07-14 RX ORDER — ENOXAPARIN SODIUM 100 MG/ML
40 INJECTION SUBCUTANEOUS EVERY 24 HOURS
Status: DISCONTINUED | OUTPATIENT
Start: 2023-07-15 | End: 2023-07-19 | Stop reason: HOSPADM

## 2023-07-14 RX ORDER — ONDANSETRON 2 MG/ML
INJECTION INTRAMUSCULAR; INTRAVENOUS PRN
Status: DISCONTINUED | OUTPATIENT
Start: 2023-07-14 | End: 2023-07-14

## 2023-07-14 RX ORDER — ONDANSETRON 2 MG/ML
4 INJECTION INTRAMUSCULAR; INTRAVENOUS EVERY 30 MIN PRN
Status: DISCONTINUED | OUTPATIENT
Start: 2023-07-14 | End: 2023-07-14 | Stop reason: HOSPADM

## 2023-07-14 RX ORDER — HYDROMORPHONE HCL IN WATER/PF 6 MG/30 ML
0.2 PATIENT CONTROLLED ANALGESIA SYRINGE INTRAVENOUS
Status: DISCONTINUED | OUTPATIENT
Start: 2023-07-14 | End: 2023-07-19 | Stop reason: HOSPADM

## 2023-07-14 RX ORDER — LIDOCAINE HYDROCHLORIDE 20 MG/ML
INJECTION, SOLUTION INFILTRATION; PERINEURAL PRN
Status: DISCONTINUED | OUTPATIENT
Start: 2023-07-14 | End: 2023-07-14

## 2023-07-14 RX ORDER — HYDRALAZINE HYDROCHLORIDE 20 MG/ML
2.5-5 INJECTION INTRAMUSCULAR; INTRAVENOUS EVERY 10 MIN PRN
Status: DISCONTINUED | OUTPATIENT
Start: 2023-07-14 | End: 2023-07-14 | Stop reason: HOSPADM

## 2023-07-14 RX ORDER — ACETAMINOPHEN 325 MG/1
975 TABLET ORAL EVERY 8 HOURS
Status: COMPLETED | OUTPATIENT
Start: 2023-07-14 | End: 2023-07-17

## 2023-07-14 RX ORDER — KETOROLAC TROMETHAMINE 30 MG/ML
INJECTION, SOLUTION INTRAMUSCULAR; INTRAVENOUS PRN
Status: DISCONTINUED | OUTPATIENT
Start: 2023-07-14 | End: 2023-07-14

## 2023-07-14 RX ORDER — HYDROMORPHONE HCL IN WATER/PF 6 MG/30 ML
0.4 PATIENT CONTROLLED ANALGESIA SYRINGE INTRAVENOUS EVERY 5 MIN PRN
Status: DISCONTINUED | OUTPATIENT
Start: 2023-07-14 | End: 2023-07-14 | Stop reason: HOSPADM

## 2023-07-14 RX ORDER — ONDANSETRON 4 MG/1
4 TABLET, ORALLY DISINTEGRATING ORAL EVERY 6 HOURS PRN
Status: DISCONTINUED | OUTPATIENT
Start: 2023-07-14 | End: 2023-07-19 | Stop reason: HOSPADM

## 2023-07-14 RX ORDER — HYDROMORPHONE HCL IN WATER/PF 6 MG/30 ML
0.2 PATIENT CONTROLLED ANALGESIA SYRINGE INTRAVENOUS EVERY 5 MIN PRN
Status: DISCONTINUED | OUTPATIENT
Start: 2023-07-14 | End: 2023-07-14 | Stop reason: HOSPADM

## 2023-07-14 RX ADMIN — SUGAMMADEX 200 MG: 100 INJECTION, SOLUTION INTRAVENOUS at 18:07

## 2023-07-14 RX ADMIN — METHYLPREDNISOLONE SODIUM SUCCINATE 20 MG: 40 INJECTION, POWDER, FOR SOLUTION INTRAMUSCULAR; INTRAVENOUS at 08:39

## 2023-07-14 RX ADMIN — FENTANYL CITRATE 100 MCG: 50 INJECTION, SOLUTION INTRAMUSCULAR; INTRAVENOUS at 15:24

## 2023-07-14 RX ADMIN — MIDAZOLAM 2 MG: 1 INJECTION INTRAMUSCULAR; INTRAVENOUS at 15:18

## 2023-07-14 RX ADMIN — SCOPALAMINE 1 PATCH: 1 PATCH, EXTENDED RELEASE TRANSDERMAL at 14:30

## 2023-07-14 RX ADMIN — PROPOFOL 200 MG: 10 INJECTION, EMULSION INTRAVENOUS at 15:24

## 2023-07-14 RX ADMIN — ROCURONIUM BROMIDE 20 MG: 50 INJECTION, SOLUTION INTRAVENOUS at 16:36

## 2023-07-14 RX ADMIN — SODIUM CHLORIDE, POTASSIUM CHLORIDE, SODIUM LACTATE AND CALCIUM CHLORIDE: 600; 310; 30; 20 INJECTION, SOLUTION INTRAVENOUS at 16:39

## 2023-07-14 RX ADMIN — ROCURONIUM BROMIDE 50 MG: 50 INJECTION, SOLUTION INTRAVENOUS at 15:24

## 2023-07-14 RX ADMIN — ESOMEPRAZOLE MAGNESIUM 40 MG: 40 CAPSULE, DELAYED RELEASE ORAL at 08:45

## 2023-07-14 RX ADMIN — KETOROLAC TROMETHAMINE 15 MG: 30 INJECTION, SOLUTION INTRAMUSCULAR at 15:24

## 2023-07-14 RX ADMIN — ONDANSETRON 4 MG: 2 INJECTION INTRAMUSCULAR; INTRAVENOUS at 15:24

## 2023-07-14 RX ADMIN — FENTANYL CITRATE 50 MCG: 50 INJECTION, SOLUTION INTRAMUSCULAR; INTRAVENOUS at 19:43

## 2023-07-14 RX ADMIN — LIDOCAINE HYDROCHLORIDE 50 MG: 20 INJECTION, SOLUTION INFILTRATION; PERINEURAL at 15:24

## 2023-07-14 RX ADMIN — ROCURONIUM BROMIDE 10 MG: 50 INJECTION, SOLUTION INTRAVENOUS at 16:55

## 2023-07-14 RX ADMIN — GLYCOPYRROLATE 0.2 MG: 0.2 INJECTION, SOLUTION INTRAMUSCULAR; INTRAVENOUS at 15:24

## 2023-07-14 RX ADMIN — CLINDAMYCIN PHOSPHATE 900 MG: 900 INJECTION, SOLUTION INTRAVENOUS at 15:24

## 2023-07-14 RX ADMIN — DEXAMETHASONE SODIUM PHOSPHATE 8 MG: 4 INJECTION, SOLUTION INTRA-ARTICULAR; INTRALESIONAL; INTRAMUSCULAR; INTRAVENOUS; SOFT TISSUE at 15:24

## 2023-07-14 RX ADMIN — ROCURONIUM BROMIDE 20 MG: 50 INJECTION, SOLUTION INTRAVENOUS at 16:01

## 2023-07-14 RX ADMIN — PHENYLEPHRINE HYDROCHLORIDE 100 MCG: 10 INJECTION INTRAVENOUS at 15:52

## 2023-07-14 RX ADMIN — PROPOFOL 75 MCG/KG/MIN: 10 INJECTION, EMULSION INTRAVENOUS at 16:18

## 2023-07-14 RX ADMIN — HYDROMORPHONE HYDROCHLORIDE 0.4 MG: 0.2 INJECTION, SOLUTION INTRAMUSCULAR; INTRAVENOUS; SUBCUTANEOUS at 20:56

## 2023-07-14 RX ADMIN — HYDROMORPHONE HYDROCHLORIDE 0.2 MG: 0.2 INJECTION, SOLUTION INTRAMUSCULAR; INTRAVENOUS; SUBCUTANEOUS at 12:13

## 2023-07-14 RX ADMIN — FENTANYL CITRATE 50 MCG: 50 INJECTION, SOLUTION INTRAMUSCULAR; INTRAVENOUS at 19:13

## 2023-07-14 RX ADMIN — HYDROMORPHONE HYDROCHLORIDE 0.2 MG: 0.2 INJECTION, SOLUTION INTRAMUSCULAR; INTRAVENOUS; SUBCUTANEOUS at 10:12

## 2023-07-14 RX ADMIN — FENTANYL CITRATE 50 MCG: 50 INJECTION, SOLUTION INTRAMUSCULAR; INTRAVENOUS at 18:51

## 2023-07-14 RX ADMIN — HYDROMORPHONE HYDROCHLORIDE 0.2 MG: 0.2 INJECTION, SOLUTION INTRAMUSCULAR; INTRAVENOUS; SUBCUTANEOUS at 07:32

## 2023-07-14 RX ADMIN — ONDANSETRON 4 MG: 2 INJECTION INTRAMUSCULAR; INTRAVENOUS at 21:24

## 2023-07-14 RX ADMIN — HYDROMORPHONE HYDROCHLORIDE 0.4 MG: 0.2 INJECTION, SOLUTION INTRAMUSCULAR; INTRAVENOUS; SUBCUTANEOUS at 22:56

## 2023-07-14 RX ADMIN — FENTANYL CITRATE 50 MCG: 50 INJECTION, SOLUTION INTRAMUSCULAR; INTRAVENOUS at 19:51

## 2023-07-14 RX ADMIN — HYDROCORTISONE SODIUM SUCCINATE 100 MG: 100 INJECTION, POWDER, FOR SOLUTION INTRAMUSCULAR; INTRAVENOUS at 16:32

## 2023-07-14 RX ADMIN — GENTAMICIN SULFATE 300 MG: 40 INJECTION, SOLUTION INTRAMUSCULAR; INTRAVENOUS at 15:20

## 2023-07-14 RX ADMIN — CLINDAMYCIN PHOSPHATE 900 MG: 900 INJECTION, SOLUTION INTRAVENOUS at 15:10

## 2023-07-14 RX ADMIN — HYDROCORTISONE SODIUM SUCCINATE 50 MG: 100 INJECTION, POWDER, FOR SOLUTION INTRAMUSCULAR; INTRAVENOUS at 22:06

## 2023-07-14 RX ADMIN — HYDROMORPHONE HYDROCHLORIDE 1 MG: 1 INJECTION, SOLUTION INTRAMUSCULAR; INTRAVENOUS; SUBCUTANEOUS at 15:24

## 2023-07-14 RX ADMIN — SODIUM CHLORIDE, POTASSIUM CHLORIDE, SODIUM LACTATE AND CALCIUM CHLORIDE: 600; 310; 30; 20 INJECTION, SOLUTION INTRAVENOUS at 20:57

## 2023-07-14 ASSESSMENT — ACTIVITIES OF DAILY LIVING (ADL)
ADLS_ACUITY_SCORE: 35
ADLS_ACUITY_SCORE: 35
ADLS_ACUITY_SCORE: 37
ADLS_ACUITY_SCORE: 35

## 2023-07-14 NOTE — OP NOTE
OPERATIVE NOTE    PERIOPERATIVE DIAGNOSIS: Medically refractory Crohn's with bowel obstruction    POSTOPERATIVE DIAGNOSIS:   Same     PROCEDURE: Laparoscopic assisted ileocolic resection     SURGEON:  Marleny Mendez MD    Assistant: Yanci Araujo PA-C and Dr. Elmore, [facility] colorectal surgery fellow       ANESTHESIA:   Tap block and general anesthesia     INDICATIONS FOR PROCEDURE:   Theresa is a 63-year-old woman has recently been diagnosed with Crohn's.  She was admitted earlier in June after failing outpatient steroids and started on IV steroids and subsequently given Remicade.  She was discharged and had her second dose of Remicade.  She comes back to the hospital several days ago with increased abdominal pain, nausea, and vomiting.  Imaging shows long segment of terminal ileum inflammation without abscess or fistula.  We discussed the possibility of continued management medical management versus proceeding with surgery.  Reviewed the risks of bleeding, infection, alteration of bowel function, stoma, and other risks associated with major abdominal surgery and general anesthesia including but not limited to DVT, PE, heart attack, stroke, hernias, fistula, demonstrating structures, other infectious complications, and even death.  She is at increased risk of having had prior right nephrectomy, open cholecystectomy, and hysterectomy as well and ongoing immunosuppression on steroids  (10 mg of prednisone), and Remicade.    She understood and she very much wished to proceed with surgery as she has been feeling miserable since her diagnosis.     FINDINGS:  -Approximately 40 cm of inflamed terminal ileum was removed and primary anastomosis was created.  The colon and small bowel margin appeared grossly normal on the specimen.     DESCRIPTION OF PROCEDURE: After informed consent was obtained patient was taken the operating positioned in the supine position.  She was intubated.  Monae catheter and  orogastric tube were placed.  She was positioned in modified lithotomy position with her arm padded and tucked at her side.  After appropriate timeout we began laparoscopically by making a 1 cm incision above the umbilicus.  Dissection was carried to the fascia and the peritoneal cavity was entered without.  Small port was inserted.  Inspection revealed adhesions consistent with a open cholecystectomy.  The visualized portion of the small bowel showed thickening and creeping fat on the distal small bowel.  A tap block was done injecting 7.5 cc of quarter percent Marcaine into 3-4 quadrants, of the right upper quadrant could not be seen at that time.  Two 5 mm ports were placed in the left lateral abdomen.  The patient was to from the right side abdomen and the cecum was identified.  The lateral attachments were incised to enter around the hepatic flexure.  This was somewhat tedious as she had a right nephrectomy colon as well as the mesentery was adherent into the retroperitoneum.  Able to release the anterior attachments of the.  And obesity duration for surgery.  We turned our attention to the small bowel and could see that this was thickened but not adherent to the sigmoid colon or any other areas.  Once we had done as March and this bleeding could laterally we elected to open.      Port was removed and we also endoscopically 3 cm above and below the umbilicus incorporating the Ochoa incision.  5 mm ports were removed.  A large Amauri was placed.  We were able to bring out the right colon and complete the dissection carefully identifying the duodenum and fully mobilizing  the hepatic flexure.  We then palpated the small bowel and found soft compliant small bowel roughly 40 cm from the ileocecal valve.  A window was made through the mesentery here and the LigaSure was used to divide the mesentery somewhat close to the bowel although where the mesentery was more soft and compliant.  Numerous fires were made with  each bite and careful inspection for hemostasis was confirmed.  We made a window in the right colon mesentery as well and joined our small bowel dissection.  The treatment inspected for hemostasis which was excellent.  We then lined up a side-to-side functional end-to-end anastomosis.  Colotomy and enterotomy were made and soft complete bowel.  The common channel was created using a ILANA 75 blue load.  Inspection within the anastomosis revealed healthy bowel and hemostatic anastomosis.  We then used the TA 90 to close the common enterotomy after offsetting the staple lines with the specimen was then divided and passed off the field.  We opened the specimen revealing margins with grossly normal bowel for several centimeters.  We then irrigated the abdomen with several liters of warm saline.      Was irrigated with several liters of warm saline and reinspected in the retroperitoneum which was nicely hemostatic.  We inspected the cut edge of the mesentery which was nicely hemostatic as well.  The small bowel and no additional lesions were noted.    We then removed the Amauri and switch to clean gowns and gloves as well as a clean closure tray.  The fascia was reapproximated using 0 PDS.  Skin edges reapproximated 4-0 Monocryl and the extraction site as well as the 2 port sites.  Dermabond was placed.  Sponge, instrument, and needle counts were correct at the end of the case.      COMPLICATIONS:   No immediate complications    SPECIMENS: Terminal ileum and right colon    Estimated blood loss 50 cc     Marleny Mendez MD

## 2023-07-14 NOTE — ANESTHESIA PROCEDURE NOTES
Airway         Procedure Start/Stop Times: 7/14/2023 3:30 PM  Staff -        CRNA: Dakota Jenkins APRN CRNA       Performed By: CRNAIndications and Patient Condition       Indications for airway management: debbie-procedural, airway protection and altered level of consciousness       Induction type:intravenous       Mask difficulty assessment: 1 - vent by mask    Final Airway Details       Final airway type: endotracheal airway       Successful airway: ETT - single  Endotracheal Airway Details        ETT size (mm): 7.0       Cuffed: yes       Successful intubation technique: direct laryngoscopy       DL Blade Type: MAC 3       Grade View of Cords: 1       Adjucts: stylet       Position: Right       Measured from: gums/teeth       Secured at (cm): 21       Bite block used: None    Post intubation assessment        Placement verified by: capnometry, equal breath sounds and chest rise        Number of attempts at approach: 1       Secured with: plastic tape       Ease of procedure: easy       Dentition: Intact    Medication(s) Administered   Medication Administration Time: 7/14/2023 3:30 PM

## 2023-07-14 NOTE — ANESTHESIA CARE TRANSFER NOTE
Patient: Devi Salas    Procedure: Procedure(s):  Laparoscopic ileocolic resection       Diagnosis: Crohn's disease of colon with complication (H) [K50.119]  Diagnosis Additional Information: No value filed.    Anesthesia Type:   General     Note:    Oropharynx: oropharynx clear of all foreign objects  Level of Consciousness: drowsy  Oxygen Supplementation: face mask    Independent Airway: airway patency satisfactory and stable  Dentition: dentition unchanged  Vital Signs Stable: post-procedure vital signs reviewed and stable  Report to RN Given: handoff report given  Patient transferred to: PACU    Handoff Report: Identifed the Patient, Identified the Reponsible Provider, Reviewed the pertinent medical history, Discussed the surgical course, Reviewed Intra-OP anesthesia mangement and issues during anesthesia, Set expectations for post-procedure period and Allowed opportunity for questions and acknowledgement of understanding      Vitals:  Vitals Value Taken Time   /78 07/14/23 1825   Temp     Pulse 88 07/14/23 1828   Resp 17 07/14/23 1828   SpO2 99 % 07/14/23 1828   Vitals shown include unvalidated device data.    Electronically Signed By: KAMLESH Benson CRNA  July 14, 2023  6:28 PM

## 2023-07-14 NOTE — BRIEF OP NOTE
[facility]    Brief Operative Note    Pre-operative diagnosis: Crohn's disease of colon with complication (H) [K50.119]  Post-operative diagnosis Same as pre-operative diagnosis    Procedure: Procedure(s):  Laparoscopic ileocolic resection  Surgeon: Surgeon(s) and Role:     * Marleny Mendez MD - Primary     * Yanci Araujo PA-C - Assisting  Anesthesia: General   Estimated Blood Loss: Less than 50 ml    Drains: none  Specimens:   ID Type Source Tests Collected by Time Destination   1 : TERMINAL ILEUM AND CECUM Resection Small Intestine, Terminal Ileum SURGICAL PATHOLOGY EXAM Marleny Mendez MD 7/14/2023  5:39 PM      Findings:   ~50cm of diseased terminal ileum resected and stapled side to side ileocolic anastomosis created. .  Complications: None.  Implants: * No implants in log *      Condition on discharge from OR: Satisfactory    Amari Elmore MD   [facility]   [phone].        ADDENDUM:    PATIENT DATA  Indicate Y or N:  Home O2 No  Hemodialysis  No  Transplant patient  No  Cirrhosis  No  Steroids in last 30 days  Yes  Immunomodulators in last 30 days  Yes  Anticoagulation at time of surgery  No   List medication n/a  Prior abdominal surgery  Yes  Pelvic irradiation  No    Albumin within 30 days if known 4.3   Hgb within 30 days if known 11.5   Hemoglobin   Date Value Ref Range Status   07/13/2023 11.5 (L) 11.7 - 15.7 g/dL Final   ]  Cr within 30 days if known 1.04   Creatinine   Date Value Ref Range Status   07/13/2023 1.04 (H) 0.51 - 0.95 mg/dL Final   ]  Body mass index is 27.42 kg/m .      OR DATA  Emergent  No   <24 hours  No   <1 week  Yes  Bowel Prep Yes  Antibiotics  No  DVT prophylaxis    Heparin  No   SCD  Yes   None  No  Drain  No  ASA (1,2,3,4) 3  OR time (min) 143  Stents  No  Transfuse >/= 2U  No  Anastomosis   Stapled  Yes   Handsewn  No  Leak Test    Positive  No   Negative  No   Not done  No

## 2023-07-14 NOTE — PROGRESS NOTES
"[facility] Progress Note     Interval History:  Pain 6/10 in RLQ today. Nervous, but ready for surgery.     Physical Exam:    /74 (BP Location: Left arm)   Pulse 79   Temp 98.1  F (36.7  C) (Oral)   Resp 20   Ht 1.6 m (5' 3\")   Wt 70.2 kg (154 lb 12.8 oz)   SpO2 98%   BMI 27.42 kg/m    Temp (24hrs), Av.3  F (36.8  C), Min:98  F (36.7  C), Max:98.7  F (37.1  C)    Patient Vitals for the past 72 hrs:   Weight   23 0926 70.2 kg (154 lb 12.8 oz)       Intake/Output Summary (Last 24 hours) at 2023 1232  Last data filed at 2023 0053  Gross per 24 hour   Intake 2453.33 ml   Output --   Net 2453.33 ml       Constitutional: No acute distress  Abdomen: Soft, mild RLQ tenderness (after pain medication), hyperactive BS    Laboratory Data  Recent Labs   Lab Test 23  0806 23  1530 23  0649   WBC 7.4 9.8 13.5*   HGB 11.5* 12.7 12.5   MCV 92 91 88    294 244     Recent Labs   Lab Test 23  0806 23  1530 23  0648    138 137   POTASSIUM 4.2 4.7 4.0  4.0   CHLORIDE 105 101 101   CO2 27 26 23   BUN 9.7 10.0 20.0   CR 1.04* 1.14* 1.11*   ANIONGAP 8 11 13   GIORGIO 8.5* 9.4 8.9     Recent Labs   Lab Test 23  1816 23  1530 23  0721 23  0700   ALBUMIN  --  4.3 3.1* 3.6   BILITOTAL  --  0.5 0.4 0.2   DBIL  --  <0.20  --   --    ALT  --  17 15 8*   AST  --  18 10 13   ALKPHOS  --  66 57 68   PROTEIN Negative  --   --   --        Imaging  EXAM: CT ABDOMEN PELVIS W CONTRAST  LOCATION: [facility]  DATE: 2023     INDICATION: Abdominal pain. Crohn's disease.  COMPARISON: 06/15/2023  TECHNIQUE: CT scan of the abdomen and pelvis was performed following injection of IV contrast. Multiplanar reformats were obtained. Dose reduction techniques were used.  CONTRAST: 71mL Isovue 370     FINDINGS:   LOWER CHEST: Unremarkable.     HEPATOBILIARY: Liver appears normal. Status post cholecystectomy. No biliary dilatation.     PANCREAS: " Normal.     SPLEEN: Unchanged 0.6 cm low-attenuation lesion in the spleen, too small to characterize.     ADRENAL GLANDS: Normal.     KIDNEYS/BLADDER: Status post right nephrectomy. Left kidney appears normal. No hydronephrosis. Urinary bladder appears normal.     BOWEL: No significant change in 3 segments of terminal ileum demonstrating wall thickening and mucosal hyperenhancement with associated luminal narrowing. The mid to distal sigmoid colon is decompressed, which obscures the previously described segments   of luminal narrowing. No definite inflammatory change involving the colon. No bowel obstruction. No abscess. Appendix not visualized, although no secondary signs of acute appendicitis.     LYMPH NODES: No lymphadenopathy.     VASCULATURE: Moderate atherosclerotic calcifications.     PELVIC ORGANS: Status post hysterectomy.     MUSCULOSKELETAL: Multilevel degenerative changes of the spine.                                                                   IMPRESSION:   1.  No significant change in 3 segments of terminal ileum demonstrating acute on chronic ileitis with luminal narrowing. No bowel obstruction. No abscess.      Assessment & Plan:  63 year old female with PMH including asthma, migraines and renal cell cancer that was managed surgically with right nephrectomy in 2009 who presented to emergency room with complaints of ongoing abdominal pain, nausea/vomiting and loose stools since her discharge from [facility] about 3 weeks ago for suspected Crohn's ileitis. Colonoscopy in May showed TI inflammation  with ulceration and stricture. Biopsies showed chronic ileitis with nonspecific features, nondiagnostic in setting of previous chronic Aleve use. No NSAID use since May. Continued symptoms of severe abdominal pain, nausea, occasional vomiting and loose stools since March. Treated for presumed Crohn's ileitis with steroids and initiation of Remicade 6/13/2023 without improvement. She has failed to respond to  outpatient treatment  (prednisone and first two remicade doses). CT on admission shows no significant change in 3 segments of TI showing acute on chronic ileitis with luminal narrowing.    Plan is  for ileocolonic resection with Dr. Mendez today.      Plan  -Cares per CRS  -Due for next Vitamin B12 IM in 6 days   -IV solumedrol 8mg/day (equivelant to 10mg prednisone per day)- taper per CRS  -Tentative  plan: cancel upcoming dose of Remicade, follow up appointment at [facility] in August as scheduled, colonoscopy in 5-6 months   -Avoid NSAIDS  -No further GI recommendations at this time. We will sign off. Please call with any concerns or questions/reconsult as needed.    Discussed with Dr. Kevan Ledezma, [facility]  Cell: [phone]  Office: [phone]

## 2023-07-14 NOTE — PROGRESS NOTES
End of Shift Summary  For vital signs and complete assessments, please see documentation flowsheets.     Pertinent assessments: Pt A/Ox4. VSS. IV dilaudid given for abd pain. BS hypoactive. LS clear. HS regular     Major Shift Events: Surgery today    Treatment Plan: pain management, bowel management    Bedside Nurse: Desi Jamison RN

## 2023-07-14 NOTE — PLAN OF CARE
Goal Outcome Evaluation:      Plan of Care Reviewed With: patient      To Do:  End of Shift Summary  For vital signs and complete assessments, please see documentation flowsheets.     Pertinent assessments: Pt A/Ox4. VSS. Dilaudid given for abdo pain. Zofran for intermittent nausea. Up ind  Major Shift Events: none  Treatment Plan: symptom management for surgery today.  Bedside Nurse: Abeba Barnes RN

## 2023-07-14 NOTE — PROGRESS NOTES
"[facility] Progress Note             Interval History:   HD#2  Tolerated prep with numerous lose stools, no bleeding, continued abdominal pain.  Stoma site marked yesterday                Medications:   I have reviewed this patient's current medications               Physical Exam:   Blood pressure 118/74, pulse 79, temperature 98.1  F (36.7  C), temperature source Oral, resp. rate 20, height 1.6 m (5' 3\"), weight 70.2 kg (154 lb 12.8 oz), SpO2 98 %.    Intake/Output Summary (Last 24 hours) at 7/14/2023 0832  Last data filed at 7/14/2023 0053  Gross per 24 hour   Intake 2453.33 ml   Output --   Net 2453.33 ml     GEN:  alert  ABD:  Soft, round mild tenderness without rebound or guarding         Data:        Lab Results   Component Value Date     07/13/2023    Lab Results   Component Value Date    CHLORIDE 105 07/13/2023    Lab Results   Component Value Date    BUN 9.7 07/13/2023      Lab Results   Component Value Date    POTASSIUM 4.2 07/13/2023    Lab Results   Component Value Date    CO2 27 07/13/2023    Lab Results   Component Value Date    CR 1.04 07/13/2023    CR 1.14 07/12/2023        Lab Results   Component Value Date    HGB 11.5 (L) 07/13/2023    HGB 12.7 07/12/2023     Lab Results   Component Value Date     07/13/2023     07/12/2023     Lab Results   Component Value Date    WBC 7.4 07/13/2023    WBC 9.8 07/12/2023            Assessment and Plan:   Plan for surgery this after noon. Questions answered.  Hibiclens shower today.   NPO       Marleny Mendez MD  [facility]  Office Phone # [phone]    "

## 2023-07-14 NOTE — ANESTHESIA PREPROCEDURE EVALUATION
Anesthesia Pre-Procedure Evaluation    Patient: Devi Salas   MRN: [id number] : 1960        Procedure : Procedure(s):  Laparoscopic ileocolic resection, possible stoma          No past medical history on file.   No past surgical history on file.   Allergies   Allergen Reactions     Latex Hives     Oxycodone Anaphylaxis, Hives and Swelling     Throat swelling, per pt     Penicillin G Anaphylaxis     Sumatriptan Palpitations     Aspirin Nausea and Vomiting     Demerol Hcl [Meperidine] Nausea and Vomiting     N/V     Percocet [Oxycodone-Acetaminophen] Nausea and Vomiting     N/V     Codeine Nausea and Vomiting     Mold       Social History     Tobacco Use     Smoking status: Not on file     Smokeless tobacco: Not on file   Substance Use Topics     Alcohol use: Not on file      Wt Readings from Last 1 Encounters:   23 70.2 kg (154 lb 12.8 oz)        Anesthesia Evaluation   Pt has had prior anesthetic. Type: General.    History of anesthetic complications  - PONV.  nauseated with multiple medications.    ROS/MED HX  ENT/Pulmonary:  - neg pulmonary ROS     Neurologic:  - neg neurologic ROS     Cardiovascular:  - neg cardiovascular ROS     METS/Exercise Tolerance:     Hematologic:  - neg hematologic  ROS     Musculoskeletal:  - neg musculoskeletal ROS     GI/Hepatic: Comment: Crohn's disease with sx ileal narrowing    (+) Inflammatory bowel disease,     Renal/Genitourinary:     (+) renal disease,     Endo:  - neg endo ROS     Psychiatric/Substance Use:  - neg psychiatric ROS     Infectious Disease:  - neg infectious disease ROS     Malignancy:   (+) Malignancy, History of Other.Other CA renal cell cancer with nephrectomy Remission status post Surgery.    Other:  - neg other ROS          Physical Exam    Airway        Mallampati: I   TM distance: > 3 FB   Neck ROM: full   Mouth opening: > 3 cm    Respiratory Devices and Support         Dental       (+) Modest Abnormalities - crowns, retainers, 1 or 2  missing teeth      Cardiovascular   cardiovascular exam normal       Rhythm and rate: regular and normal     Pulmonary   pulmonary exam normal            Other findings: Lab Test        07/13/23 07/12/23 06/17/23                       0806          1530          0649          WBC          7.4          9.8          13.5*         HGB          11.5*        12.7         12.5          MCV          92           91           88            PLT          242          294          244            Lab Test        07/14/23 07/13/23 07/12/23 06/18/23                       1338          0806          1530          0648          NA            --          140          138          137           POTASSIUM     --          4.2          4.7          4.0  4.0     CHLORIDE      --          105          101          101           CO2           --          27           26           23            BUN           --          9.7          10.0         20.0          CR            --          1.04*        1.14*        1.11*         ANIONGAP      --          8            11           13            GIORGIO           --          8.5*         9.4          8.9           GLC          107*         87           102*         118*                     OUTSIDE LABS:  CBC:   Lab Results   Component Value Date    WBC 7.4 07/13/2023    WBC 9.8 07/12/2023    HGB 11.5 (L) 07/13/2023    HGB 12.7 07/12/2023    HCT 37.3 07/13/2023    HCT 41.4 07/12/2023     07/13/2023     07/12/2023     BMP:   Lab Results   Component Value Date     07/13/2023     07/12/2023    POTASSIUM 4.2 07/13/2023    POTASSIUM 4.7 07/12/2023    CHLORIDE 105 07/13/2023    CHLORIDE 101 07/12/2023    CO2 27 07/13/2023    CO2 26 07/12/2023    BUN 9.7 07/13/2023    BUN 10.0 07/12/2023    CR 1.04 (H) 07/13/2023    CR 1.14 (H) 07/12/2023     (H) 07/14/2023    GLC 87 07/13/2023     COAGS: No results found for: PTT, INR, FIBR  POC: No results found for:  BGM, HCG, HCGS  HEPATIC:   Lab Results   Component Value Date    ALBUMIN 4.3 07/12/2023    PROTTOTAL 6.5 07/12/2023    ALT 17 07/12/2023    AST 18 07/12/2023    ALKPHOS 66 07/12/2023    BILITOTAL 0.5 07/12/2023     OTHER:   Lab Results   Component Value Date    LACT 1.0 07/12/2023    GIORGIO 8.5 (L) 07/13/2023    PHOS 2.8 06/18/2023    MAG 2.3 06/18/2023       Anesthesia Plan    ASA Status:  3   NPO Status:  NPO Appropriate    Anesthesia Type: General.     - Airway: ETT   Induction: Intravenous.   Maintenance: Balanced.        Consents    Anesthesia Plan(s) and associated risks, benefits, and realistic alternatives discussed. Questions answered and patient/representative(s) expressed understanding.     - Discussed: Risks, Benefits and Alternatives for BOTH SEDATION and the PROCEDURE were discussed     - Discussed with:  Patient      - Extended Intubation/Ventilatory Support Discussed: No.      - Patient is DNR/DNI Status: No    Use of blood products discussed: No .     Postoperative Care    Pain management: IV analgesics, Oral pain medications, Multi-modal analgesia.   PONV prophylaxis: Ondansetron (or other 5HT-3), Dexamethasone or Solumedrol, Background Propofol Infusion, Scopolamine patch     Comments:                Martin Martinez MD

## 2023-07-14 NOTE — ANESTHESIA POSTPROCEDURE EVALUATION
Patient: Devi Salas    Procedure: Procedure(s):  Laparoscopic ileocolic resection       Anesthesia Type:  General    Note:  Disposition: Inpatient   Postop Pain Control: Uneventful            Sign Out: Well controlled pain   PONV: No   Neuro/Psych: Uneventful            Sign Out: Acceptable/Baseline neuro status   Airway/Respiratory: Uneventful            Sign Out: Acceptable/Baseline resp. status   CV/Hemodynamics: Uneventful            Sign Out: Acceptable CV status; No obvious hypovolemia; No obvious fluid overload   Other NRE:    DID A NON-ROUTINE EVENT OCCUR? No           Last vitals:  Vitals Value Taken Time   /78 07/14/23 1825   Temp     Pulse 100 07/14/23 1829   Resp 23 07/14/23 1829   SpO2 99 % 07/14/23 1829   Vitals shown include unvalidated device data.    Electronically Signed By: Annalise Neri MD  July 14, 2023  6:31 PM

## 2023-07-15 LAB
ANION GAP SERPL CALCULATED.3IONS-SCNC: 8 MMOL/L (ref 7–15)
BUN SERPL-MCNC: 16 MG/DL (ref 8–23)
CALCIUM SERPL-MCNC: 8.3 MG/DL (ref 8.8–10.2)
CHLORIDE SERPL-SCNC: 106 MMOL/L (ref 98–107)
CREAT SERPL-MCNC: 1.01 MG/DL (ref 0.51–0.95)
DEPRECATED HCO3 PLAS-SCNC: 25 MMOL/L (ref 22–29)
ERYTHROCYTE [DISTWIDTH] IN BLOOD BY AUTOMATED COUNT: 16.4 % (ref 10–15)
GFR SERPL CREATININE-BSD FRML MDRD: 62 ML/MIN/1.73M2
GLUCOSE SERPL-MCNC: 103 MG/DL (ref 70–99)
HCT VFR BLD AUTO: 31.2 % (ref 35–47)
HGB BLD-MCNC: 8.8 G/DL (ref 11.7–15.7)
HGB BLD-MCNC: 9.5 G/DL (ref 11.7–15.7)
MCH RBC QN AUTO: 28.1 PG (ref 26.5–33)
MCHC RBC AUTO-ENTMCNC: 30.4 G/DL (ref 31.5–36.5)
MCV RBC AUTO: 92 FL (ref 78–100)
PLATELET # BLD AUTO: 277 10E3/UL (ref 150–450)
POTASSIUM SERPL-SCNC: 4.1 MMOL/L (ref 3.4–5.3)
RBC # BLD AUTO: 3.38 10E6/UL (ref 3.8–5.2)
SODIUM SERPL-SCNC: 139 MMOL/L (ref 136–145)
WBC # BLD AUTO: 14.2 10E3/UL (ref 4–11)

## 2023-07-15 PROCEDURE — 250N000013 HC RX MED GY IP 250 OP 250 PS 637: Performed by: PHYSICIAN ASSISTANT

## 2023-07-15 PROCEDURE — 99232 SBSQ HOSP IP/OBS MODERATE 35: CPT | Performed by: INTERNAL MEDICINE

## 2023-07-15 PROCEDURE — 250N000013 HC RX MED GY IP 250 OP 250 PS 637: Performed by: COLON & RECTAL SURGERY

## 2023-07-15 PROCEDURE — 36415 COLL VENOUS BLD VENIPUNCTURE: CPT | Performed by: INTERNAL MEDICINE

## 2023-07-15 PROCEDURE — 85018 HEMOGLOBIN: CPT | Performed by: STUDENT IN AN ORGANIZED HEALTH CARE EDUCATION/TRAINING PROGRAM

## 2023-07-15 PROCEDURE — 85027 COMPLETE CBC AUTOMATED: CPT | Performed by: INTERNAL MEDICINE

## 2023-07-15 PROCEDURE — 80048 BASIC METABOLIC PNL TOTAL CA: CPT | Performed by: INTERNAL MEDICINE

## 2023-07-15 PROCEDURE — 250N000009 HC RX 250: Performed by: INTERNAL MEDICINE

## 2023-07-15 PROCEDURE — 36415 COLL VENOUS BLD VENIPUNCTURE: CPT | Performed by: STUDENT IN AN ORGANIZED HEALTH CARE EDUCATION/TRAINING PROGRAM

## 2023-07-15 PROCEDURE — 120N000001 HC R&B MED SURG/OB

## 2023-07-15 PROCEDURE — 250N000011 HC RX IP 250 OP 636: Mod: JZ | Performed by: COLON & RECTAL SURGERY

## 2023-07-15 PROCEDURE — 258N000003 HC RX IP 258 OP 636: Performed by: COLON & RECTAL SURGERY

## 2023-07-15 RX ADMIN — HYDROMORPHONE HYDROCHLORIDE 2 MG: 2 TABLET ORAL at 18:09

## 2023-07-15 RX ADMIN — MONTELUKAST 10 MG: 10 TABLET, FILM COATED ORAL at 22:32

## 2023-07-15 RX ADMIN — TRAZODONE HYDROCHLORIDE 200 MG: 100 TABLET ORAL at 22:32

## 2023-07-15 RX ADMIN — HYDROMORPHONE HYDROCHLORIDE 0.4 MG: 0.2 INJECTION, SOLUTION INTRAMUSCULAR; INTRAVENOUS; SUBCUTANEOUS at 08:58

## 2023-07-15 RX ADMIN — ENOXAPARIN SODIUM 40 MG: 40 INJECTION SUBCUTANEOUS at 12:32

## 2023-07-15 RX ADMIN — HYDROCORTISONE SODIUM SUCCINATE 50 MG: 100 INJECTION, POWDER, FOR SOLUTION INTRAMUSCULAR; INTRAVENOUS at 05:04

## 2023-07-15 RX ADMIN — HYDROMORPHONE HYDROCHLORIDE 0.2 MG: 0.2 INJECTION, SOLUTION INTRAMUSCULAR; INTRAVENOUS; SUBCUTANEOUS at 11:19

## 2023-07-15 RX ADMIN — HYDROMORPHONE HYDROCHLORIDE 0.4 MG: 0.2 INJECTION, SOLUTION INTRAMUSCULAR; INTRAVENOUS; SUBCUTANEOUS at 00:53

## 2023-07-15 RX ADMIN — HYDROMORPHONE HYDROCHLORIDE 0.4 MG: 0.2 INJECTION, SOLUTION INTRAMUSCULAR; INTRAVENOUS; SUBCUTANEOUS at 02:57

## 2023-07-15 RX ADMIN — HYDROMORPHONE HYDROCHLORIDE 0.4 MG: 0.2 INJECTION, SOLUTION INTRAMUSCULAR; INTRAVENOUS; SUBCUTANEOUS at 05:05

## 2023-07-15 RX ADMIN — ACETAMINOPHEN 975 MG: 325 TABLET, FILM COATED ORAL at 20:28

## 2023-07-15 RX ADMIN — HYDROMORPHONE HYDROCHLORIDE 0.2 MG: 0.2 INJECTION, SOLUTION INTRAMUSCULAR; INTRAVENOUS; SUBCUTANEOUS at 11:46

## 2023-07-15 RX ADMIN — METHOCARBAMOL 500 MG: 500 TABLET ORAL at 20:28

## 2023-07-15 RX ADMIN — ESOMEPRAZOLE MAGNESIUM 40 MG: 40 CAPSULE, DELAYED RELEASE ORAL at 08:03

## 2023-07-15 RX ADMIN — ACETAMINOPHEN 975 MG: 325 TABLET, FILM COATED ORAL at 13:52

## 2023-07-15 RX ADMIN — TOPICAL ANESTHETIC 0.5 ML: 200 SPRAY DENTAL; PERIODONTAL at 15:05

## 2023-07-15 RX ADMIN — ESOMEPRAZOLE MAGNESIUM 40 MG: 40 CAPSULE, DELAYED RELEASE ORAL at 17:27

## 2023-07-15 RX ADMIN — HYDROMORPHONE HYDROCHLORIDE 0.4 MG: 0.2 INJECTION, SOLUTION INTRAMUSCULAR; INTRAVENOUS; SUBCUTANEOUS at 07:05

## 2023-07-15 RX ADMIN — HYDROCORTISONE SODIUM SUCCINATE 50 MG: 100 INJECTION, POWDER, FOR SOLUTION INTRAMUSCULAR; INTRAVENOUS at 13:52

## 2023-07-15 RX ADMIN — NORTRIPTYLINE HYDROCHLORIDE 25 MG: 25 CAPSULE ORAL at 22:32

## 2023-07-15 RX ADMIN — SODIUM CHLORIDE, POTASSIUM CHLORIDE, SODIUM LACTATE AND CALCIUM CHLORIDE: 600; 310; 30; 20 INJECTION, SOLUTION INTRAVENOUS at 08:02

## 2023-07-15 RX ADMIN — ONDANSETRON 4 MG: 2 INJECTION INTRAMUSCULAR; INTRAVENOUS at 12:32

## 2023-07-15 RX ADMIN — HYDROMORPHONE HYDROCHLORIDE 2 MG: 2 TABLET ORAL at 13:51

## 2023-07-15 RX ADMIN — HYDROMORPHONE HYDROCHLORIDE 4 MG: 2 TABLET ORAL at 22:32

## 2023-07-15 RX ADMIN — ACETAMINOPHEN 975 MG: 325 TABLET, FILM COATED ORAL at 05:02

## 2023-07-15 RX ADMIN — HYDROCORTISONE SODIUM SUCCINATE 50 MG: 100 INJECTION, POWDER, FOR SOLUTION INTRAMUSCULAR; INTRAVENOUS at 22:32

## 2023-07-15 ASSESSMENT — ACTIVITIES OF DAILY LIVING (ADL)
ADLS_ACUITY_SCORE: 37
ADLS_ACUITY_SCORE: 37
ADLS_ACUITY_SCORE: 41
ADLS_ACUITY_SCORE: 37
ADLS_ACUITY_SCORE: 41
ADLS_ACUITY_SCORE: 37

## 2023-07-15 NOTE — PLAN OF CARE
Goal Outcome Evaluation:      Plan of Care Reviewed With: patient  To Do:  End of Shift Summary  For vital signs and complete assessments, please see documentation flowsheets.     Pertinent assessments: Pt A/O. VSS. Capno, O2 4L NC, Dilaudid 5x given for abdo pain.Zofran for intermittent nausea. BS hypo. Midline incision and lap sited w/ liquid bandage, JOE. Monae patent  Major Shift Events Transferred from PACU  Treatment Plan: symptom management, solu-Cortef, await bowel return and diet advancement.  Bedside Nurse: Abeba Barnes RN

## 2023-07-15 NOTE — PROGRESS NOTES
"[facility] Progress Note             Interval History:   POD#1    Acute pain overnigth after moving in bed.  Pain better this am. Denies n/v.  No Flatus or BM.  VSS.                Medications:   I have reviewed this patient's current medications               Physical Exam:   /56 (BP Location: Left arm)   Pulse 72   Temp 98.2  F (36.8  C) (Oral)   Resp 17   Ht 1.6 m (5' 3\")   Wt 70.2 kg (154 lb 12.8 oz)   SpO2 95%   BMI 27.42 kg/m        Intake/Output Summary (Last 24 hours) at 7/15/2023 1046  Last data filed at 7/15/2023 0723  Gross per 24 hour   Intake 2303.75 ml   Output 725 ml   Net 1578.75 ml         GEN:  alert  ABD:  Soft, ttp per midline incision, mild distention         Data:     Lab Results   Component Value Date    WBC 14.2 07/15/2023     Lab Results   Component Value Date    RBC 3.38 07/15/2023     Lab Results   Component Value Date    HGB 9.5 07/15/2023     Lab Results   Component Value Date    HCT 31.2 07/15/2023     No components found for: MCT  Lab Results   Component Value Date    MCV 92 07/15/2023     Lab Results   Component Value Date    MCH 28.1 07/15/2023     Lab Results   Component Value Date    MCHC 30.4 07/15/2023     Lab Results   Component Value Date    RDW 16.4 07/15/2023     Lab Results   Component Value Date     07/15/2023     Last Comprehensive Metabolic Panel:  Lab Results   Component Value Date     07/15/2023    POTASSIUM 4.1 07/15/2023    CHLORIDE 106 07/15/2023    CO2 25 07/15/2023    ANIONGAP 8 07/15/2023     (H) 07/15/2023    BUN 16.0 07/15/2023    CR 1.01 (H) 07/15/2023    GFRESTIMATED 62 07/15/2023    GIORGIO 8.3 (L) 07/15/2023                         Assessment and Plan:   POD#1 s/p lap ileocolic resection.    - ok for fulls  - IVFs  - IV/PO pain meds  - remove ellison  - Lovenox for DVT ppx       Mely Marcus MD, FACS FASCRS  Colorectal Surgery     [facility]  [address] [place] [id number]  T: " [phone]  F: [phone]

## 2023-07-15 NOTE — PROGRESS NOTES
"[facility]  Hospitalist Progress Note     Assessment & Plan     ASSESSMENT    63F with hx of RCC s/p nephrectomy w/ CKD Stage III and Crohn's disease on Remicade with recent hospitalization discharged on steroid taper re-presents with ongoing abdominal pain and diarrhea and found to have ongoing evidence of ileitis on CT scan with luminal narrowing. CRS and GI were consulted and patient underwent ileocolic resection on 7/14/23.    PLAN    Crohn's Ileitis, Acute on Chronic  -Presents with ongoing abdominal pain 2/2 ileitis that continues after recent admission under similar circumstances where she received IV steroids  -Abdominal pain and diarrhea continued despite steroid taper and Remicade on outpatient basis  - On admission, ongoing evidence of ileitis on CT scan with luminal narrowing  - CRS and GI were consulted and patient underwent ileocolic resection on 7/14/23.  - Can complete solu-cortef taper.  - IV fluids.  - Diet advanced per CRS.   - GI has signed off. Cancel upcoming dose of Remicade. Keep follow up appointment at [facility] in August as scheduled, colonoscopy in 5-6 months.   - Avoid NSAIDS    Lactic Acidosis  - Suspect 2/2 hypovolemia. Resolved.     RCC s/p nephrectomy w/ CKD Stage III  - Cr at baseline  - Avoid nephrotoxins    DVT Prophylaxis: Enoxaparin (Lovenox) SQ  Code Status: Full Code  Expected discharge: Inpatient for another 2-3 more days depending on clinical progression after surgery.     Millicent Feliz MD FACP  Hospitalist Service  [facility]    Securely message with [employer] (more info)  Text page via [facility] Paging/Directory         Subjective     Patient seen. Family at bedside. Reporting continued pain. CRS has advanced to full liquids. Other than pain, no new complaints.          Objective   Blood pressure 104/56, pulse 72, temperature 98.2  F (36.8  C), temperature source Oral, resp. rate 17, height 1.6 m (5' 3\"), weight 70.2 kg (154 lb 12.8 oz), SpO2 95 " %.    Constitutional: Pleasant woman seen resting in bed. Family at bedside. Alert and oriented x3. Appears slightly uncomfortable, but otherwise no acute distress.   HEENT: NCAT. EOMI. Moist oral mucosa.  Respiratory: Clear to auscultation bilaterally on anterior exam. No crackles or wheezes.  Cardiovascular: Regular rate and rhythm.  GI: Soft. Tender post-op.    Musculoskeletal: No gross deformities.   Neurologic: Alert and oriented x3. No focal neurologic deficits. Did not assess gait.    Medications     lactated ringers 75 mL/hr at 07/15/23 0802       acetaminophen  975 mg Oral Q8H     enoxaparin ANTICOAGULANT  40 mg Subcutaneous Q24H     esomeprazole  40 mg Oral BID AC     hydrocortisone sodium succinate PF  50 mg Intravenous Q8H    Followed by     hydrocortisone sodium succinate PF  50 mg Intravenous BID    Followed by     [START ON 7/16/2023] hydrocortisone sodium succinate PF  25 mg Intravenous Q12H     montelukast  10 mg Oral At Bedtime     nortriptyline  25 mg Oral At Bedtime     scopolamine  1 patch Transdermal Once     scopolamine   Transdermal Q8H     sodium chloride (PF)  3 mL Intracatheter Q8H     traZODone  200 mg Oral At Bedtime       Data   Recent Labs   Lab 07/15/23  0638 07/14/23  2128 07/14/23  1338 07/13/23  0806 07/12/23  1530   WBC 14.2*  --   --  7.4 9.8   HGB 9.5*  --   --  11.5* 12.7   MCV 92  --   --  92 91     --   --  242 294     --   --  140 138   POTASSIUM 4.1  --   --  4.2 4.7   CHLORIDE 106  --   --  105 101   CO2 25  --   --  27 26   BUN 16.0  --   --  9.7 10.0   CR 1.01* 0.99*  --  1.04* 1.14*   ANIONGAP 8  --   --  8 11   GIORGIO 8.3*  --   --  8.5* 9.4   *  --  107* 87 102*   ALBUMIN  --   --   --   --  4.3   PROTTOTAL  --   --   --   --  6.5   BILITOTAL  --   --   --   --  0.5   ALKPHOS  --   --   --   --  66   ALT  --   --   --   --  17   AST  --   --   --   --  18       No results found for this or any previous visit (from the past 24 hour(s)).

## 2023-07-15 NOTE — PROGRESS NOTES
End of Shift Summary  For vital signs and complete assessments, please see documentation flowsheets.     Pertinent assessments: Pt A/O. VSS. Weaned off o2. Dilaudid 5x given for abdo pain. Zofran for intermittent nausea. BS hypo. Midline incision and lap sited w/ liquid bandage, JOE. Monae removed, pt voided. Pt needs encouragement to eat the fulls. Pt had an episode of when she went to the bathroom she had a medium amount of blood come out when she tried to have a BM, MD notified, denies being symptomatic w/ any abnormal dizziness or any other symptoms. Ambulated hallways x2    Major Shift Events: Monae removed, med amount of blood on trying to have a BM    Treatment Plan: pain management, encourage diet, encourage ambulation, monitor for return of bowel function     Bedside Nurse: Desi Jamison RN

## 2023-07-16 ENCOUNTER — APPOINTMENT (OUTPATIENT)
Dept: PHYSICAL THERAPY | Facility: CLINIC | Age: 63
End: 2023-07-16
Attending: INTERNAL MEDICINE
Payer: COMMERCIAL

## 2023-07-16 LAB
ANION GAP SERPL CALCULATED.3IONS-SCNC: 7 MMOL/L (ref 7–15)
BASOPHILS # BLD AUTO: 0 10E3/UL (ref 0–0.2)
BASOPHILS NFR BLD AUTO: 0 %
BLD PROD TYP BPU: NORMAL
BLOOD COMPONENT TYPE: NORMAL
BUN SERPL-MCNC: 29 MG/DL (ref 8–23)
CALCIUM SERPL-MCNC: 8.5 MG/DL (ref 8.8–10.2)
CHLORIDE SERPL-SCNC: 106 MMOL/L (ref 98–107)
CODING SYSTEM: NORMAL
CREAT SERPL-MCNC: 1.05 MG/DL (ref 0.51–0.95)
CROSSMATCH: NORMAL
DEPRECATED HCO3 PLAS-SCNC: 26 MMOL/L (ref 22–29)
EOSINOPHIL # BLD AUTO: 0.1 10E3/UL (ref 0–0.7)
EOSINOPHIL NFR BLD AUTO: 1 %
ERYTHROCYTE [DISTWIDTH] IN BLOOD BY AUTOMATED COUNT: 16.6 % (ref 10–15)
GFR SERPL CREATININE-BSD FRML MDRD: 59 ML/MIN/1.73M2
GLUCOSE SERPL-MCNC: 104 MG/DL (ref 70–99)
HCT VFR BLD AUTO: 25.8 % (ref 35–47)
HGB BLD-MCNC: 7.9 G/DL (ref 11.7–15.7)
HGB BLD-MCNC: 9.4 G/DL (ref 11.7–15.7)
HGB BLD-MCNC: 9.6 G/DL (ref 11.7–15.7)
IMM GRANULOCYTES # BLD: 0.1 10E3/UL
IMM GRANULOCYTES NFR BLD: 1 %
ISSUE DATE AND TIME: NORMAL
LYMPHOCYTES # BLD AUTO: 2.1 10E3/UL (ref 0.8–5.3)
LYMPHOCYTES NFR BLD AUTO: 21 %
MCH RBC QN AUTO: 28.2 PG (ref 26.5–33)
MCHC RBC AUTO-ENTMCNC: 30.6 G/DL (ref 31.5–36.5)
MCV RBC AUTO: 92 FL (ref 78–100)
MONOCYTES # BLD AUTO: 0.7 10E3/UL (ref 0–1.3)
MONOCYTES NFR BLD AUTO: 7 %
NEUTROPHILS # BLD AUTO: 6.9 10E3/UL (ref 1.6–8.3)
NEUTROPHILS NFR BLD AUTO: 70 %
NRBC # BLD AUTO: 0 10E3/UL
NRBC BLD AUTO-RTO: 0 /100
PLATELET # BLD AUTO: 240 10E3/UL (ref 150–450)
POTASSIUM SERPL-SCNC: 4.2 MMOL/L (ref 3.4–5.3)
RBC # BLD AUTO: 2.8 10E6/UL (ref 3.8–5.2)
SODIUM SERPL-SCNC: 139 MMOL/L (ref 136–145)
UNIT ABO/RH: NORMAL
UNIT NUMBER: NORMAL
UNIT STATUS: NORMAL
UNIT TYPE ISBT: 6200
WBC # BLD AUTO: 9.8 10E3/UL (ref 4–11)

## 2023-07-16 PROCEDURE — 99232 SBSQ HOSP IP/OBS MODERATE 35: CPT | Performed by: INTERNAL MEDICINE

## 2023-07-16 PROCEDURE — 36415 COLL VENOUS BLD VENIPUNCTURE: CPT | Performed by: COLON & RECTAL SURGERY

## 2023-07-16 PROCEDURE — P9016 RBC LEUKOCYTES REDUCED: HCPCS | Performed by: STUDENT IN AN ORGANIZED HEALTH CARE EDUCATION/TRAINING PROGRAM

## 2023-07-16 PROCEDURE — 36415 COLL VENOUS BLD VENIPUNCTURE: CPT | Performed by: INTERNAL MEDICINE

## 2023-07-16 PROCEDURE — 250N000013 HC RX MED GY IP 250 OP 250 PS 637: Performed by: PHYSICIAN ASSISTANT

## 2023-07-16 PROCEDURE — 85004 AUTOMATED DIFF WBC COUNT: CPT | Performed by: INTERNAL MEDICINE

## 2023-07-16 PROCEDURE — 80048 BASIC METABOLIC PNL TOTAL CA: CPT | Performed by: INTERNAL MEDICINE

## 2023-07-16 PROCEDURE — 258N000003 HC RX IP 258 OP 636: Performed by: COLON & RECTAL SURGERY

## 2023-07-16 PROCEDURE — 120N000001 HC R&B MED SURG/OB

## 2023-07-16 PROCEDURE — 250N000013 HC RX MED GY IP 250 OP 250 PS 637: Performed by: COLON & RECTAL SURGERY

## 2023-07-16 PROCEDURE — 85018 HEMOGLOBIN: CPT | Performed by: COLON & RECTAL SURGERY

## 2023-07-16 PROCEDURE — 250N000011 HC RX IP 250 OP 636: Mod: JZ | Performed by: COLON & RECTAL SURGERY

## 2023-07-16 PROCEDURE — 97161 PT EVAL LOW COMPLEX 20 MIN: CPT | Mod: GP | Performed by: PHYSICAL THERAPIST

## 2023-07-16 PROCEDURE — 85018 HEMOGLOBIN: CPT | Performed by: INTERNAL MEDICINE

## 2023-07-16 RX ADMIN — HYDROMORPHONE HYDROCHLORIDE 2 MG: 2 TABLET ORAL at 20:16

## 2023-07-16 RX ADMIN — ESOMEPRAZOLE MAGNESIUM 40 MG: 40 CAPSULE, DELAYED RELEASE ORAL at 06:45

## 2023-07-16 RX ADMIN — HYDROCORTISONE SODIUM SUCCINATE 25 MG: 100 INJECTION, POWDER, FOR SOLUTION INTRAMUSCULAR; INTRAVENOUS at 21:13

## 2023-07-16 RX ADMIN — METHOCARBAMOL 500 MG: 500 TABLET ORAL at 21:13

## 2023-07-16 RX ADMIN — ACETAMINOPHEN 975 MG: 325 TABLET, FILM COATED ORAL at 20:15

## 2023-07-16 RX ADMIN — HYDROMORPHONE HYDROCHLORIDE 2 MG: 2 TABLET ORAL at 16:22

## 2023-07-16 RX ADMIN — ACETAMINOPHEN 975 MG: 325 TABLET, FILM COATED ORAL at 04:48

## 2023-07-16 RX ADMIN — ESOMEPRAZOLE MAGNESIUM 40 MG: 40 CAPSULE, DELAYED RELEASE ORAL at 16:22

## 2023-07-16 RX ADMIN — ACETAMINOPHEN 975 MG: 325 TABLET, FILM COATED ORAL at 12:18

## 2023-07-16 RX ADMIN — MONTELUKAST 10 MG: 10 TABLET, FILM COATED ORAL at 21:13

## 2023-07-16 RX ADMIN — NORTRIPTYLINE HYDROCHLORIDE 25 MG: 25 CAPSULE ORAL at 21:13

## 2023-07-16 RX ADMIN — HYDROCORTISONE SODIUM SUCCINATE 50 MG: 100 INJECTION, POWDER, FOR SOLUTION INTRAMUSCULAR; INTRAVENOUS at 09:05

## 2023-07-16 RX ADMIN — HYDROMORPHONE HYDROCHLORIDE 4 MG: 2 TABLET ORAL at 04:48

## 2023-07-16 RX ADMIN — SODIUM CHLORIDE, POTASSIUM CHLORIDE, SODIUM LACTATE AND CALCIUM CHLORIDE: 600; 310; 30; 20 INJECTION, SOLUTION INTRAVENOUS at 20:48

## 2023-07-16 RX ADMIN — SODIUM CHLORIDE, POTASSIUM CHLORIDE, SODIUM LACTATE AND CALCIUM CHLORIDE: 600; 310; 30; 20 INJECTION, SOLUTION INTRAVENOUS at 07:21

## 2023-07-16 RX ADMIN — TRAZODONE HYDROCHLORIDE 200 MG: 100 TABLET ORAL at 21:13

## 2023-07-16 ASSESSMENT — ACTIVITIES OF DAILY LIVING (ADL)
ADLS_ACUITY_SCORE: 41
ADLS_ACUITY_SCORE: 22
ADLS_ACUITY_SCORE: 41
ADLS_ACUITY_SCORE: 41
TOILETING_ISSUES: NO
WALKING_OR_CLIMBING_STAIRS_DIFFICULTY: NO
DOING_ERRANDS_INDEPENDENTLY_DIFFICULTY: NO
CHANGE_IN_FUNCTIONAL_STATUS_SINCE_ONSET_OF_CURRENT_ILLNESS/INJURY: NO
WEAR_GLASSES_OR_BLIND: YES
ADLS_ACUITY_SCORE: 41
VISION_MANAGEMENT: GLASSES
DIFFICULTY_EATING/SWALLOWING: NO
ADLS_ACUITY_SCORE: 41
ADLS_ACUITY_SCORE: 41
CONCENTRATING,_REMEMBERING_OR_MAKING_DECISIONS_DIFFICULTY: NO
ADLS_ACUITY_SCORE: 22
ADLS_ACUITY_SCORE: 41
DRESSING/BATHING_DIFFICULTY: NO
FALL_HISTORY_WITHIN_LAST_SIX_MONTHS: NO
ADLS_ACUITY_SCORE: 41
DEPENDENT_IADLS:: INDEPENDENT

## 2023-07-16 NOTE — PROGRESS NOTES
07/16/23 0942   Appointment Info   Signing Clinician's Name / Credentials (PT) Geovany Mckeon DPT   Living Environment   People in Home spouse   Current Living Arrangements house   Home Accessibility stairs to enter home   Number of Stairs, Main Entrance 2   Stair Railings, Main Entrance railings safe and in good condition   Transportation Anticipated family or friend will provide   Self-Care   Usual Activity Tolerance excellent   Current Activity Tolerance good   Equipment Currently Used at Home none   Fall history within last six months no   General Information   Onset of Illness/Injury or Date of Surgery 07/12/23   Referring Physician Millicent Urena MD   Patient/Family Therapy Goals Statement (PT) Return home   Pertinent History of Current Problem (include personal factors and/or comorbidities that impact the POC) Per chart review patient is a 63F with hx of RCC s/p nephrectomy w/ CKD Stage III and Crohn's disease on Remicade with recent hospitalization discharged on steroid taper re-presents with ongoing abdominal pain and diarrhea and found to have ongoing evidence of ileitis on CT scan with luminal narrowing. CRS and GI were consulted and patient underwent ileocolic resection on 7/14/23.   Cognition   Affect/Mental Status (Cognition) WNL   Orientation Status (Cognition) oriented x 4   Follows Commands (Cognition) WNL   Integumentary/Edema   Integumentary/Edema no deficits were identifed   Posture    Posture Not impaired   Range of Motion (ROM)   Range of Motion ROM is WFL   Strength (Manual Muscle Testing)   Strength (Manual Muscle Testing) strength is WFL;No deficits observed during functional mobility   Bed Mobility   Bed Mobility no deficits identified   Transfers   Transfers no deficits identified   Gait/Stairs (Locomotion)   [place] Level (Gait) independent   Distance in Feet 35   Pattern (Gait) step-through;swing-through   Balance   Balance no deficits were identified   Coordination    Coordination no deficits were identified   Muscle Tone   Muscle Tone no deficits were identified   Clinical Impression   Criteria for Skilled Therapeutic Intervention Evaluation only   PT Diagnosis (PT) Impaired functional mobility   Influenced by the following impairments decreased activity tolerance   Functional limitations due to impairments Impaired functional mobility   Clinical Presentation (PT Evaluation Complexity) Stable/Uncomplicated   Clinical Presentation Rationale clinical judgement   Clinical Decision Making (Complexity) low complexity   Clinical Impression Comments Patient presents near functional baseline. No skilled physical therapy needs identified   PT Total Evaluation Time   PT Eval, Low Complexity Minutes (05632) 24   PT Discharge Planning   PT Plan Evaluation only   PT Discharge Recommendation (DC Rec) home   PT Rationale for DC Rec Patient performed functional mobility with independence and good safety awarenss. Able to ambulate hallway with nursing staff. No skilled physical therapy needs identified. Good support at home   Total Session Time   Total Session Time (sum of timed and untimed services) 24

## 2023-07-16 NOTE — PLAN OF CARE
End of Shift Summary  For vital signs and complete assessments, please see documentation flowsheets.     Pertinent assessments: A/Ox4, VSS, c/o abdominal pain 7/10. PRN Dilaudid given x1 Lap sites and midline incision open to air, Denies N/V.     Major Shift Events: None     Treatment Plan: Pain management. IV Hydrocortisone. Diet advancement. Encourage mobility. Return of bowel function.     Bedside Nurse: Maxi Garcia RN

## 2023-07-16 NOTE — CONSULTS
Care Management Initial Consult    General Information  Assessment completed with: Patient,    Type of CM/SW Visit: Initial Assessment    Primary Care Provider verified and updated as needed:     Readmission within the last 30 days:        Reason for Consult: discharge planning    Communication Assessment  Patient's communication style: spoken language (English or Bilingual)        Cognitive  Cognitive/Neuro/Behavioral: WDL                      Living Environment:   People in home:   Spouse    Current living Arrangements:   House   Able to return to prior arrangements:  Yes       Family/Social Support:  Care provided by: Self   Provides care for:    Marital Status:             Description of Support System: Supportive, Involved    Support Assessment: Adequate family and caregiver support    Current Resources:   Patient receiving home care services: No     Community Resources:    Equipment currently used at home: none  Supplies currently used at home:      Does the patient's insurance plan have a 3 day qualifying hospital stay waiver?  No    Lifestyle & Psychosocial Needs:  Social Determinants of Health     Tobacco Use: Not on file   Alcohol Use: Not on file   Financial Resource Strain: Not on file   Food Insecurity: Not on file   Transportation Needs: Not on file   Physical Activity: Not on file   Stress: Not on file   Social Connections: Not on file   Intimate Partner Violence: Not on file   Depression: Not on file   Housing Stability: Not on file       Functional Status:  Prior to admission patient needed assistance:   Dependent ADLs:: Independent  Dependent IADLs:: Independent  Assesssment of Functional Status: At functional baseline      Care Management Discharge Note    Discharge Date: 07/17/2023       Discharge Disposition: Home    Discharge Services:      Discharge DME: Raised Toilet Seat    Discharge Transportation: family or friend will provide    Private pay costs discussed: Not  applicable    Does the patient's insurance plan have a 3 day qualifying hospital stay waiver?  No    Patient/Family in Agreement with the Plan: yes    Handoff Referral Completed: No    Additional Information:  CM consulted for elevated URR. Pt admitted with  Crohns Ileitis, S/P ileocolic resection. PT evaluated pt and determined pt is safe to discharge home. Met with pt at bedside. Pt lives with supportive spouse and is not anticipating any discharge needs other than the possible need for an elevated toilet seat which she will either borrow or purchase.   Spouse will provide transportation home.  Please call if any needs arise.    Nadia Rodriguez RN   Inpatient Care Coordination  [facility]   Phone: [phone]

## 2023-07-16 NOTE — PROGRESS NOTES
End of Shift Summary  For vital signs and complete assessments, please see documentation flowsheets.     Pertinent assessments: A/Ox4, VSS, c/o abdominal pain but much improved since yesterday, tylenol & ice given & dilaudid x1. Lap sites and midline incision open to air, Denies N/V. X3 episodes of dark blood when going to the BR, did relay to MD. Pt initially symptomatic of low hgb w/ dizziness, but did improve w/ blood administration.    Major Shift Events: Blood transfusion for bleeding + symptomatic low hgb    Treatment Plan: Pain management. IV Hydrocortisone. Diet advancement. Encourage mobility. Return of bowel function.     Bedside Nurse: Desi Jamison RN

## 2023-07-16 NOTE — PROGRESS NOTES
[facility]  Hospitalist Progress Note     Assessment & Plan     ASSESSMENT    63F with hx of RCC s/p nephrectomy w/ CKD Stage III and Crohn's disease on Remicade with recent hospitalization discharged on steroid taper re-presents with ongoing abdominal pain and diarrhea and found to have ongoing evidence of ileitis on CT scan with luminal narrowing. CRS and GI were consulted and patient underwent ileocolic resection on 7/14/23.    PLAN    Crohn's Ileitis, Acute on Chronic  -Presents with ongoing abdominal pain 2/2 ileitis that continues after recent admission under similar circumstances where she received IV steroids  -Abdominal pain and diarrhea continued despite steroid taper and Remicade on outpatient basis  - On admission, ongoing evidence of ileitis on CT scan with luminal narrowing  - CRS and GI were consulted and patient underwent ileocolic resection on 7/14/23.  - Planning to complete steroid taper. If bleeding below resolves and tolerating diet, can consider transition to oral regimen in coming days.   - Diet advanced per CRS.   - GI has signed off. Cancel upcoming dose of Remicade. Keep follow up appointment at [facility] in August as scheduled, colonoscopy in 5-6 months.   - Avoid NSAIDS    Acute blood loss anemia  - Colorectal surgery following. Felt to be secondary to an anastomotic bleed. Blood is decreasing and dark, so possible that bleeding has stopped.   - Hemodynamically stable.  - Colorectal surgery did transfuse 1 unit(s) PRBCs on 7/16 due to symptomatic anemia.  - Continue to trend Hgb.   - SubQ Lovenox on hold.    Lactic Acidosis  - Present on admission. Suspect 2/2 hypovolemia. Resolved.     RCC s/p nephrectomy w/ CKD Stage III  - Cr at baseline  - Avoid nephrotoxins    DVT Prophylaxis: Enoxaparin (Lovenox) subcutaneous is now held with bleeding. SCDs are ordered.   Code Status: Full Code  Expected discharge: Inpatient for another 2-3 more days depending on clinical progression after  "surgery.     Millicent Feliz MD FACP  Hospitalist Service  [facility]    Securely message with [employer] (more info)  Text page via [employer] Paging/Directory         Subjective     Patient with blood reported from rectum this morning. Hgb has trended down to 7.9 this morning. Colorectal surgery transfusing 1 unit(s) PRBCs due to symptomatic anemia. Has had 3 more episodes of dark blood from rectum for remainder of day, though amount decreasing and patient remaining hemodynamically stable.  Continue full liquid diet at this time per CRS.           Objective   Blood pressure 120/58, pulse 89, temperature 97.2  F (36.2  C), temperature source Oral, resp. rate 18, height 1.6 m (5' 3\"), weight 70.2 kg (154 lb 12.8 oz), SpO2 98 %.    Constitutional: Pleasant woman seen sitting up in bed in the afternoon. Family at bedside. Alert and oriented x3. Continues to appear slightly uncomfortable, but otherwise no acute distress.   HEENT: NCAT. EOMI. Moist oral mucosa.  Respiratory: Clear to auscultation bilaterally on anterior exam. No crackles or wheezes.  Cardiovascular: Regular rate and rhythm.  GI: Patient defers abdominal exam at this time secondary to pain. Note that colorectal surgery is following post-operatively after bowel resection.    Musculoskeletal: No gross deformities.   Neurologic: Alert and oriented x3. No focal neurologic deficits. Did not assess gait.    Medications     lactated ringers 75 mL/hr at 07/16/23 0721       acetaminophen  975 mg Oral Q8H     [Held by provider] enoxaparin ANTICOAGULANT  40 mg Subcutaneous Q24H     esomeprazole  40 mg Oral BID AC     hydrocortisone sodium succinate PF  25 mg Intravenous Q12H     montelukast  10 mg Oral At Bedtime     nortriptyline  25 mg Oral At Bedtime     sodium chloride (PF)  3 mL Intracatheter Q8H     traZODone  200 mg Oral At Bedtime       Data   Recent Labs   Lab 07/16/23  0607 07/15/23  1611 07/15/23  0638 07/14/23  2128 07/14/23  1338 07/13/23  0806 " 07/12/23  1530   WBC 9.8  --  14.2*  --   --  7.4 9.8   HGB 7.9* 8.8* 9.5*  --   --  11.5* 12.7   MCV 92  --  92  --   --  92 91     --  277  --   --  242 294     --  139  --   --  140 138   POTASSIUM 4.2  --  4.1  --   --  4.2 4.7   CHLORIDE 106  --  106  --   --  105 101   CO2 26  --  25  --   --  27 26   BUN 29.0*  --  16.0  --   --  9.7 10.0   CR 1.05*  --  1.01* 0.99*  --  1.04* 1.14*   ANIONGAP 7  --  8  --   --  8 11   GIORGIO 8.5*  --  8.3*  --   --  8.5* 9.4   *  --  103*  --  107* 87 102*   ALBUMIN  --   --   --   --   --   --  4.3   PROTTOTAL  --   --   --   --   --   --  6.5   BILITOTAL  --   --   --   --   --   --  0.5   ALKPHOS  --   --   --   --   --   --  66   ALT  --   --   --   --   --   --  17   AST  --   --   --   --   --   --  18       No results found for this or any previous visit (from the past 24 hour(s)).

## 2023-07-16 NOTE — PROGRESS NOTES
"Colon & Rectal Surgery Progress Note             Interval History:   POD#2    3 episodes of large volume of dark blood per rectum, with last episode overnight. Hgb also downtrending from 11.5 to 9.5 to 8.8 to 7.9 Reports dizziness this morning.  Otherwise, tolerating fulls, no bowel function and voiding adequately.               Medications:   I have reviewed this patient's current medications               Physical Exam:   /60   Pulse 88   Temp 97.8  F (36.6  C) (Oral)   Resp 18   Ht 1.6 m (5' 3\")   Wt 70.2 kg (154 lb 12.8 oz)   SpO2 97%   BMI 27.42 kg/m        Intake/Output Summary (Last 24 hours) at 7/15/2023 1046  Last data filed at 7/15/2023 0723  Gross per 24 hour   Intake 2303.75 ml   Output 725 ml   Net 1578.75 ml         GEN:  alert  ABD:  Soft, mild distension, ttp on right side, no rebound or guarding. Incision sites- c.d.i         Data:     Lab Results   Component Value Date    WBC 14.2 07/15/2023     Lab Results   Component Value Date    RBC 3.38 07/15/2023     Lab Results   Component Value Date    HGB 9.5 07/15/2023     Lab Results   Component Value Date    HCT 31.2 07/15/2023     No components found for: MCT  Lab Results   Component Value Date    MCV 92 07/15/2023     Lab Results   Component Value Date    MCH 28.1 07/15/2023     Lab Results   Component Value Date    MCHC 30.4 07/15/2023     Lab Results   Component Value Date    RDW 16.4 07/15/2023     Lab Results   Component Value Date     07/15/2023     Last Comprehensive Metabolic Panel:  Lab Results   Component Value Date     07/16/2023    POTASSIUM 4.2 07/16/2023    CHLORIDE 106 07/16/2023    CO2 26 07/16/2023    ANIONGAP 7 07/16/2023     (H) 07/16/2023    BUN 29.0 (H) 07/16/2023    CR 1.05 (H) 07/16/2023    GFRESTIMATED 59 (L) 07/16/2023    GIORGIO 8.5 (L) 07/16/2023                         Assessment and Plan:   64 y/o woman POD#2 s/p lap ileocolic resection with post-course complicated by anastomotic bleed. Patient " is clinically stable but symptomatic, so will transfuse 1uPRBC. Given trend of hgb and that blood was dark, likely the bleeding has stopped, but might still have further dark BMs from residuals  - Cont fulls  - Will give 1uPRBC for symptomatic anemia, check post-tx hgb.   - Can stop IVFs after transusion  - Strict I/Os, monitor UOP  - Multimodal pain meds  - steroid taper  - Lovenox held    Discussed with Dr. Amrit Elmore MD  Colon and Rectal Surgery Fellow    [facility]  [address]  [place] [address]  T: [phone]  F: [phone]

## 2023-07-17 LAB
ANION GAP SERPL CALCULATED.3IONS-SCNC: 6 MMOL/L (ref 7–15)
BASOPHILS # BLD AUTO: 0 10E3/UL (ref 0–0.2)
BASOPHILS NFR BLD AUTO: 0 %
BUN SERPL-MCNC: 17.5 MG/DL (ref 8–23)
CALCIUM SERPL-MCNC: 8.6 MG/DL (ref 8.8–10.2)
CHLORIDE SERPL-SCNC: 105 MMOL/L (ref 98–107)
CREAT SERPL-MCNC: 1.01 MG/DL (ref 0.51–0.95)
DEPRECATED HCO3 PLAS-SCNC: 28 MMOL/L (ref 22–29)
EOSINOPHIL # BLD AUTO: 0.1 10E3/UL (ref 0–0.7)
EOSINOPHIL NFR BLD AUTO: 1 %
ERYTHROCYTE [DISTWIDTH] IN BLOOD BY AUTOMATED COUNT: 16.1 % (ref 10–15)
GFR SERPL CREATININE-BSD FRML MDRD: 62 ML/MIN/1.73M2
GLUCOSE SERPL-MCNC: 96 MG/DL (ref 70–99)
HCT VFR BLD AUTO: 27.4 % (ref 35–47)
HGB BLD-MCNC: 8.7 G/DL (ref 11.7–15.7)
HGB BLD-MCNC: 9.2 G/DL (ref 11.7–15.7)
IMM GRANULOCYTES # BLD: 0.1 10E3/UL
IMM GRANULOCYTES NFR BLD: 1 %
LYMPHOCYTES # BLD AUTO: 2.3 10E3/UL (ref 0.8–5.3)
LYMPHOCYTES NFR BLD AUTO: 24 %
MCH RBC QN AUTO: 29.1 PG (ref 26.5–33)
MCHC RBC AUTO-ENTMCNC: 31.8 G/DL (ref 31.5–36.5)
MCV RBC AUTO: 92 FL (ref 78–100)
MONOCYTES # BLD AUTO: 0.8 10E3/UL (ref 0–1.3)
MONOCYTES NFR BLD AUTO: 8 %
NEUTROPHILS # BLD AUTO: 6.5 10E3/UL (ref 1.6–8.3)
NEUTROPHILS NFR BLD AUTO: 66 %
NRBC # BLD AUTO: 0 10E3/UL
NRBC BLD AUTO-RTO: 0 /100
PLATELET # BLD AUTO: 195 10E3/UL (ref 150–450)
POTASSIUM SERPL-SCNC: 4 MMOL/L (ref 3.4–5.3)
RBC # BLD AUTO: 2.99 10E6/UL (ref 3.8–5.2)
SODIUM SERPL-SCNC: 139 MMOL/L (ref 136–145)
WBC # BLD AUTO: 9.8 10E3/UL (ref 4–11)

## 2023-07-17 PROCEDURE — 36415 COLL VENOUS BLD VENIPUNCTURE: CPT | Performed by: INTERNAL MEDICINE

## 2023-07-17 PROCEDURE — 250N000011 HC RX IP 250 OP 636: Mod: JZ | Performed by: COLON & RECTAL SURGERY

## 2023-07-17 PROCEDURE — 80048 BASIC METABOLIC PNL TOTAL CA: CPT | Performed by: INTERNAL MEDICINE

## 2023-07-17 PROCEDURE — 250N000012 HC RX MED GY IP 250 OP 636 PS 637: Performed by: COLON & RECTAL SURGERY

## 2023-07-17 PROCEDURE — 250N000013 HC RX MED GY IP 250 OP 250 PS 637: Performed by: COLON & RECTAL SURGERY

## 2023-07-17 PROCEDURE — 85025 COMPLETE CBC W/AUTO DIFF WBC: CPT | Performed by: INTERNAL MEDICINE

## 2023-07-17 PROCEDURE — 250N000013 HC RX MED GY IP 250 OP 250 PS 637: Performed by: PHYSICIAN ASSISTANT

## 2023-07-17 PROCEDURE — 85018 HEMOGLOBIN: CPT | Performed by: INTERNAL MEDICINE

## 2023-07-17 PROCEDURE — 120N000001 HC R&B MED SURG/OB

## 2023-07-17 PROCEDURE — 99232 SBSQ HOSP IP/OBS MODERATE 35: CPT | Performed by: INTERNAL MEDICINE

## 2023-07-17 RX ORDER — PREDNISONE 10 MG/1
10 TABLET ORAL DAILY
Status: DISCONTINUED | OUTPATIENT
Start: 2023-07-17 | End: 2023-07-19 | Stop reason: HOSPADM

## 2023-07-17 RX ADMIN — ACETAMINOPHEN 975 MG: 325 TABLET, FILM COATED ORAL at 14:01

## 2023-07-17 RX ADMIN — HYDROCORTISONE SODIUM SUCCINATE 25 MG: 100 INJECTION, POWDER, FOR SOLUTION INTRAMUSCULAR; INTRAVENOUS at 09:48

## 2023-07-17 RX ADMIN — HYDROMORPHONE HYDROCHLORIDE 2 MG: 2 TABLET ORAL at 19:53

## 2023-07-17 RX ADMIN — MONTELUKAST 10 MG: 10 TABLET, FILM COATED ORAL at 22:03

## 2023-07-17 RX ADMIN — ACETAMINOPHEN 975 MG: 325 TABLET, FILM COATED ORAL at 05:39

## 2023-07-17 RX ADMIN — TRAZODONE HYDROCHLORIDE 200 MG: 100 TABLET ORAL at 22:03

## 2023-07-17 RX ADMIN — PREDNISONE 10 MG: 10 TABLET ORAL at 16:55

## 2023-07-17 RX ADMIN — NORTRIPTYLINE HYDROCHLORIDE 25 MG: 25 CAPSULE ORAL at 22:03

## 2023-07-17 RX ADMIN — HYDROMORPHONE HYDROCHLORIDE 2 MG: 2 TABLET ORAL at 01:54

## 2023-07-17 RX ADMIN — HYDROMORPHONE HYDROCHLORIDE 2 MG: 2 TABLET ORAL at 09:59

## 2023-07-17 RX ADMIN — ESOMEPRAZOLE MAGNESIUM 40 MG: 40 CAPSULE, DELAYED RELEASE ORAL at 09:08

## 2023-07-17 RX ADMIN — METHOCARBAMOL 500 MG: 500 TABLET ORAL at 22:29

## 2023-07-17 RX ADMIN — ESOMEPRAZOLE MAGNESIUM 40 MG: 40 CAPSULE, DELAYED RELEASE ORAL at 16:56

## 2023-07-17 ASSESSMENT — ACTIVITIES OF DAILY LIVING (ADL)
ADLS_ACUITY_SCORE: 21
ADLS_ACUITY_SCORE: 22
ADLS_ACUITY_SCORE: 21
ADLS_ACUITY_SCORE: 22
ADLS_ACUITY_SCORE: 21
ADLS_ACUITY_SCORE: 22
ADLS_ACUITY_SCORE: 21

## 2023-07-17 NOTE — PLAN OF CARE
Goal Outcome Evaluation:    Pertinent assessments: A&O, VSS afebrile. C/o abdominal pain throughout shift, 5-7 pain rating. Using IS intermittenly. Ate fair.    Major Shift Events: Up SBA around unit, pain controlled with scheduled Tylenol and PRN Dilaudid.     Treatment Plan: Cont to advance diet, pain management. Steroid taper.     Type 2 diabetes mellitus with hyperglycemia, without long-term current use of insulin

## 2023-07-17 NOTE — PROGRESS NOTES
SPIRITUAL HEALTH SERVICES Progress Note  [facility] Med/Surg 5    Saw pt, Devi, per admission request. She had company ([employer]) and requested that I come visit later, but requested prayer for healing and pain relief. We shared prayer together.    Plan: I and other chaplains remain available for further support.    Jennie Mcintyre   Intern    [facility] routine referrals *[phone]  [facility] available 24/7 for emergent requests/referrals, either by paging the on-call  or by entering an ASAP/STAT consult in Epic (this will also page the on-call ).

## 2023-07-17 NOTE — PROGRESS NOTES
COLON & RECTAL SURGERY  PROGRESS NOTE    July 17, 2023  Post-op Day # 3    SUBJECTIVE: Pain is controlled. She has been tolerating full liquids without nausea and vomiting. No BM or gas yet. No further bleeding episodes since transfusion yesterday. Hgb 8.7 from 9.4.    OBJECTIVE:  Temp:  [97.2  F (36.2  C)-98.9  F (37.2  C)] 98.8  F (37.1  C)  Pulse:  [84-98] 94  Resp:  [18-20] 20  BP: (100-120)/(56-71) 100/62  SpO2:  [94 %-99 %] 97 %    Intake/Output Summary (Last 24 hours) at 7/17/2023 1053  Last data filed at 7/17/2023 0942  Gross per 24 hour   Intake 1088 ml   Output 1250 ml   Net -162 ml       GENERAL:  Awake, alert, no acute distress, lying in bed  HEAD: Nomocephalic atraumatic  SCLERA: anicteric  EXTREMITIES: warm and well perfused  ABDOMEN:  Soft, appropriately tender, non-distended, no rebound or guarding, no peritoneal signs.  INCISION:  C/d/i, some bruising noted    LABS:  Lab Results   Component Value Date    WBC 9.8 07/17/2023     Lab Results   Component Value Date    HGB 8.7 07/17/2023     Lab Results   Component Value Date    HCT 27.4 07/17/2023     Lab Results   Component Value Date     07/17/2023     Last Basic Metabolic Panel:  Lab Results   Component Value Date     07/17/2023      Lab Results   Component Value Date    POTASSIUM 4.0 07/17/2023     Lab Results   Component Value Date    CHLORIDE 105 07/17/2023     Lab Results   Component Value Date    GIORGIO 8.6 07/17/2023     Lab Results   Component Value Date    CO2 28 07/17/2023     Lab Results   Component Value Date    BUN 17.5 07/17/2023     Lab Results   Component Value Date    CR 1.01 07/17/2023     Lab Results   Component Value Date    GLC 96 07/17/2023     07/14/2023       ASSESSMENT/PLAN:  64 y/o woman POD#3 s/p ileocolic resection with post-course complicated by anastomotic bleed. No further bleeding since yesterday afternoon. Hgb 8.7 this morning.     - Low fiber diet  - Steroid taper  - Pain management as needed  -  Encourage ambulation  - Hold Lovenox. Continue to monitor hemoglobin and for signs of bleeding.       For questions/paging, please contact the CRS office at [phone].    Yanci Araujo PA-C  [facility]  Phone: [phone]      Colon and Rectal Surgery Attending Note    Patient seen and examined independently.  Agree with above assessment and plan.  Up walking some.  Voiding well.  Pain controlled with occasional oral Dilaudid and Tylenol.  Did have some gas with some dark blood this morning.  No bright red blood.    Abdomen: Soft, nontender, nondistended.  Incision clean dry and intact.    Plan: Continue low fiber diet, oral steroids, pain management as needed, continue to hold Lovenox until hemoglobin stable.  CBC ordered for tomorrow morning.    Pathology results pending.    Marleny Mendez MD  [facility]  [address], [address]  [place] [address]  T: [phone]  F: [phone]   [email or web address]

## 2023-07-17 NOTE — PLAN OF CARE
Goal Outcome Evaluation:      Plan of Care Reviewed With: patient    Overall Patient Progress: no changeOverall Patient Progress: no change  Pertinent assessments: Assumed cares 1623-0525. Pt A&Ox4, VSS, c/o abdominal pain, PRN dilaudid and robaxin given with some relief. Ice applied intermittently. Lap sites and midline incision open to air,  CDI with brusing. Denies feeling dizzy, nausea, SOB. New PIV placed by flymichelle RN in R forearm. IVF LR running at 75mL/hr. Encouraged oral fluids, activity as tolerated. No BM this shift.     Major Shift Events: new PIV    Treatment Plan: Pain management. IV Hydrocortisone. Diet advancement. Encourage mobility. Return of bowel function.     Bedside Nurse: EMMANUEL RIZVI RN

## 2023-07-17 NOTE — PLAN OF CARE
Pertinent assessments: Pt Aox4, VSS on room air. Ambulates SBA. C/o abdominal pain. Dilaudid PO and tylenol given with relief. Ice applied intermittently. Lap sites and midline incision open to air,  CDI with brusing. Denies feeling dizzy, nausea, SOB. LR infusing at 75mL/hr. Encourage oral fluids, activity as tolerated. No BM this shift. No gas yet. Voiding in the bathroom.     Major Shift Events: Pain management. Slept.     Treatment Plan: Pain management. IV Hydrocortisone. Diet advancement. Encourage mobility. Return of bowel function. Monitor Hgb.

## 2023-07-18 LAB
ANION GAP SERPL CALCULATED.3IONS-SCNC: 7 MMOL/L (ref 7–15)
BUN SERPL-MCNC: 12.7 MG/DL (ref 8–23)
CALCIUM SERPL-MCNC: 8.5 MG/DL (ref 8.8–10.2)
CHLORIDE SERPL-SCNC: 107 MMOL/L (ref 98–107)
CREAT SERPL-MCNC: 1 MG/DL (ref 0.51–0.95)
DEPRECATED HCO3 PLAS-SCNC: 29 MMOL/L (ref 22–29)
ERYTHROCYTE [DISTWIDTH] IN BLOOD BY AUTOMATED COUNT: 16 % (ref 10–15)
GFR SERPL CREATININE-BSD FRML MDRD: 63 ML/MIN/1.73M2
GLUCOSE SERPL-MCNC: 94 MG/DL (ref 70–99)
HCT VFR BLD AUTO: 26.5 % (ref 35–47)
HGB BLD-MCNC: 8.6 G/DL (ref 11.7–15.7)
MCH RBC QN AUTO: 29.6 PG (ref 26.5–33)
MCHC RBC AUTO-ENTMCNC: 32.5 G/DL (ref 31.5–36.5)
MCV RBC AUTO: 91 FL (ref 78–100)
PLATELET # BLD AUTO: 214 10E3/UL (ref 150–450)
POTASSIUM SERPL-SCNC: 3.9 MMOL/L (ref 3.4–5.3)
RBC # BLD AUTO: 2.91 10E6/UL (ref 3.8–5.2)
SODIUM SERPL-SCNC: 143 MMOL/L (ref 136–145)
WBC # BLD AUTO: 7.1 10E3/UL (ref 4–11)

## 2023-07-18 PROCEDURE — 99232 SBSQ HOSP IP/OBS MODERATE 35: CPT | Performed by: HOSPITALIST

## 2023-07-18 PROCEDURE — 120N000001 HC R&B MED SURG/OB

## 2023-07-18 PROCEDURE — 250N000013 HC RX MED GY IP 250 OP 250 PS 637: Performed by: COLON & RECTAL SURGERY

## 2023-07-18 PROCEDURE — 250N000013 HC RX MED GY IP 250 OP 250 PS 637: Performed by: PHYSICIAN ASSISTANT

## 2023-07-18 PROCEDURE — 250N000012 HC RX MED GY IP 250 OP 636 PS 637: Performed by: COLON & RECTAL SURGERY

## 2023-07-18 PROCEDURE — 80048 BASIC METABOLIC PNL TOTAL CA: CPT | Performed by: INTERNAL MEDICINE

## 2023-07-18 PROCEDURE — 250N000013 HC RX MED GY IP 250 OP 250 PS 637: Performed by: HOSPITALIST

## 2023-07-18 PROCEDURE — 36415 COLL VENOUS BLD VENIPUNCTURE: CPT | Performed by: COLON & RECTAL SURGERY

## 2023-07-18 PROCEDURE — 85027 COMPLETE CBC AUTOMATED: CPT | Performed by: COLON & RECTAL SURGERY

## 2023-07-18 RX ORDER — MULTIVITAMIN,THERAPEUTIC
1 TABLET ORAL DAILY
Status: DISCONTINUED | OUTPATIENT
Start: 2023-07-18 | End: 2023-07-19 | Stop reason: HOSPADM

## 2023-07-18 RX ADMIN — ESOMEPRAZOLE MAGNESIUM 40 MG: 40 CAPSULE, DELAYED RELEASE ORAL at 17:20

## 2023-07-18 RX ADMIN — TRAZODONE HYDROCHLORIDE 200 MG: 100 TABLET ORAL at 21:35

## 2023-07-18 RX ADMIN — NORTRIPTYLINE HYDROCHLORIDE 25 MG: 25 CAPSULE ORAL at 21:35

## 2023-07-18 RX ADMIN — MONTELUKAST 10 MG: 10 TABLET, FILM COATED ORAL at 21:35

## 2023-07-18 RX ADMIN — PREDNISONE 10 MG: 10 TABLET ORAL at 08:15

## 2023-07-18 RX ADMIN — HYDROMORPHONE HYDROCHLORIDE 2 MG: 2 TABLET ORAL at 21:39

## 2023-07-18 RX ADMIN — HYDROMORPHONE HYDROCHLORIDE 2 MG: 2 TABLET ORAL at 14:11

## 2023-07-18 RX ADMIN — ACETAMINOPHEN 650 MG: 325 TABLET, FILM COATED ORAL at 13:09

## 2023-07-18 RX ADMIN — ESOMEPRAZOLE MAGNESIUM 40 MG: 40 CAPSULE, DELAYED RELEASE ORAL at 08:15

## 2023-07-18 RX ADMIN — THERA TABS 1 TABLET: TAB at 08:15

## 2023-07-18 RX ADMIN — ACETAMINOPHEN 650 MG: 325 TABLET, FILM COATED ORAL at 08:15

## 2023-07-18 ASSESSMENT — ACTIVITIES OF DAILY LIVING (ADL)
ADLS_ACUITY_SCORE: 22
ADLS_ACUITY_SCORE: 21
ADLS_ACUITY_SCORE: 21
ADLS_ACUITY_SCORE: 22
ADLS_ACUITY_SCORE: 21
ADLS_ACUITY_SCORE: 22
ADLS_ACUITY_SCORE: 22
ADLS_ACUITY_SCORE: 21
ADLS_ACUITY_SCORE: 22

## 2023-07-18 NOTE — PROGRESS NOTES
"CLINICAL NUTRITION SERVICES - ASSESSMENT NOTE     Nutrition Prescription    RECOMMENDATIONS FOR MDs/PROVIDERS TO ORDER:  None    Malnutrition Status:    Patient does not meet criteria for malnutrition    Recommendations already ordered by Registered Dietitian (RD):  Chocolate ensure BID b/ln meals  Multivitamin w/ cosign as pt is meeting < 75% nutrition needs    Future/Additional Recommendations:  Adjust supplements pending PO intake/pt preference       REASON FOR ASSESSMENT  Devi Salas is a/an 63 year old female assessed by the dietitian for Admission Nutrition Risk Screen for positive    Patient presents with Crohn's, RCC s/p nephrectomy, CKD3, acute blood loss anemia    NUTRITION HISTORY  Patient was following a low fiber diet at home of no more than 13g fiber daily. Pt was concerned that she was getting too much fiber at home as she was having up to 8g of fiber in a single meal. Pt could not verbalize how she felt after eating the 8g of fiber.     Pt's family bought her two Crohn's disease books/cookbooks written by a Physician and a Registered Dietitian respectively.     Pt stated she has had a poor appetite of < 50% usual intake since March 23rd, 2023. Pt noted her weight fluctuates with stated -180lbs. Pt drinks a chocolate Ensure at 10am and 2pm    CURRENT NUTRITION ORDERS  Diet: Low Fiber  Intake/Tolerance: pt eating % of meals per nursing records and receiving on average 751kcals and 28g protein daily    LABS  Labs reviewed: Cr 1.01, anion gap 6, hgb 9.2, hematocrit 27.4, rbc 2.99, RDW 16.1    MEDICATIONS  Medications reviewed: deltasone, lactated ringers    ANTHROPOMETRICS  Height: 160 cm (5' 3\")  Most Recent Weight: 73.5 kg (162 lb 0.6 oz)    IBW: 52.4 kg  BMI: Overweight BMI 25-29.9  Weight History:   Wt Readings from Last 30 Encounters:   07/17/23 73.5 kg (162 lb 0.6 oz)   06/16/23 64.7 kg (142 lb 11.2 oz)     No other wt hx available    Dosing Weight: 73.5 kg and 57.7kg ABW for " protein/fluid    ASSESSED NUTRITION NEEDS  Estimated Energy Needs: 1512 kcals/day ([place] Jeor)  Justification: Maintenance and Overweight  Estimated Protein Needs: 46 grams protein/day (0.8 grams of pro/kg)  Justification: CKD and Maintenance  Estimated Fluid Needs: 1730 mL/day (30 mL/kg)   Justification: Maintenance    PHYSICAL FINDINGS  See malnutrition section below.  No abnormal nutrition-related physical findings observed.       MALNUTRITION:  % Weight Loss:  None noted  % Intake:  Decreased intake does not meet criteria for malnutrition as it has only been 6 days of eating < 75% estimated energy needs  Subcutaneous Fat Loss:  None observed  Muscle Loss:  Temporal region mild depletion, Clavicle bone region mild depletion and Posterior calf region mild depletion  Fluid Retention:  None noted    Malnutrition Diagnosis: Patient does not meet two of the above criteria necessary for diagnosing malnutrition      NUTRITION DIAGNOSIS  Inadequate protein-energy intake related to Crohn's disease as evidenced by meeting < 75% nutrition needs for 6 days      INTERVENTIONS  Implementation  Nutrition Education: Provided education on slowing increasing fiber as tolerated per physician direction   Multivitamin/mineral supplement therapy     Goals  Patient to consume % of nutritionally adequate meals three times per day, or the equivalent with supplements/snacks.  Total avg nutritional intake to meet a minimum of 1512 kcal/kg and 46 g PRO/kg daily (per dosing wt 73.5 kg and 57.7 kg).     Monitoring/Evaluation  Progress toward goals will be monitored and evaluated per protocol. PO intake, supplement tolerance, wt, labs

## 2023-07-18 NOTE — PLAN OF CARE
Goal Outcome Evaluation:      Plan of Care Reviewed With: patient    Overall Patient Progress: no changeOverall Patient Progress: no change    Outcome Evaluation: Pt has been eating <75% estimated energy needs for 6 days. Ordered chocolate Ensure Enlive BID b/n meals and multivitamin w/ cosign.

## 2023-07-18 NOTE — PLAN OF CARE
Goal Outcome Evaluation:      Plan of Care Reviewed With: patient    Overall Patient Progress: improvingOverall Patient Progress: improving       Pertinent assessments: A&Ox4, VSS. SBA to BR. 4x liquid BM this shift. Dark black, no visible clots. PIV infusing LR at 75mL /hr, infiltrated. Admitting paged and OK to leave PIV out overnight since pt is such a hard stick and she is drinking water. To be addressed in morning, may need vascular access. Abd pain throughout shift, PRN dilaudid and robaxin given with some relief. Low fiber diet tolerated. Midline incision, lap sites CDI, some bruising. Hgb recheck 9.2.     Major Shift Events: loss of PIV    Treatment Plan: Cont to advance diet, pain management. Steroid taper, monitor hgb.     Bedside Nurse: EMMANUEL RIZVI RN

## 2023-07-18 NOTE — PLAN OF CARE
Goal Outcome Evaluation:    Pertinent assessments: A&O, VSS, up ind in room and halls. Ate fair, did not want the protein supplements. HGB trending down, 9.2-8.6. No IV access, ok'd per hospitalist.     Major Shift Events: Cont to have tarry dark stools. C/o abd pain, received Tylenol and Dilaudid, effective at pain management.     Treatment Plan: CBC in AM, will determine discharge.

## 2023-07-18 NOTE — PROGRESS NOTES
[facility]  Hospitalist Progress Note     Assessment & Plan     ASSESSMENT    63F with hx of RCC s/p nephrectomy w/ CKD Stage III and Crohn's disease on Remicade with recent hospitalization discharged on steroid taper re-presents with ongoing abdominal pain and diarrhea and found to have ongoing evidence of ileitis on CT scan with luminal narrowing. CRS and GI were consulted and patient underwent ileocolic resection on 7/14/23.    PLAN    Acute blood loss anemia  - Colorectal surgery following. Felt to be secondary to an anastomotic bleed. Blood is decreasing and dark, so possible that bleeding has stopped.   - Hemodynamically stable.  - Colorectal surgery did transfuse 1 unit(s) PRBCs on 7/16 due to symptomatic anemia. Only one episode of dark blood today. No bright red blood.   - Continue to trend Hgb. Transfuse if Hgb < 7 or per CRS.  - SubQ Lovenox on hold.    Crohn's Ileitis, Acute on Chronic  -Presents with ongoing abdominal pain 2/2 ileitis that continues after recent admission under similar circumstances where she received IV steroids  -Abdominal pain and diarrhea continued despite steroid taper and Remicade on outpatient basis  - On admission, ongoing evidence of ileitis on CT scan with luminal narrowing  - CRS and GI were consulted and patient underwent ileocolic resection on 7/14/23.  - Planning to complete steroid taper. If bleeding below resolves and tolerating diet, can consider transition to oral regimen in coming days.   - Diet advanced per CRS. Does still have IVF running at 75 ml/hour. If tolerating diet well enough tomorrow, would consider stopping.   - Continue to encourage mobility and ambulation.  - GI has signed off. Cancel upcoming dose of Remicade. Keep follow up appointment at [facility] in August as scheduled, colonoscopy in 5-6 months.   - Avoid NSAIDS.    Lactic Acidosis  - Present on admission. Suspect 2/2 hypovolemia. Resolved.     RCC s/p nephrectomy w/ CKD Stage III  - Cr at  "baseline  - Avoid nephrotoxins    DVT Prophylaxis: Enoxaparin (Lovenox) subcutaneous is now held with bleeding. SCDs are ordered.   Code Status: Full Code  Expected discharge: Pending post-operative course.     Millicent Feliz MD [facility]  Hospitalist Service  [facility]    Securely message with [employer] (more info)  Text page via [facility] Paging/Directory         Subjective     Patient with one episode of dark blood reported from rectum today. No bright red blood. Tolerating low fiber diet. Ambulating well in hallway today. Otherwise no new complaints or acute events.           Objective   Blood pressure 100/55, pulse 91, temperature 99.2  F (37.3  C), temperature source Oral, resp. rate 20, height 1.6 m (5' 3\"), weight 73.5 kg (162 lb 0.6 oz), SpO2 98 %.    Constitutional: Pleasant woman seen sitting up in bed in the afternoon. Family at bedside. Alert and oriented x3. Continues to appear slightly uncomfortable, but otherwise no acute distress.   HEENT: NCAT. EOMI. Moist oral mucosa.  Respiratory: Clear to auscultation bilaterally on anterior exam. No crackles or wheezes.  Cardiovascular: Regular rate and rhythm.  GI: Patient defers abdominal exam at this time secondary to pain. Note that colorectal surgery is following post-operatively after bowel resection.    Musculoskeletal: No gross deformities.   Neurologic: Alert and oriented x3. No focal neurologic deficits. Did not assess gait.    Medications     lactated ringers 75 mL/hr at 07/17/23 1659       [Held by provider] enoxaparin ANTICOAGULANT  40 mg Subcutaneous Q24H     esomeprazole  40 mg Oral BID AC     montelukast  10 mg Oral At Bedtime     nortriptyline  25 mg Oral At Bedtime     predniSONE  10 mg Oral Daily     sodium chloride (PF)  3 mL Intracatheter Q8H     traZODone  200 mg Oral At Bedtime       Data   Recent Labs   Lab 07/17/23  0658 07/16/23  2211 07/16/23  1723 07/16/23  0607 07/15/23  1611 07/15/23  0638 07/13/23  0806 07/12/23  1530   WBC " 9.8  --   --  9.8  --  14.2*   < > 9.8   HGB 8.7* 9.4* 9.6* 7.9*   < > 9.5*   < > 12.7   MCV 92  --   --  92  --  92   < > 91     --   --  240  --  277   < > 294     --   --  139  --  139   < > 138   POTASSIUM 4.0  --   --  4.2  --  4.1   < > 4.7   CHLORIDE 105  --   --  106  --  106   < > 101   CO2 28  --   --  26  --  25   < > 26   BUN 17.5  --   --  29.0*  --  16.0   < > 10.0   CR 1.01*  --   --  1.05*  --  1.01*   < > 1.14*   ANIONGAP 6*  --   --  7  --  8   < > 11   GIORGIO 8.6*  --   --  8.5*  --  8.3*   < > 9.4   GLC 96  --   --  104*  --  103*   < > 102*   ALBUMIN  --   --   --   --   --   --   --  4.3   PROTTOTAL  --   --   --   --   --   --   --  6.5   BILITOTAL  --   --   --   --   --   --   --  0.5   ALKPHOS  --   --   --   --   --   --   --  66   ALT  --   --   --   --   --   --   --  17   AST  --   --   --   --   --   --   --  18    < > = values in this interval not displayed.       No results found for this or any previous visit (from the past 24 hour(s)).

## 2023-07-18 NOTE — PROGRESS NOTES
COLON & RECTAL SURGERY  PROGRESS NOTE    July 18, 2023  Post-op Day # 4    SUBJECTIVE:  Feels well. Tolerating low fiber diet. No nausea or vomiting. + gas. No BM. Has had a few more episodes of passing dark blood, most recently this morning. Pain is controlled. AVSS. Hgb 8.6.     OBJECTIVE:  Temp:  [98  F (36.7  C)-99.2  F (37.3  C)] 98  F (36.7  C)  Pulse:  [86-91] 86  Resp:  [18-20] 18  BP: (100-121)/(55-66) 121/66  SpO2:  [90 %-98 %] 90 %    Intake/Output Summary (Last 24 hours) at 7/18/2023 1218  Last data filed at 7/17/2023 2100  Gross per 24 hour   Intake 1307 ml   Output 900 ml   Net 407 ml       GENERAL:  Awake, alert, no acute distress, sitting up in her chair  HEAD: Nomocephalic atraumatic  SCLERA: anicteric  EXTREMITIES: warm and well perfused  ABDOMEN:  Soft, appropriately tender, non-distended, no rebound or guarding, no peritoneal signs  INCISION:  C/d/i, some bruising    LABS:  Lab Results   Component Value Date    WBC 7.1 07/18/2023     Lab Results   Component Value Date    HGB 8.6 07/18/2023     Lab Results   Component Value Date    HCT 26.5 07/18/2023     Lab Results   Component Value Date     07/18/2023     Last Basic Metabolic Panel:  Lab Results   Component Value Date     07/18/2023      Lab Results   Component Value Date    POTASSIUM 3.9 07/18/2023     Lab Results   Component Value Date    CHLORIDE 107 07/18/2023     Lab Results   Component Value Date    GIORGIO 8.5 07/18/2023     Lab Results   Component Value Date    CO2 29 07/18/2023     Lab Results   Component Value Date    BUN 12.7 07/18/2023     Lab Results   Component Value Date    CR 1.00 07/18/2023     Lab Results   Component Value Date    GLC 94 07/18/2023     07/14/2023       ASSESSMENT/PLAN:   64 y/o woman POD#4 s/p ileocolic resection with post-course complicated by anastomotic bleed. No further bleeding since yesterday afternoon. Hgb 8.6 this morning.      - Low fiber diet  - Steroid taper  - Pain management as  needed  - Encourage ambulation  - Hold Lovenox. Continue to monitor hemoglobin and for signs of bleeding.       For questions/paging, please contact the CRS office at [phone].    Yanci Araujo PA-C  [facility]  Phone: [phone]    Colon and Rectal Surgery Attending Note    Patient seen and examined independently.  Agree with above assessment and plan.  Frequent loose stools this after noon. No further blood  abd soft, incision cdi  Plan  Oral steroids  LFD  Likely discharge home tomorrow.    Marleny Mendez MD  [facility]  [address] [place] [address], [address]  [place] [address]  T: [phone]  F: [phone]   [email or web address]

## 2023-07-18 NOTE — PROGRESS NOTES
[facility]    Medicine Progress Note - Hospitalist Service    Date of Admission:  7/12/2023    Assessment & Plan     63F with hx of RCC s/p nephrectomy w/ CKD Stage III and Crohn's disease on Remicade with recent hospitalization discharged on steroid taper re-presents with ongoing abdominal pain and diarrhea and found to have ongoing evidence of ileitis on CT scan with luminal narrowing. CRS and GI were consulted and patient underwent ileocolic resection on 7/14/23.     PLAN     Acute blood loss anemia  - Colorectal surgery following. Felt to be secondary to an anastomotic bleed. Blood is decreasing and dark, so possible that bleeding has stopped.   - Hemodynamically stable.  - CRS  transfuse 1 unit(s) PRBCs on 7/16 due to symptomatic anemia. Only one episode of dark blood today.   -Hg 8.6 g  - Continue to trend Hgb. Transfuse if Hgb < 7 or per CRS.  - SubQ Lovenox on hold  -Plan to monitor CBC if hemoglobin stable in the a.m. she can be discharged     Crohn's Ileitis, Acute on Chronic  -Presents with ongoing abdominal pain 2/2 ileitis that continues after recent admission under similar circumstances where she received IV steroids  -Abdominal pain and diarrhea continued despite steroid taper and Remicade on outpatient basis  - On admission, ongoing evidence of ileitis on CT scan with luminal narrowing  - CRS and GI were consulted and patient underwent ileocolic resection on 7/14/23.  - Planning to complete steroid taper. If bleeding below resolves and tolerating diet, can consider transition to oral regimen in coming days.   - Diet advanced per CRS. Does still have IVF running at 75 ml/hour. If tolerating diet well enough tomorrow, would consider stopping.   - Continue to encourage mobility and ambulation.  - GI has signed off. Cancel upcoming dose of Remicade. Keep follow up appointment at [facility] in August as scheduled, colonoscopy in 5-6 months.   - Avoid NSAIDS.     Lactic Acidosis  - Present  "on admission. Suspect 2/2 hypovolemia. Resolved.      RCC s/p nephrectomy w/ CKD Stage III  - Cr at baseline  - Avoid nephrotoxins     Diet: Low Fiber Diet  Snacks/Supplements Adult: Ensure Enlive; Between Meals    DVT Prophylaxis: Pneumatic Compression Devices  Monae Catheter: Not present  Lines: None     Cardiac Monitoring: None  Code Status: Full Code      Clinically Significant Risk Factors                         # Overweight: Estimated body mass index is 28.35 kg/m  as calculated from the following:    Height as of this encounter: 1.6 m (5' 3\").    Weight as of this encounter: 72.6 kg (160 lb 0.9 oz).           Disposition Plan     Expected Discharge Date: 07/18/2023      Destination: home            Axel Reeves MD  Hospitalist Service  [facility]  Securely message with [employer] (more info)  Text page via [employer]. Paging/Directory   ______________________________________________________________________    Interval History   Patient seen examined at bedside/chart reviewed.  She complains of moderate abdominal pain with some nausea.  She has been up and ambulatory and walking the halls.  She reported a bowel movement that was dark this AM.  Hg 8.6 g this a.m.    Physical Exam   Vital Signs: Temp: 98  F (36.7  C) Temp src: Oral BP: 121/66 Pulse: 86   Resp: 18 SpO2: 90 % O2 Device: None (Room air)    Weight: 160 lbs .86 oz  Nurse chaperone Shwetha    Constitutional: Pleasant woman seen sitting up in bed in the afternoon. Family at bedside. Alert and oriented x3. Continues to appear slightly uncomfortable, but otherwise no acute distress.   HEENT: NCAT. EOMI. Moist oral mucosa.  Respiratory: Clear to auscultation bilaterally on anterior exam. No crackles or wheezes.  Cardiovascular: Regular rate and rhythm.  GI: Patient defers abdominal exam at this time secondary to pain. Note that colorectal surgery is following post-operatively after bowel resection.    Musculoskeletal: No gross deformities. "   Neurologic: Alert and oriented x3. No focal neurologic deficits. Did not assess gait.    Medical Decision Making       40 MINUTES SPENT BY ME on the date of service doing chart review, history, exam, documentation & further activities per the note.      Data   NOTE: Data reviewed over the past 24 hrs contributes toward MDM complexity

## 2023-07-18 NOTE — PLAN OF CARE
Goal Outcome Evaluation:    Pertinent assessments: Pt A&Ox4, VSS, on RA. SBA to BR. PIV infusing LR at 75mL /hr, infiltrated. Admitting paged and OK to leave PIV out overnight since pt is such a hard stick and she is drinking water. To be addressed in morning, may need vascular access. Midline incision, lap sites CDI, some bruising. Scheduled tylenol for abd pain. Slept well.     Major Shift Events: none    Treatment Plan: Cont to advance diet, pain management. Steroid taper.    Bedside Nurse: Rochelle Welch RN

## 2023-07-19 VITALS
BODY MASS INDEX: 27.66 KG/M2 | RESPIRATION RATE: 16 BRPM | WEIGHT: 156.1 LBS | SYSTOLIC BLOOD PRESSURE: 134 MMHG | HEIGHT: 63 IN | OXYGEN SATURATION: 96 % | TEMPERATURE: 97.7 F | HEART RATE: 81 BPM | DIASTOLIC BLOOD PRESSURE: 68 MMHG

## 2023-07-19 LAB
BASOPHILS # BLD AUTO: 0 10E3/UL (ref 0–0.2)
BASOPHILS NFR BLD AUTO: 0 %
EOSINOPHIL # BLD AUTO: 0.1 10E3/UL (ref 0–0.7)
EOSINOPHIL NFR BLD AUTO: 2 %
ERYTHROCYTE [DISTWIDTH] IN BLOOD BY AUTOMATED COUNT: 15.9 % (ref 10–15)
HCT VFR BLD AUTO: 25.7 % (ref 35–47)
HGB BLD-MCNC: 8.2 G/DL (ref 11.7–15.7)
HOLD SPECIMEN: NORMAL
IMM GRANULOCYTES # BLD: 0.1 10E3/UL
IMM GRANULOCYTES NFR BLD: 1 %
LYMPHOCYTES # BLD AUTO: 2.4 10E3/UL (ref 0.8–5.3)
LYMPHOCYTES NFR BLD AUTO: 33 %
MCH RBC QN AUTO: 29.2 PG (ref 26.5–33)
MCHC RBC AUTO-ENTMCNC: 31.9 G/DL (ref 31.5–36.5)
MCV RBC AUTO: 92 FL (ref 78–100)
MONOCYTES # BLD AUTO: 0.5 10E3/UL (ref 0–1.3)
MONOCYTES NFR BLD AUTO: 7 %
NEUTROPHILS # BLD AUTO: 4.1 10E3/UL (ref 1.6–8.3)
NEUTROPHILS NFR BLD AUTO: 57 %
NRBC # BLD AUTO: 0 10E3/UL
NRBC BLD AUTO-RTO: 0 /100
PLATELET # BLD AUTO: 220 10E3/UL (ref 150–450)
RBC # BLD AUTO: 2.81 10E6/UL (ref 3.8–5.2)
WBC # BLD AUTO: 7.2 10E3/UL (ref 4–11)

## 2023-07-19 PROCEDURE — 250N000012 HC RX MED GY IP 250 OP 636 PS 637: Performed by: COLON & RECTAL SURGERY

## 2023-07-19 PROCEDURE — 250N000013 HC RX MED GY IP 250 OP 250 PS 637: Performed by: COLON & RECTAL SURGERY

## 2023-07-19 PROCEDURE — 99239 HOSP IP/OBS DSCHRG MGMT >30: CPT | Performed by: HOSPITALIST

## 2023-07-19 PROCEDURE — 36415 COLL VENOUS BLD VENIPUNCTURE: CPT | Performed by: HOSPITALIST

## 2023-07-19 PROCEDURE — 85025 COMPLETE CBC W/AUTO DIFF WBC: CPT | Performed by: HOSPITALIST

## 2023-07-19 PROCEDURE — 250N000013 HC RX MED GY IP 250 OP 250 PS 637: Performed by: PHYSICIAN ASSISTANT

## 2023-07-19 PROCEDURE — 250N000013 HC RX MED GY IP 250 OP 250 PS 637: Performed by: HOSPITALIST

## 2023-07-19 RX ORDER — ENOXAPARIN SODIUM 100 MG/ML
40 INJECTION SUBCUTANEOUS EVERY 24 HOURS
Qty: 11.2 ML | Refills: 0 | Status: SHIPPED | OUTPATIENT
Start: 2023-07-19 | End: 2023-08-16

## 2023-07-19 RX ORDER — PREDNISONE 10 MG/1
TABLET ORAL
Qty: 11 TABLET | Refills: 0
Start: 2023-07-20 | End: 2023-08-03

## 2023-07-19 RX ADMIN — ESOMEPRAZOLE MAGNESIUM 40 MG: 40 CAPSULE, DELAYED RELEASE ORAL at 06:19

## 2023-07-19 RX ADMIN — PREDNISONE 10 MG: 10 TABLET ORAL at 09:22

## 2023-07-19 RX ADMIN — THERA TABS 1 TABLET: TAB at 09:22

## 2023-07-19 RX ADMIN — ACETAMINOPHEN 650 MG: 325 TABLET, FILM COATED ORAL at 09:34

## 2023-07-19 RX ADMIN — METHOCARBAMOL 500 MG: 500 TABLET ORAL at 09:34

## 2023-07-19 RX ADMIN — HYDROMORPHONE HYDROCHLORIDE 4 MG: 2 TABLET ORAL at 01:59

## 2023-07-19 RX ADMIN — HYDROMORPHONE HYDROCHLORIDE 4 MG: 2 TABLET ORAL at 06:19

## 2023-07-19 RX ADMIN — METHOCARBAMOL 500 MG: 500 TABLET ORAL at 00:20

## 2023-07-19 ASSESSMENT — ACTIVITIES OF DAILY LIVING (ADL)
ADLS_ACUITY_SCORE: 21

## 2023-07-19 NOTE — PROGRESS NOTES
COLON & RECTAL SURGERY  PROGRESS NOTE    July 19, 2023  Post-op Day # 5    SUBJECTIVE:  Tolerating a diet. +BM/gas. Darker stool last night. No nausea and vomiting. Feels ready to go home. Pain is controlled.    OBJECTIVE:  Temp:  [97.7  F (36.5  C)-98.6  F (37  C)] 97.7  F (36.5  C)  Pulse:  [81-89] 81  Resp:  [16-18] 16  BP: (111-134)/(41-68) 134/68  SpO2:  [94 %-96 %] 96 %  No intake or output data in the 24 hours ending 07/19/23 0825    GENERAL: Awake, alert, no acute distress, lying in bed  HEAD: Nomocephalic atraumatic  SCLERA: anicteric  EXTREMITIES: warm and well perfused  ABDOMEN:  Soft, appropriately tender, non-distended, no rebound or guarding, no peritoneal signs.  INCISION:  C/d/i.    LABS:  Lab Results   Component Value Date    WBC 7.2 07/19/2023     Lab Results   Component Value Date    HGB 8.2 07/19/2023     Lab Results   Component Value Date    HCT 25.7 07/19/2023     Lab Results   Component Value Date     07/19/2023     Last Basic Metabolic Panel:  Lab Results   Component Value Date     07/18/2023      Lab Results   Component Value Date    POTASSIUM 3.9 07/18/2023     Lab Results   Component Value Date    CHLORIDE 107 07/18/2023     Lab Results   Component Value Date    GIORGIO 8.5 07/18/2023     Lab Results   Component Value Date    CO2 29 07/18/2023     Lab Results   Component Value Date    BUN 12.7 07/18/2023     Lab Results   Component Value Date    CR 1.00 07/18/2023     Lab Results   Component Value Date    GLC 94 07/18/2023     07/14/2023       ASSESSMENT/PLAN: 64 y/o woman POD#5 s/p ileocolic resection with post-course complicated by anastomotic bleed. Hgb 8.2 this morning.      - Low fiber diet  - Steroid taper. 10mg of prednisone for 7 days total. Then decrease to 5mg for 7 days.  - Pain management as needed  - Encourage ambulation  - Restart Lovenox today for ppx. Will need this at discharge for 28 days post op so please provide education.  - May be able to discharge  later today if no further bleeding with Lovenox.   - Would hold off on next Remicade infusion until after her post op visit in one month      For questions/paging, please contact the CRS office at [phone].    Yanci Araujo PA-C  [facility]  Phone: [phone]      Colon and Rectal Surgery Attending Note    Patient seen and examined independently.  Agree with above assessment and plan.  Green/ dark stools. Tolerating low fiber. Wants to go home  abd soft, incision CDI  Plan restart lovenox if doing well can discharge after lunch.  Follow up for a ost op check in 3-4 weeks  Hold Remicade until after post op visit.     Marleny Mendez MD  [facility]  [address] [facility], [address]  [place] [address]  T: [phone]  F: [phone]   [email or web address]           ADDENDUM:  Length of stay: 5 days  Indicate Y or N for the following:  UTI  No  C diff  No  PNA  No  SSI No  DVT No  PE  No  CVA No  MI No  Enterocutaneous fistula  No  Peripheral nerve injury  No  Abscess (not adjacent to anastomosis)  No  Leak No    Treated with:   Antibiotics N/A   Drain  N/A   Reoperation  N/A  Death within 30 days No  Reintubation  No  Reoperation  No   Procedure N/A

## 2023-07-19 NOTE — DISCHARGE SUMMARY
Discharge Note    Patient discharged to home via private vehicle  accompanied by friend.  IV: Discontinued  Prescriptions printed and given to patient/family.   Belongings reviewed and sent with patient and family.   Home medications returned to patient: NA  Equipment sent with:  N/A.   patient verbalizes understanding of discharge instructions. AVS given to patient.  Additional education completed? Anticoagulation Therapy  and Low fiber diet

## 2023-07-19 NOTE — PLAN OF CARE
A&Ox4, Ind. LS clear, denied SOB, N/V. Midline abdominal incision-dermabond JOE, no drainage, healing well.Moderate abdominal pain overnight described as cramping-PO dilaudid, ice & binder with relief. VSS. Continent B&B. No peripheral IV.     Major Shift Events:   Uneventful night, slept well. No significant change in condition.     Treatment Plan:   Pt hopes to discharge to home today. Tolerating low fiber diet. Pain management-PO dilaudid, monitor HgB-patient received 1unit on 7/16 for HgB 7.9.

## 2023-07-19 NOTE — DISCHARGE SUMMARY
"[facility]  Hospitalist Discharge Summary      Date of Admission:  7/12/2023  Date of Discharge:  7/19/2023  Discharging Provider: Axel Reeves MD  Discharge Service: Hospitalist Service    Discharge Diagnoses   ABLA  Acute on chronic Crohn's ileitis  Lactic acidosis  CKD stage III  History of RCC status post nephrectomy    Clinically Significant Risk Factors     # Overweight: Estimated body mass index is 27.65 kg/m  as calculated from the following:    Height as of this encounter: 1.6 m (5' 3\").    Weight as of this encounter: 70.8 kg (156 lb 1.6 oz).       Follow-ups Needed After Discharge   Follow-up Appointments     Follow-up and recommended labs and tests       Follow up with primary care provider, Physician No Ref-Primary, within 7   days for hospital follow- up.  No follow up labs or test are needed.    Follow up with Dr. Marleny Mendez ,[facility] [id number] [place] [address], [address]  [place] [address] within 2 - 4 weeks or   hospital follow- up and to follow up on results. No follow up labs or test   are needed.            Unresulted Labs Ordered in the Past 30 Days of this Admission     Date and Time Order Name Status Description    7/14/2023  6:04 PM Surgical Pathology Exam In process       These results will be followed up by CRS    Discharge Disposition   Discharged to home  Condition at discharge: Stable    Hospital Course   63F with hx of RCC s/p nephrectomy w/ CKD Stage III and Crohn's disease on Remicade with recent hospitalization discharged on steroid taper re-presents with ongoing abdominal pain and diarrhea and found to have ongoing evidence of ileitis on CT scan with luminal narrowing. CRS and GI were consulted and patient underwent ileocolic resection on 7/14/23.     PLAN     Acute blood loss anemia  - Colorectal surgery following. Felt to be secondary to an anastomotic bleed. Blood is decreasing and dark, so possible that bleeding has stopped.   - " Hemodynamically stable.  - CRS  transfuse 1 unit(s) PRBCs on 7/16 due to symptomatic anemia. Only one episode of dark blood today.   -Hg 8.6 g  - Continue to trend Hgb. Transfuse if Hgb < 7 or per CRS.  - SubQ Lovenox on hold  -Plan to monitor CBC if hemoglobin stable in the a.m. she can be discharged     Crohn's Ileitis, Acute on Chronic  -Presents with ongoing abdominal pain 2/2 ileitis that continues after recent admission under similar circumstances where she received IV steroids  -Abdominal pain and diarrhea continued despite steroid taper and Remicade on outpatient basis  - On admission, ongoing evidence of ileitis on CT scan with luminal narrowing  - CRS and GI were consulted and patient underwent ileocolic resection on 7/14/23.  - Planning to complete steroid taper. If bleeding below resolves and tolerating diet, can consider transition to oral regimen in coming days.   - Diet advanced per CRS. Does still have IVF running at 75 ml/hour. If tolerating diet well enough tomorrow, would consider stopping.   - Continue to encourage mobility and ambulation.  - GI has signed off. Cancel upcoming dose of Remicade. Keep follow up appointment at [facility] in August as scheduled, colonoscopy in 5-6 months.   - Avoid NSAIDS.     Lactic Acidosis  - Present on admission. Suspect 2/2 hypovolemia. Resolved.      RCC s/p nephrectomy w/ CKD Stage III  - Cr at baseline  - Avoid nephrotoxins       Consultations This Hospital Stay   GASTROENTEROLOGY IP CONSULT  COLORECTAL SURGERY IP CONSULT  WOUND OSTOMY CONTINENCE NURSE  IP CONSULT  PHYSICAL THERAPY ADULT IP CONSULT  CARE MANAGEMENT / SOCIAL WORK IP CONSULT    Code Status   Full Code    Time Spent on this Encounter   I, Axel Reeves MD, personally saw the patient today and spent greater than 30 minutes discharging this patient.       Axel Reeves MD  [place] [address] [facility]  [address] [place] [address]  Phone: [phone]  Fax:  [phone]  ______________________________________________________________________    Physical Exam   Vital Signs: Temp: 97.7  F (36.5  C) Temp src: Oral BP: 134/68 Pulse: 81   Resp: 16 SpO2: 96 % O2 Device: None (Room air)    Weight: 156 lbs 1.6 oz  Constitutional: Pleasant woman seen sitting up in bed in the afternoon. Family at bedside. Alert and oriented x3. Continues to appear slightly uncomfortable, but otherwise no acute distress.   HEENT: NCAT. EOMI. Moist oral mucosa.  Respiratory: Clear to auscultation bilaterally on anterior exam. No crackles or wheezes.  Cardiovascular: Regular rate and rhythm.  GI: Patient defers abdominal exam at this time secondary to pain. Note that colorectal surgery is following post-operatively after bowel resection.    Musculoskeletal: No gross deformities.   Neurologic: Alert and oriented x3. No focal neurologic deficits. Did not assess gait.         Primary Care Physician   Physician No Ref-Primary    Discharge Orders      Reason for your hospital stay    Acute on Chronic Crohn's Ileitis s/p underwent ileocolic resection on 7/14/23     Follow-up and recommended labs and tests     Follow up with primary care provider, Physician No Ref-Primary, within 7 days for hospital follow- up.  No follow up labs or test are needed.    Follow up with Dr. Marleny Mendez ,[facility] [id number] [place] [address], [address]  [place] [address] within 2 - 4 weeks or hospital follow- up and to follow up on results. No follow up labs or test are needed.     Activity    Your activity upon discharge: activity as tolerated     Diet    Follow this diet upon discharge: Orders Placed This Encounter      Snacks/Supplements Adult: Ensure Enlive; Between Meals      Low Fiber Diet       Significant Results and Procedures   Results for orders placed or performed during the hospital encounter of 07/12/23   CT Abdomen Pelvis w Contrast    Narrative    EXAM: CT ABDOMEN PELVIS W  CONTRAST  LOCATION: [facility]  DATE: 7/12/2023    INDICATION: Abdominal pain. Crohn's disease.  COMPARISON: 06/15/2023  TECHNIQUE: CT scan of the abdomen and pelvis was performed following injection of IV contrast. Multiplanar reformats were obtained. Dose reduction techniques were used.  CONTRAST: 71mL Isovue 370    FINDINGS:   LOWER CHEST: Unremarkable.    HEPATOBILIARY: Liver appears normal. Status post cholecystectomy. No biliary dilatation.    PANCREAS: Normal.    SPLEEN: Unchanged 0.6 cm low-attenuation lesion in the spleen, too small to characterize.    ADRENAL GLANDS: Normal.    KIDNEYS/BLADDER: Status post right nephrectomy. Left kidney appears normal. No hydronephrosis. Urinary bladder appears normal.    BOWEL: No significant change in 3 segments of terminal ileum demonstrating wall thickening and mucosal hyperenhancement with associated luminal narrowing. The mid to distal sigmoid colon is decompressed, which obscures the previously described segments   of luminal narrowing. No definite inflammatory change involving the colon. No bowel obstruction. No abscess. Appendix not visualized, although no secondary signs of acute appendicitis.    LYMPH NODES: No lymphadenopathy.    VASCULATURE: Moderate atherosclerotic calcifications.    PELVIC ORGANS: Status post hysterectomy.    MUSCULOSKELETAL: Multilevel degenerative changes of the spine.      Impression    IMPRESSION:   1.  No significant change in 3 segments of terminal ileum demonstrating acute on chronic ileitis with luminal narrowing. No bowel obstruction. No abscess.       Discharge Medications   Current Discharge Medication List      START taking these medications    Details   enoxaparin ANTICOAGULANT (LOVENOX) 40 MG/0.4ML syringe Inject 0.4 mLs (40 mg) Subcutaneous every 24 hours for 28 days  Qty: 11.2 mL, Refills: 0    Associated Diagnoses: Crohn's disease of colon with complication (H)         CONTINUE these medications which  have CHANGED    Details   predniSONE (DELTASONE) 10 MG tablet Take 1 tablet (10 mg) by mouth daily for 7 days, THEN 0.5 tablets (5 mg) daily for 7 days.  Qty: 11 tablet, Refills: 0    Associated Diagnoses: Crohn's disease of colon with complication (H)         CONTINUE these medications which have NOT CHANGED    Details   acetaminophen (TYLENOL) 650 MG CR tablet Take 650 mg by mouth At Bedtime      albuterol (PROAIR HFA/PROVENTIL HFA/VENTOLIN HFA) 108 (90 Base) MCG/ACT inhaler Inhale 2 puffs into the lungs every 6 hours as needed for shortness of breath, wheezing or cough      bisacodyl (DULCOLAX) 10 MG suppository Place 1 suppository (10 mg) rectally 2 times daily as needed for constipation  Qty: 10 suppository, Refills: 1    Associated Diagnoses: RLQ abdominal pain; Partial small bowel obstruction (H)      esomeprazole (NEXIUM) 40 MG DR capsule Take 40 mg by mouth 2 times daily (before meals) Take 30-60 minutes before eating.      famotidine (PEPCID) 40 MG tablet Take 40 mg by mouth At Bedtime      methocarbamol (ROBAXIN) 500 MG tablet Take 1 tablet (500 mg) by mouth 4 times daily as needed for muscle spasms  Qty: 15 tablet, Refills: 1    Associated Diagnoses: RLQ abdominal pain; Partial small bowel obstruction (H); IBD (inflammatory bowel disease); Nausea      montelukast (SINGULAIR) 10 MG tablet Take 10 mg by mouth At Bedtime      nortriptyline (PAMELOR) 25 MG capsule Take 25 mg by mouth At Bedtime      ondansetron (ZOFRAN ODT) 4 MG ODT tab Take 1 tablet (4 mg) by mouth every 8 hours as needed for nausea  Qty: 15 tablet, Refills: 2    Associated Diagnoses: IBD (inflammatory bowel disease); Nausea      polyethylene glycol (MIRALAX) 17 GM/Dose powder Take 17 g by mouth daily  Qty: 510 g, Refills: 3    Associated Diagnoses: RLQ abdominal pain; Partial small bowel obstruction (H)      sennosides (SENOKOT) 8.6 MG tablet Take 1-2 tablets by mouth 2 times daily as needed for constipation  Qty: 90 tablet, Refills: 1     Associated Diagnoses: RLQ abdominal pain; Partial small bowel obstruction (H)      traZODone (DESYREL) 100 MG tablet Take 200 mg by mouth At Bedtime           Allergies   Allergies   Allergen Reactions     Latex Hives     Oxycodone Anaphylaxis, Hives and Swelling     Throat swelling, per pt     Penicillin G Anaphylaxis     Sumatriptan Palpitations     Aspirin Nausea and Vomiting     Demerol Hcl [Meperidine] Nausea and Vomiting     N/V     Percocet [Oxycodone-Acetaminophen] Nausea and Vomiting     N/V     Codeine Nausea and Vomiting     Mold

## 2023-07-19 NOTE — PHARMACY - DISCHARGE MEDICATION RECONCILIATION AND EDUCATION
Discharge medication review for this patient is complete. Face-to-face medication education was provided by the pharmacist.  See [facility] for allergy information, immunization status, and discharge medication list.  Pharmacist assisted with medication reconciliation of discharge medications.    Patient was informed to START taking the following NEW medications (and reason why, if known):   Lovenox subcutaneous for 28 days.    Patient was educated on the following for each new or changed medication listed above:   Rationale for therapy  Duration of treatment  Common side effects  Importance of compliance  Missed doses  Self monitoring parameters    Left written materials and instructions (Clinical notes from [facility]) for new medications: Yes    OUTCOMES: Patient verbalized understanding

## 2023-07-19 NOTE — PLAN OF CARE
To Do:  End of Shift Summary  For vital signs and complete assessments, please see documentation flowsheets.     Pertinent assessments: A&Ox4, Ind. LS clear, denied SOB, N/V. Abd incision JOE, no drainage, healing well. Pain at abd 6/10, give P.O dilaudid. VSS. Continent B&B. No peripheral IV.   Major Shift Events: none  Treatment Plan: may discharge tomorrow depending on hemoglobin level.   Bedside Nurse: Francisca Dudley RN

## 2023-07-20 ENCOUNTER — PATIENT OUTREACH (OUTPATIENT)
Dept: CARE COORDINATION | Facility: CLINIC | Age: 63
End: 2023-07-20
Payer: COMMERCIAL

## 2023-07-20 LAB
PATH REPORT.COMMENTS IMP SPEC: NORMAL
PATH REPORT.COMMENTS IMP SPEC: NORMAL
PATH REPORT.FINAL DX SPEC: NORMAL
PATH REPORT.GROSS SPEC: NORMAL
PATH REPORT.MICROSCOPIC SPEC OTHER STN: NORMAL
PATH REPORT.RELEVANT HX SPEC: NORMAL
PHOTO IMAGE: NORMAL

## 2023-07-20 NOTE — PROGRESS NOTES
Clinic Care Coordination Contact  [facility]: Post-Discharge Note  SITUATION                                                      Admission:    Admission Date: 06/05/23   Reason for Admission: RLQ abdominal pain  Discharge:   Discharge Date: 06/18/23  Discharge Diagnosis: Partial SBO, resolved   Recent dx Crohns with evidence of ileal stricture.  Leukocytosis, thought to be demargination primarily related to steroids, ongoing but stable   LIDIA  Moderate Protein-Calorie Malnutrition   GERD   MDD  InsomniaI'    BACKGROUND                                                      Per hospital discharge summary and inpatient provider notes:    Ms. Devi Salas is a 62 year old female with hx Crohn's disease diagnosed early this year, asthma, migraines and renal cell cancer that was managed surgically with right nephrectomy in 2009 who presented to emergency room with diarrhea, nausea, and abdominal pain.      ASSESSMENT           Discharge Assessment  How are you doing now that you are home?: I'm feeling better but I'm still having pain.  How are your symptoms? (Red Flag symptoms escalate to triage hotline per guidelines): Improved  Do you feel your condition is stable enough to be safe at home until your provider visit?: Yes  Does the patient have their discharge instructions? : Yes (reviewed with patient as she has not read them yet)  Does the patient have questions regarding their discharge instructions? : No  Were you started on any new medications or were there changes to any of your previous medications? : Yes  Does the patient have all of their medications?: Yes  Do you have questions regarding any of your medications? : No  Do you have all of your needed medical supplies or equipment (DME)?  (i.e. oxygen tank, CPAP, cane, etc.): No - What equipment or supplies are needed? (T pump heating pad)  Discharge follow-up appointment scheduled within 14 calendar days? : Yes  Discharge Follow Up Appointment Date:  07/27/23  Discharge Follow Up Appointment Scheduled with?: Primary Care Provider         Post-op (Clinicians Only)  Did the patient have surgery or a procedure: Yes (Laparoscopic assisted ileocolic resection)  Incision: closed;healing  Drainage: No  Bleeding: none  Fever: No  Chills: No  Redness: No  Warmth: No  Swelling: No  Incision site pain: Yes  Closure:  (steri strips)  Eating & Drinking: eating and drinking without complaints/concerns  PO Intake: regular diet  Bowel Function: normal  Date of last BM: 07/20/23  Urinary Status: voiding without complaint/concerns    Patient improved. Insurance RN helping her to get T pump heating pad. Taking tylenol 1000 mg every 4 hours. Advised maximum is 1000 mg 3 times per day. She agrees to reduce dosage. No further questions or concerns per patient. 24/7 [facility] nurse triage phone number provided to patient.       PLAN                                                      Outpatient Plan:  Follow-up Appointments     Follow-up and recommended labs and tests       Follow up with primary care provider, Physician No Ref-Primary, within 7   days for hospital follow- up.     IBD clinic follow up within 2-4 wks.      No future appointments.      For any urgent concerns, please contact our 24 hour nurse triage line: [phone] ([phone])         Mame Orantes RN

## 2023-07-24 ENCOUNTER — APPOINTMENT (OUTPATIENT)
Dept: CT IMAGING | Facility: CLINIC | Age: 63
End: 2023-07-24
Attending: EMERGENCY MEDICINE
Payer: COMMERCIAL

## 2023-07-24 ENCOUNTER — HOSPITAL ENCOUNTER (OUTPATIENT)
Facility: CLINIC | Age: 63
Setting detail: OBSERVATION
Discharge: HOME | End: 2023-07-26
Attending: EMERGENCY MEDICINE | Admitting: HOSPITALIST
Payer: COMMERCIAL

## 2023-07-24 DIAGNOSIS — R10.31 RLQ ABDOMINAL PAIN: Primary | ICD-10-CM

## 2023-07-24 DIAGNOSIS — R19.5 ABNORMAL STOOLS: ICD-10-CM

## 2023-07-24 DIAGNOSIS — R10.9 ABDOMINAL PAIN, UNSPECIFIED ABDOMINAL LOCATION: ICD-10-CM

## 2023-07-24 DIAGNOSIS — R42 DIZZINESS: ICD-10-CM

## 2023-07-24 DIAGNOSIS — K50.90 CROHN DISEASE (H): ICD-10-CM

## 2023-07-24 DIAGNOSIS — Z98.890 HISTORY OF COLON SURGERY: ICD-10-CM

## 2023-07-24 LAB
ALBUMIN SERPL BCG-MCNC: 3.9 G/DL (ref 3.5–5.2)
ALBUMIN UR-MCNC: NEGATIVE MG/DL
ALP SERPL-CCNC: 72 U/L (ref 35–104)
ALT SERPL W P-5'-P-CCNC: 28 U/L (ref 0–50)
ANION GAP SERPL CALCULATED.3IONS-SCNC: 13 MMOL/L (ref 7–15)
ANION GAP SERPL CALCULATED.3IONS-SCNC: 15 MMOL/L (ref 7–15)
APPEARANCE UR: CLEAR
AST SERPL W P-5'-P-CCNC: 29 U/L (ref 0–45)
BASOPHILS # BLD AUTO: 0 10E3/UL (ref 0–0.2)
BASOPHILS NFR BLD AUTO: 0 %
BILIRUB SERPL-MCNC: 0.3 MG/DL
BILIRUB UR QL STRIP: NEGATIVE
BUN SERPL-MCNC: 18.5 MG/DL (ref 8–23)
BUN SERPL-MCNC: 18.7 MG/DL (ref 8–23)
CALCIUM SERPL-MCNC: 9.6 MG/DL (ref 8.8–10.2)
CALCIUM SERPL-MCNC: 9.7 MG/DL (ref 8.8–10.2)
CHLORIDE SERPL-SCNC: 105 MMOL/L (ref 98–107)
CHLORIDE SERPL-SCNC: 105 MMOL/L (ref 98–107)
COLOR UR AUTO: ABNORMAL
CREAT SERPL-MCNC: 1.01 MG/DL (ref 0.51–0.95)
CREAT SERPL-MCNC: 1.03 MG/DL (ref 0.51–0.95)
DEPRECATED HCO3 PLAS-SCNC: 20 MMOL/L (ref 22–29)
DEPRECATED HCO3 PLAS-SCNC: 21 MMOL/L (ref 22–29)
EOSINOPHIL # BLD AUTO: 0.1 10E3/UL (ref 0–0.7)
EOSINOPHIL NFR BLD AUTO: 1 %
ERYTHROCYTE [DISTWIDTH] IN BLOOD BY AUTOMATED COUNT: 15.2 % (ref 10–15)
GFR SERPL CREATININE-BSD FRML MDRD: 60 ML/MIN/1.73M2
GFR SERPL CREATININE-BSD FRML MDRD: 62 ML/MIN/1.73M2
GLUCOSE SERPL-MCNC: 111 MG/DL (ref 70–99)
GLUCOSE SERPL-MCNC: 112 MG/DL (ref 70–99)
GLUCOSE UR STRIP-MCNC: NEGATIVE MG/DL
HCT VFR BLD AUTO: 33.1 % (ref 35–47)
HGB BLD-MCNC: 10.3 G/DL (ref 11.7–15.7)
HGB UR QL STRIP: NEGATIVE
HOLD SPECIMEN: NORMAL
IMM GRANULOCYTES # BLD: 0.2 10E3/UL
IMM GRANULOCYTES NFR BLD: 2 %
KETONES UR STRIP-MCNC: NEGATIVE MG/DL
LEUKOCYTE ESTERASE UR QL STRIP: NEGATIVE
LIPASE SERPL-CCNC: 56 U/L (ref 13–60)
LYMPHOCYTES # BLD AUTO: 1.3 10E3/UL (ref 0.8–5.3)
LYMPHOCYTES NFR BLD AUTO: 12 %
MCH RBC QN AUTO: 28.6 PG (ref 26.5–33)
MCHC RBC AUTO-ENTMCNC: 31.1 G/DL (ref 31.5–36.5)
MCV RBC AUTO: 92 FL (ref 78–100)
MONOCYTES # BLD AUTO: 0.3 10E3/UL (ref 0–1.3)
MONOCYTES NFR BLD AUTO: 3 %
MUCOUS THREADS #/AREA URNS LPF: PRESENT /LPF
NEUTROPHILS # BLD AUTO: 8.5 10E3/UL (ref 1.6–8.3)
NEUTROPHILS NFR BLD AUTO: 82 %
NITRATE UR QL: NEGATIVE
NRBC # BLD AUTO: 0 10E3/UL
NRBC BLD AUTO-RTO: 0 /100
PH UR STRIP: 5 [PH] (ref 5–7)
PLATELET # BLD AUTO: 405 10E3/UL (ref 150–450)
POTASSIUM SERPL-SCNC: 3.9 MMOL/L (ref 3.4–5.3)
POTASSIUM SERPL-SCNC: 4.1 MMOL/L (ref 3.4–5.3)
PROT SERPL-MCNC: 7 G/DL (ref 6.4–8.3)
RBC # BLD AUTO: 3.6 10E6/UL (ref 3.8–5.2)
RBC URINE: <1 /HPF
SODIUM SERPL-SCNC: 139 MMOL/L (ref 136–145)
SODIUM SERPL-SCNC: 140 MMOL/L (ref 136–145)
SP GR UR STRIP: 1.01 (ref 1–1.03)
UROBILINOGEN UR STRIP-MCNC: NORMAL MG/DL
WBC # BLD AUTO: 10.6 10E3/UL (ref 4–11)
WBC URINE: <1 /HPF

## 2023-07-24 PROCEDURE — 250N000011 HC RX IP 250 OP 636: Mod: JZ | Performed by: EMERGENCY MEDICINE

## 2023-07-24 PROCEDURE — 250N000009 HC RX 250: Performed by: EMERGENCY MEDICINE

## 2023-07-24 PROCEDURE — 74177 CT ABD & PELVIS W/CONTRAST: CPT

## 2023-07-24 PROCEDURE — 99223 1ST HOSP IP/OBS HIGH 75: CPT | Performed by: PHYSICIAN ASSISTANT

## 2023-07-24 PROCEDURE — G0378 HOSPITAL OBSERVATION PER HR: HCPCS

## 2023-07-24 PROCEDURE — 85025 COMPLETE CBC W/AUTO DIFF WBC: CPT | Performed by: EMERGENCY MEDICINE

## 2023-07-24 PROCEDURE — 96376 TX/PRO/DX INJ SAME DRUG ADON: CPT

## 2023-07-24 PROCEDURE — 80053 COMPREHEN METABOLIC PANEL: CPT | Performed by: EMERGENCY MEDICINE

## 2023-07-24 PROCEDURE — 258N000003 HC RX IP 258 OP 636: Performed by: EMERGENCY MEDICINE

## 2023-07-24 PROCEDURE — 250N000013 HC RX MED GY IP 250 OP 250 PS 637: Performed by: PHYSICIAN ASSISTANT

## 2023-07-24 PROCEDURE — 258N000003 HC RX IP 258 OP 636: Performed by: PHYSICIAN ASSISTANT

## 2023-07-24 PROCEDURE — C9113 INJ PANTOPRAZOLE SODIUM, VIA: HCPCS | Mod: JZ | Performed by: EMERGENCY MEDICINE

## 2023-07-24 PROCEDURE — 96374 THER/PROPH/DIAG INJ IV PUSH: CPT | Mod: 59

## 2023-07-24 PROCEDURE — 93005 ELECTROCARDIOGRAM TRACING: CPT

## 2023-07-24 PROCEDURE — 83690 ASSAY OF LIPASE: CPT | Performed by: EMERGENCY MEDICINE

## 2023-07-24 PROCEDURE — 96375 TX/PRO/DX INJ NEW DRUG ADDON: CPT

## 2023-07-24 PROCEDURE — 250N000011 HC RX IP 250 OP 636: Performed by: PHYSICIAN ASSISTANT

## 2023-07-24 PROCEDURE — 36415 COLL VENOUS BLD VENIPUNCTURE: CPT | Performed by: EMERGENCY MEDICINE

## 2023-07-24 PROCEDURE — 250N000011 HC RX IP 250 OP 636: Performed by: EMERGENCY MEDICINE

## 2023-07-24 PROCEDURE — 96361 HYDRATE IV INFUSION ADD-ON: CPT

## 2023-07-24 PROCEDURE — 81001 URINALYSIS AUTO W/SCOPE: CPT | Performed by: EMERGENCY MEDICINE

## 2023-07-24 PROCEDURE — 99285 EMERGENCY DEPT VISIT HI MDM: CPT | Mod: 25

## 2023-07-24 RX ORDER — METHOCARBAMOL 500 MG/1
500 TABLET, FILM COATED ORAL 4 TIMES DAILY PRN
Status: DISCONTINUED | OUTPATIENT
Start: 2023-07-24 | End: 2023-07-26 | Stop reason: HOSPADM

## 2023-07-24 RX ORDER — HYDROMORPHONE HCL IN WATER/PF 6 MG/30 ML
0.2 PATIENT CONTROLLED ANALGESIA SYRINGE INTRAVENOUS
Status: DISCONTINUED | OUTPATIENT
Start: 2023-07-24 | End: 2023-07-26 | Stop reason: HOSPADM

## 2023-07-24 RX ORDER — ACETAMINOPHEN 325 MG/1
975 TABLET ORAL EVERY 8 HOURS
Status: DISCONTINUED | OUTPATIENT
Start: 2023-07-24 | End: 2023-07-26 | Stop reason: HOSPADM

## 2023-07-24 RX ORDER — LIDOCAINE 40 MG/G
CREAM TOPICAL
Status: DISCONTINUED | OUTPATIENT
Start: 2023-07-24 | End: 2023-07-26 | Stop reason: HOSPADM

## 2023-07-24 RX ORDER — ENOXAPARIN SODIUM 100 MG/ML
40 INJECTION SUBCUTANEOUS EVERY 24 HOURS
Status: DISCONTINUED | OUTPATIENT
Start: 2023-07-25 | End: 2023-07-26 | Stop reason: HOSPADM

## 2023-07-24 RX ORDER — PREDNISONE 5 MG/1
5 TABLET ORAL DAILY
Status: DISCONTINUED | OUTPATIENT
Start: 2023-07-26 | End: 2023-07-26 | Stop reason: HOSPADM

## 2023-07-24 RX ORDER — FAMOTIDINE 20 MG/1
40 TABLET, FILM COATED ORAL AT BEDTIME
Status: DISCONTINUED | OUTPATIENT
Start: 2023-07-24 | End: 2023-07-26 | Stop reason: HOSPADM

## 2023-07-24 RX ORDER — ONDANSETRON 4 MG/1
4 TABLET, ORALLY DISINTEGRATING ORAL EVERY 6 HOURS PRN
Status: DISCONTINUED | OUTPATIENT
Start: 2023-07-24 | End: 2023-07-26 | Stop reason: HOSPADM

## 2023-07-24 RX ORDER — HYDROMORPHONE HYDROCHLORIDE 1 MG/ML
0.5 INJECTION, SOLUTION INTRAMUSCULAR; INTRAVENOUS; SUBCUTANEOUS
Status: DISCONTINUED | OUTPATIENT
Start: 2023-07-24 | End: 2023-07-24

## 2023-07-24 RX ORDER — PREDNISONE 10 MG/1
10 TABLET ORAL DAILY
Status: COMPLETED | OUTPATIENT
Start: 2023-07-25 | End: 2023-07-25

## 2023-07-24 RX ORDER — TRAZODONE HYDROCHLORIDE 100 MG/1
200 TABLET ORAL AT BEDTIME
Status: DISCONTINUED | OUTPATIENT
Start: 2023-07-24 | End: 2023-07-26 | Stop reason: HOSPADM

## 2023-07-24 RX ORDER — NALOXONE HYDROCHLORIDE 0.4 MG/ML
0.4 INJECTION, SOLUTION INTRAMUSCULAR; INTRAVENOUS; SUBCUTANEOUS
Status: DISCONTINUED | OUTPATIENT
Start: 2023-07-24 | End: 2023-07-26 | Stop reason: HOSPADM

## 2023-07-24 RX ORDER — NALOXONE HYDROCHLORIDE 0.4 MG/ML
0.2 INJECTION, SOLUTION INTRAMUSCULAR; INTRAVENOUS; SUBCUTANEOUS
Status: DISCONTINUED | OUTPATIENT
Start: 2023-07-24 | End: 2023-07-26 | Stop reason: HOSPADM

## 2023-07-24 RX ORDER — ONDANSETRON 2 MG/ML
4 INJECTION INTRAMUSCULAR; INTRAVENOUS EVERY 6 HOURS PRN
Status: DISCONTINUED | OUTPATIENT
Start: 2023-07-24 | End: 2023-07-26 | Stop reason: HOSPADM

## 2023-07-24 RX ORDER — NORTRIPTYLINE HCL 25 MG
25 CAPSULE ORAL AT BEDTIME
Status: DISCONTINUED | OUTPATIENT
Start: 2023-07-24 | End: 2023-07-26 | Stop reason: HOSPADM

## 2023-07-24 RX ORDER — MONTELUKAST SODIUM 10 MG/1
10 TABLET ORAL AT BEDTIME
Status: DISCONTINUED | OUTPATIENT
Start: 2023-07-24 | End: 2023-07-26 | Stop reason: HOSPADM

## 2023-07-24 RX ORDER — SODIUM CHLORIDE, SODIUM LACTATE, POTASSIUM CHLORIDE, CALCIUM CHLORIDE 600; 310; 30; 20 MG/100ML; MG/100ML; MG/100ML; MG/100ML
INJECTION, SOLUTION INTRAVENOUS CONTINUOUS
Status: ACTIVE | OUTPATIENT
Start: 2023-07-24 | End: 2023-07-25

## 2023-07-24 RX ORDER — PANTOPRAZOLE SODIUM 40 MG/1
40 TABLET, DELAYED RELEASE ORAL
Status: DISCONTINUED | OUTPATIENT
Start: 2023-07-25 | End: 2023-07-26 | Stop reason: HOSPADM

## 2023-07-24 RX ORDER — IOPAMIDOL 755 MG/ML
120 INJECTION, SOLUTION INTRAVASCULAR ONCE
Status: COMPLETED | OUTPATIENT
Start: 2023-07-24 | End: 2023-07-24

## 2023-07-24 RX ADMIN — HYDROMORPHONE HYDROCHLORIDE 0.2 MG: 0.2 INJECTION, SOLUTION INTRAMUSCULAR; INTRAVENOUS; SUBCUTANEOUS at 23:16

## 2023-07-24 RX ADMIN — IOPAMIDOL 79 ML: 755 INJECTION, SOLUTION INTRAVENOUS at 17:16

## 2023-07-24 RX ADMIN — PANTOPRAZOLE SODIUM 40 MG: 40 INJECTION, POWDER, FOR SOLUTION INTRAVENOUS at 20:33

## 2023-07-24 RX ADMIN — SODIUM CHLORIDE 60 ML: 9 INJECTION, SOLUTION INTRAVENOUS at 17:17

## 2023-07-24 RX ADMIN — SODIUM CHLORIDE, POTASSIUM CHLORIDE, SODIUM LACTATE AND CALCIUM CHLORIDE: 600; 310; 30; 20 INJECTION, SOLUTION INTRAVENOUS at 23:07

## 2023-07-24 RX ADMIN — ONDANSETRON 4 MG: 4 TABLET, ORALLY DISINTEGRATING ORAL at 23:16

## 2023-07-24 RX ADMIN — HYDROMORPHONE HYDROCHLORIDE 0.5 MG: 1 INJECTION, SOLUTION INTRAMUSCULAR; INTRAVENOUS; SUBCUTANEOUS at 20:33

## 2023-07-24 RX ADMIN — MONTELUKAST 10 MG: 10 TABLET, FILM COATED ORAL at 23:02

## 2023-07-24 RX ADMIN — SODIUM CHLORIDE 1000 ML: 9 INJECTION, SOLUTION INTRAVENOUS at 16:42

## 2023-07-24 RX ADMIN — NORTRIPTYLINE HYDROCHLORIDE 25 MG: 25 CAPSULE ORAL at 23:02

## 2023-07-24 RX ADMIN — TRAZODONE HYDROCHLORIDE 200 MG: 100 TABLET ORAL at 23:02

## 2023-07-24 RX ADMIN — ACETAMINOPHEN 975 MG: 325 TABLET, FILM COATED ORAL at 23:02

## 2023-07-24 RX ADMIN — FAMOTIDINE 40 MG: 20 TABLET ORAL at 23:16

## 2023-07-24 ASSESSMENT — ACTIVITIES OF DAILY LIVING (ADL)
ADLS_ACUITY_SCORE: 37
ADLS_ACUITY_SCORE: 35

## 2023-07-24 NOTE — CONSULTS
[facility]  Colon and Rectal Surgery Consult Note  Name: Devi Salas    MRN: [id number]  YOB: 1960    Age: 63 year old  Date of admission: 7/24/2023  Primary care provider: No Ref-Primary, Physician     Requesting Physician:  n/a  Reason for consult:  recent surgery           History of Present Illness:   Devi Salas is a 63 year old woman with recent open ileocolic resection on 7/14/2023 (Dr. Mendez) for medical refractory crohn's with recurrent SBO from TI stricture. Post-op course was complicated by anastomotic bleed, which was managed and resolved conservatively and with PRBC transfusion. Patient was discharged home on POD#5 on PO steroid taper and lovenox injection. Patient reported that she was initially doing well upon discharge; tolerating diet, no nausea/vomiting, fevers or chills. Was having flatus and multiple loose BMs per day. However, yesterday she began not feeling well with dizziness and weakness, also intermittent right sided abdominal pain, crampy in nature, which was managed with tylenol. She was also having increased bowel frequency; 8 yesterday and 13 today, two of which were black stools. She reports episodic cold sweats today, denied fevers, SOB, CP, dysuria or productive cough. She does report chest tightness. She has been taking her steroids and lovenox injection, though lovenox not taken today.  In the ED, patient was afeb, but tachy to 110s. BP stable. Labs pending, but ones from earlier with WBC of 10.6, H/H 10.3/33 from 8.2/26 upon discharge.  Cr 1.0      Colonoscopy History:  5/23/23: Inflammation with ulceration and stenosis at the terminal ileum. Path revealed mild active chronic ileitis at the terminal ileum.     Past abdominal surgery:Right nephrectomy, appendectomy, cholecystectomy             Past Medical History:   No past medical history on file.          Past Surgical History:     Past Surgical History:   Procedure Laterality Date     LAPAROSCOPIC RESECTION ILEOCECAL N/A 7/14/2023    Procedure: Laparoscopic assisted ileocolic resection;  Surgeon: Marleny Mendez MD;  Location:  OR               Social History:     Social History     Tobacco Use    Smoking status: Not on file    Smokeless tobacco: Not on file   Substance Use Topics    Alcohol use: Not on file             Family History:   No family history on file.          Allergies:     Allergies   Allergen Reactions    Latex Hives    Oxycodone Anaphylaxis, Hives and Swelling     Throat swelling, per pt    Penicillin G Anaphylaxis    Sumatriptan Palpitations    Aspirin Nausea and Vomiting    Demerol Hcl [Meperidine] Nausea and Vomiting     N/V    Percocet [Oxycodone-Acetaminophen] Nausea and Vomiting     N/V    Codeine Nausea and Vomiting    Mold              Medications:      sodium chloride 0.9%  1,000 mL Intravenous Once             Review of Systems:   A comprehensive greater than 10 system review of systems was carried out.  Pertinent positives and negatives are noted above.  Otherwise negative for contributory info.            Physical Exam:     Blood pressure (!) 146/93, pulse 110, temperature 97.7  F (36.5  C), temperature source Temporal, resp. rate 16, SpO2 99 %.  No intake or output data in the 24 hours ending 07/24/23 1656  Exam:  General - Awake alert and oriented, non toxic, but slightly lethargic   Pulm - Non-labored breathing with normal respiratory effort  CVS - tachy with normal rhythm, no peripheral edema  Abd - soft, mild distension, ttp on right side.  No guarding, rigidity or peritoneal signs. Incision sites c/d/i             Data Reviewed:   No results found for this or any previous visit (from the past 24 hour(s)).    Recent Labs   Lab 07/24/23  1608 07/19/23  0659 07/18/23  0638   WBC 10.6 7.2 7.1   HGB 10.3* 8.2* 8.6*   HCT 33.1* 25.7* 26.5*   MCV 92 92 91    220 214          Lab Results   Component Value Date     07/24/2023     07/18/2023      07/17/2023    Lab Results   Component Value Date    CHLORIDE 105 07/24/2023    CHLORIDE 107 07/18/2023    CHLORIDE 105 07/17/2023    Lab Results   Component Value Date    BUN 18.7 07/24/2023    BUN 12.7 07/18/2023    BUN 17.5 07/17/2023      Lab Results   Component Value Date    POTASSIUM 3.9 07/24/2023    POTASSIUM 3.9 07/18/2023    POTASSIUM 4.0 07/17/2023    Lab Results   Component Value Date    CO2 21 07/24/2023    CO2 29 07/18/2023    CO2 28 07/17/2023    Lab Results   Component Value Date    CR 1.01 07/24/2023    CR 1.00 07/18/2023    CR 1.01 07/17/2023            Assessment and Plan:   Devi Salas is a 63 year old POD#5 s/p open ileocolic resection for crohn's with TI stricture, with post-op course c/b anastomotic bleed, now presenting with overall malaise/weakness with worsening abdominal pain and increasing bowel frequency. Although patient is tender on right side, there is no evidence of peritonitis and she is clinically stable. No indication for acute surgical intervention. Differential includes C diff infection, anastomotic leak, intra-abdominal abscess or dehydration from increased GI loss. Agree with full evaluation by ED- labs, C diff/stool studies and CT A/P. Further reccs pending complete work up. Although, if work up is unremarkable, will recommend admission to medicine for resuscitation and further observation.    Plan:  Admit to medicine pending ED evaluation  Surgery: no indication for urgent surgery at this time.  Diet: NPO  IV Fluids: LR @100  Antibiotics:  No indication for abx at this time  Medications:  Cont home steroids and lovenox injection for DVT PPx  I&O s:  strict I&O s   Labs:   - Reviewed: Yes   Imaging:   - CT abd/pelvis pending  - Ordered  Activity:  OOB, ambulate as able  DVT prophylaxis: SCD s, lovenox Ppx pending ED evaluation  This plan has been discussed with Dr. Mendez    Patient specific identified risk factors considered as part of today s evaluation include:  crohns, immunosuppressed, recent surgery   Additional history obtained from chart review.  Time spent on consultation: 30 minutes, greater than 50% spent on counseling and/or coordination of care.      Amari Elmore MD  Colon and Rectal Surgery Fellow      [facility]  [address] [place] [address]  T: [phone]  F: [phone]

## 2023-07-24 NOTE — ED TRIAGE NOTES
Colon surgery on 7/14. Required hospitalization and blood transfusion after surgery. Started feeling dizzy yesterday. Feels similar to when she needed blood in the hospital. Went to clinic appointment today, MD immediately sent her to ER for eval. VSS in triage.

## 2023-07-24 NOTE — ED NOTES
Tele-PIT/Intake Evaluation      Video-Visit Details    Type of service:  Video Visit    Video Start Time (time video started): 4:24 PM  Video End Time (time video stopped): 4:30 PM   Originating Location (pt. Location):  [facility]  Distant Location (provider location): [facility]  Mode of Communication:  Video Conference via X[employer]  Patient verbally consented to [employer] televisit.    History:  This patient is a 63-year-old woman with a history of Crohn's disease.  She was admitted to the hospital earlier this month and discharged on July 19.  On the 14th she had an ileocolic resection.  GI and colorectal surgery continue to follow.  She is due to restart her Remicade tomorrow.  While in the hospital she became anemic and required a blood transfusion.  Over the course of the last 1 to 2 days now she has become increasingly weak and fatigued.  She was referred by her primary care physician for consideration of worsening anemia or need for blood transfusion.  She denies fever or vomiting.  She has very frequent loose stools which she says she has had related to her Crohn's disease in the past.  Her abdominal incisions seem to be healing well.    Exam:  General: No distress  Respiratory: Breathing comfortably  Abdomen: No distention  Skin: Well perfused  Neurologic: Sitting up, speech is fluent  Patient Vitals for the past 24 hrs:   BP Temp Temp src Pulse Resp SpO2   07/24/23 1600 (!) 146/93 97.7  F (36.5  C) Temporal 110 16 99 %       Appropriate interventions for symptom management were initiated if applicable.  Appropriate diagnostic tests were initiated if indicated.    Important information for subsequent clinician:  Given that the patient was recently in the hospital we will proceed with a work-up.  She will have IV fluids.  Hemoglobin actually has increased compared to previously.  We will get stool studies to rule out C. difficile given that she was recently in the  hospital.  We will get a CT scan to look at the anastomosis.    I briefly evaluated the patient and developed an initial plan of care. I discussed this plan and explained that this brief interaction does not constitute a full evaluation. Patient/family understands that they should wait to be fully evaluated and discuss any test results with another clinician prior to leaving the hospital.       Ray Henning MD  07/24/23 9244

## 2023-07-25 LAB
ABO/RH(D): NORMAL
ADV 40+41 DNA STL QL NAA+NON-PROBE: NEGATIVE
ANION GAP SERPL CALCULATED.3IONS-SCNC: 8 MMOL/L (ref 7–15)
ANTIBODY SCREEN: NEGATIVE
ASTRO TYP 1-8 RNA STL QL NAA+NON-PROBE: NEGATIVE
ATRIAL RATE - MUSE: 84 BPM
BUN SERPL-MCNC: 12.4 MG/DL (ref 8–23)
C CAYETANENSIS DNA STL QL NAA+NON-PROBE: NEGATIVE
C DIFF TOX B STL QL: NEGATIVE
CALCIUM SERPL-MCNC: 8.7 MG/DL (ref 8.8–10.2)
CAMPYLOBACTER DNA SPEC NAA+PROBE: NEGATIVE
CHLORIDE SERPL-SCNC: 109 MMOL/L (ref 98–107)
CREAT SERPL-MCNC: 0.89 MG/DL (ref 0.51–0.95)
CRYPTOSP DNA STL QL NAA+NON-PROBE: NEGATIVE
DEPRECATED HCO3 PLAS-SCNC: 24 MMOL/L (ref 22–29)
DIASTOLIC BLOOD PRESSURE - MUSE: NORMAL MMHG
E COLI O157 DNA STL QL NAA+NON-PROBE: NORMAL
E HISTOLYT DNA STL QL NAA+NON-PROBE: NEGATIVE
EAEC ASTA GENE ISLT QL NAA+PROBE: NEGATIVE
EC STX1+STX2 GENES STL QL NAA+NON-PROBE: NEGATIVE
EPEC EAE GENE STL QL NAA+NON-PROBE: NEGATIVE
ERYTHROCYTE [DISTWIDTH] IN BLOOD BY AUTOMATED COUNT: 15.3 % (ref 10–15)
ETEC LTA+ST1A+ST1B TOX ST NAA+NON-PROBE: NEGATIVE
G LAMBLIA DNA STL QL NAA+NON-PROBE: NEGATIVE
GFR SERPL CREATININE-BSD FRML MDRD: 72 ML/MIN/1.73M2
GLUCOSE SERPL-MCNC: 89 MG/DL (ref 70–99)
HCT VFR BLD AUTO: 27.7 % (ref 35–47)
HGB BLD-MCNC: 8.7 G/DL (ref 11.7–15.7)
INTERPRETATION ECG - MUSE: NORMAL
MCH RBC QN AUTO: 28.2 PG (ref 26.5–33)
MCHC RBC AUTO-ENTMCNC: 31.4 G/DL (ref 31.5–36.5)
MCV RBC AUTO: 90 FL (ref 78–100)
NOROVIRUS GI+II RNA STL QL NAA+NON-PROBE: NEGATIVE
P AXIS - MUSE: 40 DEGREES
P SHIGELLOIDES DNA STL QL NAA+NON-PROBE: NEGATIVE
PLATELET # BLD AUTO: 339 10E3/UL (ref 150–450)
POTASSIUM SERPL-SCNC: 3.6 MMOL/L (ref 3.4–5.3)
PR INTERVAL - MUSE: 138 MS
QRS DURATION - MUSE: 76 MS
QT - MUSE: 376 MS
QTC - MUSE: 444 MS
R AXIS - MUSE: -15 DEGREES
RBC # BLD AUTO: 3.08 10E6/UL (ref 3.8–5.2)
RVA RNA STL QL NAA+NON-PROBE: NEGATIVE
SALMONELLA SP RPOD STL QL NAA+PROBE: NEGATIVE
SAPO I+II+IV+V RNA STL QL NAA+NON-PROBE: NEGATIVE
SHIGELLA SP+EIEC IPAH ST NAA+NON-PROBE: NEGATIVE
SODIUM SERPL-SCNC: 141 MMOL/L (ref 136–145)
SPECIMEN EXPIRATION DATE: NORMAL
SYSTOLIC BLOOD PRESSURE - MUSE: NORMAL MMHG
T AXIS - MUSE: 45 DEGREES
V CHOLERAE DNA SPEC QL NAA+PROBE: NEGATIVE
VENTRICULAR RATE- MUSE: 84 BPM
VIBRIO DNA SPEC NAA+PROBE: NEGATIVE
WBC # BLD AUTO: 7.9 10E3/UL (ref 4–11)
Y ENTEROCOL DNA STL QL NAA+PROBE: NEGATIVE

## 2023-07-25 PROCEDURE — 87493 C DIFF AMPLIFIED PROBE: CPT | Performed by: PHYSICIAN ASSISTANT

## 2023-07-25 PROCEDURE — 250N000013 HC RX MED GY IP 250 OP 250 PS 637

## 2023-07-25 PROCEDURE — 36415 COLL VENOUS BLD VENIPUNCTURE: CPT | Performed by: PHYSICIAN ASSISTANT

## 2023-07-25 PROCEDURE — 85027 COMPLETE CBC AUTOMATED: CPT | Performed by: PHYSICIAN ASSISTANT

## 2023-07-25 PROCEDURE — 258N000003 HC RX IP 258 OP 636: Performed by: PHYSICIAN ASSISTANT

## 2023-07-25 PROCEDURE — 250N000012 HC RX MED GY IP 250 OP 636 PS 637: Performed by: PHYSICIAN ASSISTANT

## 2023-07-25 PROCEDURE — 80048 BASIC METABOLIC PNL TOTAL CA: CPT | Performed by: PHYSICIAN ASSISTANT

## 2023-07-25 PROCEDURE — 86850 RBC ANTIBODY SCREEN: CPT | Performed by: PHYSICIAN ASSISTANT

## 2023-07-25 PROCEDURE — 99233 SBSQ HOSP IP/OBS HIGH 50: CPT | Performed by: PHYSICIAN ASSISTANT

## 2023-07-25 PROCEDURE — 86901 BLOOD TYPING SEROLOGIC RH(D): CPT | Performed by: PHYSICIAN ASSISTANT

## 2023-07-25 PROCEDURE — 87507 IADNA-DNA/RNA PROBE TQ 12-25: CPT | Performed by: PHYSICIAN ASSISTANT

## 2023-07-25 PROCEDURE — 96372 THER/PROPH/DIAG INJ SC/IM: CPT | Performed by: PHYSICIAN ASSISTANT

## 2023-07-25 PROCEDURE — G0378 HOSPITAL OBSERVATION PER HR: HCPCS

## 2023-07-25 PROCEDURE — 250N000013 HC RX MED GY IP 250 OP 250 PS 637: Performed by: PHYSICIAN ASSISTANT

## 2023-07-25 PROCEDURE — 250N000011 HC RX IP 250 OP 636: Mod: JZ | Performed by: PHYSICIAN ASSISTANT

## 2023-07-25 RX ORDER — METRONIDAZOLE 500 MG/1
500 TABLET ORAL 2 TIMES DAILY
Status: DISCONTINUED | OUTPATIENT
Start: 2023-07-25 | End: 2023-07-26 | Stop reason: HOSPADM

## 2023-07-25 RX ORDER — CYANOCOBALAMIN 1000 UG/ML
1000 INJECTION, SOLUTION INTRAMUSCULAR; SUBCUTANEOUS
COMMUNITY

## 2023-07-25 RX ORDER — SODIUM CHLORIDE, SODIUM LACTATE, POTASSIUM CHLORIDE, CALCIUM CHLORIDE 600; 310; 30; 20 MG/100ML; MG/100ML; MG/100ML; MG/100ML
INJECTION, SOLUTION INTRAVENOUS CONTINUOUS
Status: DISCONTINUED | OUTPATIENT
Start: 2023-07-25 | End: 2023-07-25

## 2023-07-25 RX ORDER — INFLIXIMAB 100 MG/10ML
5 INJECTION, POWDER, LYOPHILIZED, FOR SOLUTION INTRAVENOUS
Status: ON HOLD | COMMUNITY
End: 2023-07-26

## 2023-07-25 RX ORDER — SODIUM CHLORIDE, SODIUM LACTATE, POTASSIUM CHLORIDE, CALCIUM CHLORIDE 600; 310; 30; 20 MG/100ML; MG/100ML; MG/100ML; MG/100ML
INJECTION, SOLUTION INTRAVENOUS CONTINUOUS
Status: ACTIVE | OUTPATIENT
Start: 2023-07-25 | End: 2023-07-25

## 2023-07-25 RX ADMIN — PANTOPRAZOLE SODIUM 40 MG: 40 TABLET, DELAYED RELEASE ORAL at 09:13

## 2023-07-25 RX ADMIN — TRAZODONE HYDROCHLORIDE 200 MG: 100 TABLET ORAL at 21:19

## 2023-07-25 RX ADMIN — ACETAMINOPHEN 975 MG: 325 TABLET, FILM COATED ORAL at 06:33

## 2023-07-25 RX ADMIN — SODIUM CHLORIDE, POTASSIUM CHLORIDE, SODIUM LACTATE AND CALCIUM CHLORIDE: 600; 310; 30; 20 INJECTION, SOLUTION INTRAVENOUS at 10:37

## 2023-07-25 RX ADMIN — HYDROMORPHONE HYDROCHLORIDE 1 MG: 2 TABLET ORAL at 10:34

## 2023-07-25 RX ADMIN — MONTELUKAST 10 MG: 10 TABLET, FILM COATED ORAL at 21:19

## 2023-07-25 RX ADMIN — ACETAMINOPHEN 975 MG: 325 TABLET, FILM COATED ORAL at 14:37

## 2023-07-25 RX ADMIN — ENOXAPARIN SODIUM 40 MG: 40 INJECTION SUBCUTANEOUS at 09:13

## 2023-07-25 RX ADMIN — PANTOPRAZOLE SODIUM 40 MG: 40 TABLET, DELAYED RELEASE ORAL at 16:22

## 2023-07-25 RX ADMIN — PREDNISONE 10 MG: 10 TABLET ORAL at 10:29

## 2023-07-25 RX ADMIN — METRONIDAZOLE 500 MG: 500 TABLET ORAL at 10:29

## 2023-07-25 RX ADMIN — METRONIDAZOLE 500 MG: 500 TABLET ORAL at 19:29

## 2023-07-25 RX ADMIN — FAMOTIDINE 40 MG: 20 TABLET ORAL at 21:20

## 2023-07-25 RX ADMIN — ACETAMINOPHEN 975 MG: 325 TABLET, FILM COATED ORAL at 21:19

## 2023-07-25 RX ADMIN — NORTRIPTYLINE HYDROCHLORIDE 25 MG: 25 CAPSULE ORAL at 21:19

## 2023-07-25 RX ADMIN — SODIUM CHLORIDE, POTASSIUM CHLORIDE, SODIUM LACTATE AND CALCIUM CHLORIDE: 600; 310; 30; 20 INJECTION, SOLUTION INTRAVENOUS at 10:29

## 2023-07-25 ASSESSMENT — ACTIVITIES OF DAILY LIVING (ADL)
ADLS_ACUITY_SCORE: 33

## 2023-07-25 NOTE — H&P
[facility]    History and Physical - Hospitalist Service       Date of Admission:  7/24/2023    Assessment & Plan      Devi Salas is a 63 year old female with a PMH significant for Crohn's disease s/p ileocolic resection 7/14 due to medically refractory Crohn's colitis, asthma, migraines, CKD and renal cell cancer that was managed surgically with right nephrectomy in 2009, who was admitted on 7/24/2023 with increasing abdominal pain and feeling off POD#10 from ileocolic resection.     1. Crohn's disease  POD#10 ileocolic resection with anastomosis  Occasional melena  Increasing abdominal pain  Possible ileus  Admitted in June with SBO related to Crohn's disease, treated with Prednisone taper and started on Remicade. Discharged home with planned follow up. Returned to hospital on 7/12 with ongoing abd pain and diarrhea, found to have ileitis with luminal narrowing. GI and CRS consulted, CRS opted for ileocolic resection on 7/14 and continued on prednisone taper, next dose of Remicade held. Also had acute blood loss anemia during that admission due to presumed anastomotic bleed, received 1 unit PRBCs.   Returns today with increase in abd pain and feeling off since yesterday. 2 episodes of melena this morning, then returned to watery stools of normal color. Notes hot flashes (does not typically measure temp) and one measured temp of 99 on Friday. CT abd/pelvis shows postop changes with wall thickening around anastomosis with distended loops of small bowel, concerning for post op ileus. CRS aware, reviewed CT, felt changes are post op, recommend monitoring and stool studies. WBC normal, Hgb stable at 10.3. R bound tenderness noted in RUQ.  - admit to OBS  - send stool sample for c diff and enteric panel  - gentle IVFs overnight  - pain control and supportive cares  - NPO per CRS  - continue Prednisone (2 more days of 10 mg then 5 mg for 7 days) and Lovenox  - CRS consult    2. Chronic kidney  "disease  Renal cell ca s/p nephrectomy  - Creatinine stable  - Continue follow labs  - IVFs per above  - Avoid nephrotoxins      3. Asthma: no acute exacerbation  - resume singulair, ok to use home albuterol inhaler prn     Diet:  NPO  DVT Prophylaxis: Enoxaparin (Lovenox) SQ  Monae Catheter: Not present  Lines: None     Cardiac Monitoring: None  Code Status:  Full code    Clinically Significant Risk Factors Present on Admission               # Drug Induced Coagulation Defect: home medication list includes an anticoagulant medication         # Overweight: Estimated body mass index is 27.65 kg/m  as calculated from the following:    Height as of 7/13/23: 1.6 m (5' 3\").    Weight as of 7/18/23: 70.8 kg (156 lb 1.6 oz).            Disposition Plan      Expected Discharge Date: 07/25/2023                The patient's care was discussed with the Patient, Patient's Family, and ED provider, Dr. Vasquez .    Yuko Mckeon PA-C  Hospitalist Service  [facility]  Securely message with [employer] (more info)  Text page via [facility] Paging/Directory     ______________________________________________________________________    Chief Complaint   Abd pain    History is obtained from the patient    History of Present Illness   Devi Salas is a 63 year old female with a PMH significant for Crohn's disease s/p ileocolic resection 7/14 due to medically refractory Crohn's colitis, asthma, migraines, CKD and renal cell cancer that was managed surgically with right nephrectomy in 2009, who presents to the ED with increasing abdominal pain and feeling off since yesterday.  Patient notes a history of Crohn's, she was treated for Crohn's flare in early June but did not improve.  She was admitted here on 7/12 for concerns of acute on chronic ileitis.  Colorectal surgery was consulted and patient ultimately underwent an ileocolic resection with anastomosis.  Patient symptoms improved and she was able to tolerate oral " intake prior to discharge.  She states her stools have not been normal for very long time.  She states this morning she woke up and had to dark black stools followed by normal colored watery stools.  She notes hot flashes but does not always measure her temperature.  She did check a temp last Friday which was 99.  She denies chest pain, shortness of breath, nausea, vomiting or dysuria.  She is on Lovenox injections for DVT prophylaxis.  She is also still on a prednisone taper.  In the ED, she is mildly tachycardic, vital signs otherwise stable.  CMP is fairly unremarkable, creatinine is stable at 1.03.  CBC is unremarkable, hemoglobin stable at 10.3.  UA is unremarkable.  EKG shows sinus rhythm.  CT abdomen pelvis shows postop changes of ileocolic resection with wall thickening at the anastomosis and stranding in the mesentery, no evidence of abscess formation.  There is a small bowel loop proximal to the anastomosis that is distended with fluid and may represent a postoperative ileus.  She was given 1 L normal saline bolus and 40 mg IV Protonix.  Colorectal surgery was consulted from the ED and did evaluate the patient.  They recommended stool studies, observation overnight, recheck hemoglobin in the morning and n.p.o.    Past Medical History    Crohn's disease  CKD  RCC s/p nephrectomy  Asthma  Migraines     Past Surgical History   Past Surgical History:   Procedure Laterality Date    LAPAROSCOPIC RESECTION ILEOCECAL N/A 7/14/2023    Procedure: Laparoscopic assisted ileocolic resection;  Surgeon: Marleny Mendez MD;  Location: [facility] OR   S/p R nephrectomy 2009    Prior to Admission Medications   Prior to Admission Medications   Prescriptions Last Dose Informant Patient Reported? Taking?   acetaminophen (TYLENOL) 650 MG CR tablet   Yes No   Sig: Take 650 mg by mouth At Bedtime   albuterol (PROAIR HFA/PROVENTIL HFA/VENTOLIN HFA) 108 (90 Base) MCG/ACT inhaler   Yes No   Sig: Inhale 2 puffs into the lungs every 6  hours as needed for shortness of breath, wheezing or cough   bisacodyl (DULCOLAX) 10 MG suppository   No No   Sig: Place 1 suppository (10 mg) rectally 2 times daily as needed for constipation   enoxaparin ANTICOAGULANT (LOVENOX) 40 MG/0.4ML syringe   No No   Sig: Inject 0.4 mLs (40 mg) Subcutaneous every 24 hours for 28 days   esomeprazole (NEXIUM) 40 MG DR capsule   Yes No   Sig: Take 40 mg by mouth 2 times daily (before meals) Take 30-60 minutes before eating.   famotidine (PEPCID) 40 MG tablet   Yes No   Sig: Take 40 mg by mouth At Bedtime   methocarbamol (ROBAXIN) 500 MG tablet   No No   Sig: Take 1 tablet (500 mg) by mouth 4 times daily as needed for muscle spasms   montelukast (SINGULAIR) 10 MG tablet   Yes No   Sig: Take 10 mg by mouth At Bedtime   nortriptyline (PAMELOR) 25 MG capsule   Yes No   Sig: Take 25 mg by mouth At Bedtime   ondansetron (ZOFRAN ODT) 4 MG ODT tab   No No   Sig: Take 1 tablet (4 mg) by mouth every 8 hours as needed for nausea   polyethylene glycol (MIRALAX) 17 GM/Dose powder   No No   Sig: Take 17 g by mouth daily   predniSONE (DELTASONE) 10 MG tablet   No No   Sig: Take 1 tablet (10 mg) by mouth daily for 7 days, THEN 0.5 tablets (5 mg) daily for 7 days.   sennosides (SENOKOT) 8.6 MG tablet   No No   Sig: Take 1-2 tablets by mouth 2 times daily as needed for constipation   traZODone (DESYREL) 100 MG tablet   Yes No   Sig: Take 200 mg by mouth At Bedtime      Facility-Administered Medications: None           Physical Exam   Vital Signs: Temp: 97.7  F (36.5  C) Temp src: Temporal BP: (!) 146/93 Pulse: 110   Resp: 16 SpO2: 99 % O2 Device: None (Room air)    Weight: 0 lbs 0 oz    GENERAL:  Comfortable.  PSYCH: pleasant, oriented, No acute distress.  HEENT:  Atraumatic, normocephalic. Normal conjunctiva, normal hearing, and oropharynx is normal.  NECK:  Supple, no neck vein distention.  HEART:  Normal S1, S2 with no murmur, no pericardial rub, gallops or S3 or S4.  LUNGS:  Clear to  auscultation, normal Respiratory effort. No wheezing, rales or ronchi.  GI:  Soft, normal bowel sounds. Rebound tenderness in RUQ, non distended. Midline incision healing well  EXTREMITIES:  No pedal edema, +2 pulses bilateral and equal.  SKIN:  Dry to touch, No rash, wound or ulcerations.  NEUROLOGIC:  CN 2-12 intact, BL 5/5 symmetric upper and lower extremity strength, sensation is intact with no focal deficits.      Medical Decision Making       75+ MINUTES SPENT BY ME on the date of service doing chart review, history, exam, documentation & further activities per the note.      Data     I have personally reviewed the following data over the past 24 hrs:    10.6  \   10.3 (L)   / 405     139; 140 105; 105 18.7; 18.5 /  111 (H); 112 (H)   3.9; 4.1 21 (L); 20 (L) 1.01 (H); 1.03 (H) \     ALT: 28 AST: 29 AP: 72 TBILI: 0.3   ALB: 3.9 TOT PROTEIN: 7.0 LIPASE: 56       Imaging results reviewed over the past 24 hrs:   Recent Results (from the past 24 hour(s))   CT Abdomen Pelvis w Contrast    Narrative    EXAM: CT ABDOMEN PELVIS W CONTRAST  LOCATION: [facility]  DATE: 7/24/2023    INDICATION: Abd pain, recent iliocolic resection on 07/14/2023. History of Crohn's disease.  COMPARISON: CT abdomen and pelvis 07/12/2023  TECHNIQUE: CT scan of the abdomen and pelvis was performed following injection of IV contrast. Multiplanar reformats were obtained. Dose reduction techniques were used.  CONTRAST: 79 mL Isovue 370    FINDINGS:   LOWER CHEST: Normal.    HEPATOBILIARY: Cholecystectomy.    PANCREAS: Normal.    SPLEEN: Small hypodense splenic lesion too small to further characterize, but likely represents a cyst.    ADRENAL GLANDS: Normal.    KIDNEYS/BLADDER: Right nephrectomy.    BOWEL: Recent postoperative changes ileocolic resection, with wall thickening and tiny gas pocket at the surgical anastomosis, and stranding in the adjacent mesentery. No localized fluid collections or evidence for abscess. The  small bowel proximal to   the anastomosis is distended with fluid, but no definite evidence for obstruction.    LYMPH NODES: Normal.    VASCULATURE: Atherosclerotic disease abdominal aorta.    PELVIC ORGANS: Hysterectomy. No free pelvic fluid.    MUSCULOSKELETAL: Degenerative disc disease L5 level.      Impression    IMPRESSION:   1.  Postop changes ileocolic resection with wall thickening at the anastomosis and stranding/edema in the mesentery. Tiny extraluminal gas collection near the anastomosis presumed postoperative. No other findings for free air.   2.  No evidence for abscess formation.  3.  Small bowel loop proximal to the anastomosis is distended with fluid and may represent a postoperative ileus. No definite findings for obstruction.

## 2023-07-25 NOTE — ED NOTES
[facility]  ED Nurse Handoff Report    ED Chief complaint: Dizziness  . ED Diagnosis:   Final diagnoses:   Dizziness   History of colon surgery   Abdominal pain, unspecified abdominal location   Abnormal stools       Allergies:   Allergies   Allergen Reactions    Latex Hives    Oxycodone Anaphylaxis, Hives and Swelling     Throat swelling, per pt    Penicillin G Anaphylaxis    Sumatriptan Palpitations    Aspirin Nausea and Vomiting    Demerol Hcl [Meperidine] Nausea and Vomiting     N/V    Percocet [Oxycodone-Acetaminophen] Nausea and Vomiting     N/V    Codeine Nausea and Vomiting    Mold        Code Status: Full Code    Activity level - Baseline/Home:  independent.  Activity Level - Current:   standby.   Lift room needed: No.   Bariatric: No   Needed: No   Isolation: Yes.   Infection: Not Applicable  C-Diff Pending.     Respiratory status: Room air    Vital Signs (within 30 minutes):   Vitals:    07/24/23 1600 07/24/23 2000 07/24/23 2100 07/24/23 2200   BP: (!) 146/93 (!) 144/86 (!) 141/88 (!) 130/93   Pulse: 110 89 82 83   Resp: 16 12 14   Temp: 97.7  F (36.5  C)      TempSrc: Temporal      SpO2: 99% 95% 96% 96%       Cardiac Rhythm:  ,      Pain level:    Patient confused: No.   Patient Falls Risk: patient and family education.   Elimination Status: Has voided     Patient Report - Initial Complaint: Colon surgery on 7/14. Required hospitalization and blood transfusion after surgery. Started feeling dizzy yesterday. Feels similar to when she needed blood in the hospital. Went to clinic appointment today, MD immediately sent her to ER for eval. VSS in triage.  .   Focused Assessment:  Devi Salas is a 63 year old female who presents with some dizziness. States that she was hospitalized from 7/12-7/19. She has a history of crohn's disease. States that stool was black this morning 2 times and then after went to normal. State she has increased tiredness and dizziness.   states she does not feel right. Was not on antibiotics in the hospital. 2 tylenol every 6 hours but does not seem to subsided the pain. Yesterday the stool was brown and is liquid. Weak and dizzy feels like anemia when she was in the hospital. Denies any fevers, vomiting, and alcohol use.     Abnormal Results:   Labs Ordered and Resulted from Time of ED Arrival to Time of ED Departure   BASIC METABOLIC PANEL - Abnormal       Result Value    Sodium 139      Potassium 3.9      Chloride 105      Carbon Dioxide (CO2) 21 (*)     Anion Gap 13      Urea Nitrogen 18.7      Creatinine 1.01 (*)     Calcium 9.6      Glucose 111 (*)     GFR Estimate 62     CBC WITH PLATELETS AND DIFFERENTIAL - Abnormal    WBC Count 10.6      RBC Count 3.60 (*)     Hemoglobin 10.3 (*)     Hematocrit 33.1 (*)     MCV 92      MCH 28.6      MCHC 31.1 (*)     RDW 15.2 (*)     Platelet Count 405      % Neutrophils 82      % Lymphocytes 12      % Monocytes 3      % Eosinophils 1      % Basophils 0      % Immature Granulocytes 2      NRBCs per 100 WBC 0      Absolute Neutrophils 8.5 (*)     Absolute Lymphocytes 1.3      Absolute Monocytes 0.3      Absolute Eosinophils 0.1      Absolute Basophils 0.0      Absolute Immature Granulocytes 0.2      Absolute NRBCs 0.0     COMPREHENSIVE METABOLIC PANEL - Abnormal    Sodium 140      Potassium 4.1      Chloride 105      Carbon Dioxide (CO2) 20 (*)     Anion Gap 15      Urea Nitrogen 18.5      Creatinine 1.03 (*)     Calcium 9.7      Glucose 112 (*)     Alkaline Phosphatase 72      AST 29      ALT 28      Protein Total 7.0      Albumin 3.9      Bilirubin Total 0.3      GFR Estimate 60 (*)    ROUTINE UA WITH MICROSCOPIC REFLEX TO CULTURE - Abnormal    Color Urine Light Yellow      Appearance Urine Clear      Glucose Urine Negative      Bilirubin Urine Negative      Ketones Urine Negative      Specific Gravity Urine 1.010      Blood Urine Negative      pH Urine 5.0      Protein Albumin Urine Negative       Urobilinogen Urine Normal      Nitrite Urine Negative      Leukocyte Esterase Urine Negative      Mucus Urine Present (*)     RBC Urine <1      WBC Urine <1     LIPASE - Normal    Lipase 56     ENTERIC BACTERIA AND VIRUS PANEL BY PCR   C. DIFFICILE TOXIN B PCR WITH REFLEX TO C. DIFFICILE ANTIGEN AND TOXINS A/B EIA        CT Abdomen Pelvis w Contrast   Final Result   IMPRESSION:    1.  Postop changes ileocolic resection with wall thickening at the anastomosis and stranding/edema in the mesentery. Tiny extraluminal gas collection near the anastomosis presumed postoperative. No other findings for free air.    2.  No evidence for abscess formation.   3.  Small bowel loop proximal to the anastomosis is distended with fluid and may represent a postoperative ileus. No definite findings for obstruction.          Treatments provided: See MAR  Family Comments: spouse at bedside  OBS brochure/video discussed/provided to patient:  Yes  ED Medications:   Medications   HYDROmorphone (PF) (DILAUDID) injection 0.5 mg (0.5 mg Intravenous $Given 7/24/23 2033)   0.9% sodium chloride BOLUS (0 mLs Intravenous Stopped 7/24/23 2033)   iopamidol (ISOVUE-370) solution 120 mL (79 mLs Intravenous $Given 7/24/23 1716)   Saline CT scan flush (60 mLs Intravenous $Given 7/24/23 1717)   pantoprazole (PROTONIX) IV push injection 40 mg (40 mg Intravenous $Given 7/24/23 2033)       Drips infusing:  No  For the majority of the shift this patient was Green.   Interventions performed were N/A.    Sepsis treatment initiated: No    Cares/treatment/interventions/medications to be completed following ED care: stool studies, pain management    ED Nurse Name: Octavia Bonilla RN  10:10 PM    RECEIVING UNIT ED HANDOFF REVIEW    Above ED Nurse Handoff Report was reviewed: Yes  Reviewed by: Hossein Live RN on July 24, 2023 at 10:19 PM

## 2023-07-25 NOTE — CONSULTS
Care Management Discharge Note    Discharge Date: 07/26/2023    Discharge Disposition:  Home     Additional Information:  Pt admitted with possible ileus, history of Crohn's disease and is POD#10 ileocolic resection with anastomosis. Pt was recently admitted from 7/12-7/19 she was seen by RN CC on 7/16 and denied having any discharge needs. Per chart review, she lives at home with her spouse and is independent at baseline. Pt noted to have unplanned readmission risk of 23%. Per AM care rounds, pt does not have any anticipated discharge needs at this time. Will clear consult, please call if needs arise.     Paola Benson RN BSN   Inpatient Care Coordination  [facility]   Phone [phone]

## 2023-07-25 NOTE — PLAN OF CARE
"PRIMARY DIAGNOSIS: Dizziness, Abnormal Stools   OUTPATIENT/OBSERVATION GOALS TO BE MET BEFORE DISCHARGE:  ADLs back to baseline: Yes    Activity and level of assistance: Ambulating independently.    Pain status: Improved but still requiring IV narcotics.    Return to near baseline physical activity: Yes     Discharge Planner Nurse   Safe discharge environment identified: Yes  Barriers to discharge: Yes       Entered by: Hossein Live RN 07/25/2023 5:41 AM  /72 (BP Location: Right arm)   Pulse 87   Temp 98  F (36.7  C) (Oral)   Resp 16   Ht 1.6 m (5' 3\")   Wt 67.6 kg (149 lb)   SpO2 98%   BMI 26.39 kg/m    Pt is A&O x4, Pt tolerated oral medications. Pt was curious if she will be receiving her Remicade while she is admitted and wants to know if she will also be seeing Gastroenterology as well. Pt has had IVF at 100 ml/hr overnight. Pt is still needing stool samples collected. Pt is sleeping and able to make needs known. Will continue to follow plan of care.   Please review provider order for any additional goals.   Nurse to notify provider when observation goals have been met and patient is ready for discharge.  "

## 2023-07-25 NOTE — PROGRESS NOTES
[facility]    Medicine Progress Note - Hospitalist Service    Date of Admission:  7/24/2023    Assessment & Plan  Devi Salas is a 63 year old female with a PMH significant for Crohn's disease s/p ileocolic resection 7/14 due to medically refractory Crohn's colitis, asthma, migraines, CKD and renal cell cancer that was managed surgically with right nephrectomy in 2009, who was admitted on 7/24/2023 with increasing abdominal pain, malaise.     She was admitted in June with SBO related to Crohn's disease, treated with Prednisone taper and started on Remicade. Discharged home with planned follow up. Returned to hospital on 7/12 with ongoing abd pain and diarrhea, found to have ileitis with luminal narrowing. GI and CRS consulted, CRS opted for ileocolic resection on 7/14 and continued on prednisone taper, next dose of Remicade held. Also had acute blood loss anemia during that admission due to presumed anastomotic bleed, received 1 unit PRBCs.     Returned to ED with increase in abd pain, malaise, 2 episodes of melena. Work up including CT abd/pelvis shows postop changes with wall thickening around anastomosis with distended loops of small bowel, concerning for post op ileus. CRS aware, reviewed CT, felt changes are post op, recommend monitoring and stool studies. WBC normal, Hgb stable at 10.3. Admitted to observation, NPO. Advancing diet, monitoring hgb.       Crohn's disease with complication S/p open ileocolic resection for crohn's with TI stricture, with post-op course c/b anastomotic bleed  Returned to ED with increase in abd pain, malaise, 2 episodes of melena. Work up including CT abd/pelvis shows postop changes with wall thickening around anastomosis with distended loops of small bowel, concerning for post op ileus. CRS aware, reviewed CT, felt changes are post op, recommend monitoring and stool studies. WBC normal, Hgb stable at 10.3. Admitted to observation, NPO. Advancing diet,  "monitoring hgb.   -> CRS consulted: recommending holding off on Remicade infusion until clearance post op with Dr Mendez 8/14  -low fiber diet, regress if issues   -starting flagyl empirically   -analgesia prn   -stool studies c diff, enteric in process   -continue Prednisone (2 more days of 10 mg then 5 mg for 7 days) and Lovenox  -Dissipate home tomorrow if tolerating diet, hemoglobin stable and pain better controlled    Acute Blood Loss anemia   Had acute blood loss anemia during that admission due to presumed anastomotic bleed, received 1 unit PRBCs. WBC normal, Hgb stable at 10.3 -> 8.7, possible dilutional contribution. No melena here. May have symptomatic anemia with occasional lightheadedness. Last EGD 04/07/2023, endoscopically normal. Gastric biopsies, chronic gastritis, but negative H. pylori. Esophageal and duodenal biopsies were normal.  - monitor stool output for melena  -asked RN to check orthostatic BP today   - trend hgb, repeating in AM unless melanotic stools today in which case would make npo  - type and cross  - transfuse for hgb <7  - continue PTA b12, ppi     Chronic kidney disease  Renal cell ca s/p nephrectomy  - Creatinine stable  - Continue follow labs  - IVFs per above  - Avoid nephrotoxins      Asthma: no acute exacerbation  - resume singulair, ok to use home albuterol inhaler prn        Diet: Low Fiber Diet    DVT Prophylaxis: Enoxaparin (Lovenox) SQ  Monae Catheter: Not present  Lines: None     Cardiac Monitoring: None  Code Status: Full Code      Clinically Significant Risk Factors Present on Admission               # Drug Induced Coagulation Defect: home medication list includes an anticoagulant medication         # Overweight: Estimated body mass index is 26.39 kg/m  as calculated from the following:    Height as of this encounter: 1.6 m (5' 3\").    Weight as of this encounter: 67.6 kg (149 lb).            Disposition Plan      Expected Discharge Date: 07/25/2023              "       Li Ochoa PA-C  Hospitalist Service  [facility]  Securely message with [employer] (more info)  Text page via [facility] Paging/Directory   ______________________________________________________________________    Interval History   One loose bm today, no blood. Abdominal pain better, 4/10  Tolerating diet, ate breakfast.  No nausea or vomiting  Afebrile  Passing gas  Lightheadedness better, not oob yet today     Physical Exam   Vital Signs: Temp: 98.4  F (36.9  C) Temp src: Oral BP: 132/75 Pulse: 86   Resp: 18 SpO2: 96 % O2 Device: None (Room air)    Weight: 149 lbs 0 oz    Constitutional: Awake, alert, no apparent distress  Respiratory:  Normal work of breathing. Lungs clear to auscultation bilaterally, no crackles or wheezing.  Cardiovascular: Regular rate and rhythm, normal S1 and S2, and no murmur appreciated.   GI: Bowel sounds present. soft, non-distended, midline tenderness near incisions. No guarding or rebound tenderness.   Skin/Integument: Warm, dry. no peripheral edema.  Neuro: No focal deficits. Moving all extremities with normal strength. Coordination and sensation grossly intact. Speech clear. No focal deficits.   Psych: Appropriate affect.      Medical Decision Making       55 MINUTES SPENT BY ME on the date of service doing chart review, history, exam, documentation & further activities per the note.      Data   ------------------------- PAST 24 HR DATA REVIEWED -----------------------------------------------[id number]}

## 2023-07-25 NOTE — PLAN OF CARE
"PRIMARY DIAGNOSIS: Dizziness,Weakness & Abdominal Pain  OUTPATIENT/OBSERVATION GOALS TO BE MET BEFORE DISCHARGE:  1. Pain Status: Improved-controlled with oral pain medications.    2. Return to near baseline physical activity: No    3. Cleared for discharge by consultants (if involved): No    Discharge Planner Nurse   Safe discharge environment identified: Yes  Barriers to discharge: Yes       Entered by: Ester Hassan RN 07/25/2023     Patient is A & O x 4, Lung sounds CTA. Bowel sounds hyperactive, LBM 7/25. Patient is independent, has not reported pain so far during this shift. Denies any rectal bleeding, continent of bladder, voiding adequately. Pt on Low fibre diet, tolerating well. Hgb 8.7, PIV to left hand infusing LR at 100 ml/hr, Colorectal surgery is following.  /75 (BP Location: Right arm)   Pulse 102   Temp 98.6  F (37  C) (Oral)   Resp 17   Ht 1.6 m (5' 3\")   Wt 67.6 kg (149 lb)   SpO2 97%   BMI 26.39 kg/m       Please review provider order for any additional goals.   Nurse to notify provider when observation goals have been met and patient is ready for discharge.      "

## 2023-07-25 NOTE — PLAN OF CARE
"PRIMARY DIAGNOSIS: GENERALIZED WEAKNESS/Abdominal Sx    OUTPATIENT/OBSERVATION GOALS TO BE MET BEFORE DISCHARGE  1. Orthostatic performed: No    2. Tolerating PO medications: Yes    3. Return to near baseline physical activity: Yes    4. Cleared for discharge by consultants (if involved): No    Discharge Planner Nurse   Safe discharge environment identified: Yes  Barriers to discharge: Yes       Entered by: Twila Toscano RN 07/25/2023 3:02 PM    Pt A/O x4 and makes needs known. Independent of mobility. C/O 4-5/10 abdominal pain. 1mg PO dilaudid given for pain and was effective. LR restarted at 100 mL/hr. Stool sample collected. Enteric precautions maintained. Low fiber diet, tolerating. VSS.  Vital signs:  Temp: 98.7  F (37.1  C) Temp src: Oral BP: 124/75 Pulse: 95   Resp: 18 SpO2: 97 % O2 Device: None (Room air)   Height: 160 cm (5' 3\") Weight: 67.6 kg (149 lb)  Estimated body mass index is 26.39 kg/m  as calculated from the following:    Height as of this encounter: 1.6 m (5' 3\").    Weight as of this encounter: 67.6 kg (149 lb).       Please review provider order for any additional goals.   Nurse to notify provider when observation goals have been met and patient is ready for discharge.  "

## 2023-07-25 NOTE — PLAN OF CARE
ROOM # 221    Living Situation (if not independent, order SW consult): Home w/ spouse   Facility name:  : [facility] [phone]    Activity level at baseline: Ind   Activity level on admit: Ind     Who will be transporting you at discharge: [facility]     Patient registered to observation; given Patient Bill of Rights; given the opportunity to ask questions about observation status and their plan of care.  Patient has been oriented to the observation room, bathroom and call light is in place.    Discussed discharge goals and expectations with patient/family.

## 2023-07-25 NOTE — PLAN OF CARE
"PRIMARY DIAGNOSIS: GENERALIZED WEAKNESS/Abdominal Sx    OUTPATIENT/OBSERVATION GOALS TO BE MET BEFORE DISCHARGE  1. Orthostatic performed: No    2. Tolerating PO medications: Yes    3. Return to near baseline physical activity: Yes    4. Cleared for discharge by consultants (if involved): No    Discharge Planner Nurse   Safe discharge environment identified: Yes  Barriers to discharge: Yes       Entered by: Twila Toscano RN 07/25/2023 3:05 PM    Pt A/O x4 and makes needs known. Independent of mobility. C/O 4-5/10 abdominal pain. 1mg PO dilaudid given for pain and was effective. LR restarted at 100 mL/hr. Stool sample collected. Enteric precautions maintained. Low fiber diet, tolerating. VSS.  *1200 addendum: enteric precautions discontinued. Ambulated the unit. No s/s of bleeding. Scheduled tylenol given.  Vital signs:  Temp: 98.7  F (37.1  C) Temp src: Oral BP: 124/75 Pulse: 95   Resp: 18 SpO2: 97 % O2 Device: None (Room air)   Height: 160 cm (5' 3\") Weight: 67.6 kg (149 lb)  Estimated body mass index is 26.39 kg/m  as calculated from the following:    Height as of this encounter: 1.6 m (5' 3\").    Weight as of this encounter: 67.6 kg (149 lb).       Please review provider order for any additional goals.   Nurse to notify provider when observation goals have been met and patient is ready for discharge.  "

## 2023-07-25 NOTE — PHARMACY-ADMISSION MEDICATION HISTORY
Pharmacist Admission Medication History    Admission medication history is complete. The information provided in this note is only as accurate as the sources available at the time of the update.    Medication reconciliation/reorder completed by provider prior to medication history? Yes    Information Source(s): Patient, Clinic records, and CareEverywhere/SureScripts via in-person    Pertinent Information: None    Changes made to PTA medication list:  Added: Cyanocobalamin inj, Infliximab inj [both clinic administered]  Deleted: None  Changed: None    Medication Affordability:  Not including over the counter (OTC) medications, was there a time in the past 3 months when you did not take your medications as prescribed because of cost?: No    Allergies reviewed with patient and updates made in EHR: no    Medication History Completed By: Ernesto Cee [facility] 7/25/2023 9:19 AM    Prior to Admission medications    Medication Sig Last Dose Taking? Auth Provider Long Term End Date   acetaminophen (TYLENOL) 650 MG CR tablet Take 650 mg by mouth At Bedtime 7/23/2023 at HS Yes Unknown, Entered By History     albuterol (PROAIR HFA/PROVENTIL HFA/VENTOLIN HFA) 108 (90 Base) MCG/ACT inhaler Inhale 2 puffs into the lungs every 6 hours as needed for shortness of breath, wheezing or cough More than a month Yes Unknown, Entered By History Yes    bisacodyl (DULCOLAX) 10 MG suppository Place 1 suppository (10 mg) rectally 2 times daily as needed for constipation Unknown Yes Vikki Duran DO     cyanocobalamin (CYANOCOBALAMIN) 1000 MCG/ML injection Inject 1,000 mcg as directed every 30 days More than a month at Was due 07/13/23 Yes Unknown, Entered By History     enoxaparin ANTICOAGULANT (LOVENOX) 40 MG/0.4ML syringe Inject 0.4 mLs (40 mg) Subcutaneous every 24 hours for 28 days 7/23/2023 at 1100 Yes Axel Reeves MD  8/16/23   esomeprazole (NEXIUM) 40 MG DR capsule Take 40 mg by mouth 2 times daily (before meals) Take 30-60 minutes  before eating. 7/24/2023 at x1 Yes Unknown, Entered By History     famotidine (PEPCID) 40 MG tablet Take 40 mg by mouth At Bedtime 7/23/2023 Yes Unknown, Entered By History     inFLIXimab (REMICADE) 100 MG injection Inject 5 mg/kg into the vein at weeks 0, 2 and 6, then every 8 weeks. Infuse by IV route. 6/27/2023 at Next dose (wk 6) due 07/25 Yes Unknown, Entered By History     methocarbamol (ROBAXIN) 500 MG tablet Take 1 tablet (500 mg) by mouth 4 times daily as needed for muscle spasms Unknown Yes Vikki Duran DO     montelukast (SINGULAIR) 10 MG tablet Take 10 mg by mouth At Bedtime 7/23/2023 at HS Yes Unknown, Entered By History Yes    nortriptyline (PAMELOR) 25 MG capsule Take 25 mg by mouth At Bedtime 7/23/2023 at HS Yes Unknown, Entered By History Yes    ondansetron (ZOFRAN ODT) 4 MG ODT tab Take 1 tablet (4 mg) by mouth every 8 hours as needed for nausea Unknown Yes Vikki Duran DO     polyethylene glycol (MIRALAX) 17 GM/Dose powder Take 17 g by mouth daily Unknown Yes Vikki Duran DO     predniSONE (DELTASONE) 10 MG tablet Take 1 tablet (10 mg) by mouth daily for 7 days, THEN 0.5 tablets (5 mg) daily for 7 days. 7/24/2023 at AM (1 dose of 10mg left, then 5mg x7 days) Yes Axel Reeves MD No 8/3/23   sennosides (SENOKOT) 8.6 MG tablet Take 1-2 tablets by mouth 2 times daily as needed for constipation Unknown Yes Vikki Duran DO     traZODone (DESYREL) 100 MG tablet Take 200 mg by mouth At Bedtime 7/23/2023 at HS Yes Unknown, Entered By History Yes

## 2023-07-25 NOTE — PROGRESS NOTES
COLON & RECTAL SURGERY  PROGRESS NOTE    July 25, 2023    Post-op Day: #11    SUBJECTIVE:  Feeling a little better today. No nausea or vomiting. Pain is better today and is controlled. Had one loose bowel movement this morning. No blood. Does report 2 dark/black loose stool yesterday morning.     OBJECTIVE:  Temp:  [97.7  F (36.5  C)-98.4  F (36.9  C)] 98.4  F (36.9  C)  Pulse:  [] 86  Resp:  [12-18] 18  BP: (116-156)/(72-93) 132/75  SpO2:  [95 %-100 %] 96 %  No intake or output data in the 24 hours ending 07/25/23 0942    GENERAL:  Awake, alert, no acute distress, lying in bed  HEAD: Nomocephalic atraumatic  SCLERA: anicteric  EXTREMITIES: warm and well perfused  ABDOMEN:  Soft, mild tenderness on the right, non-distended, no rebound or guarding, no peritoneal signs  INCISION:  C/d/i    LABS:  Lab Results   Component Value Date    WBC 7.9 07/25/2023     Lab Results   Component Value Date    HGB 8.7 07/25/2023     Lab Results   Component Value Date    HCT 27.7 07/25/2023     Lab Results   Component Value Date     07/25/2023     Last Basic Metabolic Panel:  Lab Results   Component Value Date     07/25/2023      Lab Results   Component Value Date    POTASSIUM 3.6 07/25/2023     Lab Results   Component Value Date    CHLORIDE 109 07/25/2023     Lab Results   Component Value Date    GIORGIO 8.7 07/25/2023     Lab Results   Component Value Date    CO2 24 07/25/2023     Lab Results   Component Value Date    BUN 12.4 07/25/2023     Lab Results   Component Value Date    CR 0.89 07/25/2023     Lab Results   Component Value Date    GLC 89 07/25/2023     07/14/2023       ASSESSMENT/PLAN: Devi is a 63 year old POD#11 s/p open ileocolic resection for crohn's with TI stricture, with post-op course c/b anastomotic bleed, now presenting with overall malaise/weakness with worsening abdominal pain and increasing bowel frequency. CT scan with some wall thickening around the anastomosis with small gas collection  presumed to be postoperative changes. No abscess identified.     - Low fiber diet  - Await stool studies  - Start Flagyl today   - Pain management as needed  - No plans for surgery  - Would recommend holding off Remicade infusion until after her post op visit with Dr. Mendez on 8/14 given recent surgery.       For questions/paging, please contact the [facility] office at [phone].    Yanci Araujo PA-C  [facility]  Phone: [phone]      Colon and Rectal Surgery Attending Note    Patient seen and examined independently.  Agree with above assessment and plan.  Feeling better today.  Having loose bowel movements.  No bleeding.  No narcotics recently although did get to yesterday.  C. difficile negative  Abdomen soft with focal right lower quadrant tenderness without rebound or guarding.  Plan:  Okay to resume low fiber diet.  We will start low-dose oral Flagyl to help with perianastomotic inflammation.  Will allow for outpatient follow-up with [facility]  for Remicade typically 4 weeks after surgery if doing well.  Discussed this with Dr. Phillips    No plans for surgery at this time.  If clinical decline or fails to improve may need to consider repeat imaging however overall seems better than yesterday.  I suspect a component of dehydration with frequent loose stools.    Marleny Mendez MD  [facility]  [address], [address]  [place] [address]  T: [phone]  F: [phone]   [email or web address]

## 2023-07-25 NOTE — ED PROVIDER NOTES
"  History     Chief Complaint:  Dizziness, weakness and abdominal pain       The history is provided by the patient and the spouse.      Devi Salas is a 63 year old female who presents with weakness, dizziness and abdominal symptoms. States that she was hospitalized from 7/12-7/19 for complications related to her previously diagnosed Crohn's disease, she had an ileocolic resection with a local colorectal surgery team while hospitalized and required blood transfusion for acute blood loss anemia.  Over the last day or so she has been feeling overall more weak and tired.  She had several episodes of liquid black stool this morning, though stool has now returned to its normal \"creamy brown\" color, always very loose.  No vomiting.  No fever.  She feels similarly to the way she felt when she was hospitalized and needed blood transfusion.    Independent Historian:   Spouse/Partner - They report per HPI   at bedside, who expresses that the patient is frustrated by having to be back in the hospital yet again.    Review of External Notes:   I personally performed electronic chart review including the discharge summary from July 19 at which time she was discharged from a 1 week hospitalization here at [facility], she had been admitted for anemia and Crohn's ileitis, she also has a history of renal cell carcinoma status post prior nephrectomy.    Patient was seen in the emergency department today by the colorectal surgery fellow before I first met the patient, I reviewed their note as part of the patient's evaluation today.    Medications:    Albuterol   Bisacodyl   Enoxaparin   Esomeprazole   Famotidine   Methocarbamol   Montelukast   Nortriptyline   Ondansetron   Prednisone   Sennosides   Trazodone     Past Medical History:    Allergic rhinitis   Headache   Kidney cancer   Grave's disease     Past Surgical History:    Laparoscopic resection ileocecal      Physical Exam   Patient Vitals for the past 24 hrs:   BP Temp " "Temp src Pulse Resp SpO2 Height Weight   07/24/23 2248 (!) 156/91 98  F (36.7  C) Oral 96 15 100 % 1.6 m (5' 3\") 67.6 kg (149 lb)   07/24/23 2200 (!) 130/93 -- -- 83 14 96 % -- --   07/24/23 2100 (!) 141/88 -- -- 82 12 96 % -- --   07/24/23 2000 (!) 144/86 -- -- 89 -- 95 % -- --   07/24/23 1600 (!) 146/93 97.7  F (36.5  C) Temporal 110 16 99 % -- --      Physical Exam  General: Chronically ill appearing woman recumbent in room 34,  at bedside  HENT: mucous membranes slightly dry  CV: rate as above, no murmur audible  Resp: normal effort, speaks in full phrases, no stridor, no cough observed  GI: Abdomen soft but tender especially in the right side, negative Molina sign, no distention  MSK: no bony tenderness, no CVAT  Skin: appropriately warm and dry, multiple abdominal surgical sites are clean dry and intact  Neuro: alert, clear speech, oriented   Psych: cooperative, manifests frustration at her recurrent symptoms, no apparent hallucinations    Emergency Department Course   ECG  ECG taken at 2017, ECG read at 2018  Normal sinus rhythm  Minim al voltage criteria for LVH, may be normal variant.   Rate 84 bpm. NH interval 138 ms. QRS duration 76 ms. QT/QTc 376/444 ms. P-R-T axes 40 -15 45.     Imaging:  CT Abdomen Pelvis w Contrast   Final Result   IMPRESSION:    1.  Postop changes ileocolic resection with wall thickening at the anastomosis and stranding/edema in the mesentery. Tiny extraluminal gas collection near the anastomosis presumed postoperative. No other findings for free air.    2.  No evidence for abscess formation.   3.  Small bowel loop proximal to the anastomosis is distended with fluid and may represent a postoperative ileus. No definite findings for obstruction.         Report per radiology    Laboratory:  Labs Ordered and Resulted from Time of ED Arrival to Time of ED Departure   BASIC METABOLIC PANEL - Abnormal       Result Value    Sodium 139      Potassium 3.9      Chloride 105      Carbon " Dioxide (CO2) 21 (*)     Anion Gap 13      Urea Nitrogen 18.7      Creatinine 1.01 (*)     Calcium 9.6      Glucose 111 (*)     GFR Estimate 62     CBC WITH PLATELETS AND DIFFERENTIAL - Abnormal    WBC Count 10.6      RBC Count 3.60 (*)     Hemoglobin 10.3 (*)     Hematocrit 33.1 (*)     MCV 92      MCH 28.6      MCHC 31.1 (*)     RDW 15.2 (*)     Platelet Count 405      % Neutrophils 82      % Lymphocytes 12      % Monocytes 3      % Eosinophils 1      % Basophils 0      % Immature Granulocytes 2      NRBCs per 100 WBC 0      Absolute Neutrophils 8.5 (*)     Absolute Lymphocytes 1.3      Absolute Monocytes 0.3      Absolute Eosinophils 0.1      Absolute Basophils 0.0      Absolute Immature Granulocytes 0.2      Absolute NRBCs 0.0     COMPREHENSIVE METABOLIC PANEL - Abnormal    Sodium 140      Potassium 4.1      Chloride 105      Carbon Dioxide (CO2) 20 (*)     Anion Gap 15      Urea Nitrogen 18.5      Creatinine 1.03 (*)     Calcium 9.7      Glucose 112 (*)     Alkaline Phosphatase 72      AST 29      ALT 28      Protein Total 7.0      Albumin 3.9      Bilirubin Total 0.3      GFR Estimate 60 (*)    ROUTINE UA WITH MICROSCOPIC REFLEX TO CULTURE - Abnormal    Color Urine Light Yellow      Appearance Urine Clear      Glucose Urine Negative      Bilirubin Urine Negative      Ketones Urine Negative      Specific Gravity Urine 1.010      Blood Urine Negative      pH Urine 5.0      Protein Albumin Urine Negative      Urobilinogen Urine Normal      Nitrite Urine Negative      Leukocyte Esterase Urine Negative      Mucus Urine Present (*)     RBC Urine <1      WBC Urine <1     LIPASE - Normal    Lipase 56     ENTERIC BACTERIA AND VIRUS PANEL BY PCR   C. DIFFICILE TOXIN B PCR WITH REFLEX TO C. DIFFICILE ANTIGEN AND TOXINS A/B EIA      Emergency Department Course & Assessments:     Interventions:  Medications   enoxaparin ANTICOAGULANT (LOVENOX) injection 40 mg (has no administration in time range)   pantoprazole (PROTONIX)  EC tablet 40 mg (has no administration in time range)   famotidine (PEPCID) tablet 40 mg (40 mg Oral $Given 7/24/23 2316)   methocarbamol (ROBAXIN) tablet 500 mg (has no administration in time range)   nortriptyline (PAMELOR) capsule 25 mg (25 mg Oral $Given 7/24/23 2302)   montelukast (SINGULAIR) tablet 10 mg (10 mg Oral $Given 7/24/23 2302)   predniSONE (DELTASONE) tablet 10 mg (has no administration in time range)   traZODone (DESYREL) tablet 200 mg (200 mg Oral $Given 7/24/23 2302)   predniSONE (DELTASONE) tablet 5 mg (has no administration in time range)   melatonin tablet 1 mg (has no administration in time range)   ondansetron (ZOFRAN ODT) ODT tab 4 mg (4 mg Oral $Given 7/24/23 2316)     Or   ondansetron (ZOFRAN) injection 4 mg ( Intravenous See Alternative 7/24/23 2316)   lidocaine 1 % 0.1-1 mL (has no administration in time range)   lidocaine (LMX4) cream (has no administration in time range)   sodium chloride (PF) 0.9% PF flush 3 mL (has no administration in time range)   sodium chloride (PF) 0.9% PF flush 3 mL (3 mLs Intracatheter $Given 7/24/23 2308)   lactated ringers infusion ( Intravenous $New Bag 7/24/23 2307)   acetaminophen (TYLENOL) tablet 975 mg (975 mg Oral $Given 7/24/23 2302)   HYDROmorphone (DILAUDID) half-tab 1 mg (has no administration in time range)   HYDROmorphone (DILAUDID) injection 0.2 mg (0.2 mg Intravenous $Given 7/24/23 2316)   naloxone (NARCAN) injection 0.2 mg (has no administration in time range)     Or   naloxone (NARCAN) injection 0.4 mg (has no administration in time range)     Or   naloxone (NARCAN) injection 0.2 mg (has no administration in time range)     Or   naloxone (NARCAN) injection 0.4 mg (has no administration in time range)   0.9% sodium chloride BOLUS (0 mLs Intravenous Stopped 7/24/23 2033)   iopamidol (ISOVUE-370) solution 120 mL (79 mLs Intravenous $Given 7/24/23 1716)   Saline CT scan flush (60 mLs Intravenous $Given 7/24/23 1717)   pantoprazole (PROTONIX) IV  push injection 40 mg (40 mg Intravenous $Given 7/24/23 2033)        Assessments:  2000 I obtained history and examined the patient as noted above.    Independent Interpretation (X-rays, CTs, rhythm strip):  I personally reviewed the CT and noted the findings.    Consultations/Discussion of Management or Tests:  2011 I spoke with Dr. Hollins [facility], regarding the patient's history and presentation in the emergency department today.   2045 I spoke with Dr. Mckeon of the hospitalist team regarding the patient, who accepted the patient for admission.     Social Determinants of Health affecting care:   Healthcare Access/Compliance    Disposition:  The patient was admitted to the hospital under the care of Dr. Alcala.     Impression & Plan    Medical Decision Making:  The specific etiology of her presenting symptoms is unclear despite extensive testing as above.  She reports feeling dizzy though on further questioning this seems to be a generalized weakness and lightheadedness, likely multifactorial including some degree of dehydration and deconditioning from her recent hospitalizations and surgery.  She describes some liquid black stool today, upper GI bleed but certainly considered and remains on the differential, though fortunately, her hemoglobin at this time is actually several grams higher than it had been at last check.  For this reason, in combination with satisfactory blood pressures at this time, I do not think she requires immediate transfusion of blood though she will require close monitoring here in the hospital and serial hemoglobins at a minimum.  She does have some abdominal tenderness especially on the right side, she has had a CT, has been seen in person by the colorectal surgery team, and I did speak with the surgery team again after CT was resulted to confirm that they do not feel she needs any immediate procedural intervention.  However, they do recommend that she be admitted to the hospitalist  service for further multidisciplinary care, the colorectal surgery team they will continue to follow her as well.  C. difficile colitis and other infectious etiologies remain on the differential.  Findings and plan explained to the patient and her , who agree with them.    Diagnosis:    ICD-10-CM    1. Dizziness  R42       2. History of colon surgery  Z98.890       3. Abdominal pain, unspecified abdominal location  R10.9       4. Abnormal stools  R19.5          Scribe Disclosure:  I, Keegan Connlely, am serving as a scribe at 7:57 PM on 7/24/2023 to document services personally performed by Ry Vasquez MD based on my observations and the provider's statements to me.   7/24/2023   Ry Vasquez MD Reitsema, Jeffrey Alan, MD  07/25/23 0206

## 2023-07-26 VITALS
HEIGHT: 63 IN | SYSTOLIC BLOOD PRESSURE: 141 MMHG | OXYGEN SATURATION: 100 % | HEART RATE: 84 BPM | DIASTOLIC BLOOD PRESSURE: 83 MMHG | RESPIRATION RATE: 16 BRPM | WEIGHT: 149 LBS | BODY MASS INDEX: 26.4 KG/M2 | TEMPERATURE: 98.1 F

## 2023-07-26 LAB
ANION GAP SERPL CALCULATED.3IONS-SCNC: 9 MMOL/L (ref 7–15)
BUN SERPL-MCNC: 13.4 MG/DL (ref 8–23)
CALCIUM SERPL-MCNC: 8.8 MG/DL (ref 8.8–10.2)
CHLORIDE SERPL-SCNC: 110 MMOL/L (ref 98–107)
CREAT SERPL-MCNC: 0.97 MG/DL (ref 0.51–0.95)
DEPRECATED HCO3 PLAS-SCNC: 26 MMOL/L (ref 22–29)
ERYTHROCYTE [DISTWIDTH] IN BLOOD BY AUTOMATED COUNT: 15.3 % (ref 10–15)
GFR SERPL CREATININE-BSD FRML MDRD: 65 ML/MIN/1.73M2
GLUCOSE SERPL-MCNC: 88 MG/DL (ref 70–99)
HCT VFR BLD AUTO: 27.6 % (ref 35–47)
HGB BLD-MCNC: 8.6 G/DL (ref 11.7–15.7)
MCH RBC QN AUTO: 28.3 PG (ref 26.5–33)
MCHC RBC AUTO-ENTMCNC: 31.2 G/DL (ref 31.5–36.5)
MCV RBC AUTO: 91 FL (ref 78–100)
PLATELET # BLD AUTO: 339 10E3/UL (ref 150–450)
POTASSIUM SERPL-SCNC: 3.7 MMOL/L (ref 3.4–5.3)
RBC # BLD AUTO: 3.04 10E6/UL (ref 3.8–5.2)
SODIUM SERPL-SCNC: 145 MMOL/L (ref 136–145)
WBC # BLD AUTO: 7.3 10E3/UL (ref 4–11)

## 2023-07-26 PROCEDURE — G0378 HOSPITAL OBSERVATION PER HR: HCPCS

## 2023-07-26 PROCEDURE — 99239 HOSP IP/OBS DSCHRG MGMT >30: CPT | Performed by: PHYSICIAN ASSISTANT

## 2023-07-26 PROCEDURE — 250N000013 HC RX MED GY IP 250 OP 250 PS 637

## 2023-07-26 PROCEDURE — 80048 BASIC METABOLIC PNL TOTAL CA: CPT | Performed by: PHYSICIAN ASSISTANT

## 2023-07-26 PROCEDURE — 85027 COMPLETE CBC AUTOMATED: CPT | Performed by: PHYSICIAN ASSISTANT

## 2023-07-26 PROCEDURE — 96372 THER/PROPH/DIAG INJ SC/IM: CPT | Performed by: PHYSICIAN ASSISTANT

## 2023-07-26 PROCEDURE — 36415 COLL VENOUS BLD VENIPUNCTURE: CPT | Performed by: PHYSICIAN ASSISTANT

## 2023-07-26 PROCEDURE — 250N000012 HC RX MED GY IP 250 OP 636 PS 637: Performed by: PHYSICIAN ASSISTANT

## 2023-07-26 PROCEDURE — 250N000011 HC RX IP 250 OP 636: Mod: JZ | Performed by: PHYSICIAN ASSISTANT

## 2023-07-26 PROCEDURE — 250N000013 HC RX MED GY IP 250 OP 250 PS 637: Performed by: PHYSICIAN ASSISTANT

## 2023-07-26 RX ORDER — METRONIDAZOLE 500 MG/1
500 TABLET ORAL 2 TIMES DAILY
Qty: 20 TABLET | Refills: 0 | Status: SHIPPED | OUTPATIENT
Start: 2023-07-26 | End: 2023-08-05

## 2023-07-26 RX ORDER — ACETAMINOPHEN 500 MG
500-1000 TABLET ORAL EVERY 6 HOURS PRN
Start: 2023-07-26

## 2023-07-26 RX ORDER — HYDROMORPHONE HYDROCHLORIDE 2 MG/1
1 TABLET ORAL EVERY 4 HOURS PRN
Qty: 9 TABLET | Refills: 0 | Status: SHIPPED | OUTPATIENT
Start: 2023-07-26 | End: 2023-07-26

## 2023-07-26 RX ORDER — HYDROMORPHONE HYDROCHLORIDE 2 MG/1
1 TABLET ORAL EVERY 4 HOURS PRN
Qty: 9 TABLET | Refills: 0 | Status: SHIPPED | OUTPATIENT
Start: 2023-07-26 | End: 2023-10-09

## 2023-07-26 RX ORDER — INFLIXIMAB 100 MG/10ML
5 INJECTION, POWDER, LYOPHILIZED, FOR SOLUTION INTRAVENOUS ONCE
Start: 2023-07-26 | End: 2023-07-26

## 2023-07-26 RX ADMIN — PREDNISONE 5 MG: 5 TABLET ORAL at 08:18

## 2023-07-26 RX ADMIN — METRONIDAZOLE 500 MG: 500 TABLET ORAL at 08:18

## 2023-07-26 RX ADMIN — HYDROMORPHONE HYDROCHLORIDE 1 MG: 2 TABLET ORAL at 08:39

## 2023-07-26 RX ADMIN — ACETAMINOPHEN 975 MG: 325 TABLET, FILM COATED ORAL at 06:14

## 2023-07-26 RX ADMIN — PANTOPRAZOLE SODIUM 40 MG: 40 TABLET, DELAYED RELEASE ORAL at 06:14

## 2023-07-26 RX ADMIN — ENOXAPARIN SODIUM 40 MG: 40 INJECTION SUBCUTANEOUS at 08:18

## 2023-07-26 ASSESSMENT — ACTIVITIES OF DAILY LIVING (ADL)
ADLS_ACUITY_SCORE: 31
ADLS_ACUITY_SCORE: 33
ADLS_ACUITY_SCORE: 31

## 2023-07-26 NOTE — PLAN OF CARE
Patient's After Visit Summary was reviewed with patient and/or spouse.   Patient verbalized understanding of After Visit Summary, recommended follow up and was given an opportunity to ask questions.   Discharge medications sent home with patient/family: YES   Discharged with spouse    OBSERVATION patient END time: 1883

## 2023-07-26 NOTE — PROGRESS NOTES
[facility]  PROGRESS NOTE    July 26, 2023  Post-op Day # 12    SUBJECTIVE:  Patient feeling okay. Still having some right sided abdominal pain. Tolerating regular diet. Occasional nausea. Had 4 loose stools yesterday, none today.    OBJECTIVE:  Temp:  [98  F (36.7  C)-98.7  F (37.1  C)] 98.1  F (36.7  C)  Pulse:  [] 84  Resp:  [16-20] 20  BP: (118-145)/(66-83) 141/83  SpO2:  [96 %-98 %] 98 %  No intake or output data in the 24 hours ending 07/26/23 0907    GENERAL:  Awake, alert, no acute distress, sitting up in bed  HEAD: Nomocephalic atraumatic  SCLERA: anicteric  EXTREMITIES: warm and well perfused  ABDOMEN:  Soft, RLQ tenderness, non-distended, no rebound or guarding, no peritoneal signs  INCISION:  C/d/i. Some bruising.    LABS:  Lab Results   Component Value Date    WBC 7.3 07/26/2023     Lab Results   Component Value Date    HGB 8.6 07/26/2023     Lab Results   Component Value Date    HCT 27.6 07/26/2023     Lab Results   Component Value Date     07/26/2023     Last Basic Metabolic Panel:  Lab Results   Component Value Date     07/26/2023      Lab Results   Component Value Date    POTASSIUM 3.7 07/26/2023     Lab Results   Component Value Date    CHLORIDE 110 07/26/2023     Lab Results   Component Value Date    GIORGIO 8.8 07/26/2023     Lab Results   Component Value Date    CO2 26 07/26/2023     Lab Results   Component Value Date    BUN 13.4 07/26/2023     Lab Results   Component Value Date    CR 0.97 07/26/2023     Lab Results   Component Value Date    GLC 88 07/26/2023     07/14/2023       ASSESSMENT/PLAN:  Devi is a 63 year old POD#12 s/p open ileocolic resection for crohn's with TI stricture, with post-op course c/b anastomotic bleed, now presenting with overall malaise/weakness with worsening abdominal pain and increasing bowel frequency. CT scan with some wall thickening around the anastomosis with small gas collection presumed to be postoperative changes. No  abscess identified. Stool studies negative.      - Low fiber diet  - Continue low dose Flagyl for perianastomotic inflammation. Will need 10 days at discharge.   - Pain management as needed  - No plans for surgery  - Okay to discharge from a CRS standpoint. Will need CT scan in 10 days which our office can set up. Post op clinic visit scheduled for 8/14.       For questions/paging, please contact the CRS office at [phone].    Yanci Araujo PA-C  [facility]  Phone: [phone]

## 2023-07-26 NOTE — PLAN OF CARE
"PRIMARY DIAGNOSIS: Abdominal pain   OUTPATIENT/OBSERVATION GOALS TO BE MET BEFORE DISCHARGE:  ADLs back to baseline: Yes    Activity and level of assistance: Ambulating independently.    Pain status: Pain free.    Return to near baseline physical activity: Yes     Discharge Planner Nurse   Safe discharge environment identified: Yes  Barriers to discharge: No         Entered by: Cristian Luna RN 07/26/2023    Please review provider order for any additional goals.   Nurse to notify provider when observation goals have been met and patient is ready for discharge.    A&O X4. VSS. Independent in room. Tolerating low fiber diet and oral medications. No IV access. Colorectal following and has cleared for discharge and pt will have to follow outpatient. Pt reports pain in right lower abdomen, 1mg PO dilaudid given. Pt negative for otho bp. Discharge today. Bed in lowest position and call light within reach.    Blood pressure (!) 141/83, pulse 84, temperature 98.1  F (36.7  C), temperature source Oral, resp. rate 16, height 1.6 m (5' 3\"), weight 67.6 kg (149 lb), SpO2 100 %.    "

## 2023-07-26 NOTE — PLAN OF CARE
PRIMARY DIAGNOSIS: Abdominal pain   OUTPATIENT/OBSERVATION GOALS TO BE MET BEFORE DISCHARGE:  ADLs back to baseline: Yes    Activity and level of assistance: Ambulating independently.    Pain status: Pain free.    Return to near baseline physical activity: Yes     Discharge Planner Nurse   Safe discharge environment identified: Yes  Barriers to discharge: No       Entered by: Chelsea Mathur RN 07/26/2023    Please review provider order for any additional goals.   Nurse to notify provider when observation goals have been met and patient is ready for discharge.

## 2023-07-26 NOTE — PLAN OF CARE
"PRIMARY DIAGNOSIS: Dizziness,Weakness & Abdominal Pain  OUTPATIENT/OBSERVATION GOALS TO BE MET BEFORE DISCHARGE:  1. Pain Status: Improved-controlled with oral pain medications.    2. Return to near baseline physical activity: No    3. Cleared for discharge by consultants (if involved): No    Discharge Planner Nurse   Safe discharge environment identified: Yes  Barriers to discharge: Yes       Entered by: Ester Hassan RN 07/25/2023     Patient is A & O x 4, Lung sounds CTA. Bowel sounds hyperactive, LBM 7/25. Patient is independent, took a shower, expressed feeling fresh afterwards. Pt has not reported pain this shift. Scheduled Tylenol given at 2120. Denies any rectal bleeding, continent of bladder, voiding adequately. Pt on Low fibre diet, tolerating well. After the shower, patient's IV became wet and loose. Writer tried to change the dressing but the IV came out. Pt refused to get a new one, said she can have a new one tomorrow if needed. Hgb 8.7, Colorectal surgery is following. Will continue to monitor.  BP (!) 143/77 (BP Location: Right arm)   Pulse 84   Temp 98.1  F (36.7  C) (Oral)   Resp 16   Ht 1.6 m (5' 3\")   Wt 67.6 kg (149 lb)   SpO2 96%   BMI 26.39 kg/m       Please review provider order for any additional goals.   Nurse to notify provider when observation goals have been met and patient is ready for discharge.      "

## 2023-07-26 NOTE — PLAN OF CARE
"PRIMARY DIAGNOSIS: Abdominal pain   OUTPATIENT/OBSERVATION GOALS TO BE MET BEFORE DISCHARGE:  ADLs back to baseline: Yes    Activity and level of assistance: Ambulating independently.    Pain status: Pain free.    Return to near baseline physical activity: Yes     Discharge Planner Nurse   Safe discharge environment identified: Yes  Barriers to discharge: No    /66 (BP Location: Right arm)   Pulse 86   Temp 98  F (36.7  C) (Oral)   Resp 17   Ht 1.6 m (5' 3\")   Wt 67.6 kg (149 lb)   SpO2 96%   BMI 26.39 kg/m      Pt is A&Ox4. VSS on RA. Pt denies pain. Pt on low fiber diet. No IV access. Colorectal following.          Entered by: Chelsea Mathur RN 07/26/2023    Please review provider order for any additional goals.   Nurse to notify provider when observation goals have been met and patient is ready for discharge.  "

## 2023-07-26 NOTE — PLAN OF CARE
"PRIMARY DIAGNOSIS: Abdominal pain   OUTPATIENT/OBSERVATION GOALS TO BE MET BEFORE DISCHARGE:  ADLs back to baseline: Yes    Activity and level of assistance: Ambulating independently.    Pain status: Pain free.    Return to near baseline physical activity: Yes     Discharge Planner Nurse   Safe discharge environment identified: Yes  Barriers to discharge: No         Entered by: Cristian Luna RN 07/26/2023    Please review provider order for any additional goals.   Nurse to notify provider when observation goals have been met and patient is ready for discharge.    A&O X4. VSS. Independent in room. Tolerating low fiber diet and oral medications. No IV access. Colorectal following and has cleared for discharge and pt will have to follow outpatient. Pt reports pain in right lower abdomen, 1mg PO dilaudid given. Pt negative for otho bp. Discharge later today. Bed in lowest position and call light within reach.    Blood pressure (!) 141/83, pulse 84, temperature 98.1  F (36.7  C), temperature source Oral, resp. rate 16, height 1.6 m (5' 3\"), weight 67.6 kg (149 lb), SpO2 100 %.    "

## 2023-07-27 NOTE — DISCHARGE SUMMARY
[facility]  Hospitalist Discharge Summary      Date of Admission:  7/24/2023  Date of Discharge:  7/26/2023  1:29 PM  Discharging Provider: Li Ochoa PA-C  Discharge Service: Hospitalist Service    Discharge Diagnoses   Abdominal pain  Crohn's disease with recent complication S/p open ileocolic resection for crohn's with TI stricture, with post-op course c/b anastomotic bleed  Anemia    Follow-ups Needed After Discharge   Follow-up Appointments     Follow-up and recommended labs and tests       Follow up with PCP 7-14 days to recheck hgb, discuss when due for repeat   B12 injection.   - Continue low dose Flagyl for perianastomotic inflammation. Will need 10   days at discharge.     - Will need CT scan in 10 days which our office can set up. Post op clinic   visit scheduled for 8/14.    For questions, please contact the CRS office at [phone].               Unresulted Labs Ordered in the Past 30 Days of this Admission     No orders found from 6/24/2023 to 7/25/2023.      These results will be followed up by n/a    Discharge Disposition   Discharged to home  Condition at discharge: Stable    Hospital Course }Devi Salas is a 63 year old female with a PMH significant for Crohn's disease s/p ileocolic resection 7/14 due to medically refractory Crohn's colitis, asthma, migraines, CKD and renal cell cancer that was managed surgically with right nephrectomy in 2009, who was admitted on 7/24/2023 with increasing abdominal pain, malaise.      She was admitted in June with SBO related to Crohn's disease, treated with Prednisone taper and started on Remicade. Discharged home with planned follow up. Returned to hospital on 7/12 with ongoing abd pain and diarrhea, found to have ileitis with luminal narrowing. GI and CRS consulted, CRS opted for ileocolic resection on 7/14 and continued on prednisone taper, next dose of Remicade held. Also had acute blood loss anemia during that admission due  to presumed anastomotic bleed, received 1 unit PRBCs.      Returned to ED with increase in abd pain, malaise, 2 episodes of melena. Work up including CT abd/pelvis shows postop changes with wall thickening around anastomosis with distended loops of small bowel, concerning for post op ileus. CRS aware, reviewed CT, felt changes are post op, recommend monitoring and stool studies. WBC normal, Hgb stable at 10.3. Admitted. Hgb stable. CRS consulted. Enteric, c diff testing was negative. Started on flagyl for debbie anastomotic inflammation with plans for follow up imaging with CRS office.         Crohn's disease with complication S/p open ileocolic resection for crohn's with TI stricture, with post-op course c/b anastomotic bleed  Returned to ED with increase in abd pain, malaise, 2 episodes of melena. Work up including CT abd/pelvis shows postop changes with wall thickening around anastomosis with distended loops of small bowel, concerning for post op ileus. CRS aware, reviewed CT, felt changes are post op, recommend monitoring and stool studies. WBC normal, Hgb stable at 10.3. Admitted to observation, NPO. Advancing diet, monitoring hgb.   -> CRS consulted: recommending holding off on Remicade infusion until clearance post op with Dr Mendez 8/14.Will need CT scan in 10 days which  CRS office can set up. Post op clinic visit scheduled for 8/14.   -low fiber diet, regress if issues   -low dose Flagyl for perianastomotic inflammation. Will need 10 days at discharge.   - Pain management as needed  -stool studies c diff, enteric in process   -continue Prednisone (5 mg for 7 days) and Lovenox  - tolerating diet, hemoglobin stable and pain better controlled     Acute Blood Loss anemia   Had acute blood loss anemia during that admission due to presumed anastomotic bleed, received 1 unit PRBCs. WBC normal, Hgb stable at 10.3 -> 8.7, possible dilutional contribution. No melena here. May have symptomatic anemia with occasional  "lightheadedness. Last EGD 04/07/2023, endoscopically normal. Gastric biopsies, chronic gastritis, but negative H. pylori. Esophageal and duodenal biopsies were normal.  - continue PTA b12, ppi      Chronic kidney disease  Renal cell ca s/p nephrectomy  - Creatinine stable  - Continue follow labs  - Avoid nephrotoxins      Asthma: no acute exacerbation  - resume singulair, ok to use home albuterol inhaler prn     Consultations This Hospital Stay   COLORECTAL SURGERY IP CONSULT  CARE MANAGEMENT / SOCIAL WORK IP CONSULT    Code Status   Prior    Time Spent on this Encounter   ILi PA-C, personally saw the patient today and spent greater than 30 minutes discharging this patient.       Li Ochoa PA-C  ______________________________________________________________________    Interval History  Mild right sided abdominal discomfort, intermittent. Improved with pain medication. Tolerating normal diet. No nausea, vomiting. Fewer loose stools, no hematochezia. No lightheadedness dizziness.     Physical Exam   BP (!) 141/83 (BP Location: Right arm)   Pulse 84   Temp 98.1  F (36.7  C) (Oral)   Resp 16   Ht 1.6 m (5' 3\")   Wt 67.6 kg (149 lb)   SpO2 100%   BMI 26.39 kg/m        Constitutional: Awake, alert, no apparent distress  Respiratory:  Normal work of breathing. Lungs clear to auscultation bilaterally, no crackles or wheezing.  Cardiovascular: Regular rate and rhythm, normal S1 and S2, and no murmur appreciated.   GI: Bowel sounds present. soft, non-distended, midline tenderness near incisions. No guarding or rebound tenderness.   Skin/Integument: Warm, dry. no peripheral edema.  Neuro: No focal deficits. Moving all extremities with normal strength. Coordination and sensation grossly intact. Speech clear. No focal deficits.   Psych: Appropriate affect.       Primary Care Physician   Physician No Ref-Primary    Discharge Orders      Reason for your hospital stay    Abdominal pain     Follow-up and " recommended labs and tests     Follow up with PCP 7-14 days to recheck hgb, discuss when due for repeat B12 injection.   - Continue low dose Flagyl for perianastomotic inflammation. Will need 10 days at discharge.     - Will need CT scan in 10 days which our office can set up. Post op clinic visit scheduled for 8/14.    For questions, please contact the CRS office at [phone].        Activity    Your activity upon discharge: activity as tolerated, take extra time going from sitting to standing     When to contact your care team    Call your primary care doctor if you have any of the following: temperature greater than 101 F, worsening shortness of breath, increased swelling, worsening pain, new or unrelenting diarrhea, or any other concerning symptoms. Call 911 or go to the emergency room if you need immediate assistance.     Diet    Follow this diet upon discharge: Orders Placed This Encounter      Low Fiber Diet       Significant Results and Procedures   Results for orders placed or performed during the hospital encounter of 07/24/23   CT Abdomen Pelvis w Contrast    Narrative    EXAM: CT ABDOMEN PELVIS W CONTRAST  LOCATION: [facility]  DATE: 7/24/2023    INDICATION: Abd pain, recent iliocolic resection on 07/14/2023. History of Crohn's disease.  COMPARISON: CT abdomen and pelvis 07/12/2023  TECHNIQUE: CT scan of the abdomen and pelvis was performed following injection of IV contrast. Multiplanar reformats were obtained. Dose reduction techniques were used.  CONTRAST: 79 mL Isovue 370    FINDINGS:   LOWER CHEST: Normal.    HEPATOBILIARY: Cholecystectomy.    PANCREAS: Normal.    SPLEEN: Small hypodense splenic lesion too small to further characterize, but likely represents a cyst.    ADRENAL GLANDS: Normal.    KIDNEYS/BLADDER: Right nephrectomy.    BOWEL: Recent postoperative changes ileocolic resection, with wall thickening and tiny gas pocket at the surgical anastomosis, and stranding  in the adjacent mesentery. No localized fluid collections or evidence for abscess. The small bowel proximal to   the anastomosis is distended with fluid, but no definite evidence for obstruction.    LYMPH NODES: Normal.    VASCULATURE: Atherosclerotic disease abdominal aorta.    PELVIC ORGANS: Hysterectomy. No free pelvic fluid.    MUSCULOSKELETAL: Degenerative disc disease L5 level.      Impression    IMPRESSION:   1.  Postop changes ileocolic resection with wall thickening at the anastomosis and stranding/edema in the mesentery. Tiny extraluminal gas collection near the anastomosis presumed postoperative. No other findings for free air.   2.  No evidence for abscess formation.  3.  Small bowel loop proximal to the anastomosis is distended with fluid and may represent a postoperative ileus. No definite findings for obstruction.       Discharge Medications   Discharge Medication List as of 7/26/2023  1:19 PM      START taking these medications    Details   acetaminophen (TYLENOL) 500 MG tablet Take 1-2 tablets (500-1,000 mg) by mouth every 6 hours as needed for mild pain, No Print Out      metroNIDAZOLE (FLAGYL) 500 MG tablet Take 1 tablet (500 mg) by mouth 2 times daily for 10 days, Disp-20 tablet, R-0, E-Prescribe         CONTINUE these medications which have CHANGED    Details   HYDROmorphone (DILAUDID) 2 MG tablet Take 0.5 tablets (1 mg) by mouth every 4 hours as needed for severe pain, Disp-9 tablet, R-0, Local Print      inFLIXimab (REMICADE) 100 MG injection Inject 338 mg into the vein once for 1 dose at weeks 0, 2 and 6, then every 8 weeks. Infuse by IV route.  Hold until approved outpatient follow up., No Print Out         CONTINUE these medications which have NOT CHANGED    Details   acetaminophen (TYLENOL) 650 MG CR tablet Take 650 mg by mouth At Bedtime, Historical      albuterol (PROAIR HFA/PROVENTIL HFA/VENTOLIN HFA) 108 (90 Base) MCG/ACT inhaler Inhale 2 puffs into the lungs every 6 hours as needed  for shortness of breath, wheezing or cough, Historical      bisacodyl (DULCOLAX) 10 MG suppository Place 1 suppository (10 mg) rectally 2 times daily as needed for constipation, Disp-10 suppository, R-1, E-Prescribe      cyanocobalamin (CYANOCOBALAMIN) 1000 MCG/ML injection Inject 1,000 mcg as directed every 30 days, Historical      enoxaparin ANTICOAGULANT (LOVENOX) 40 MG/0.4ML syringe Inject 0.4 mLs (40 mg) Subcutaneous every 24 hours for 28 days, Disp-11.2 mL, R-0, E-Prescribe      esomeprazole (NEXIUM) 40 MG DR capsule Take 40 mg by mouth 2 times daily (before meals) Take 30-60 minutes before eating., Historical      famotidine (PEPCID) 40 MG tablet Take 40 mg by mouth At Bedtime, Historical      methocarbamol (ROBAXIN) 500 MG tablet Take 1 tablet (500 mg) by mouth 4 times daily as needed for muscle spasms, Disp-15 tablet, R-1, E-Prescribe      montelukast (SINGULAIR) 10 MG tablet Take 10 mg by mouth At Bedtime, Historical      nortriptyline (PAMELOR) 25 MG capsule Take 25 mg by mouth At Bedtime, Historical      ondansetron (ZOFRAN ODT) 4 MG ODT tab Take 1 tablet (4 mg) by mouth every 8 hours as needed for nausea, Disp-15 tablet, R-2, E-Prescribe      polyethylene glycol (MIRALAX) 17 GM/Dose powder Take 17 g by mouth daily, Disp-510 g, R-3, E-Prescribe      predniSONE (DELTASONE) 10 MG tablet Take 1 tablet (10 mg) by mouth daily for 7 days, THEN 0.5 tablets (5 mg) daily for 7 days., Disp-11 tablet, R-0, No Print Out      sennosides (SENOKOT) 8.6 MG tablet Take 1-2 tablets by mouth 2 times daily as needed for constipation, Disp-90 tablet, R-1, E-Prescribe      traZODone (DESYREL) 100 MG tablet Take 200 mg by mouth At Bedtime, Historical           Allergies   Allergies   Allergen Reactions     Latex Hives     Oxycodone Anaphylaxis, Hives and Swelling     Throat swelling, per pt     Penicillin G Anaphylaxis     Sumatriptan Palpitations     Aspirin Nausea and Vomiting     Demerol Hcl [Meperidine] Nausea and Vomiting      N/V     Percocet [Oxycodone-Acetaminophen] Nausea and Vomiting     N/V     Codeine Nausea and Vomiting     Mold

## 2023-08-18 NOTE — PROGRESS NOTES
"[facility]  Hospitalist Progress Note     Assessment & Plan     ASSESSMENT    63F with hx of RCC s/p nephrectomy w/ CKD Stage III and Crohn's disease on Remicade with recent hospitalization discharged on steroid taper re-presents with ongoing abdominal pain and diarrhea and found to have ongoing evidence of ileitis on CT scan with luminal narrowing. CRS and GI following, surgical options are being considered.    Colorectal Surgery have plans for surgery today.    PLAN    Crohn's Ileitis, Acute on Chronic  -Presents with ongoing abdominal pain 2/2 ileitis that continues after recent adm under similar circumstances where she received IV steroids  -Abdominal pain and diarrhea continue despite steroid taper and Remicade on outpatient basis  -On adm, ongoing evidence of ileitis on CT scan with luminal narrowing  -CRS and GI following, surgical options are being considered  PLAN  -Methylprednisolone 8mg IV q24 (equiv to pt's home pred taper per GI recs)  -NS@75ml/hr  -NPO for surgery  -CRS and GI following, appreciate recommendations    Lactic Acidosis  -Suspect 2/2 hypovolemia, no evidence of sepsis    RCC s/p nephrectomy w/ CKD Stage III  -Cr currently at baseline  -Avoid nephrotoxins    DVT Prophy  -SCDs    Disposition  -Inpatient for another 2-3 more days depending on clinical progression after surgery      Marcio Bender MD    Subjective     Seen at bedside. Still mild ongoing pain. CRS have plans for surgery this afternoon.         Objective   Blood pressure 118/74, pulse 79, temperature 98.1  F (36.7  C), temperature source Oral, resp. rate 20, height 1.6 m (5' 3\"), weight 70.2 kg (154 lb 12.8 oz), SpO2 98 %.    PHYSICAL EXAM  General: In no acute distress  CV: RRR.  Lungs: CTAB. Nl WOB.  Abd: Mild tenderness in BL lower quadrants.  Ext: No edema.    LABS AND IMAGING  Reviewed and pertinent results discussed in assessment and plan.   " Detail Level: Detailed Quality 226: Preventive Care And Screening: Tobacco Use: Screening And Cessation Intervention: Patient screened for tobacco use, is a smoker AND received Cessation Counseling within measurement period or in the six months prior to the measurement period

## 2023-09-07 ENCOUNTER — HOSPITAL ENCOUNTER (EMERGENCY)
Facility: CLINIC | Age: 63
Discharge: HOME | End: 2023-09-07
Attending: EMERGENCY MEDICINE | Admitting: EMERGENCY MEDICINE
Payer: COMMERCIAL

## 2023-09-07 VITALS
TEMPERATURE: 97.6 F | HEART RATE: 98 BPM | DIASTOLIC BLOOD PRESSURE: 70 MMHG | OXYGEN SATURATION: 98 % | RESPIRATION RATE: 16 BRPM | SYSTOLIC BLOOD PRESSURE: 123 MMHG

## 2023-09-07 DIAGNOSIS — T78.40XA ALLERGIC REACTION, INITIAL ENCOUNTER: ICD-10-CM

## 2023-09-07 PROCEDURE — 250N000011 HC RX IP 250 OP 636: Mod: JZ | Performed by: EMERGENCY MEDICINE

## 2023-09-07 PROCEDURE — 258N000003 HC RX IP 258 OP 636: Performed by: EMERGENCY MEDICINE

## 2023-09-07 PROCEDURE — 96374 THER/PROPH/DIAG INJ IV PUSH: CPT

## 2023-09-07 PROCEDURE — 250N000009 HC RX 250: Performed by: EMERGENCY MEDICINE

## 2023-09-07 PROCEDURE — 96361 HYDRATE IV INFUSION ADD-ON: CPT

## 2023-09-07 PROCEDURE — 99291 CRITICAL CARE FIRST HOUR: CPT | Mod: 25

## 2023-09-07 RX ORDER — DIPHENHYDRAMINE HCL 25 MG
25 CAPSULE ORAL EVERY 6 HOURS PRN
Qty: 30 CAPSULE | Refills: 0 | Status: ON HOLD | OUTPATIENT
Start: 2023-09-07 | End: 2024-01-18

## 2023-09-07 RX ORDER — PREDNISONE 20 MG/1
40 TABLET ORAL DAILY
Qty: 4 TABLET | Refills: 0 | Status: SHIPPED | OUTPATIENT
Start: 2023-09-08 | End: 2023-09-10

## 2023-09-07 RX ORDER — LIDOCAINE 40 MG/G
CREAM TOPICAL
Status: DISCONTINUED | OUTPATIENT
Start: 2023-09-07 | End: 2023-09-07 | Stop reason: HOSPADM

## 2023-09-07 RX ADMIN — EPINEPHRINE 0.5 MG: 1 INJECTION INTRAMUSCULAR; INTRAVENOUS; SUBCUTANEOUS at 12:24

## 2023-09-07 RX ADMIN — FAMOTIDINE 20 MG: 10 INJECTION, SOLUTION INTRAVENOUS at 12:37

## 2023-09-07 RX ADMIN — SODIUM CHLORIDE 1000 ML: 9 INJECTION, SOLUTION INTRAVENOUS at 12:37

## 2023-09-07 ASSESSMENT — ACTIVITIES OF DAILY LIVING (ADL): ADLS_ACUITY_SCORE: 35

## 2023-09-07 NOTE — ED PROVIDER NOTES
History     Chief Complaint:  Allergic Reaction       HPI   Devi Salas is a 63 year old female with history of Crohn's disease who follows through [facility].  She had an ileocolic resection on July 14 due to medically refractory Crohn's colitis.  She has been on Remicade.  She has previously been on steroid tapers.  She has had multiple hospitalizations for Crohn's disease with complications including anemia.  Today she was at the outpatient infusion center getting an iron infusion.  She was noted to have a pruritic rash and sensation of swelling of the throat.  She was given 2 doses of diphenhydramine, 25 mg each IV.  She is also given 50 mg of Solu-Medrol along with IV fluids.  She feels that her rash has resolved but she continues to have a scratchy and swollen sensation in the throat.  She is not drooling or stridorous.      Independent Historian:    History taken from EMS who provides information about the patient's treatment at the infusion center.    Review of External Notes:  Discharge summary reviewed from July 26, 2023 and the patient was admitted for complications related to her Crohn's disease.    Medications:    diphenhydrAMINE (BENADRYL) 25 MG capsule  [START ON 9/8/2023] predniSONE (DELTASONE) 20 MG tablet  acetaminophen (TYLENOL) 500 MG tablet  acetaminophen (TYLENOL) 650 MG CR tablet  albuterol (PROAIR HFA/PROVENTIL HFA/VENTOLIN HFA) 108 (90 Base) MCG/ACT inhaler  bisacodyl (DULCOLAX) 10 MG suppository  cyanocobalamin (CYANOCOBALAMIN) 1000 MCG/ML injection  esomeprazole (NEXIUM) 40 MG DR capsule  famotidine (PEPCID) 40 MG tablet  HYDROmorphone (DILAUDID) 2 MG tablet  methocarbamol (ROBAXIN) 500 MG tablet  montelukast (SINGULAIR) 10 MG tablet  nortriptyline (PAMELOR) 25 MG capsule  ondansetron (ZOFRAN ODT) 4 MG ODT tab  polyethylene glycol (MIRALAX) 17 GM/Dose powder  sennosides (SENOKOT) 8.6 MG tablet  traZODone (DESYREL) 100 MG tablet        Past Medical History:    No past medical  history on file.    Past Surgical History:    Past Surgical History:   Procedure Laterality Date    LAPAROSCOPIC RESECTION ILEOCECAL N/A 7/14/2023    Procedure: Laparoscopic assisted ileocolic resection;  Surgeon: Marleny Mendez MD;  Location:  OR          Physical Exam   Patient Vitals for the past 24 hrs:   BP Temp Temp src Pulse Resp SpO2   09/07/23 1425 123/70 -- -- -- -- 99 %   09/07/23 1420 -- -- -- -- -- 99 %   09/07/23 1415 -- -- -- 101 -- 98 %   09/07/23 1410 115/70 -- -- -- -- 96 %   09/07/23 1405 -- -- -- -- -- 98 %   09/07/23 1400 -- -- -- 105 -- 97 %   09/07/23 1355 128/69 -- -- -- -- 98 %   09/07/23 1350 -- -- -- -- -- 100 %   09/07/23 1345 -- -- -- 102 -- 98 %   09/07/23 1340 132/62 -- -- -- 16 96 %   09/07/23 1325 (!) 140/72 -- -- -- 16 100 %   09/07/23 1315 -- -- -- 103 -- 100 %   09/07/23 1310 127/70 -- -- -- 16 98 %   09/07/23 1305 -- -- -- -- -- 100 %   09/07/23 1300 -- -- -- 96 -- 98 %   09/07/23 1255 129/73 -- -- -- -- 99 %   09/07/23 1250 -- -- -- -- -- 99 %   09/07/23 1245 (!) 150/78 -- -- 93 -- 99 %   09/07/23 1225 -- -- -- -- 16 100 %   09/07/23 1220 -- -- -- -- -- 98 %   09/07/23 1215 -- -- -- -- -- 95 %   09/07/23 1209 (!) 140/85 97.6  F (36.4  C) Oral 82 18 99 %        Physical Exam  Constitutional:       General: She is not in acute distress.     Appearance: Normal appearance. She is not diaphoretic.   HENT:      Head: Atraumatic.      Right Ear: External ear normal.      Left Ear: External ear normal.      Nose: Nose normal.      Mouth/Throat:      Mouth: Mucous membranes are moist.      Pharynx: Oropharynx is clear. No oropharyngeal exudate or posterior oropharyngeal erythema.      Comments: No drooling or stridor.  Airway widely patent.  No elevation of the floor the mouth.  Tongue appears normal.  Uvula appears normal.  Eyes:      General: No scleral icterus.     Conjunctiva/sclera: Conjunctivae normal.   Cardiovascular:      Rate and Rhythm: Normal rate.      Heart sounds:  Normal heart sounds.   Pulmonary:      Effort: No respiratory distress.      Breath sounds: Normal breath sounds.   Abdominal:      General: Abdomen is flat. There is no distension.      Tenderness: There is no abdominal tenderness.   Musculoskeletal:      Cervical back: Neck supple.   Skin:     General: Skin is warm.      Capillary Refill: Capillary refill takes less than 2 seconds.      Findings: No rash.   Neurological:      General: No focal deficit present.      Mental Status: She is alert and oriented to person, place, and time.   Psychiatric:         Mood and Affect: Mood normal.         Behavior: Behavior normal.           Emergency Department Course     Emergency Department Course & Assessments:             Interventions:  Medications   lidocaine 1 % 0.1-1 mL (has no administration in time range)   lidocaine (LMX4) cream (has no administration in time range)   sodium chloride (PF) 0.9% PF flush 3 mL (has no administration in time range)   sodium chloride (PF) 0.9% PF flush 3 mL (has no administration in time range)   0.9% sodium chloride BOLUS (0 mLs Intravenous Stopped 9/7/23 1436)   famotidine (PEPCID) injection 20 mg (20 mg Intravenous $Given 9/7/23 1237)   EPINEPHrine (ADRENALIN) kit 0.3-0.5 mg (0.5 mg Intramuscular $Given 9/7/23 1224)         Disposition:  The patient was discharged to home.     Impression & Plan      Medical Decision Making:  This patient is a 63-year-old who presents to the ED with an allergic reaction following an iron infusion through [facility].  She received Solu-Medrol and Benadryl prehospital.  The rash resolved but she still had an abnormal sensation in her throat.  She received epinephrine and IV Pepcid.  She has been observed for another 2-1/2 hours.  She feels improved.  No difficulty breathing or swallowing.  No recurrence of her rash or any rebound reaction.  She is appropriate for discharge.  She is already been working with her provider for different iron formulation.   She will continue 2 more days of prednisone and continue outpatient antihistamines as well.  We discussed return precautions.        Diagnosis:    ICD-10-CM    1. Allergic reaction, initial encounter  T78.40XA            Discharge Medications:  New Prescriptions    DIPHENHYDRAMINE (BENADRYL) 25 MG CAPSULE    Take 1 capsule (25 mg) by mouth every 6 hours as needed for itching or allergies    PREDNISONE (DELTASONE) 20 MG TABLET    Take 2 tablets (40 mg) by mouth daily for 2 days Take two tablets daily for 4 more days.          Ray Gramajo MD  9/7/2023                Ray Gramajo MD  09/07/23 2331

## 2023-09-07 NOTE — ED TRIAGE NOTES
Patient arrives via EMS from clinic were she was getting an iron infusion.  She reports she had some itching, rash, and throat swelling.  ABCs intact, A&Ox4.    50mg benadryl and 50 solumedrol given at clinic prior to arrival.     Triage Assessment       Row Name 09/07/23 1211       Triage Assessment (Adult)    Airway WDL WDL       Respiratory WDL    Respiratory WDL WDL       Skin Circulation/Temperature WDL    Skin Circulation/Temperature WDL WDL       Cardiac WDL    Cardiac WDL WDL       Peripheral/Neurovascular WDL    Peripheral Neurovascular WDL WDL       Cognitive/Neuro/Behavioral WDL    Cognitive/Neuro/Behavioral WDL WDL       [place] Coma Scale    Best Eye Response 4-->(E4) spontaneous    Best Motor Response 6-->(M6) obeys commands    Best Verbal Response 5-->(V5) oriented    [place] Coma Scale Score 15

## 2023-09-07 NOTE — ED NOTES
Bed: ED11  Expected date:   Expected time:   Means of arrival:   Comments:  [facility] allergic rxn- TRIAGE

## 2023-10-09 ENCOUNTER — APPOINTMENT (OUTPATIENT)
Dept: CT IMAGING | Facility: CLINIC | Age: 63
End: 2023-10-09
Attending: EMERGENCY MEDICINE
Payer: COMMERCIAL

## 2023-10-09 ENCOUNTER — HOSPITAL ENCOUNTER (INPATIENT)
Facility: CLINIC | Age: 63
LOS: 6 days | Discharge: HOME | End: 2023-10-15
Attending: EMERGENCY MEDICINE | Admitting: STUDENT IN AN ORGANIZED HEALTH CARE EDUCATION/TRAINING PROGRAM
Payer: COMMERCIAL

## 2023-10-09 DIAGNOSIS — R10.9 ABDOMINAL PAIN, UNSPECIFIED ABDOMINAL LOCATION: Primary | ICD-10-CM

## 2023-10-09 DIAGNOSIS — K50.919 CROHN'S DISEASE WITH COMPLICATION, UNSPECIFIED GASTROINTESTINAL TRACT LOCATION (H): ICD-10-CM

## 2023-10-09 DIAGNOSIS — R82.81 PYURIA: ICD-10-CM

## 2023-10-09 DIAGNOSIS — K52.9 ACUTE COLITIS: ICD-10-CM

## 2023-10-09 LAB
ALBUMIN SERPL BCG-MCNC: 4.7 G/DL (ref 3.5–5.2)
ALBUMIN UR-MCNC: NEGATIVE MG/DL
ALP SERPL-CCNC: 87 U/L (ref 35–104)
ALT SERPL W P-5'-P-CCNC: 15 U/L (ref 0–50)
ANION GAP SERPL CALCULATED.3IONS-SCNC: 13 MMOL/L (ref 7–15)
APPEARANCE UR: CLEAR
AST SERPL W P-5'-P-CCNC: 24 U/L (ref 0–45)
BACTERIA #/AREA URNS HPF: ABNORMAL /HPF
BASO+EOS+MONOS # BLD AUTO: NORMAL 10*3/UL
BASO+EOS+MONOS NFR BLD AUTO: NORMAL %
BASOPHILS # BLD AUTO: 0.1 10E3/UL (ref 0–0.2)
BASOPHILS NFR BLD AUTO: 1 %
BILIRUB SERPL-MCNC: 0.4 MG/DL
BILIRUB UR QL STRIP: NEGATIVE
BUN SERPL-MCNC: 7.4 MG/DL (ref 8–23)
CALCIUM SERPL-MCNC: 9.4 MG/DL (ref 8.8–10.2)
CHLORIDE SERPL-SCNC: 103 MMOL/L (ref 98–107)
COLOR UR AUTO: ABNORMAL
CREAT SERPL-MCNC: 1.08 MG/DL (ref 0.51–0.95)
CRP SERPL-MCNC: <3 MG/L
DEPRECATED HCO3 PLAS-SCNC: 23 MMOL/L (ref 22–29)
EGFRCR SERPLBLD CKD-EPI 2021: 57 ML/MIN/1.73M2
EOSINOPHIL # BLD AUTO: 0.1 10E3/UL (ref 0–0.7)
EOSINOPHIL NFR BLD AUTO: 1 %
ERYTHROCYTE [DISTWIDTH] IN BLOOD BY AUTOMATED COUNT: 13.2 % (ref 10–15)
ERYTHROCYTE [SEDIMENTATION RATE] IN BLOOD BY WESTERGREN METHOD: 5 MM/HR (ref 0–30)
GLUCOSE SERPL-MCNC: 95 MG/DL (ref 70–99)
GLUCOSE UR STRIP-MCNC: NEGATIVE MG/DL
HCT VFR BLD AUTO: 43.2 % (ref 35–47)
HGB BLD-MCNC: 13.9 G/DL (ref 11.7–15.7)
HGB UR QL STRIP: NEGATIVE
HOLD SPECIMEN: NORMAL
HOLD SPECIMEN: NORMAL
IMM GRANULOCYTES # BLD: 0 10E3/UL
IMM GRANULOCYTES NFR BLD: 0 %
KETONES UR STRIP-MCNC: NEGATIVE MG/DL
LEUKOCYTE ESTERASE UR QL STRIP: ABNORMAL
LIPASE SERPL-CCNC: 27 U/L (ref 13–60)
LYMPHOCYTES # BLD AUTO: 2.1 10E3/UL (ref 0.8–5.3)
LYMPHOCYTES NFR BLD AUTO: 36 %
MCH RBC QN AUTO: 27.7 PG (ref 26.5–33)
MCHC RBC AUTO-ENTMCNC: 32.2 G/DL (ref 31.5–36.5)
MCV RBC AUTO: 86 FL (ref 78–100)
MONOCYTES # BLD AUTO: 0.4 10E3/UL (ref 0–1.3)
MONOCYTES NFR BLD AUTO: 7 %
MUCOUS THREADS #/AREA URNS LPF: PRESENT /LPF
NEUTROPHILS # BLD AUTO: 3.1 10E3/UL (ref 1.6–8.3)
NEUTROPHILS NFR BLD AUTO: 55 %
NITRATE UR QL: NEGATIVE
NRBC # BLD AUTO: 0 10E3/UL
NRBC BLD AUTO-RTO: 0 /100
PH UR STRIP: 5 [PH] (ref 5–7)
PLATELET # BLD AUTO: 209 10E3/UL (ref 150–450)
POTASSIUM SERPL-SCNC: 3.6 MMOL/L (ref 3.4–5.3)
PROT SERPL-MCNC: 7 G/DL (ref 6.4–8.3)
RBC # BLD AUTO: 5.02 10E6/UL (ref 3.8–5.2)
RBC URINE: 1 /HPF
SODIUM SERPL-SCNC: 139 MMOL/L (ref 135–145)
SP GR UR STRIP: 1.01 (ref 1–1.03)
SQUAMOUS EPITHELIAL: <1 /HPF
UROBILINOGEN UR STRIP-MCNC: NORMAL MG/DL
WBC # BLD AUTO: 5.7 10E3/UL (ref 4–11)
WBC URINE: 7 /HPF

## 2023-10-09 PROCEDURE — 250N000009 HC RX 250: Performed by: EMERGENCY MEDICINE

## 2023-10-09 PROCEDURE — 258N000003 HC RX IP 258 OP 636: Performed by: EMERGENCY MEDICINE

## 2023-10-09 PROCEDURE — 120N000001 HC R&B MED SURG/OB

## 2023-10-09 PROCEDURE — 96375 TX/PRO/DX INJ NEW DRUG ADDON: CPT

## 2023-10-09 PROCEDURE — 36415 COLL VENOUS BLD VENIPUNCTURE: CPT | Performed by: EMERGENCY MEDICINE

## 2023-10-09 PROCEDURE — 96361 HYDRATE IV INFUSION ADD-ON: CPT

## 2023-10-09 PROCEDURE — 83690 ASSAY OF LIPASE: CPT | Performed by: EMERGENCY MEDICINE

## 2023-10-09 PROCEDURE — 85025 COMPLETE CBC W/AUTO DIFF WBC: CPT | Performed by: EMERGENCY MEDICINE

## 2023-10-09 PROCEDURE — 99222 1ST HOSP IP/OBS MODERATE 55: CPT | Mod: AI | Performed by: STUDENT IN AN ORGANIZED HEALTH CARE EDUCATION/TRAINING PROGRAM

## 2023-10-09 PROCEDURE — 80053 COMPREHEN METABOLIC PANEL: CPT | Performed by: EMERGENCY MEDICINE

## 2023-10-09 PROCEDURE — 81001 URINALYSIS AUTO W/SCOPE: CPT | Performed by: EMERGENCY MEDICINE

## 2023-10-09 PROCEDURE — 86140 C-REACTIVE PROTEIN: CPT | Performed by: EMERGENCY MEDICINE

## 2023-10-09 PROCEDURE — 87186 SC STD MICRODIL/AGAR DIL: CPT | Performed by: EMERGENCY MEDICINE

## 2023-10-09 PROCEDURE — 96374 THER/PROPH/DIAG INJ IV PUSH: CPT | Mod: 59

## 2023-10-09 PROCEDURE — 250N000011 HC RX IP 250 OP 636: Performed by: EMERGENCY MEDICINE

## 2023-10-09 PROCEDURE — 85652 RBC SED RATE AUTOMATED: CPT | Performed by: EMERGENCY MEDICINE

## 2023-10-09 PROCEDURE — 250N000011 HC RX IP 250 OP 636: Mod: JZ | Performed by: EMERGENCY MEDICINE

## 2023-10-09 PROCEDURE — 250N000013 HC RX MED GY IP 250 OP 250 PS 637: Performed by: STUDENT IN AN ORGANIZED HEALTH CARE EDUCATION/TRAINING PROGRAM

## 2023-10-09 PROCEDURE — 99285 EMERGENCY DEPT VISIT HI MDM: CPT | Mod: 25

## 2023-10-09 PROCEDURE — 74177 CT ABD & PELVIS W/CONTRAST: CPT

## 2023-10-09 RX ORDER — HYDROMORPHONE HYDROCHLORIDE 1 MG/ML
0.5 INJECTION, SOLUTION INTRAMUSCULAR; INTRAVENOUS; SUBCUTANEOUS EVERY 30 MIN PRN
Status: DISCONTINUED | OUTPATIENT
Start: 2023-10-09 | End: 2023-10-09

## 2023-10-09 RX ORDER — TRAZODONE HYDROCHLORIDE 50 MG/1
200 TABLET, FILM COATED ORAL AT BEDTIME
Status: DISCONTINUED | OUTPATIENT
Start: 2023-10-09 | End: 2023-10-10

## 2023-10-09 RX ORDER — ACETAMINOPHEN 500 MG
500-1000 TABLET ORAL EVERY 6 HOURS PRN
Status: DISCONTINUED | OUTPATIENT
Start: 2023-10-09 | End: 2023-10-15 | Stop reason: HOSPADM

## 2023-10-09 RX ORDER — METHYLPREDNISOLONE SODIUM SUCCINATE 40 MG/ML
20 INJECTION, POWDER, LYOPHILIZED, FOR SOLUTION INTRAMUSCULAR; INTRAVENOUS EVERY 8 HOURS
Status: DISCONTINUED | OUTPATIENT
Start: 2023-10-09 | End: 2023-10-15

## 2023-10-09 RX ORDER — IOPAMIDOL 755 MG/ML
500 INJECTION, SOLUTION INTRAVASCULAR ONCE
Status: COMPLETED | OUTPATIENT
Start: 2023-10-09 | End: 2023-10-09

## 2023-10-09 RX ORDER — ONDANSETRON 2 MG/ML
4 INJECTION INTRAMUSCULAR; INTRAVENOUS ONCE
Status: COMPLETED | OUTPATIENT
Start: 2023-10-09 | End: 2023-10-09

## 2023-10-09 RX ORDER — NORTRIPTYLINE HCL 25 MG
25 CAPSULE ORAL AT BEDTIME
Status: DISCONTINUED | OUTPATIENT
Start: 2023-10-09 | End: 2023-10-10

## 2023-10-09 RX ORDER — HYDROMORPHONE HYDROCHLORIDE 1 MG/ML
0.5 INJECTION, SOLUTION INTRAMUSCULAR; INTRAVENOUS; SUBCUTANEOUS EVERY 4 HOURS PRN
Status: DISCONTINUED | OUTPATIENT
Start: 2023-10-09 | End: 2023-10-15

## 2023-10-09 RX ORDER — ALBUTEROL SULFATE 90 UG/1
2 AEROSOL, METERED RESPIRATORY (INHALATION) EVERY 6 HOURS PRN
Status: DISCONTINUED | OUTPATIENT
Start: 2023-10-09 | End: 2023-10-15 | Stop reason: HOSPADM

## 2023-10-09 RX ORDER — PANTOPRAZOLE SODIUM 40 MG/1
40 TABLET, DELAYED RELEASE ORAL
Status: DISCONTINUED | OUTPATIENT
Start: 2023-10-10 | End: 2023-10-15 | Stop reason: HOSPADM

## 2023-10-09 RX ORDER — SODIUM CHLORIDE 9 MG/ML
INJECTION, SOLUTION INTRAVENOUS CONTINUOUS
Status: DISCONTINUED | OUTPATIENT
Start: 2023-10-09 | End: 2023-10-11

## 2023-10-09 RX ORDER — MONTELUKAST SODIUM 10 MG/1
10 TABLET ORAL AT BEDTIME
Status: DISCONTINUED | OUTPATIENT
Start: 2023-10-09 | End: 2023-10-15 | Stop reason: HOSPADM

## 2023-10-09 RX ADMIN — HYDROMORPHONE HYDROCHLORIDE 0.5 MG: 1 INJECTION, SOLUTION INTRAMUSCULAR; INTRAVENOUS; SUBCUTANEOUS at 22:48

## 2023-10-09 RX ADMIN — IOPAMIDOL 75 ML: 755 INJECTION, SOLUTION INTRAVENOUS at 18:02

## 2023-10-09 RX ADMIN — SODIUM CHLORIDE: 9 INJECTION, SOLUTION INTRAVENOUS at 22:40

## 2023-10-09 RX ADMIN — ONDANSETRON 4 MG: 2 INJECTION INTRAMUSCULAR; INTRAVENOUS at 17:42

## 2023-10-09 RX ADMIN — METHYLPREDNISOLONE SODIUM SUCCINATE 20 MG: 40 INJECTION, POWDER, FOR SOLUTION INTRAMUSCULAR; INTRAVENOUS at 20:31

## 2023-10-09 RX ADMIN — TRAZODONE HYDROCHLORIDE 200 MG: 100 TABLET ORAL at 23:51

## 2023-10-09 RX ADMIN — SODIUM CHLORIDE, POTASSIUM CHLORIDE, SODIUM LACTATE AND CALCIUM CHLORIDE 1000 ML: 600; 310; 30; 20 INJECTION, SOLUTION INTRAVENOUS at 20:43

## 2023-10-09 RX ADMIN — SODIUM CHLORIDE 59 ML: 9 INJECTION, SOLUTION INTRAVENOUS at 18:02

## 2023-10-09 RX ADMIN — SODIUM CHLORIDE 1000 ML: 9 INJECTION, SOLUTION INTRAVENOUS at 17:40

## 2023-10-09 ASSESSMENT — ACTIVITIES OF DAILY LIVING (ADL)
ADLS_ACUITY_SCORE: 35

## 2023-10-09 NOTE — ED PROVIDER NOTES
History     Chief Complaint:  Diarrhea     HPI   Devi Salas is a 63 year old female with a history of Crohn's who presents to the ED with diarrhea and abdominal pain. Patient reports that 4 days ago she began having diarrhea and right sided abdominal pain which has been progressively worsening. Reports 8 episodes of diarrhea today. Also complains of nausea. Denies fever, cough, or cold symptoms, dysuria, urinary frequency, recent antibiotics, blood in stool, melena, history of c-difficile, medications for Crohn's disease, or vomiting. Patient follows with [facility]. Recently had a cholecystectomy for Chron's disease 3 months ago. Patient recently went to [place] but denies any other travel.      Independent Historian:   None - Patient Only    Review of External Notes:   I reviewed the note from 7/24/23 when patient was admitted for Crohn's disease and RLQ abdominal pain.      Medications:    Bisacodyl  Albuterol   Cyanocobalamin  Diphenhydramine   Esomeprazole   Famotidine   Hydromorphone   Methocarbamol  Montelukast  Nortriptyline   Zofran   Senokot   Trazodone     Past Medical History:    Chron's disease of colon     Past Surgical History:    Laparoscopic resect ileocecal    Appendectomy   Cholecystectomy     Physical Exam   Patient Vitals for the past 24 hrs:   BP Temp Temp src Pulse Resp SpO2   10/09/23 1720 (!) 134/96 97.8  F (36.6  C) Temporal 101 16 100 %      Physical Exam  Nursing note and vitals reviewed.  Constitutional:  Appears well-developed and well-nourished.   HENT:   Head:    Atraumatic.   Mouth/Throat:   Oropharynx is clear and moist. No oropharyngeal exudate. Dry mucous membranes.   Eyes:    Pupils are equal, round, and reactive to light.   Neck:    Normal range of motion. Neck supple.      No tracheal deviation present. No thyromegaly present.   Cardiovascular:  Normal rate, regular rhythm, no murmur   Pulmonary/Chest: Breath sounds are clear and equal without wheezes or  crackles.  Abdominal:   Soft. Bowel sounds are normal. Exhibits no distension and      no mass. Tenderness across lower abdomen and right side of abdomen with guarding but no rebound.   Musculoskeletal:  Exhibits no edema.   Lymphadenopathy:  No cervical adenopathy.   Neurological:   Alert and oriented to person, place, and time.   Skin:    Skin is warm and dry. No rash noted. No pallor.     Emergency Department Course   Imaging:  CT Abdomen Pelvis w Contrast   Final Result   IMPRESSION:    1.  Ileocolic anastomosis. Wall thickening and mucosal enhancement involving the ascending and sigmoid colon suggesting an acute colitis. No evidence for obstruction.   2.  Fluid in the sigmoid colon well likely result in diarrhea.         Report per radiology    Laboratory:  Labs Ordered and Resulted from Time of ED Arrival to Time of ED Departure   COMPREHENSIVE METABOLIC PANEL - Abnormal       Result Value    Sodium 139      Potassium 3.6      Carbon Dioxide (CO2) 23      Anion Gap 13      Urea Nitrogen 7.4 (*)     Creatinine 1.08 (*)     GFR Estimate 57 (*)     Calcium 9.4      Chloride 103      Glucose 95      Alkaline Phosphatase 87      AST 24      ALT 15      Protein Total 7.0      Albumin 4.7      Bilirubin Total 0.4     ROUTINE UA WITH MICROSCOPIC REFLEX TO CULTURE - Abnormal    Color Urine Light Yellow      Appearance Urine Clear      Glucose Urine Negative      Bilirubin Urine Negative      Ketones Urine Negative      Specific Gravity Urine 1.013      Blood Urine Negative      pH Urine 5.0      Protein Albumin Urine Negative      Urobilinogen Urine Normal      Nitrite Urine Negative      Leukocyte Esterase Urine Small (*)     Bacteria Urine Many (*)     Mucus Urine Present (*)     RBC Urine 1      WBC Urine 7 (*)     Squamous Epithelials Urine <1     LIPASE - Normal    Lipase 27     ERYTHROCYTE SEDIMENTATION RATE AUTO - Normal    Erythrocyte Sedimentation Rate 5     CRP INFLAMMATION - Normal    CRP Inflammation <3.00      CBC WITH PLATELETS AND DIFFERENTIAL    WBC Count 5.7      RBC Count 5.02      Hemoglobin 13.9      Hematocrit 43.2      MCV 86      MCH 27.7      MCHC 32.2      RDW 13.2      Platelet Count 209      % Neutrophils 55      % Lymphocytes 36      % Monocytes 7      Mids % (Monos, Eos, Basos)        % Eosinophils 1      % Basophils 1      % Immature Granulocytes 0      NRBCs per 100 WBC 0      Absolute Neutrophils 3.1      Absolute Lymphocytes 2.1      Absolute Monocytes 0.4      Mids Abs (Monos, Eos, Basos)        Absolute Eosinophils 0.1      Absolute Basophils 0.1      Absolute Immature Granulocytes 0.0      Absolute NRBCs 0.0     C. DIFFICILE TOXIN B PCR WITH REFLEX TO C. DIFFICILE ANTIGEN AND TOXINS A/B EIA      Procedures   None    Emergency Department Course & Assessments:       Interventions:  Medications   sodium chloride 0.9% BOLUS 1,000 mL (1,000 mLs Intravenous $New Bag 10/9/23 1740)   ondansetron (ZOFRAN) injection 4 mg (4 mg Intravenous $Given 10/9/23 1742)   iopamidol (ISOVUE-370) solution 500 mL (75 mLs Intravenous $Given 10/9/23 1802)   CT scan flush (59 mLs Intravenous $Given 10/9/23 1802)      Assessments:  1942 I obtained history and examined the patient as noted above.     Independent Interpretation (X-rays, CTs, rhythm strip):  none    Consultations/Discussion of Management or Tests:  1950 I spoke with Dr. Carmichael from [facility] about the care and treatment of the patient.    2029 I spoke to Dr. Ventura of the hospitalist service who accepts the patient for admission.        Social Determinants of Health affecting care:   None    Disposition:  The patient was admitted to the hospital under the care of Dr. Ventura.     Impression & Plan    CMS Diagnoses: None    Medical Decision Making:  I found this patient to have acute colitis as a cause of her diarrhea and abdominal pain.  I feel that this is likely an exacerbation of Crohn's colitis, so I consulted [facility] on-call who  recommended to give her Solu-Medrol 20 mg every 8 hours and she was admitted to the care of the hospitalist service.  She was also given pain medication and IV fluid hydration.  C. difficile stool testing was ordered.  She was not having any signs of bacterial sepsis at this time.  Her urine showed 7 WBCs and many bacteria so her urine was cultured, however I did not feel that antibiotics were indicated at this time pending urine culture, since she does not have any UTI symptoms or fever.    Diagnosis:    ICD-10-CM    1. Acute colitis  K52.9       2. Crohn's disease with complication, unspecified gastrointestinal tract location (H)  K50.919       3. Pyuria  R82.81            Discharge Medications:  New Prescriptions    No medications on file      Scribe Disclosure:  Latoya CALLAHAN, am serving as a scribe at 6:44 PM on 10/9/2023 to document services personally performed by Alyssa Yang MD based on my observations and the provider's statements to me.     10/9/2023   Alyssa Yang MD [place], Cheri Lee, MD  10/09/23 1415

## 2023-10-09 NOTE — ED TRIAGE NOTES
Pt presents to the ED with complaint of diarrhea for the last 3 days. Today pt states she had 8 watery stools. Pt states she has abd pain 7/10. Pt had surgery for chron's in July.       Triage Assessment       Row Name 10/09/23 1048       Triage Assessment (Adult)    Airway WDL WDL       Respiratory WDL    Respiratory WDL WDL       Skin Circulation/Temperature WDL    Skin Circulation/Temperature WDL WDL       Cardiac WDL    Cardiac WDL X;rhythm    Pulse Rate & Regularity tachycardic       Peripheral/Neurovascular WDL    Peripheral Neurovascular WDL WDL       Cognitive/Neuro/Behavioral WDL    Cognitive/Neuro/Behavioral WDL WDL

## 2023-10-09 NOTE — ED PROVIDER NOTES
PIT/Triage Evaluation    Patient presented with abdominal pain and diarrhea.  Patient has a history of Crohn's disease with last round of Remicade 8 weeks prior.  She follows with [facility] as well as Dr. Marleny Mendez of colorectal surgery.  Crohn's has been complicated by ileocolic resection for refractory Crohn's.  4 days prior she had the gradual onset of epigastric/right upper quadrant and lower abdominal pain.  Pain is severe and radiates to the back.  This been accompanied by increased stool frequency from 3-8 stools per day.  No associated bloody stool or fever.  She is nauseated without vomiting.    Exam is notable for:    Patient Vitals for the past 24 hrs:   BP Temp Temp src Pulse Resp SpO2   10/09/23 1720 (!) 134/96 97.8  F (36.6  C) Temporal 101 16 100 %       HEENT:    Oropharynx is moist  Eyes:    Conjunctiva normal  CV:     Normal perfusion  PULM:    No respiratory distress.      No stridor.  ABD:    Soft, non-distended.       Moderate-severe tenderness in the RUQ/epigastric area.     Bowel sounds normal.     No pulsatile masses.       No rebound, guarding or rigidity.     No CVA tenderness.   MSK:     No gross deformity to all four extremities.   LYMPH:   No cervical lymphadenopathy.  NEURO:   Alert.  Good muscular tone, no atrophy.   Skin:    Warm, dry and intact.    Psych:    Mood is good and affect is appropriate.      Appropriate interventions for symptom management were initiated if applicable.  Appropriate diagnostic tests were initiated if indicated.    Important information for subsequent clinician:    Fluids, antiemetics, labs including C. difficile testing and CT scan of the abdomen ordered.    I briefly evaluated the patient and developed an initial plan of care. I discussed this plan and explained that this brief interaction does not constitute a full evaluation. Patient/family understands that they should wait to be fully evaluated and discuss any test results with another clinician  prior to leaving the hospital.       Isaiah Bah MD  10/09/23 4294

## 2023-10-10 LAB
ADV 40+41 DNA STL QL NAA+NON-PROBE: NEGATIVE
ANION GAP SERPL CALCULATED.3IONS-SCNC: 10 MMOL/L (ref 7–15)
ASTRO TYP 1-8 RNA STL QL NAA+NON-PROBE: NEGATIVE
BUN SERPL-MCNC: 6.4 MG/DL (ref 8–23)
C CAYETANENSIS DNA STL QL NAA+NON-PROBE: NEGATIVE
C DIFF TOX B STL QL: NEGATIVE
CALCIUM SERPL-MCNC: 9.2 MG/DL (ref 8.8–10.2)
CAMPYLOBACTER DNA SPEC NAA+PROBE: NEGATIVE
CHLORIDE SERPL-SCNC: 109 MMOL/L (ref 98–107)
CREAT SERPL-MCNC: 0.91 MG/DL (ref 0.51–0.95)
CRYPTOSP DNA STL QL NAA+NON-PROBE: NEGATIVE
DEPRECATED HCO3 PLAS-SCNC: 22 MMOL/L (ref 22–29)
E COLI O157 DNA STL QL NAA+NON-PROBE: NORMAL
E HISTOLYT DNA STL QL NAA+NON-PROBE: NEGATIVE
EAEC ASTA GENE ISLT QL NAA+PROBE: NEGATIVE
EC STX1+STX2 GENES STL QL NAA+NON-PROBE: NEGATIVE
EGFRCR SERPLBLD CKD-EPI 2021: 70 ML/MIN/1.73M2
EPEC EAE GENE STL QL NAA+NON-PROBE: NEGATIVE
ERYTHROCYTE [DISTWIDTH] IN BLOOD BY AUTOMATED COUNT: 13.1 % (ref 10–15)
ETEC LTA+ST1A+ST1B TOX ST NAA+NON-PROBE: NEGATIVE
G LAMBLIA DNA STL QL NAA+NON-PROBE: NEGATIVE
GLUCOSE SERPL-MCNC: 123 MG/DL (ref 70–99)
HCT VFR BLD AUTO: 39 % (ref 35–47)
HGB BLD-MCNC: 12.4 G/DL (ref 11.7–15.7)
MCH RBC QN AUTO: 27.2 PG (ref 26.5–33)
MCHC RBC AUTO-ENTMCNC: 31.8 G/DL (ref 31.5–36.5)
MCV RBC AUTO: 86 FL (ref 78–100)
NOROVIRUS GI+II RNA STL QL NAA+NON-PROBE: NEGATIVE
P SHIGELLOIDES DNA STL QL NAA+NON-PROBE: NEGATIVE
PLATELET # BLD AUTO: 188 10E3/UL (ref 150–450)
POTASSIUM SERPL-SCNC: 4.3 MMOL/L (ref 3.4–5.3)
RBC # BLD AUTO: 4.56 10E6/UL (ref 3.8–5.2)
RVA RNA STL QL NAA+NON-PROBE: NEGATIVE
SALMONELLA SP RPOD STL QL NAA+PROBE: NEGATIVE
SAPO I+II+IV+V RNA STL QL NAA+NON-PROBE: NEGATIVE
SHIGELLA SP+EIEC IPAH ST NAA+NON-PROBE: NEGATIVE
SODIUM SERPL-SCNC: 141 MMOL/L (ref 135–145)
V CHOLERAE DNA SPEC QL NAA+PROBE: NEGATIVE
VIBRIO DNA SPEC NAA+PROBE: NEGATIVE
WBC # BLD AUTO: 4 10E3/UL (ref 4–11)
Y ENTEROCOL DNA STL QL NAA+PROBE: NEGATIVE

## 2023-10-10 PROCEDURE — 250N000013 HC RX MED GY IP 250 OP 250 PS 637: Performed by: STUDENT IN AN ORGANIZED HEALTH CARE EDUCATION/TRAINING PROGRAM

## 2023-10-10 PROCEDURE — 258N000003 HC RX IP 258 OP 636: Performed by: INTERNAL MEDICINE

## 2023-10-10 PROCEDURE — 82310 ASSAY OF CALCIUM: CPT | Performed by: STUDENT IN AN ORGANIZED HEALTH CARE EDUCATION/TRAINING PROGRAM

## 2023-10-10 PROCEDURE — 250N000011 HC RX IP 250 OP 636: Mod: JZ | Performed by: INTERNAL MEDICINE

## 2023-10-10 PROCEDURE — 99233 SBSQ HOSP IP/OBS HIGH 50: CPT | Performed by: INTERNAL MEDICINE

## 2023-10-10 PROCEDURE — 250N000011 HC RX IP 250 OP 636: Performed by: STUDENT IN AN ORGANIZED HEALTH CARE EDUCATION/TRAINING PROGRAM

## 2023-10-10 PROCEDURE — 83993 ASSAY FOR CALPROTECTIN FECAL: CPT | Performed by: STUDENT IN AN ORGANIZED HEALTH CARE EDUCATION/TRAINING PROGRAM

## 2023-10-10 PROCEDURE — 87493 C DIFF AMPLIFIED PROBE: CPT | Performed by: EMERGENCY MEDICINE

## 2023-10-10 PROCEDURE — 85027 COMPLETE CBC AUTOMATED: CPT | Performed by: STUDENT IN AN ORGANIZED HEALTH CARE EDUCATION/TRAINING PROGRAM

## 2023-10-10 PROCEDURE — 250N000011 HC RX IP 250 OP 636: Performed by: EMERGENCY MEDICINE

## 2023-10-10 PROCEDURE — 36415 COLL VENOUS BLD VENIPUNCTURE: CPT | Performed by: STUDENT IN AN ORGANIZED HEALTH CARE EDUCATION/TRAINING PROGRAM

## 2023-10-10 PROCEDURE — 258N000003 HC RX IP 258 OP 636: Performed by: EMERGENCY MEDICINE

## 2023-10-10 PROCEDURE — 120N000001 HC R&B MED SURG/OB

## 2023-10-10 PROCEDURE — 250N000013 HC RX MED GY IP 250 OP 250 PS 637: Performed by: INTERNAL MEDICINE

## 2023-10-10 RX ORDER — NORTRIPTYLINE HCL 25 MG
25 CAPSULE ORAL AT BEDTIME
Status: DISCONTINUED | OUTPATIENT
Start: 2023-10-10 | End: 2023-10-15 | Stop reason: HOSPADM

## 2023-10-10 RX ORDER — METHOCARBAMOL 500 MG/1
500 TABLET, FILM COATED ORAL 4 TIMES DAILY PRN
Status: DISCONTINUED | OUTPATIENT
Start: 2023-10-10 | End: 2023-10-15 | Stop reason: HOSPADM

## 2023-10-10 RX ORDER — NALOXONE HYDROCHLORIDE 0.4 MG/ML
0.4 INJECTION, SOLUTION INTRAMUSCULAR; INTRAVENOUS; SUBCUTANEOUS
Status: DISCONTINUED | OUTPATIENT
Start: 2023-10-10 | End: 2023-10-15 | Stop reason: HOSPADM

## 2023-10-10 RX ORDER — ONDANSETRON 2 MG/ML
4 INJECTION INTRAMUSCULAR; INTRAVENOUS EVERY 6 HOURS PRN
Status: DISCONTINUED | OUTPATIENT
Start: 2023-10-10 | End: 2023-10-15 | Stop reason: HOSPADM

## 2023-10-10 RX ORDER — TRAZODONE HYDROCHLORIDE 50 MG/1
200 TABLET, FILM COATED ORAL AT BEDTIME
Status: DISCONTINUED | OUTPATIENT
Start: 2023-10-10 | End: 2023-10-15 | Stop reason: HOSPADM

## 2023-10-10 RX ORDER — NALOXONE HYDROCHLORIDE 0.4 MG/ML
0.2 INJECTION, SOLUTION INTRAMUSCULAR; INTRAVENOUS; SUBCUTANEOUS
Status: DISCONTINUED | OUTPATIENT
Start: 2023-10-10 | End: 2023-10-15 | Stop reason: HOSPADM

## 2023-10-10 RX ADMIN — PANTOPRAZOLE SODIUM 40 MG: 40 TABLET, DELAYED RELEASE ORAL at 16:21

## 2023-10-10 RX ADMIN — MONTELUKAST 10 MG: 10 TABLET, FILM COATED ORAL at 00:10

## 2023-10-10 RX ADMIN — TRAZODONE HYDROCHLORIDE 200 MG: 50 TABLET ORAL at 21:36

## 2023-10-10 RX ADMIN — METHYLPREDNISOLONE SODIUM SUCCINATE 20 MG: 40 INJECTION, POWDER, FOR SOLUTION INTRAMUSCULAR; INTRAVENOUS at 19:54

## 2023-10-10 RX ADMIN — HYDROMORPHONE HYDROCHLORIDE 0.5 MG: 1 INJECTION, SOLUTION INTRAMUSCULAR; INTRAVENOUS; SUBCUTANEOUS at 21:36

## 2023-10-10 RX ADMIN — PANTOPRAZOLE SODIUM 40 MG: 40 TABLET, DELAYED RELEASE ORAL at 08:02

## 2023-10-10 RX ADMIN — METHOCARBAMOL 500 MG: 500 TABLET ORAL at 17:43

## 2023-10-10 RX ADMIN — ACETAMINOPHEN 1000 MG: 500 TABLET, FILM COATED ORAL at 17:43

## 2023-10-10 RX ADMIN — ACETAMINOPHEN 500 MG: 500 TABLET, FILM COATED ORAL at 09:27

## 2023-10-10 RX ADMIN — METHYLPREDNISOLONE SODIUM SUCCINATE 20 MG: 40 INJECTION, POWDER, FOR SOLUTION INTRAMUSCULAR; INTRAVENOUS at 04:01

## 2023-10-10 RX ADMIN — SODIUM CHLORIDE: 9 INJECTION, SOLUTION INTRAVENOUS at 15:26

## 2023-10-10 RX ADMIN — NORTRIPTYLINE HYDROCHLORIDE 25 MG: 25 CAPSULE ORAL at 21:36

## 2023-10-10 RX ADMIN — HYDROMORPHONE HYDROCHLORIDE 0.5 MG: 1 INJECTION, SOLUTION INTRAMUSCULAR; INTRAVENOUS; SUBCUTANEOUS at 15:27

## 2023-10-10 RX ADMIN — NORTRIPTYLINE HYDROCHLORIDE 25 MG: 25 CAPSULE ORAL at 00:10

## 2023-10-10 RX ADMIN — METHOCARBAMOL 500 MG: 500 TABLET ORAL at 13:30

## 2023-10-10 RX ADMIN — ONDANSETRON 4 MG: 2 INJECTION INTRAMUSCULAR; INTRAVENOUS at 09:27

## 2023-10-10 RX ADMIN — METHYLPREDNISOLONE SODIUM SUCCINATE 20 MG: 40 INJECTION, POWDER, FOR SOLUTION INTRAMUSCULAR; INTRAVENOUS at 12:24

## 2023-10-10 RX ADMIN — MONTELUKAST 10 MG: 10 TABLET, FILM COATED ORAL at 21:36

## 2023-10-10 RX ADMIN — HYDROMORPHONE HYDROCHLORIDE 0.5 MG: 1 INJECTION, SOLUTION INTRAMUSCULAR; INTRAVENOUS; SUBCUTANEOUS at 02:12

## 2023-10-10 RX ADMIN — HYDROMORPHONE HYDROCHLORIDE 0.5 MG: 1 INJECTION, SOLUTION INTRAMUSCULAR; INTRAVENOUS; SUBCUTANEOUS at 06:24

## 2023-10-10 RX ADMIN — HYDROMORPHONE HYDROCHLORIDE 0.5 MG: 1 INJECTION, SOLUTION INTRAMUSCULAR; INTRAVENOUS; SUBCUTANEOUS at 10:46

## 2023-10-10 RX ADMIN — SODIUM CHLORIDE: 9 INJECTION, SOLUTION INTRAVENOUS at 06:21

## 2023-10-10 ASSESSMENT — ACTIVITIES OF DAILY LIVING (ADL)
DRESSING/BATHING_DIFFICULTY: NO
ADLS_ACUITY_SCORE: 22
WEAR_GLASSES_OR_BLIND: YES
ADLS_ACUITY_SCORE: 22
ADLS_ACUITY_SCORE: 35
ADLS_ACUITY_SCORE: 35
WALKING_OR_CLIMBING_STAIRS_DIFFICULTY: NO
ADLS_ACUITY_SCORE: 22
ADLS_ACUITY_SCORE: 37
CHANGE_IN_FUNCTIONAL_STATUS_SINCE_ONSET_OF_CURRENT_ILLNESS/INJURY: NO
FALL_HISTORY_WITHIN_LAST_SIX_MONTHS: NO
ADLS_ACUITY_SCORE: 35
VISION_MANAGEMENT: GLASSES
ADLS_ACUITY_SCORE: 22
ADLS_ACUITY_SCORE: 35
ADLS_ACUITY_SCORE: 22
DOING_ERRANDS_INDEPENDENTLY_DIFFICULTY: NO
ADLS_ACUITY_SCORE: 22
CONCENTRATING,_REMEMBERING_OR_MAKING_DECISIONS_DIFFICULTY: NO
DIFFICULTY_EATING/SWALLOWING: NO
TOILETING_ISSUES: NO
ADLS_ACUITY_SCORE: 22

## 2023-10-10 NOTE — ED NOTES
[facility]  ED Nurse Handoff Report    ED Chief complaint: Diarrhea  . ED Diagnosis:   Final diagnoses:   Acute colitis       Allergies:   Allergies   Allergen Reactions    Latex Hives    Oxycodone Anaphylaxis, Hives and Swelling     Throat swelling, per pt    Penicillin G Anaphylaxis    Sumatriptan Palpitations    Aspirin Nausea and Vomiting    Demerol Hcl [Meperidine] Nausea and Vomiting     N/V    Percocet [Oxycodone-Acetaminophen] Nausea and Vomiting     N/V    Codeine Nausea and Vomiting    Mold        Code Status: Full Code    Activity level - Baseline/Home:  independent.  Activity Level - Current:   independent.   Lift room needed: No.   Bariatric: No   Needed: No   Isolation: Yes.   Infection: Not Applicable  C-Diff Pending.     Respiratory status: Room air    Vital Signs (within 30 minutes):   Vitals:    10/09/23 1720   BP: (!) 134/96   Pulse: 101   Resp: 16   Temp: 97.8  F (36.6  C)   TempSrc: Temporal   SpO2: 100%       Cardiac Rhythm:  ,      Pain level:    Patient confused: No.   Patient Falls Risk: patient and family education and room door open.   Elimination Status: Has voided     Patient Report - Initial Complaint: diarrhea.   Focused Assessment: Patient presented with abdominal pain and diarrhea.  Patient has a history of Crohn's disease with last round of Remicade 8 weeks prior.  She follows with [facility] as well as Dr. Marleny Mendez of colorectal surgery.  Crohn's has been complicated by ileocolic resection for refractory Crohn's.  4 days prior she had the gradual onset of epigastric/right upper quadrant and lower abdominal pain.  Pain is severe and radiates to the back.  This been accompanied by increased stool frequency from 3-8 stools per day.  No associated bloody stool or fever.  She is nauseated without vomiting.        Abnormal Results:   Labs Ordered and Resulted from Time of ED Arrival to Time of ED Departure   COMPREHENSIVE METABOLIC PANEL - Abnormal        Result Value    Sodium 139      Potassium 3.6      Carbon Dioxide (CO2) 23      Anion Gap 13      Urea Nitrogen 7.4 (*)     Creatinine 1.08 (*)     GFR Estimate 57 (*)     Calcium 9.4      Chloride 103      Glucose 95      Alkaline Phosphatase 87      AST 24      ALT 15      Protein Total 7.0      Albumin 4.7      Bilirubin Total 0.4     ROUTINE UA WITH MICROSCOPIC REFLEX TO CULTURE - Abnormal    Color Urine Light Yellow      Appearance Urine Clear      Glucose Urine Negative      Bilirubin Urine Negative      Ketones Urine Negative      Specific Gravity Urine 1.013      Blood Urine Negative      pH Urine 5.0      Protein Albumin Urine Negative      Urobilinogen Urine Normal      Nitrite Urine Negative      Leukocyte Esterase Urine Small (*)     Bacteria Urine Many (*)     Mucus Urine Present (*)     RBC Urine 1      WBC Urine 7 (*)     Squamous Epithelials Urine <1     LIPASE - Normal    Lipase 27     ERYTHROCYTE SEDIMENTATION RATE AUTO - Normal    Erythrocyte Sedimentation Rate 5     CRP INFLAMMATION - Normal    CRP Inflammation <3.00     CBC WITH PLATELETS AND DIFFERENTIAL    WBC Count 5.7      RBC Count 5.02      Hemoglobin 13.9      Hematocrit 43.2      MCV 86      MCH 27.7      MCHC 32.2      RDW 13.2      Platelet Count 209      % Neutrophils 55      % Lymphocytes 36      % Monocytes 7      Mids % (Monos, Eos, Basos)        % Eosinophils 1      % Basophils 1      % Immature Granulocytes 0      NRBCs per 100 WBC 0      Absolute Neutrophils 3.1      Absolute Lymphocytes 2.1      Absolute Monocytes 0.4      Mids Abs (Monos, Eos, Basos)        Absolute Eosinophils 0.1      Absolute Basophils 0.1      Absolute Immature Granulocytes 0.0      Absolute NRBCs 0.0     CALPROTECTIN FECES   ENTERIC BACTERIA AND VIRUS PANEL BY PCR   C. DIFFICILE TOXIN B PCR WITH REFLEX TO C. DIFFICILE ANTIGEN AND TOXINS A/B EIA   URINE CULTURE        CT Abdomen Pelvis w Contrast   Final Result   IMPRESSION:    1.  Ileocolic  anastomosis. Wall thickening and mucosal enhancement involving the ascending and sigmoid colon suggesting an acute colitis. No evidence for obstruction.   2.  Fluid in the sigmoid colon well likely result in diarrhea.          Treatments provided: see MAR  Family Comments:  at bedside  OBS brochure/video discussed/provided to patient:  Yes  ED Medications:   Medications   lactated ringers BOLUS 1,000 mL (has no administration in time range)   methylPREDNISolone sodium succinate (solu-MEDROL) injection 20 mg (20 mg Intravenous $Given 10/9/23 2031)   sodium chloride 0.9% BOLUS 1,000 mL (1,000 mLs Intravenous $New Bag 10/9/23 1740)   ondansetron (ZOFRAN) injection 4 mg (4 mg Intravenous $Given 10/9/23 1742)   iopamidol (ISOVUE-370) solution 500 mL (75 mLs Intravenous $Given 10/9/23 1802)   CT scan flush (59 mLs Intravenous $Given 10/9/23 1802)       Drips infusing:  No  For the majority of the shift this patient was Green.   Interventions performed were NA.    Sepsis treatment initiated: No    Cares/treatment/interventions/medications to be completed following ED care: NA    ED Nurse Name: Lennie Gaines RN  8:41 PM    RECEIVING UNIT ED HANDOFF REVIEW    Above ED Nurse Handoff Report was reviewed: Yes  Reviewed by: Annalise Eddy RN on October 10, 2023 at 9:11 AM

## 2023-10-10 NOTE — PHARMACY-ADMISSION MEDICATION HISTORY
Pharmacist Admission Medication History    Admission medication history is complete. The information provided in this note is only as accurate as the sources available at the time of the update.    Information Source(s): Patient via in-person    Pertinent Information: none    Changes made to PTA medication list:  Added: None  Deleted: dilaudid, miralax, senna  Changed: None    Medication Affordability:  Not including over the counter (OTC) medications, was there a time in the past 3 months when you did not take your medications as prescribed because of cost?: No    Allergies reviewed with patient and updates made in EHR: yes    Medication History Completed By: Blu Barajas RPH 10/9/2023 9:26 PM    PTA Med List   Medication Sig Last Dose    acetaminophen (TYLENOL) 500 MG tablet Take 1-2 tablets (500-1,000 mg) by mouth every 6 hours as needed for mild pain prn    acetaminophen (TYLENOL) 650 MG CR tablet Take 650 mg by mouth At Bedtime 10/8/2023    albuterol (PROAIR HFA/PROVENTIL HFA/VENTOLIN HFA) 108 (90 Base) MCG/ACT inhaler Inhale 2 puffs into the lungs every 6 hours as needed for shortness of breath, wheezing or cough prn    bisacodyl (DULCOLAX) 10 MG suppository Place 1 suppository (10 mg) rectally 2 times daily as needed for constipation prn    cyanocobalamin (CYANOCOBALAMIN) 1000 MCG/ML injection Inject 1,000 mcg as directed every 30 days 9/8/2023    diphenhydrAMINE (BENADRYL) 25 MG capsule Take 1 capsule (25 mg) by mouth every 6 hours as needed for itching or allergies prn    esomeprazole (NEXIUM) 40 MG DR capsule Take 40 mg by mouth 2 times daily (before meals) Take 30-60 minutes before eating. 10/9/2023 at am    famotidine (PEPCID) 40 MG tablet Take 40 mg by mouth At Bedtime 10/8/2023    methocarbamol (ROBAXIN) 500 MG tablet Take 1 tablet (500 mg) by mouth 4 times daily as needed for muscle spasms 10/9/2023 at am    montelukast (SINGULAIR) 10 MG tablet Take 10 mg by mouth At Bedtime 10/8/2023    nortriptyline  (PAMELOR) 25 MG capsule Take 25 mg by mouth At Bedtime 10/8/2023    ondansetron (ZOFRAN ODT) 4 MG ODT tab Take 1 tablet (4 mg) by mouth every 8 hours as needed for nausea     traZODone (DESYREL) 100 MG tablet Take 200 mg by mouth At Bedtime 10/8/2023

## 2023-10-10 NOTE — PROGRESS NOTES
[facility]    ED Boarding Nurse Handoff Addendum Report:    Date/time: 10/10/2023, 9:47 AM    Activity Level: standby    Fall Risk: Yes:  nonskid shoes/slippers when out of bed, arm band in place, and patient and family education    Active Infusions: NS 100ml/hr    Current Meds Due: NA    Current care needs: Collect stool specimen    Oxygen requirements (liters/min and/or FiO2): NA    Respiratory status: Room air    Vital signs (within last 30 minutes):    Vitals:    10/09/23 1720 10/09/23 2300 10/09/23 2346 10/10/23 0742   BP: (!) 134/96 135/76 (!) 128/97 99/70   BP Location:    Right arm   Pulse: 101 80  80   Resp: 16   18   Temp: 97.8  F (36.6  C)  97.9  F (36.6  C) 97.9  F (36.6  C)   TempSrc: Temporal  Oral Oral   SpO2: 100% 97%  100%       Focused assessment within last 30 minutes:    A&Ox4, SBA. Pain RUQ, RLQ abdominal and epigastric. Had dilaudid 0624, can have next at 1024. Tylenol & zofran given 0930. May advance to [facility] diet per MD. Has not been able to produce BM, feels she may have better luck after drinking some coffee.     ED Boarding Nurse name: Sharifa Odonnell RN

## 2023-10-10 NOTE — PROGRESS NOTES
Pt admitted from ED today with Chron's and r/o C.diff. She is A/O x4, up independent in the room. Had BM x1 which was watery. Cidff was negative, enteric panel pending. Continues to have abd pain which she has received IV dilaudid x2, Robaxin x2, and tylenol. She is also using a heating pad. BS faint and not passing flatus. On IV Solumedrol. Clear liquid diet, tolerating. No N/V. GI following.

## 2023-10-10 NOTE — PROGRESS NOTES
[facility]    Medicine Progress Note - Hospitalist Service    Date of Admission:  10/9/2023    Assessment & Plan      Devi Salas is a 63 year old female admitted on 10/9/2023. She has a history of Crohn's disease for which she had ileocolic resection for refractory disease in July, Remicade as needed therapy.  Surgery in July had several months of good symptom control without issue.  Over the last 5 days has had persistent watery diarrhea, upwards of 10 stools each day, no blood in the stool but mucus present.  [facility] consulted from the ED, recommended starting IV Solu-Medrol, formal GI consult placed.    Crohn's disease  S/p ileocolic resection 7/2023  Admitted in June of this year with SBO related to Crohn's disease, initially treated medically but eventually opted for ileocolic resection 7/14.  Since then treated with steroid taper, started on Remicade and did not have any diarrhea or abdominal pain since discharge.    -About 5 days ago started having worsening abdominal pain and frequent watery diarrhea with some mucus present.  No blood appreciated in the stool. CT this admission showed wall thickening and mucosal enhancement of the ascending and sigmoid colon suggesting of acute colitis, but no chris complications with the anastomosis or evidence of obstruction.  Systemic inflammatory markers were negative, fecal calprotectin ordered but not collected yet.    -Started on IV steroids since admission   -Currently n.p.o. since midnight.  Will await further instructions from GI service  - GI consulted, appreciate assistance  - IV methylprednisolone 20 mg every 8 hours  - Follow-up stool studies, fecal calprotectin    Chronic kidney disease  Baseline creatinine appears to be 1-1.1.  Creatinine on arrival 1.08.  Will monitor with daily labs.  -Most recent kidney function normal at 0.9     Diet: Clear Liquid Diet    DVT Prophylaxis: Pneumatic Compression Devices  Monae Catheter: Not  present  Lines: None     Cardiac Monitoring: None  Code Status: Full Code      Clinically Significant Risk Factors Present on Admission                                  Disposition Plan      Expected Discharge Date: Anticipating she will still be requiring inpatient care at least in the next 2 more days                  Ino Mora MD, MD  Hospitalist Service  [facility]  Securely message with [employer] (more info)  Text page via [employer] Paging/Directory   ______________________________________________________________________    Interval History   I assume medicine service care today.  Seen and examined.  Chart reviewed. I met this very pleasant lady while she is lying comfortably in bed and was able to tolerate her liquids.  Earlier this morning patient is complaining of some intermittent nausea and still with intermittent abdominal discomfort.  No diarrhea.  No reported bleeding tendencies either.  Currently not requiring oxygen support.  Afebrile.  No reports of being agitated or combative overnight.      Physical Exam   Vital Signs: Temp: 97.9  F (36.6  C) Temp src: Oral BP: 132/83 Pulse: 81   Resp: 18 SpO2: 98 % O2 Device: None (Room air)    Weight: 0 lbs 0 oz    HEENT; Atraumatic, normocephalic, pinkish conjuctiva, pupils bilateral reactive   Skin: warm and moist, no rashes  Lungs: equal chest expansion, clear to auscultation, no wheezes, no stridor, no crackles,   Heart: normal rate, normal rhythm, no rubs or gallops.   Abdomen: normal bowel sounds, some diffuse tenderness, no guarding, no peritoneal signs  Extremities: no deformities, no edema   Neuro; follow commands, alert and oriented x3, spontaneous speech, coherent, moves all extremities spontaneously  Psych; no hallucination, euthymic mood, not agitated      Medical Decision Making       50 MINUTES SPENT BY ME on the date of service doing chart review, history, exam, documentation & further activities per the  note.  MANAGEMENT DISCUSSED with the following over the past 24 hours: Yes   NOTE(S)/MEDICAL RECORDS REVIEWED over the past 24 hours: Yes       Data     I have personally reviewed the following data over the past 24 hrs:    4.0  \   12.4   / 188     141 109 (H) 6.4 (L) /  123 (H)   4.3 22 0.91 \     ALT: 15 AST: 24 AP: 87 TBILI: 0.4   ALB: 4.7 TOT PROTEIN: 7.0 LIPASE: 27     Procal: N/A CRP: <3.00 Lactic Acid: N/A         Imaging results reviewed over the past 24 hrs:   Recent Results (from the past 24 hour(s))   CT Abdomen Pelvis w Contrast    Narrative    EXAM: CT ABDOMEN PELVIS W CONTRAST  LOCATION: [facility]  DATE: 10/9/2023    INDICATION: hx Crohn's, RUQ epigastric pain  COMPARISON: CT abdomen and pelvis 07/24/2023  TECHNIQUE: CT scan of the abdomen and pelvis was performed following injection of IV contrast. Multiplanar reformats were obtained. Dose reduction techniques were used.  CONTRAST: 75mL Isovue 370    FINDINGS:   LOWER CHEST: Normal.    HEPATOBILIARY: The liver is unremarkable. Cholecystectomy.    PANCREAS: Normal.    SPLEEN: Small hypodense splenic lesion is unchanged.    ADRENAL GLANDS: Normal.    KIDNEYS/BLADDER: Right nephrectomy.    BOWEL: Ileocolic resection. Mild wall thickening and mucosal enhancement involving the residual ascending colon and sigmoid colon. Fluid within the sigmoid colon. No evidence for obstruction.    LYMPH NODES: Normal.    VASCULATURE: Unremarkable.    PELVIC ORGANS: Hysterectomy. No free pelvic fluid.    MUSCULOSKELETAL: Advanced degenerative disc disease L5 level.      Impression    IMPRESSION:   1.  Ileocolic anastomosis. Wall thickening and mucosal enhancement involving the ascending and sigmoid colon suggesting an acute colitis. No evidence for obstruction.  2.  Fluid in the sigmoid colon well likely result in diarrhea.

## 2023-10-10 NOTE — H&P
[facility]    History and Physical - Hospitalist Service       Date of Admission:  10/9/2023    Assessment & Plan      Devi Salas is a 63 year old female admitted on 10/9/2023. She has a history of Crohn's disease for which she had ileocolic resection for refractory disease in July, Remicade as needed therapy.  Surgery in July had several months of good symptom control without issue.  Over the last 5 days has had persistent watery diarrhea, upwards of 10 stools each day, no blood in the stool but mucus present.  [facility] consulted from the ED, recommended starting IV Solu-Medrol, formal GI consult placed.    Crohn's disease  S/p ileocolic resection 7/2023  Admitted in June of this year with SBO related to Crohn's disease, initially treated medically but eventually opted for ileocolic resection 7/14.  Since then treated with steroid taper, started on Remicade and did not have any diarrhea or abdominal pain since discharge.  About 5 days ago started having worsening abdominal pain and frequent watery diarrhea with some mucus present.  No blood appreciated in the stool. CT this admission showed wall thickening and mucosal enhancement of the ascending and sigmoid colon suggesting of acute colitis, but no chris complications with the anastomosis or evidence of obstruction.  Systemic inflammatory markers were negative, fecal calprotectin ordered but not collected yet.  ED physician spoke with [facility], recommended starting IV methylprednisolone every 8 hours.  Formal consult to [facility] placed.  We will keep n.p.o. at midnight.  - GI consulted, appreciate assistance  - IV methylprednisolone 20 mg every 8 hours  - N.p.o. at midnight  - Follow-up stool studies, fecal calprotectin    Chronic kidney disease  Baseline creatinine appears to be 1-1.1.  Creatinine on arrival 1.08.  Will monitor with daily labs.       Diet:  Regular, n.p.o. at midnight  DVT Prophylaxis: Pneumatic Compression Devices  Monae  Catheter: Not present  Lines: None     Cardiac Monitoring: None  Code Status:  Full code    Clinically Significant Risk Factors Present on Admission                                  Disposition Plan      Expected Discharge Date: 10/11/2023                  Wolfgang Ventura MD  Hospitalist Service  [facility]  Securely message with [employer] (more info)  Text page via [facility] Paging/Directory     ______________________________________________________________________    Chief Complaint   Abdominal pain, diarrhea    History is obtained from the patient    History of Present Illness   Devi Salas is a 63 year old female with history of Crohn's disease on Remicade for maintenance therapy, ileocolic resection 7/14/2023 for refractory Crohn's disease who presented to the ED with 5 days of worsening abdominal discomfort and diarrhea.  Following her discharge from the hospital in July, patient had good control of her symptoms with no abdominal pain, soft stools but no significant diarrhea since then.  Over the last 4 days abdominal discomfort in the right lower and right upper quadrants has gotten worse, diarrhea becoming more frequent with 8-10 stools per day.  Diarrhea has been watery brown, occasional mucus present, but no blood in the diarrhea.  Denies any fevers, chills, night sweats.    In the ED CT of the abdomen was obtained which showed no significant changes to the stenosis from prior surgery, but thickening of the ascending and sigmoid colon consistent with acute colitis.  Systemic inflammatory markers negative.  C. difficile, enteric panel, and fecal calprotectin ordered.  [facility] was consulted from the ED, recommended starting IV methylprednisolone and admitting for pain control and further work-up of acute colitis.      Past Medical History    No past medical history on file.    Past Surgical History   Past Surgical History:   Procedure Laterality Date    LAPAROSCOPIC RESECTION ILEOCECAL N/A  7/14/2023    Procedure: Laparoscopic assisted ileocolic resection;  Surgeon: Marleny Mendez MD;  Location: [facility] OR       Prior to Admission Medications   Prior to Admission Medications   Prescriptions Last Dose Informant Patient Reported? Taking?   acetaminophen (TYLENOL) 500 MG tablet prn  No Yes   Sig: Take 1-2 tablets (500-1,000 mg) by mouth every 6 hours as needed for mild pain   acetaminophen (TYLENOL) 650 MG CR tablet 10/8/2023  Yes Yes   Sig: Take 650 mg by mouth At Bedtime   albuterol (PROAIR HFA/PROVENTIL HFA/VENTOLIN HFA) 108 (90 Base) MCG/ACT inhaler prn  Yes Yes   Sig: Inhale 2 puffs into the lungs every 6 hours as needed for shortness of breath, wheezing or cough   bisacodyl (DULCOLAX) 10 MG suppository prn  No Yes   Sig: Place 1 suppository (10 mg) rectally 2 times daily as needed for constipation   cyanocobalamin (CYANOCOBALAMIN) 1000 MCG/ML injection 9/8/2023  Yes Yes   Sig: Inject 1,000 mcg as directed every 30 days   diphenhydrAMINE (BENADRYL) 25 MG capsule prn  No Yes   Sig: Take 1 capsule (25 mg) by mouth every 6 hours as needed for itching or allergies   esomeprazole (NEXIUM) 40 MG DR capsule 10/9/2023 at am  Yes Yes   Sig: Take 40 mg by mouth 2 times daily (before meals) Take 30-60 minutes before eating.   famotidine (PEPCID) 40 MG tablet 10/8/2023  Yes Yes   Sig: Take 40 mg by mouth At Bedtime   methocarbamol (ROBAXIN) 500 MG tablet 10/9/2023 at am  No Yes   Sig: Take 1 tablet (500 mg) by mouth 4 times daily as needed for muscle spasms   montelukast (SINGULAIR) 10 MG tablet 10/8/2023  Yes Yes   Sig: Take 10 mg by mouth At Bedtime   nortriptyline (PAMELOR) 25 MG capsule 10/8/2023  Yes Yes   Sig: Take 25 mg by mouth At Bedtime   ondansetron (ZOFRAN ODT) 4 MG ODT tab   No Yes   Sig: Take 1 tablet (4 mg) by mouth every 8 hours as needed for nausea   traZODone (DESYREL) 100 MG tablet 10/8/2023  Yes Yes   Sig: Take 200 mg by mouth At Bedtime      Facility-Administered Medications: None         Review of Systems    The 10 point Review of Systems is negative other than noted in the HPI or here.      Physical Exam   Vital Signs: Temp: 97.8  F (36.6  C) Temp src: Temporal BP: (!) 134/96 Pulse: 101   Resp: 16 SpO2: 100 % O2 Device: None (Room air)    Weight: 0 lbs 0 oz    Constitutional: awake, alert, cooperative, no apparent distress, and appears stated age  Respiratory: No increased work of breathing, good air exchange, clear to auscultation bilaterally, no crackles or wheezing  Cardiovascular: Normal apical impulse, regular rate and rhythm, normal S1 and S2, no S3 or S4, and no murmur noted  GI: Soft, quite tender to palpation in the left lower, right lower, and right upper quadrants.  No guarding, rigidity, or rebound tenderness present  Musculoskeletal: There is no redness, warmth, or swelling of the joints.  Full range of motion noted.  Motor strength is 5 out of 5 all extremities bilaterally.  Tone is normal.  Neurologic: Awake alert and oriented x3, mentating appropriately, answering questions    Medical Decision Making       60 MINUTES SPENT BY ME on the date of service doing chart review, history, exam, documentation & further activities per the note.      Data     I have personally reviewed the following data over the past 24 hrs:    5.7  \   13.9   / 209     139 103 7.4 (L) /  95   3.6 23 1.08 (H) \     ALT: 15 AST: 24 AP: 87 TBILI: 0.4   ALB: 4.7 TOT PROTEIN: 7.0 LIPASE: 27     Procal: N/A CRP: <3.00 Lactic Acid: N/A         Imaging results reviewed over the past 24 hrs:   Recent Results (from the past 24 hour(s))   CT Abdomen Pelvis w Contrast    Narrative    EXAM: CT ABDOMEN PELVIS W CONTRAST  LOCATION: [facility]  DATE: 10/9/2023    INDICATION: hx Crohn's, RUQ epigastric pain  COMPARISON: CT abdomen and pelvis 07/24/2023  TECHNIQUE: CT scan of the abdomen and pelvis was performed following injection of IV contrast. Multiplanar reformats were obtained. Dose reduction  techniques were used.  CONTRAST: 75mL Isovue 370    FINDINGS:   LOWER CHEST: Normal.    HEPATOBILIARY: The liver is unremarkable. Cholecystectomy.    PANCREAS: Normal.    SPLEEN: Small hypodense splenic lesion is unchanged.    ADRENAL GLANDS: Normal.    KIDNEYS/BLADDER: Right nephrectomy.    BOWEL: Ileocolic resection. Mild wall thickening and mucosal enhancement involving the residual ascending colon and sigmoid colon. Fluid within the sigmoid colon. No evidence for obstruction.    LYMPH NODES: Normal.    VASCULATURE: Unremarkable.    PELVIC ORGANS: Hysterectomy. No free pelvic fluid.    MUSCULOSKELETAL: Advanced degenerative disc disease L5 level.      Impression    IMPRESSION:   1.  Ileocolic anastomosis. Wall thickening and mucosal enhancement involving the ascending and sigmoid colon suggesting an acute colitis. No evidence for obstruction.  2.  Fluid in the sigmoid colon well likely result in diarrhea.

## 2023-10-10 NOTE — CONSULTS
GASTROENTEROLOGY CONSULTATION      Devi Salas  [address] [place] [id number]  63 year old female     Admission Date/Time: 10/9/2023  Primary Care Provider: Jeanette Betancourt     We were asked to see the patient in consultation by Dr. Ventura for evaluation of Colitis in the setting of Crohn's .    CC: Abdominal pain and diarrhea     HPI:  Devi Salas is a 63 year old female with a history of ileal stricturing crohn's in May, 2023 . She was admitted to the hospital in June and was treated with IV steroids without improvement prompting. She was treated with Remicade on June 18. She had second induction dose on June 27 through our office. Unfortunately, she continued to pain and developed bloody diarrhea prompting her to be rehospitalized in July. CTAP showed no significant change in 3 segments of TI with wall thickening and mucosal hyperenhancement with associated luminal narrowing, no abscess or fistula. This led to laparoscopic ileocecal resection with Dr. Mendez (colorectal surgery) on July 14. Pathology showed active, chronic ileitis with colon margin with tubular adenoma. The ileum was markedly strictured, narrowing the lumen to approximately 1 cm in diameter. No evidence of malignancy.     Today, patient reports that she had multiple loose, watery, non bloody stools since Thursday last week. This was associated with sever right sided abdominal pain and nausea. No fevers or chills. No contact with anyone who was sick with GI symptoms. No stools today.     She is on remicade at this time. Next dose is scheduled on this Thursday.     CTAP on this admission showed Ileocolic anastomosis. Wall thickening and mucosal enhancement involving the ascending and sigmoid colon suggesting an acute colitis. No evidence for obstruction.  Fluid in the sigmoid colon well likely result in diarrhea. CBC, CMP, and CRP is unremarkable.     EGD 04/07/2023, endoscopically normal. Gastric biopsies, chronic gastritis, but negative  H. pylori. Esophageal and duodenal biopsies were normal.    Colonoscopy 05/23/2023: Narrowing, ulcerations, scarring in the terminal ileum. Biopsies revealed nonspecific mildly active chronic ileitis without specific features. Random right and left colon biopsies were normal. She had a 4 mm cecal with SSA.       PAST MEDICAL HISTORY:  Patient Active Problem List    Diagnosis Date Noted    Acute colitis 10/09/2023     Priority: Medium    Dizziness 07/24/2023     Priority: Medium    Abnormal stools 07/24/2023     Priority: Medium    History of colon surgery 07/24/2023     Priority: Medium    Abdominal pain, unspecified abdominal location 07/24/2023     Priority: Medium    Intractable abdominal pain 07/12/2023     Priority: Medium    Crohn's disease of colon with complication (H) 07/12/2023     Priority: Medium    RLQ abdominal pain 06/05/2023     Priority: Medium          ROS: A comprehensive ten point review of systems was negative aside from those in mentioned in the HPI.       MEDICATIONS:   Prior to Admission medications    Medication Sig Start Date End Date Taking? Authorizing Provider   acetaminophen (TYLENOL) 500 MG tablet Take 1-2 tablets (500-1,000 mg) by mouth every 6 hours as needed for mild pain 7/26/23  Yes Li Ochoa PA-C   acetaminophen (TYLENOL) 650 MG CR tablet Take 650 mg by mouth At Bedtime   Yes Unknown, Entered By History   albuterol (PROAIR HFA/PROVENTIL HFA/VENTOLIN HFA) 108 (90 Base) MCG/ACT inhaler Inhale 2 puffs into the lungs every 6 hours as needed for shortness of breath, wheezing or cough   Yes Unknown, Entered By History   bisacodyl (DULCOLAX) 10 MG suppository Place 1 suppository (10 mg) rectally 2 times daily as needed for constipation 6/18/23  Yes Vikki Duran,    cyanocobalamin (CYANOCOBALAMIN) 1000 MCG/ML injection Inject 1,000 mcg as directed every 30 days   Yes Unknown, Entered By History   diphenhydrAMINE (BENADRYL) 25 MG capsule Take 1 capsule (25 mg) by  mouth every 6 hours as needed for itching or allergies 9/7/23  Yes Ray Henning MD   esomeprazole (NEXIUM) 40 MG DR capsule Take 40 mg by mouth 2 times daily (before meals) Take 30-60 minutes before eating.   Yes Unknown, Entered By History   famotidine (PEPCID) 40 MG tablet Take 40 mg by mouth At Bedtime   Yes Unknown, Entered By History   methocarbamol (ROBAXIN) 500 MG tablet Take 1 tablet (500 mg) by mouth 4 times daily as needed for muscle spasms 6/18/23  Yes Vikki Duran DO   montelukast (SINGULAIR) 10 MG tablet Take 10 mg by mouth At Bedtime   Yes Unknown, Entered By History   nortriptyline (PAMELOR) 25 MG capsule Take 25 mg by mouth At Bedtime   Yes Unknown, Entered By History   ondansetron (ZOFRAN ODT) 4 MG ODT tab Take 1 tablet (4 mg) by mouth every 8 hours as needed for nausea 6/18/23  Yes Vikki Duran DO   traZODone (DESYREL) 100 MG tablet Take 200 mg by mouth At Bedtime   Yes Unknown, Entered By History        ALLERGIES:   Allergies   Allergen Reactions    Latex Hives    Oxycodone Anaphylaxis, Hives and Swelling     Throat swelling, per pt    Penicillin G Anaphylaxis    Sumatriptan Palpitations    Aspirin Nausea and Vomiting    Demerol Hcl [Meperidine] Nausea and Vomiting     N/V    Percocet [Oxycodone-Acetaminophen] Nausea and Vomiting     N/V    Codeine Nausea and Vomiting    Mold         SOCIAL HISTORY:        FAMILY HISTORY:  No family history on file.     PHYSICAL EXAM:   BP 99/70 (BP Location: Right arm)   Pulse 80   Temp 97.9  F (36.6  C) (Oral)   Resp 18   SpO2 100%      General: alert, oriented, NAD  SKIN: no suspicious lesions, rashes, jaundice, or spider angiomas  EYES: No scleral icterus  RESPIRATORY: Non labored breathing, Lungs clear  CARDIOVASCULAR:  RRR. No murmurs, clicks gallops or rub  GASTROINTESTINAL: Active bowel sounds,  abdomen soft and right upper and lower abdominal tenderness on palpation.    JOINT/EXTREMITIES: extremities normal- no gross  deformities noted. No edema.   NEURO: Grossly WNL.  PSYCH: no abnormal anxiety/depression       LABS:  I reviewed the patient's new clinical lab test results.   Recent Labs   Lab Test 10/10/23  0718 10/09/23  1737 07/26/23  0622   WBC 4.0 5.7 7.3   HGB 12.4 13.9 8.6*   MCV 86 86 91    209 339     Recent Labs   Lab Test 10/10/23  0718 10/09/23  1737 07/26/23  0622    139 145   POTASSIUM 4.3 3.6 3.7   CHLORIDE 109* 103 110*   CO2 22 23 26   BUN 6.4* 7.4* 13.4   ANIONGAP 10 13 9   GIORGIO 9.2 9.4 8.8     Recent Labs   Lab Test 10/09/23  1750 10/09/23  1737 07/24/23  1717 07/24/23  1608 07/12/23  1816 07/12/23  1530   ALBUMIN  --  4.7  --  3.9  --  4.3   BILITOTAL  --  0.4  --  0.3  --  0.5   ALT  --  15  --  28  --  17   AST  --  24  --  29  --  18   ALKPHOS  --  87  --  72  --  66   PROTEIN Negative  --  Negative  --  Negative  --    LIPASE  --  27  --  56  --   --         IMAGING/PROCEDURES:  CTAP 10/9  IMPRESSION:   1.  Ileocolic anastomosis. Wall thickening and mucosal enhancement involving the ascending and sigmoid colon suggesting an acute colitis. No evidence for obstruction.  2.  Fluid in the sigmoid colon well likely result in diarrhea.  I personally reviewed the patient's new imaging results.    Problem list pertaining to GI:  Abdominal pain  Diarrhea  Crohn's disease  S/p ileal resection    Assessment: This is a 63 y-o female with a stricturing ileal disease, this was diagnosed in May 2023, underwent ileac resection due to ongoing symptoms, on remicade 5mg/kg every Q8 weeks,  who presented to hospital for on evaluation of worsening abdominal pain, diarrhea. CT showed wall thickening and mucosal enhancement involving the ascending and sigmoid colon suggesting an acute colitis. No evidence for obstruction. No diarrhea since the admission. Differentials considered are infectious or inflammatory colitis.     Plan:  - agree with stool studies  - Pain management  - Antiemetics as needed.   - If no  improvement we might consider colonoscopy   - Clear liquid diet for now  - GI will follow    I will discuss with Dr. Mathur    Thank you for allowing me to participate in the care of this patient.  Please contact me with any questions or concerns.    Total time spent:  Approximately, 45 minutes was spent providing patient care, including patient evaluation, reviewing documentation/test results, and . Thank you for asking us to participate in the care of this patient.      Jaleesa Hoyt CNP   [facility] (Formerly [facility])  [phone]

## 2023-10-10 NOTE — PLAN OF CARE
[facility]    ED Boarding Nurse Handoff Addendum Report:    Date/time: 10/10/2023, 6:37 AM    Activity Level: standby    Fall Risk: Yes:  nonskid shoes/slippers when out of bed, patient and family education, and activity supervised    Active Infusions: NS    Current Meds Due: See MAR    Current care needs: Per plan of care     Respiratory status: Room air    Vital signs (within last 30 minutes):    Vitals:    10/09/23 1720 10/09/23 2300 10/09/23 2346   BP: (!) 134/96 135/76 (!) 128/97   Pulse: 101 80    Resp: 16     Temp: 97.8  F (36.6  C)  97.9  F (36.6  C)   TempSrc: Temporal  Oral   SpO2: 100% 97%        Focused assessment within last 30 minutes:    A&O x4. VSS, RA. Up w/ SBA; steady gait. NPO since midnight. Enteric precautions for c-diff rule-out. Pt unable to produce stool sample at this time. Abd pain managed w/ PRN IV dilaudid x2. Cont NS infusing at 125 ml/hr. On IV solumedrol q8h. Plan for GI.     ED Boarding Nurse name: Joana Conroy RN

## 2023-10-11 LAB
BACTERIA UR CULT: ABNORMAL
CALPROTECTIN STL-MCNT: 30.1 MG/KG (ref 0–49.9)

## 2023-10-11 PROCEDURE — 120N000001 HC R&B MED SURG/OB

## 2023-10-11 PROCEDURE — 258N000003 HC RX IP 258 OP 636: Performed by: INTERNAL MEDICINE

## 2023-10-11 PROCEDURE — 250N000013 HC RX MED GY IP 250 OP 250 PS 637: Performed by: STUDENT IN AN ORGANIZED HEALTH CARE EDUCATION/TRAINING PROGRAM

## 2023-10-11 PROCEDURE — 250N000013 HC RX MED GY IP 250 OP 250 PS 637: Performed by: INTERNAL MEDICINE

## 2023-10-11 PROCEDURE — 250N000011 HC RX IP 250 OP 636: Mod: JZ | Performed by: INTERNAL MEDICINE

## 2023-10-11 PROCEDURE — 250N000011 HC RX IP 250 OP 636: Performed by: STUDENT IN AN ORGANIZED HEALTH CARE EDUCATION/TRAINING PROGRAM

## 2023-10-11 PROCEDURE — 250N000011 HC RX IP 250 OP 636: Performed by: EMERGENCY MEDICINE

## 2023-10-11 PROCEDURE — 99232 SBSQ HOSP IP/OBS MODERATE 35: CPT | Performed by: INTERNAL MEDICINE

## 2023-10-11 RX ORDER — CIPROFLOXACIN 500 MG/1
500 TABLET, FILM COATED ORAL EVERY 12 HOURS SCHEDULED
Status: COMPLETED | OUTPATIENT
Start: 2023-10-11 | End: 2023-10-13

## 2023-10-11 RX ADMIN — HYDROMORPHONE HYDROCHLORIDE 0.5 MG: 1 INJECTION, SOLUTION INTRAMUSCULAR; INTRAVENOUS; SUBCUTANEOUS at 09:03

## 2023-10-11 RX ADMIN — ONDANSETRON 4 MG: 2 INJECTION INTRAMUSCULAR; INTRAVENOUS at 04:02

## 2023-10-11 RX ADMIN — PANTOPRAZOLE SODIUM 40 MG: 40 TABLET, DELAYED RELEASE ORAL at 16:29

## 2023-10-11 RX ADMIN — SODIUM CHLORIDE: 9 INJECTION, SOLUTION INTRAVENOUS at 00:18

## 2023-10-11 RX ADMIN — CIPROFLOXACIN HYDROCHLORIDE 500 MG: 500 TABLET, FILM COATED ORAL at 20:32

## 2023-10-11 RX ADMIN — PANTOPRAZOLE SODIUM 40 MG: 40 TABLET, DELAYED RELEASE ORAL at 06:46

## 2023-10-11 RX ADMIN — METHOCARBAMOL 500 MG: 500 TABLET ORAL at 08:47

## 2023-10-11 RX ADMIN — HYDROMORPHONE HYDROCHLORIDE 0.5 MG: 1 INJECTION, SOLUTION INTRAMUSCULAR; INTRAVENOUS; SUBCUTANEOUS at 18:15

## 2023-10-11 RX ADMIN — NORTRIPTYLINE HYDROCHLORIDE 25 MG: 25 CAPSULE ORAL at 22:19

## 2023-10-11 RX ADMIN — SODIUM CHLORIDE: 9 INJECTION, SOLUTION INTRAVENOUS at 09:03

## 2023-10-11 RX ADMIN — ONDANSETRON 4 MG: 2 INJECTION INTRAMUSCULAR; INTRAVENOUS at 16:39

## 2023-10-11 RX ADMIN — HYDROMORPHONE HYDROCHLORIDE 0.5 MG: 1 INJECTION, SOLUTION INTRAMUSCULAR; INTRAVENOUS; SUBCUTANEOUS at 03:56

## 2023-10-11 RX ADMIN — CIPROFLOXACIN HYDROCHLORIDE 500 MG: 500 TABLET, FILM COATED ORAL at 08:58

## 2023-10-11 RX ADMIN — HYDROMORPHONE HYDROCHLORIDE 0.5 MG: 1 INJECTION, SOLUTION INTRAMUSCULAR; INTRAVENOUS; SUBCUTANEOUS at 13:24

## 2023-10-11 RX ADMIN — TRAZODONE HYDROCHLORIDE 200 MG: 50 TABLET ORAL at 22:19

## 2023-10-11 RX ADMIN — HYDROMORPHONE HYDROCHLORIDE 0.5 MG: 1 INJECTION, SOLUTION INTRAMUSCULAR; INTRAVENOUS; SUBCUTANEOUS at 22:19

## 2023-10-11 RX ADMIN — METHYLPREDNISOLONE SODIUM SUCCINATE 20 MG: 40 INJECTION, POWDER, FOR SOLUTION INTRAMUSCULAR; INTRAVENOUS at 03:59

## 2023-10-11 RX ADMIN — ACETAMINOPHEN 1000 MG: 500 TABLET, FILM COATED ORAL at 16:39

## 2023-10-11 RX ADMIN — MONTELUKAST 10 MG: 10 TABLET, FILM COATED ORAL at 22:19

## 2023-10-11 RX ADMIN — ACETAMINOPHEN 1000 MG: 500 TABLET, FILM COATED ORAL at 09:03

## 2023-10-11 RX ADMIN — METHYLPREDNISOLONE SODIUM SUCCINATE 20 MG: 40 INJECTION, POWDER, FOR SOLUTION INTRAMUSCULAR; INTRAVENOUS at 20:32

## 2023-10-11 RX ADMIN — METHYLPREDNISOLONE SODIUM SUCCINATE 20 MG: 40 INJECTION, POWDER, FOR SOLUTION INTRAMUSCULAR; INTRAVENOUS at 12:11

## 2023-10-11 ASSESSMENT — ACTIVITIES OF DAILY LIVING (ADL)
ADLS_ACUITY_SCORE: 22

## 2023-10-11 NOTE — PROGRESS NOTES
GASTROENTEROLOGY PROGRESS NOTE     CC: Abdominal pain, diarrhea     SUBJECTIVE:  She reports ongoing right sided abdominal pain. This is intermittent and severe in intensity.      OBJECTIVE:  General Appearance:  Not in distress  /66   Pulse 72   Temp 97.6  F (36.4  C) (Oral)   Resp 18   SpO2 95%   Temp (24hrs), Av.1  F (36.7  C), Min:97.6  F (36.4  C), Max:98.6  F (37  C)    No data found.    Intake/Output Summary (Last 24 hours) at 10/11/2023 1028  Last data filed at 10/10/2023 1526  Gross per 24 hour   Intake 1884 ml   Output --   Net 1884 ml        PHYSICAL EXAM  General: alert, oriented, NAD  SKIN: no suspicious lesions, rashes, jaundice, or spider angiomas   EYES: No scleral icterus   RESPIRATORY: Non labored breathing  GASTROINTESTINAL: Active bowel sounds,  abdomen soft and very tender on palpation.   NEURO:Grossly WNL.     Additional Comments:  ROS, FH, SH: See initial GI consult for details.     I have reviewed the patient's new clinical lab results:     Recent Labs   Lab Test 10/10/23  0718 10/09/23  1737 07/26/23  0622   WBC 4.0 5.7 7.3   HGB 12.4 13.9 8.6*   MCV 86 86 91    209 339     Recent Labs   Lab Test 10/10/23  0718 10/09/23  1737 07/26/23  0622   POTASSIUM 4.3 3.6 3.7   CHLORIDE 109* 103 110*   CO2 22 23 26   BUN 6.4* 7.4* 13.4   ANIONGAP 10 13 9     Recent Labs   Lab Test 10/09/23  1750 10/09/23  17323  1717 23  1608 23  1816 23  1530   ALBUMIN  --  4.7  --  3.9  --  4.3   BILITOTAL  --  0.4  --  0.3  --  0.5   ALT  --  15  --  28  --  17   AST  --  24  --  29  --  18   PROTEIN Negative  --  Negative  --  Negative  --    LIPASE  --  27  --  56  --   --         Imaging and procedures:  CTAP 10/9  IMPRESSION:   1.  Ileocolic anastomosis. Wall thickening and mucosal enhancement involving the ascending and sigmoid colon suggesting an acute colitis. No evidence for obstruction.  2.  Fluid in the sigmoid colon well likely result in diarrhea.  I  personally reviewed the patient's new imaging results.    Problem list pertaining to GI:  Abdominal pain  Diarrhea  Crohn's disease  S/p ileal resection    Assessment: This is a 63 y-o female with a stricturing ileal disease, this was diagnosed in May 2023, underwent ileal resection due to ongoing symptoms, on remicade 5mg/kg every Q8 weeks,  who presented to hospital for on evaluation of worsening abdominal pain, non bloody diarrhea. No fevers or chills or ill contact with anyone who was sick. CT showed wall thickening and mucosal enhancement involving the ascending and sigmoid colon suggesting an acute colitis. No evidence for obstruction.  She had three small bowel movements yesterday. Non today.     C-diff and enteric pathogen negative. Fecal calprotectin is pending.     Plan:  - Continue clear liquid diet  - Continue IV steroid  - Remicade tomorrow  - Pain medication and antiemetics as needed  - Ambulate  - Minimize narcotics as much as possible  - GI will follow    I will discuss with Dr. Mathur       Time spent: 20 minutes, greater than 50% of the visit was spent in counseling/coordination of care.     Jaleesa Hoyt, [facility] ([facility])  [phone]

## 2023-10-11 NOTE — PLAN OF CARE
Aox4. VSS on RA. Tolerating IV Dilaudid for right abdominal pain. Zofran given for nausea. NS running @ 100. On IV Solu Medrol. Voiding good, passing gas. Hypoactive bowel sound. Skin wdl. Clear liquid diet, tolerating. Up ad patrick. Pt refusing bed alarm. Cdiff came back negative. Plan: Pain management, await further instructions from GI service, IVF, follow up stool studies and daily lab monitoring.

## 2023-10-11 NOTE — PLAN OF CARE
VSS, able to progress diet to full liquid and patient is tolerating this well. Ambulating up ad-patrick. Was able to shower today and patient expressed this helped her. Still having some pain that is treated with tylenol and IV dilaudid. Plan to advance diet, continue steroids, and possibly discharge tomorrow.

## 2023-10-11 NOTE — PROGRESS NOTES
[facility]    Medicine Progress Note - Hospitalist Service    Date of Admission:  10/9/2023    Assessment & Plan      Devi Salas is a 63 year old female admitted on 10/9/2023. She has a history of Crohn's disease for which she had ileocolic resection for refractory disease in July, Remicade as needed therapy.  Surgery in July had several months of good symptom control without issue.  Over the last 5 days has had persistent watery diarrhea, upwards of 10 stools each day, no blood in the stool but mucus present.  [facility] consulted from the ED, recommended starting IV Solu-Medrol, formal GI consult placed.    Crohn's disease  S/p ileocolic resection 7/2023  Admitted in June of this year with SBO related to Crohn's disease, initially treated medically but eventually opted for ileocolic resection 7/14.  Since then treated with steroid taper, started on Remicade and did not have any diarrhea or abdominal pain since discharge.    -About 5 days ago started having worsening abdominal pain and frequent watery diarrhea with some mucus present.  No blood appreciated in the stool. CT this admission showed wall thickening and mucosal enhancement of the ascending and sigmoid colon suggesting of acute colitis, but no chris complications with the anastomosis or evidence of obstruction.  Systemic inflammatory markers were negative, fecal calprotectin ordered but not collected yet.    -Started on IV steroids since admission   -Tolerating clear liquid diet.  Ms. Salas is asking for advancement to at least full liquids as she was able to tolerate liquids earlier with no further recurrent issues of worsening abdominal symptomatology  - GI consulted, appreciate assistance  - IV methylprednisolone 20 mg every 8 hours  -Plans for Remicade infusion  - Negative C. difficile study    #Suspected urinary tract infection with isolated E. coli on earlier urine culture  -Urinalysis sent earlier during admission  process  -Showed bacteriuria and some pyuria  -Urine cultures grew E. Coli  -Allergic to penicillin  -We will cover with short course of oral Cipro at least the next 3 days    Chronic kidney disease  Baseline creatinine appears to be 1-1.1.  Creatinine on arrival 1.08.  Will monitor with daily labs.  -Most recent kidney function normal at 0.9     Diet: Full Liquid Diet    DVT Prophylaxis: Pneumatic Compression Devices  Monae Catheter: Not present  Lines: None     Cardiac Monitoring: None  Code Status: Full Code      Clinically Significant Risk Factors                                    Disposition Plan      Expected Discharge Date: Anticipating she will still be requiring inpatient care at least in the next 2 more days                  Ino Mora MD, MD  Hospitalist Service  [facility]  Securely message with [employer] (more info)  Text page via [employer] Paging/Directory   ______________________________________________________________________    Interval History   Continuing medicine service care today.  Seen and examined.  Chart reviewed. I met this very pleasant lady while she is sitting comfortably in hospital bed.  She mentioned that she had no issues with tolerance of her liquid diet.  No nausea or vomiting.  Had no bowel movement this morning.  No reported bleeding tendencies either.  Remained afebrile.  Not requiring oxygen support.  Appears to be in better spirits.  Denies any abdominal cramping, shortness of breath.  Not requiring oxygen support.  No mental status changes.            Physical Exam   Vital Signs: Temp: 97.6  F (36.4  C) Temp src: Oral BP: 114/66 Pulse: 72   Resp: 18 SpO2: 95 % O2 Device: None (Room air)    Weight: 0 lbs 0 oz    HEENT; Atraumatic, normocephalic, pinkish conjuctiva, pupils bilateral reactive   Skin: warm and moist, no rashes  Lungs: equal chest expansion, clear to auscultation, no wheezes, no stridor, no crackles,   Heart: normal rate, normal rhythm,  no rubs or gallops.   Abdomen: normal bowel sounds, no significant tenderness, no guarding, no peritoneal signs  Extremities: no deformities, no edema   Neuro; follow commands, alert and oriented x3, spontaneous speech, coherent, moves all extremities spontaneously  Psych; no hallucination, euthymic mood, not agitated      Medical Decision Making       40 MINUTES SPENT BY ME on the date of service doing chart review, history, exam, documentation & further activities per the note.  MANAGEMENT DISCUSSED with the following over the past 24 hours: Yes   NOTE(S)/MEDICAL RECORDS REVIEWED over the past 24 hours: Y       Data       Imaging results reviewed over the past 24 hrs:   No results found for this or any previous visit (from the past 24 hour(s)).

## 2023-10-11 NOTE — PROGRESS NOTES
Urine culture grew E coli.  UA earlier showed some bacteriuria and pyuria  Allergic to PCN  Added short course of Cipro  C diff studies earlier negative

## 2023-10-12 PROCEDURE — 250N000011 HC RX IP 250 OP 636: Performed by: EMERGENCY MEDICINE

## 2023-10-12 PROCEDURE — 258N000003 HC RX IP 258 OP 636: Performed by: NURSE PRACTITIONER

## 2023-10-12 PROCEDURE — 250N000011 HC RX IP 250 OP 636: Mod: JZ | Performed by: NURSE PRACTITIONER

## 2023-10-12 PROCEDURE — 250N000013 HC RX MED GY IP 250 OP 250 PS 637: Performed by: INTERNAL MEDICINE

## 2023-10-12 PROCEDURE — 120N000001 HC R&B MED SURG/OB

## 2023-10-12 PROCEDURE — 250N000011 HC RX IP 250 OP 636: Performed by: STUDENT IN AN ORGANIZED HEALTH CARE EDUCATION/TRAINING PROGRAM

## 2023-10-12 PROCEDURE — 250N000011 HC RX IP 250 OP 636: Mod: JZ | Performed by: INTERNAL MEDICINE

## 2023-10-12 PROCEDURE — 250N000013 HC RX MED GY IP 250 OP 250 PS 637: Performed by: NURSE PRACTITIONER

## 2023-10-12 PROCEDURE — 250N000013 HC RX MED GY IP 250 OP 250 PS 637: Performed by: STUDENT IN AN ORGANIZED HEALTH CARE EDUCATION/TRAINING PROGRAM

## 2023-10-12 PROCEDURE — 99232 SBSQ HOSP IP/OBS MODERATE 35: CPT | Performed by: INTERNAL MEDICINE

## 2023-10-12 RX ORDER — MAGNESIUM CARB/ALUMINUM HYDROX 105-160MG
296 TABLET,CHEWABLE ORAL ONCE
Status: DISCONTINUED | OUTPATIENT
Start: 2023-10-13 | End: 2023-10-12

## 2023-10-12 RX ORDER — BISACODYL 5 MG
10 TABLET, DELAYED RELEASE (ENTERIC COATED) ORAL ONCE
Status: COMPLETED | OUTPATIENT
Start: 2023-10-12 | End: 2023-10-12

## 2023-10-12 RX ORDER — POLYETHYLENE GLYCOL 3350 17 G/17G
238 POWDER, FOR SOLUTION ORAL ONCE
Status: COMPLETED | OUTPATIENT
Start: 2023-10-12 | End: 2023-10-12

## 2023-10-12 RX ADMIN — PANTOPRAZOLE SODIUM 40 MG: 40 TABLET, DELAYED RELEASE ORAL at 06:45

## 2023-10-12 RX ADMIN — INFLIXIMAB 300 MG: 100 INJECTION, POWDER, LYOPHILIZED, FOR SOLUTION INTRAVENOUS at 09:52

## 2023-10-12 RX ADMIN — METHYLPREDNISOLONE SODIUM SUCCINATE 20 MG: 40 INJECTION, POWDER, FOR SOLUTION INTRAMUSCULAR; INTRAVENOUS at 12:54

## 2023-10-12 RX ADMIN — BISACODYL 10 MG: 5 TABLET, COATED ORAL at 16:09

## 2023-10-12 RX ADMIN — HYDROMORPHONE HYDROCHLORIDE 0.5 MG: 1 INJECTION, SOLUTION INTRAMUSCULAR; INTRAVENOUS; SUBCUTANEOUS at 08:48

## 2023-10-12 RX ADMIN — HYDROMORPHONE HYDROCHLORIDE 0.5 MG: 1 INJECTION, SOLUTION INTRAMUSCULAR; INTRAVENOUS; SUBCUTANEOUS at 04:38

## 2023-10-12 RX ADMIN — CIPROFLOXACIN HYDROCHLORIDE 500 MG: 500 TABLET, FILM COATED ORAL at 08:04

## 2023-10-12 RX ADMIN — ACETAMINOPHEN 1000 MG: 500 TABLET, FILM COATED ORAL at 08:55

## 2023-10-12 RX ADMIN — HYDROMORPHONE HYDROCHLORIDE 0.5 MG: 1 INJECTION, SOLUTION INTRAMUSCULAR; INTRAVENOUS; SUBCUTANEOUS at 14:02

## 2023-10-12 RX ADMIN — ONDANSETRON 4 MG: 2 INJECTION INTRAMUSCULAR; INTRAVENOUS at 16:59

## 2023-10-12 RX ADMIN — ACETAMINOPHEN 1000 MG: 500 TABLET, FILM COATED ORAL at 22:11

## 2023-10-12 RX ADMIN — MONTELUKAST 10 MG: 10 TABLET, FILM COATED ORAL at 22:04

## 2023-10-12 RX ADMIN — NORTRIPTYLINE HYDROCHLORIDE 25 MG: 25 CAPSULE ORAL at 22:04

## 2023-10-12 RX ADMIN — CIPROFLOXACIN HYDROCHLORIDE 500 MG: 500 TABLET, FILM COATED ORAL at 20:47

## 2023-10-12 RX ADMIN — METHYLPREDNISOLONE SODIUM SUCCINATE 20 MG: 40 INJECTION, POWDER, FOR SOLUTION INTRAMUSCULAR; INTRAVENOUS at 04:38

## 2023-10-12 RX ADMIN — PANTOPRAZOLE SODIUM 40 MG: 40 TABLET, DELAYED RELEASE ORAL at 16:55

## 2023-10-12 RX ADMIN — POLYETHYLENE GLYCOL 3350 238 G: 17 POWDER, FOR SOLUTION ORAL at 17:01

## 2023-10-12 RX ADMIN — TRAZODONE HYDROCHLORIDE 200 MG: 50 TABLET ORAL at 22:03

## 2023-10-12 RX ADMIN — HYDROMORPHONE HYDROCHLORIDE 0.5 MG: 1 INJECTION, SOLUTION INTRAMUSCULAR; INTRAVENOUS; SUBCUTANEOUS at 20:48

## 2023-10-12 RX ADMIN — METHYLPREDNISOLONE SODIUM SUCCINATE 20 MG: 40 INJECTION, POWDER, FOR SOLUTION INTRAMUSCULAR; INTRAVENOUS at 20:47

## 2023-10-12 RX ADMIN — METHOCARBAMOL 500 MG: 500 TABLET ORAL at 16:09

## 2023-10-12 ASSESSMENT — ACTIVITIES OF DAILY LIVING (ADL)
ADLS_ACUITY_SCORE: 22

## 2023-10-12 NOTE — PROGRESS NOTES
GASTROENTEROLOGY PROGRESS NOTE     CC: Abdominal pain, diarrhea     SUBJECTIVE:   Not much improvement in abdominal pain. Tolerating full liquid    OBJECTIVE:  General Appearance:  Not in distress  BP (!) 141/72 (BP Location: Left arm)   Pulse 95   Temp 98.5  F (36.9  C) (Oral)   Resp 16   Wt 69.3 kg (152 lb 12.5 oz)   SpO2 100%   BMI 27.06 kg/m    Temp (24hrs), Av.1  F (36.7  C), Min:97.6  F (36.4  C), Max:98.6  F (37  C)    Patient Vitals for the past 72 hrs:   Weight   10/12/23 0900 69.3 kg (152 lb 12.5 oz)       Intake/Output Summary (Last 24 hours) at 10/11/2023 1028  Last data filed at 10/10/2023 1526  Gross per 24 hour   Intake 1884 ml   Output --   Net 1884 ml        PHYSICAL EXAM  General: alert, oriented, NAD  SKIN: no suspicious lesions, rashes, jaundice, or spider angiomas   EYES: No scleral icterus   RESPIRATORY: Non labored breathing  GASTROINTESTINAL: Active bowel sounds,  abdomen soft and very tender on palpation.   NEURO:Grossly WNL.     Additional Comments:  ROS, FH, SH: See initial GI consult for details.     I have reviewed the patient's new clinical lab results:     Recent Labs   Lab Test 10/10/23  0718 10/09/23  1737 07/26/23  0622   WBC 4.0 5.7 7.3   HGB 12.4 13.9 8.6*   MCV 86 86 91    209 339     Recent Labs   Lab Test 10/10/23  0718 10/09/23  1737 07/26/23  0622   POTASSIUM 4.3 3.6 3.7   CHLORIDE 109* 103 110*   CO2    BUN 6.4* 7.4* 13.4   ANIONGAP 10 13 9     Recent Labs   Lab Test 10/09/23  1750 10/09/23  17323  1717 23  1608 23  1816 23  1530   ALBUMIN  --  4.7  --  3.9  --  4.3   BILITOTAL  --  0.4  --  0.3  --  0.5   ALT  --  15  --  28  --  17   AST  --  24  --  29  --  18   PROTEIN Negative  --  Negative  --  Negative  --    LIPASE  --  27  --  56  --   --         Imaging and procedures:  CTAP 10/9  IMPRESSION:   1.  Ileocolic anastomosis. Wall thickening and mucosal enhancement involving the ascending and sigmoid colon suggesting an  acute colitis. No evidence for obstruction.  2.  Fluid in the sigmoid colon well likely result in diarrhea.  I personally reviewed the patient's new imaging results.    Problem list pertaining to GI:  Abdominal pain  Diarrhea  Crohn's disease  S/p ileal resection    Assessment: This is a 63 y-o female with a stricturing ileal disease, this was diagnosed in May 2023, underwent ileal resection due to ongoing symptoms, on remicade 5mg/kg every Q8 weeks,  who presented to hospital for on evaluation of worsening abdominal pain, non bloody diarrhea. No fevers or chills or ill contact with anyone who was sick. CT showed wall thickening and mucosal enhancement involving the ascending and sigmoid colon suggesting an acute colitis. No evidence for obstruction.  She had three small bowel movements yesterday. Non today.     C-diff and enteric pathogen negative. Fecal calprotectin level within normal range    Plan:  - Continue clear liquid diet  - Continue IV steroid  - Remicade infusing  - Pain medication and antiemetics as needed  - Ambulate  - Minimize narcotics as much as possible  - Colonoscopy tomorrow    I will discuss with Dr. Mathur       Time spent: 20 minutes, greater than 50% of the visit was spent in counseling/coordination of care.     Jaleesa Hoyt, CNP  [facility] ([facility])  [phone]

## 2023-10-12 NOTE — PROGRESS NOTES
CLINICAL NUTRITION SERVICES  -  ASSESSMENT NOTE      Recommendations Ordered by Registered Dietitian (RD):   - Ordered chocolate Ensure Enlive at 10 am snack and chocolate Ensure milkshake with dinner  - Encouraged po intake, emphasizing the importance of choosing options that don't exacerbate GI symptoms.    Future/Additional Recommendations: check tolerance of oral nutrition supplements   Malnutrition: Patient does not meet two of the above criteria necessary for diagnosing malnutrition but is at risk for developing malnutrition       REASON FOR ASSESSMENT  Devi Salas is a 63 year old female seen by Registered Dietitian for Admission Nutrition Risk Screen     PMHx: Crohn's disease for which she had ileocolic resection for refractory disease in July, Remicade as needed therapy.  Surgery in July had several months of good symptom control without issue.  Over the last 5 days has had persistent watery diarrhea, upwards of 10 stools each day, no blood in the stool but mucus presen. Also found to have a UTI and CKD.      NUTRITION HISTORY  - Information obtained from chart review and pt at bedside, who was very polite and engaging   - Patient follows a low fiber diet at home/knows what foods do/don't cause GI distress.  - Typical food/fluid intake is toast and cream of wheat or bananas breakfast, diced chicken salad or a grilled chicken sandwich, and dinner is a hamburger or steak, if cooked well done. Pt main source of protein is chicken.    - Diet history pt endorsed the poor appetite, stating this has been going on for years and didn't even increase when on Prednisone. Devi said the diarrhea issues have been going on for much of her life, while the Crohn's Dx is relatively new, and she thinks her attenuated appetite is compensatory to avoid GI distress/diarrhea. Devi thought her UBW was 146-151 or 152 lb. Bedside RN took a bed weight during interview, which was 69.3 kg (152.5 lb).  - Supplements - Devi  "drinks Ensure at times at home, and loves chocolate in general. She was eager to receive a chocolate Ensure Enlive @ 10 am snack and likes to mix that with milk, to drink in two sittings. When told about the Ensure milkshake, pt was eager to have that with dinner. Devi has tried the Gelatein and doesn't like these.     CURRENT NUTRITION ORDERS  Diet Order:     Full liquid      Current Intake/Tolerance:  No BM recorded so far this admit (x3 BM per GI note 10/11). No po intake on 10/10 and 50% x1 meal 10/11.     NUTRITION FOCUSED PHYSICAL ASSESSMENT FOR DIAGNOSING MALNUTRITION)  Yes         Observed:    Muscle wasting (refer to documentation in Malnutrition section)    Obtained from Chart/Interdisciplinary Team:  Per MD and H&P notes, - \"I met this very pleasant lady while she is sitting comfortably in hospital bed.  She mentioned that she had no issues with tolerance of her liquid diet.  No nausea or vomiting.  Had no bowel movement this morning.  Appears to be in better spirits.\"   -  \"Over the last 5 days has had persistent watery diarrhea, upwards of 10 stools each day, no blood in the stool but mucus present.\"     ANTHROPOMETRICS  Height: 5' 3\"  Weight: 67.6 kg (7/24/2023)  Weight Status:  Overweight BMI 25-29.9  IBW: 115 lb (52.3 kg)  % IBW: 130%  Weight History: wt is fluctuating  Wt Readings from Last 10 Encounters:   10/12/23 69.3 kg (152 lb 12.5 oz)   07/24/23 67.6 kg (149 lb)   07/18/23 70.8 kg (156 lb 1.6 oz)   06/16/23 64.7 kg (142 lb 11.2 oz)     LABS  BUN 6.4 (L), -123 (H)    MEDICATIONS  Medications reviewed    ASSESSED NUTRITION NEEDS PER APPROVED PRACTICE GUIDELINES:  Dosing Weight 56.1 kg Adjusted BW    Estimated Energy Needs: 4033-2509 kcals (25-30 Kcal/Kg)  Justification: maintenance  Estimated Protein Needs: 56-67 grams protein (1-1.2 g pro/Kg)  Justification: maintenance  Estimated Fluid Needs: 8370-5759  mL (1 mL/Kcal)  Justification: maintenance    MALNUTRITION:  % Weight Loss:  " Weight loss does not meet criteria for malnutrition   % Intake:  Decreased intake does not meet criteria for malnutrition   Subcutaneous Fat Loss:  None observed  Muscle Loss:  Temporal region mild depletion, Upper Arm region mild depletion, and Dorsal hand region mild depletion  Fluid Retention:  None noted    Malnutrition Diagnosis: Patient does not meet two of the above criteria necessary for diagnosing malnutrition but is at risk for developing malnutrition     NUTRITION DIAGNOSIS:  Inadequate oral intake related to Altered GI function as evidenced by poor appetite, sub-optimal po intake and mild muscle wasting.    NUTRITION INTERVENTIONS  Recommendations / Nutrition Prescription  - Ordered chocolate Ensure Enlive at 10 am snack and chocolate Ensure milkshake with dinner  - Encouraged po intake, emphasizing the importance of choosing options that don't exacerbate GI symptoms.     Implementation  Nutrition education: Per Provider order if indicated   General/healthful diet and Medical Food Supplement, as above    Nutrition Goals  Patient to consume % of nutritionally adequate meal trays TID, or the equivalent with supplements/snacks.      MONITORING AND EVALUATION:  Progress towards goals will be monitored and evaluated per protocol and Practice Guidelines    Dl Valdivia RD

## 2023-10-12 NOTE — PLAN OF CARE
Vitals are Temp: 98.1  F (36.7  C) Temp src: Oral BP: 125/71 Pulse: 67   Resp: 16 SpO2: 93 %.  Patient is Alert and Oriented x4. They are independent with no assistive devices .  Pt is a Full liquid diet.  Tolerating well. They are complaining of 6-8/10 pain in their abdomen.  Dilaudid given for pain.  Patient is Saline locked. Denies nausea/dizziness/SOB. RA. Solu-Medrol q8hrs. PO Cipro q12hrs. GI following. Plan is for Remicade infusion today and pain control.

## 2023-10-12 NOTE — PROGRESS NOTES
[facility]    Medicine Progress Note - Hospitalist Service    Date of Admission:  10/9/2023    Assessment & Plan      Devi Salas is a 63 year old female admitted on 10/9/2023. She has a history of Crohn's disease for which she had ileocolic resection for refractory disease in July, Remicade as needed therapy.  Surgery in July had several months of good symptom control without issue.  Over the last 5 days has had persistent watery diarrhea, upwards of 10 stools each day, no blood in the stool but mucus present.  [facility] consulted from the ED, recommended starting IV Solu-Medrol, formal GI consult placed.    Crohn's disease  S/p ileocolic resection 7/2023  Admitted in June of this year with SBO related to Crohn's disease, initially treated medically but eventually opted for ileocolic resection 7/14.  Since then treated with steroid taper, started on Remicade and did not have any diarrhea or abdominal pain since discharge.    -About 5 days ago started having worsening abdominal pain and frequent watery diarrhea with some mucus present.  No blood appreciated in the stool. CT this admission showed wall thickening and mucosal enhancement of the ascending and sigmoid colon suggesting of acute colitis, but no chris complications with the anastomosis or evidence of obstruction.  Systemic inflammatory markers were negative, fecal calprotectin ordered but not collected yet.    -Started on IV steroids since admission   -Tolerating liquid diet currently on full  -Remain on IV methylprednisone care of GI service  -Plans for Remicade infusion  - Negative C. difficile study    #Suspected urinary tract infection with isolated E. coli on earlier urine culture  -Urinalysis sent earlier during admission process  -Showed bacteriuria and some pyuria  -Urine cultures grew E. Coli  -Allergic to penicillin  -We will cover with short course of oral Cipro at least the next 3 days    Chronic kidney disease  Baseline  creatinine appears to be 1-1.1.  Creatinine on arrival 1.08.  Will monitor with daily labs.  -Most recent kidney function normal at 0.9     Diet: Full Liquid Diet    DVT Prophylaxis: Pneumatic Compression Devices  Monae Catheter: Not present  Lines: None     Cardiac Monitoring: None  Code Status: Full Code      Clinically Significant Risk Factors                                    Disposition Plan      Expected Discharge Date: Anticipating she will still be requiring inpatient care at least in the next 1-2 more days                Ino Mora MD, MD  Hospitalist Service  [facility]  Securely message with [employer] (more info)  Text page via [facility] Paging/Directory   ______________________________________________________________________    Interval History   Continuing medicine service care today.  Seen and examined.  Chart reviewed. I met this very pleasant lady while she is sitting comfortably in hospital bed.   Case discussed with nursing service.  No significant issues reported overnight such as bloody stooling.  No diarrhea.  Passing flatus.  Voiding freely.  Some abdominal cramps earlier but no complaints of any uncontrolled pain.  Tolerating liquid diet.  Afebrile.  Denies any urinary symptoms such as hematuria or hematuria or dysuria.   Not requiring oxygen support, remained afebrile.                Physical Exam   Vital Signs: Temp: 98.1  F (36.7  C) Temp src: Oral BP: (!) 142/74 Pulse: 72   Resp: 16 SpO2: 97 % O2 Device: None (Room air)    Weight: 152 lbs 12.46 oz    HEENT; Atraumatic, normocephalic, pinkish conjuctiva, pupils bilateral reactive   Skin: warm and moist, no rashes  Lungs: equal chest expansion, clear to auscultation, no wheezes, no stridor, no crackles,   Heart: normal rate, normal rhythm, no rubs or gallops.   Abdomen: normal bowel sounds, no significant tenderness, no guarding, no peritoneal signs  Extremities: no deformities, no edema   Neuro; follow commands,  alert and oriented x3, spontaneous speech, coherent, moves all extremities spontaneously  Psych; no hallucination, euthymic mood, not agitated      Medical Decision Making       45 MINUTES SPENT BY ME on the date of service doing chart review, history, exam, documentation & further activities per the note.  MANAGEMENT DISCUSSED with the following over the past 24 hours: Yes   NOTE(S)/MEDICAL RECORDS REVIEWED over the past 24 hours: Yes       Data       Imaging results reviewed over the past 24 hrs:   No results found for this or any previous visit (from the past 24 hour(s)).

## 2023-10-12 NOTE — CARE PLAN
Started remicade at 137ml/hr. Premedicated with tylenol. Patient got a headache, so decreased to 68.5ml/hr. tolerated well besides headache.tolerating clear liquids well. Gave iv dilaudid for pain. Bowel prep for colonoscopy tomorrow started around 1700, gave zofran prior.    Temp: 98  F (36.7  C) Temp src: Oral BP: 133/86 Pulse: 69   Resp: 16 SpO2: 96 % O2 Device: None (Room air)

## 2023-10-13 LAB
ANION GAP SERPL CALCULATED.3IONS-SCNC: 9 MMOL/L (ref 7–15)
BASO+EOS+MONOS # BLD AUTO: NORMAL 10*3/UL
BASO+EOS+MONOS NFR BLD AUTO: NORMAL %
BASOPHILS # BLD AUTO: 0 10E3/UL (ref 0–0.2)
BASOPHILS NFR BLD AUTO: 0 %
BUN SERPL-MCNC: 9.2 MG/DL (ref 8–23)
CALCIUM SERPL-MCNC: 9.5 MG/DL (ref 8.8–10.2)
CHLORIDE SERPL-SCNC: 104 MMOL/L (ref 98–107)
COLONOSCOPY: NORMAL
CREAT SERPL-MCNC: 1 MG/DL (ref 0.51–0.95)
DEPRECATED HCO3 PLAS-SCNC: 28 MMOL/L (ref 22–29)
EGFRCR SERPLBLD CKD-EPI 2021: 63 ML/MIN/1.73M2
EOSINOPHIL # BLD AUTO: 0 10E3/UL (ref 0–0.7)
EOSINOPHIL NFR BLD AUTO: 0 %
ERYTHROCYTE [DISTWIDTH] IN BLOOD BY AUTOMATED COUNT: 13.1 % (ref 10–15)
GLUCOSE SERPL-MCNC: 110 MG/DL (ref 70–99)
HCT VFR BLD AUTO: 38.6 % (ref 35–47)
HGB BLD-MCNC: 12.5 G/DL (ref 11.7–15.7)
IMM GRANULOCYTES # BLD: 0 10E3/UL
IMM GRANULOCYTES NFR BLD: 1 %
LYMPHOCYTES # BLD AUTO: 1.2 10E3/UL (ref 0.8–5.3)
LYMPHOCYTES NFR BLD AUTO: 18 %
MCH RBC QN AUTO: 27.2 PG (ref 26.5–33)
MCHC RBC AUTO-ENTMCNC: 32.4 G/DL (ref 31.5–36.5)
MCV RBC AUTO: 84 FL (ref 78–100)
MONOCYTES # BLD AUTO: 0.4 10E3/UL (ref 0–1.3)
MONOCYTES NFR BLD AUTO: 6 %
NEUTROPHILS # BLD AUTO: 5.1 10E3/UL (ref 1.6–8.3)
NEUTROPHILS NFR BLD AUTO: 75 %
NRBC # BLD AUTO: 0 10E3/UL
NRBC BLD AUTO-RTO: 0 /100
PLATELET # BLD AUTO: 224 10E3/UL (ref 150–450)
POTASSIUM SERPL-SCNC: 3.8 MMOL/L (ref 3.4–5.3)
RBC # BLD AUTO: 4.59 10E6/UL (ref 3.8–5.2)
SODIUM SERPL-SCNC: 141 MMOL/L (ref 135–145)
WBC # BLD AUTO: 6.7 10E3/UL (ref 4–11)

## 2023-10-13 PROCEDURE — 250N000013 HC RX MED GY IP 250 OP 250 PS 637: Performed by: STUDENT IN AN ORGANIZED HEALTH CARE EDUCATION/TRAINING PROGRAM

## 2023-10-13 PROCEDURE — 250N000011 HC RX IP 250 OP 636: Performed by: INTERNAL MEDICINE

## 2023-10-13 PROCEDURE — 120N000001 HC R&B MED SURG/OB

## 2023-10-13 PROCEDURE — 99232 SBSQ HOSP IP/OBS MODERATE 35: CPT | Performed by: INTERNAL MEDICINE

## 2023-10-13 PROCEDURE — 258N000003 HC RX IP 258 OP 636: Performed by: INTERNAL MEDICINE

## 2023-10-13 PROCEDURE — 250N000011 HC RX IP 250 OP 636: Performed by: STUDENT IN AN ORGANIZED HEALTH CARE EDUCATION/TRAINING PROGRAM

## 2023-10-13 PROCEDURE — 250N000011 HC RX IP 250 OP 636: Mod: JZ | Performed by: INTERNAL MEDICINE

## 2023-10-13 PROCEDURE — 250N000013 HC RX MED GY IP 250 OP 250 PS 637: Performed by: INTERNAL MEDICINE

## 2023-10-13 PROCEDURE — 88305 TISSUE EXAM BY PATHOLOGIST: CPT | Mod: TC | Performed by: INTERNAL MEDICINE

## 2023-10-13 PROCEDURE — 85025 COMPLETE CBC W/AUTO DIFF WBC: CPT | Performed by: INTERNAL MEDICINE

## 2023-10-13 PROCEDURE — 250N000011 HC RX IP 250 OP 636: Performed by: EMERGENCY MEDICINE

## 2023-10-13 PROCEDURE — G0500 MOD SEDAT ENDO SERVICE >5YRS: HCPCS | Performed by: INTERNAL MEDICINE

## 2023-10-13 PROCEDURE — 80048 BASIC METABOLIC PNL TOTAL CA: CPT | Performed by: INTERNAL MEDICINE

## 2023-10-13 PROCEDURE — 36415 COLL VENOUS BLD VENIPUNCTURE: CPT | Performed by: INTERNAL MEDICINE

## 2023-10-13 PROCEDURE — 250N000011 HC RX IP 250 OP 636: Mod: JZ | Performed by: NURSE PRACTITIONER

## 2023-10-13 PROCEDURE — 0DBF8ZX EXCISION OF RIGHT LARGE INTESTINE, VIA NATURAL OR ARTIFICIAL OPENING ENDOSCOPIC, DIAGNOSTIC: ICD-10-PCS | Performed by: INTERNAL MEDICINE

## 2023-10-13 PROCEDURE — 45380 COLONOSCOPY AND BIOPSY: CPT | Performed by: INTERNAL MEDICINE

## 2023-10-13 PROCEDURE — 88305 TISSUE EXAM BY PATHOLOGIST: CPT | Mod: 26 | Performed by: PATHOLOGY

## 2023-10-13 RX ORDER — SODIUM CHLORIDE 9 MG/ML
INJECTION, SOLUTION INTRAVENOUS CONTINUOUS PRN
Status: COMPLETED | OUTPATIENT
Start: 2023-10-13 | End: 2023-10-13

## 2023-10-13 RX ORDER — NALOXONE HYDROCHLORIDE 0.4 MG/ML
0.2 INJECTION, SOLUTION INTRAMUSCULAR; INTRAVENOUS; SUBCUTANEOUS
Status: DISCONTINUED | OUTPATIENT
Start: 2023-10-13 | End: 2023-10-13 | Stop reason: HOSPADM

## 2023-10-13 RX ORDER — NALOXONE HYDROCHLORIDE 0.4 MG/ML
0.4 INJECTION, SOLUTION INTRAMUSCULAR; INTRAVENOUS; SUBCUTANEOUS
Status: DISCONTINUED | OUTPATIENT
Start: 2023-10-13 | End: 2023-10-13 | Stop reason: HOSPADM

## 2023-10-13 RX ORDER — DIPHENHYDRAMINE HCL 25 MG
50 CAPSULE ORAL
Status: DISCONTINUED | OUTPATIENT
Start: 2023-10-13 | End: 2023-10-15 | Stop reason: HOSPADM

## 2023-10-13 RX ORDER — EPINEPHRINE 1 MG/ML
0.1 INJECTION, SOLUTION INTRAMUSCULAR; SUBCUTANEOUS
Status: DISCONTINUED | OUTPATIENT
Start: 2023-10-13 | End: 2023-10-13 | Stop reason: HOSPADM

## 2023-10-13 RX ORDER — FLUMAZENIL 0.1 MG/ML
0.2 INJECTION, SOLUTION INTRAVENOUS
Status: ACTIVE | OUTPATIENT
Start: 2023-10-13 | End: 2023-10-13

## 2023-10-13 RX ORDER — LIDOCAINE 40 MG/G
CREAM TOPICAL
Status: DISCONTINUED | OUTPATIENT
Start: 2023-10-13 | End: 2023-10-13 | Stop reason: HOSPADM

## 2023-10-13 RX ORDER — DIPHENHYDRAMINE HYDROCHLORIDE 50 MG/ML
25-50 INJECTION INTRAMUSCULAR; INTRAVENOUS
Status: DISCONTINUED | OUTPATIENT
Start: 2023-10-13 | End: 2023-10-13 | Stop reason: HOSPADM

## 2023-10-13 RX ORDER — ATROPINE SULFATE 0.1 MG/ML
1 INJECTION INTRAVENOUS
Status: DISCONTINUED | OUTPATIENT
Start: 2023-10-13 | End: 2023-10-13 | Stop reason: HOSPADM

## 2023-10-13 RX ORDER — FLUMAZENIL 0.1 MG/ML
0.2 INJECTION, SOLUTION INTRAVENOUS
Status: DISCONTINUED | OUTPATIENT
Start: 2023-10-13 | End: 2023-10-13 | Stop reason: HOSPADM

## 2023-10-13 RX ORDER — ONDANSETRON 2 MG/ML
4 INJECTION INTRAMUSCULAR; INTRAVENOUS
Status: COMPLETED | OUTPATIENT
Start: 2023-10-13 | End: 2023-10-13

## 2023-10-13 RX ORDER — SIMETHICONE 40MG/0.6ML
133 SUSPENSION, DROPS(FINAL DOSAGE FORM)(ML) ORAL
Status: DISCONTINUED | OUTPATIENT
Start: 2023-10-13 | End: 2023-10-13 | Stop reason: HOSPADM

## 2023-10-13 RX ORDER — FENTANYL CITRATE 50 UG/ML
50-100 INJECTION, SOLUTION INTRAMUSCULAR; INTRAVENOUS EVERY 5 MIN PRN
Status: DISCONTINUED | OUTPATIENT
Start: 2023-10-13 | End: 2023-10-13 | Stop reason: HOSPADM

## 2023-10-13 RX ADMIN — FENTANYL CITRATE 100 MCG: 50 INJECTION, SOLUTION INTRAMUSCULAR; INTRAVENOUS at 08:50

## 2023-10-13 RX ADMIN — ACETAMINOPHEN 1000 MG: 500 TABLET, FILM COATED ORAL at 19:58

## 2023-10-13 RX ADMIN — HYDROMORPHONE HYDROCHLORIDE 0.5 MG: 1 INJECTION, SOLUTION INTRAMUSCULAR; INTRAVENOUS; SUBCUTANEOUS at 10:11

## 2023-10-13 RX ADMIN — MIDAZOLAM 1 MG: 1 INJECTION INTRAMUSCULAR; INTRAVENOUS at 08:58

## 2023-10-13 RX ADMIN — CIPROFLOXACIN HYDROCHLORIDE 500 MG: 500 TABLET, FILM COATED ORAL at 10:11

## 2023-10-13 RX ADMIN — METHOCARBAMOL 500 MG: 500 TABLET ORAL at 22:07

## 2023-10-13 RX ADMIN — NORTRIPTYLINE HYDROCHLORIDE 25 MG: 25 CAPSULE ORAL at 21:55

## 2023-10-13 RX ADMIN — MONTELUKAST 10 MG: 10 TABLET, FILM COATED ORAL at 21:55

## 2023-10-13 RX ADMIN — METHYLPREDNISOLONE SODIUM SUCCINATE 20 MG: 40 INJECTION, POWDER, FOR SOLUTION INTRAMUSCULAR; INTRAVENOUS at 04:44

## 2023-10-13 RX ADMIN — ONDANSETRON 4 MG: 2 INJECTION INTRAMUSCULAR; INTRAVENOUS at 22:35

## 2023-10-13 RX ADMIN — DIPHENHYDRAMINE HYDROCHLORIDE 50 MG: 25 CAPSULE ORAL at 14:27

## 2023-10-13 RX ADMIN — ACETAMINOPHEN 1000 MG: 500 TABLET, FILM COATED ORAL at 13:54

## 2023-10-13 RX ADMIN — METHYLPREDNISOLONE SODIUM SUCCINATE 20 MG: 40 INJECTION, POWDER, FOR SOLUTION INTRAMUSCULAR; INTRAVENOUS at 11:44

## 2023-10-13 RX ADMIN — HYDROMORPHONE HYDROCHLORIDE 0.5 MG: 1 INJECTION, SOLUTION INTRAMUSCULAR; INTRAVENOUS; SUBCUTANEOUS at 21:56

## 2023-10-13 RX ADMIN — HYDROMORPHONE HYDROCHLORIDE 0.5 MG: 1 INJECTION, SOLUTION INTRAMUSCULAR; INTRAVENOUS; SUBCUTANEOUS at 13:54

## 2023-10-13 RX ADMIN — CIPROFLOXACIN HYDROCHLORIDE 500 MG: 500 TABLET, FILM COATED ORAL at 19:58

## 2023-10-13 RX ADMIN — HYDROMORPHONE HYDROCHLORIDE 0.5 MG: 1 INJECTION, SOLUTION INTRAMUSCULAR; INTRAVENOUS; SUBCUTANEOUS at 00:46

## 2023-10-13 RX ADMIN — ONDANSETRON 4 MG: 2 INJECTION INTRAMUSCULAR; INTRAVENOUS at 00:54

## 2023-10-13 RX ADMIN — METHOCARBAMOL 500 MG: 500 TABLET ORAL at 17:21

## 2023-10-13 RX ADMIN — FENTANYL CITRATE 50 MCG: 50 INJECTION, SOLUTION INTRAMUSCULAR; INTRAVENOUS at 08:54

## 2023-10-13 RX ADMIN — TRAZODONE HYDROCHLORIDE 200 MG: 50 TABLET ORAL at 21:55

## 2023-10-13 RX ADMIN — HYDROMORPHONE HYDROCHLORIDE 0.5 MG: 1 INJECTION, SOLUTION INTRAMUSCULAR; INTRAVENOUS; SUBCUTANEOUS at 04:44

## 2023-10-13 RX ADMIN — PANTOPRAZOLE SODIUM 40 MG: 40 TABLET, DELAYED RELEASE ORAL at 06:34

## 2023-10-13 RX ADMIN — MIDAZOLAM 1 MG: 1 INJECTION INTRAMUSCULAR; INTRAVENOUS at 08:54

## 2023-10-13 RX ADMIN — MIDAZOLAM 2 MG: 1 INJECTION INTRAMUSCULAR; INTRAVENOUS at 08:49

## 2023-10-13 RX ADMIN — METHOCARBAMOL 500 MG: 500 TABLET ORAL at 11:49

## 2023-10-13 RX ADMIN — ONDANSETRON 4 MG: 2 INJECTION INTRAMUSCULAR; INTRAVENOUS at 08:07

## 2023-10-13 RX ADMIN — PANTOPRAZOLE SODIUM 40 MG: 40 TABLET, DELAYED RELEASE ORAL at 17:13

## 2023-10-13 RX ADMIN — METHYLPREDNISOLONE SODIUM SUCCINATE 20 MG: 40 INJECTION, POWDER, FOR SOLUTION INTRAMUSCULAR; INTRAVENOUS at 19:59

## 2023-10-13 ASSESSMENT — ACTIVITIES OF DAILY LIVING (ADL)
ADLS_ACUITY_SCORE: 22
ADLS_ACUITY_SCORE: 24
ADLS_ACUITY_SCORE: 22
ADLS_ACUITY_SCORE: 24
ADLS_ACUITY_SCORE: 22

## 2023-10-13 NOTE — PROGRESS NOTES
[facility]    Medicine Progress Note - Hospitalist Service    Date of Admission:  10/9/2023    Assessment & Plan      Devi Salas is a 63 year old female admitted on 10/9/2023. She has a history of Crohn's disease for which she had ileocolic resection for refractory disease in July, Remicade as needed therapy.  Surgery in July had several months of good symptom control without issue.  Over the last 5 days has had persistent watery diarrhea, upwards of 10 stools each day, no blood in the stool but mucus present.  [facility] consulted from the ED, recommended starting IV Solu-Medrol, formal GI consult placed patient underwent a colonoscopy 10/13/2023.    Crohn's disease with acute flare/ S/p ileocolic resection 7/2023  Admitted in June of this year with SBO related to Crohn's disease, initially treated medically but eventually opted for ileocolic resection 7/14.  Since then treated with steroid taper, started on Remicade and did not have any diarrhea or abdominal pain since discharge.  About 5 days higher to admission she started having worsening abdominal pain and frequent watery diarrhea with some mucus present.  No blood appreciated in the stool. CT this admission showed wall thickening and mucosal enhancement of the ascending and sigmoid colon suggesting of acute colitis, but no chris complications with the anastomosis or evidence of obstruction.  Systemic inflammatory markers were negative, fecal calprotectin ordered but not collected yet.  She has been started on IV steroids since admission along with Remicade.  Her symptoms are slowly improving.  Her C. difficile was negative.  She was placed on a full liquid diet.  She was seen by GI and underwent a colonoscopy 10/13/2023 which did not show any significant inflammation.  Biopsies were taken.  She was found to have internal hemorrhoids.  We will place the patient back on full liquid diet today and hopefully can transition her to a soft diet  as her symptoms improved.      #Suspected urinary tract infection with isolated E. coli on earlier urine culture  -Urinalysis sent earlier during admission process showed bacteriuria and some pyuria.  Culture showed susceptible E. coli  K.  Patient is allergic to penicillin and it was resistant to Macrobid.  Patient was placed on a short course of Cipro which she will continue and finish.      Chronic kidney disease, stage II  Baseline creatinine appears to be 1-1.1.  Creatinine on arrival 1.08.  Will monitor with daily labs.  Patient's creatinine now is 1 with a GFR of 63.       Diet: Snacks/Supplements Adult: Other; Chocolate Ensure + 4 oz milk at 10 am + chocolate Ensure w/ ice cream at dinner; With Meals  Full Liquid Diet    DVT Prophylaxis: Pneumatic Compression Devices  Monae Catheter: Not present  Lines: None     Cardiac Monitoring: None  Code Status: Full Code      Clinically Significant Risk Factors                                    Disposition Plan      Expected Discharge Date: Anticipating she will still be requiring inpatient care at least in the next 1-2 more days, need to be able to advance her diet so she can sustain herself at home.              Discussed with bedside nursing, social work, case management, also was informed of the results from GI about her colonoscopy      Familia Monteiro MD  Hospitalist Service  [facility]  Securely message with [employer] (more info)  Text page via [employer] Paging/Directory   ______________________________________________________________________    Interval History   Patient is new to me.  States that maybe her pains a little bit better but still having loose stools.  She underwent no colonoscopy prep overnight and did fine.  I saw the patient prior to colonoscopy but this note is written after it and she did fine with the procedure that did not show any significant inflammation.  She had been tolerating her full liquid diet.  She is  passing gas.  Denies any blood in her stools.  She states that the pain is little bit more tolerable today.  She is feeling hungry and wants to be able to eat.      Physical Exam   Vital Signs: Temp: 98.4  F (36.9  C) Temp src: Oral BP: 128/84 Pulse: 76   Resp: 14 SpO2: 97 % O2 Device: None (Room air) Oxygen Delivery: 4 LPM  Weight: 152 lbs 12.46 oz    Exam is really unchanged from yesterday  GEN: Patient appears her stated age, sitting up in bed, does not appear appreciably ill  HEENT; membranes are moist   Lungs: equal chest expansion, clear to auscultation, no wheezes, no stridor, no crackles, good air movement  Heart: normal rate, normal rhythm, no murmurs  Abdomen: normal bowel sounds, no significant tenderness, no rebound or guarding  Extremities: no deformities, no edema moves all extremities  Neuro; follow commands, alert and oriented x3, spontaneous speech, coherent, moves all extremities spontaneously, no focal deficits    Data     I have personally reviewed the following data over the past 24 hrs:    6.7  \   12.5   / 224     141 104 9.2 /  110 (H)   3.8 28 1.00 (H) \     Imaging results reviewed over the past 24 hrs:   No results found for this or any previous visit (from the past 24 hour(s)).

## 2023-10-13 NOTE — PRE-PROCEDURE
INPATIENT PRE-PROCEDURE NOTE    CHIEF COMPLAINT / REASON FOR PROCEDURE: crohn;s    Admission History and Physical Reviewed, including past medical history, social history family history, ROS, and interval progress notes: on chart-<30 days old; updated within 7 days.    PRE-SEDATION ASSESSMENT:  Lung Exam: normal  Heart Exam: normal  Airway Exam: Normal  Previous reaction to anesthesia/sedation: NO  Sedation plan based on assessment:Moderate (conscious) sedation  ASA Classification: 2 - Mild systemic disease       IMPRESSION: crohn's    PLAN: colonoscopy    Isaiah Mathur MD

## 2023-10-13 NOTE — PLAN OF CARE
Goal Outcome Evaluation:    Pt had colonoscopy this am. Pt alert and orientated x4 pleasant. Up and Independent in room.   Showered today and linens changed. Dilaudid given for pain. Using heating pad on abdomen for pain. for pain, Robaxin given x1, dilaudid x2, tylenol x1. Lung sounds clear. Full liquid diet, had some cream of wheat. Pt wanted to sleep, gave benadryl.

## 2023-10-13 NOTE — PLAN OF CARE
Shift from [id number]     Inpatient Progress Note:  For complete assessment see flow sheet documentation.    /87 (BP Location: Left arm)   Pulse 70   Temp 98.4  F (36.9  C) (Oral)   Resp 16   Wt 69.3 kg (152 lb 12.5 oz)   SpO2 95%   BMI 27.06 kg/m      Orientation: AO x4  Neuros: Intact  Pain status: Rated pain 6-8/10 in lower abdomen, dilaudid given x2 and was effective.  Activity: Up independently  Resp: WDL  Cardiac: WDL  GI: Abdominal discomfort noted  : WDL  LDA: PIV in RAC  Infusions: SL    Diet: Clear liquid  Safety: Call light within reach, gripper socks on, reminder to call if dizzy/lightheaded  Discharge Plan: Discharge when medically stable

## 2023-10-13 NOTE — UTILIZATION REVIEW
"  Admission Status; Secondary Review Determination         Under the authority of the Utilization Management Committee, the utilization review process indicated a secondary review on the above patient.  The review outcome is based on review of the medical records, discussions with staff, and applying clinical experience noted on the date of the review.        (xxx)  Inpatient Status Appropriate - This patient's medical care is consistent with medical management for inpatient care and reasonable inpatient medical practice.      () Observation Status Appropriate - This patient does not meet hospital inpatient criteria and is placed in observation status. If this patient's primary payer is Medicare and was admitted as an inpatient, Condition Code 44 should be used and patient status changed to \"observation\".   () Admission Status NOT Appropriate - This patient's medical care is not consistent with medical management for Inpatient or Observation Status.          RATIONALE FOR DETERMINATION     Devi Salas is a 63 year old female with a history of Crohn's disease for which she had ileocolic resection in July, now admitted 10/9/2023 with 5 days of abdominal pain and persistent watery diarrhea.  CT scan showed wall thickening and mucosal enhancement of the ascending and sigmoid colon suggestive of acute colitis.  No chris complications with the anastomosis or evidence of obstruction.  C diff and stool studies negative.  She was admitted for close clinical monitoring, IV steroids, IV pain medication, GI consultation and further evaluation.  She continues to have pain requiring repeated doses of IV pain medication.  Colonoscopy completed this morning due to persistent symptoms.  IP status is appropriate.    The severity of illness, intensity of service provided, expected LOS and risk for adverse outcome make the care complex, high risk and appropriate for hospital admission.        The information on this document is " developed by the utilization review team in order for the business office to ensure compliance.  This only denotes the appropriateness of proper admission status and does not reflect the quality of care rendered.         The definitions of Inpatient Status and Observation Status used in making the determination above are those provided in the CMS Coverage Manual, Chapter 1 and Chapter 6, section 70.4.      Sincerely,     Reina Hudson MD  Physician Advisor   Utilization Review/ Case Management  [facility].

## 2023-10-14 PROCEDURE — 120N000001 HC R&B MED SURG/OB

## 2023-10-14 PROCEDURE — 99232 SBSQ HOSP IP/OBS MODERATE 35: CPT | Performed by: INTERNAL MEDICINE

## 2023-10-14 PROCEDURE — 250N000011 HC RX IP 250 OP 636: Performed by: STUDENT IN AN ORGANIZED HEALTH CARE EDUCATION/TRAINING PROGRAM

## 2023-10-14 PROCEDURE — 250N000013 HC RX MED GY IP 250 OP 250 PS 637: Performed by: INTERNAL MEDICINE

## 2023-10-14 PROCEDURE — 250N000011 HC RX IP 250 OP 636: Mod: JZ | Performed by: INTERNAL MEDICINE

## 2023-10-14 PROCEDURE — 250N000013 HC RX MED GY IP 250 OP 250 PS 637: Performed by: STUDENT IN AN ORGANIZED HEALTH CARE EDUCATION/TRAINING PROGRAM

## 2023-10-14 PROCEDURE — 250N000011 HC RX IP 250 OP 636: Performed by: EMERGENCY MEDICINE

## 2023-10-14 RX ORDER — HYDROXYZINE HYDROCHLORIDE 50 MG/1
50 TABLET, FILM COATED ORAL EVERY 6 HOURS PRN
Status: DISCONTINUED | OUTPATIENT
Start: 2023-10-14 | End: 2023-10-15 | Stop reason: HOSPADM

## 2023-10-14 RX ORDER — HYDROXYZINE HYDROCHLORIDE 25 MG/1
25 TABLET, FILM COATED ORAL EVERY 6 HOURS PRN
Status: DISCONTINUED | OUTPATIENT
Start: 2023-10-14 | End: 2023-10-15 | Stop reason: HOSPADM

## 2023-10-14 RX ORDER — HYDROCODONE BITARTRATE AND ACETAMINOPHEN 5; 325 MG/1; MG/1
1 TABLET ORAL EVERY 6 HOURS PRN
Status: DISCONTINUED | OUTPATIENT
Start: 2023-10-14 | End: 2023-10-15

## 2023-10-14 RX ADMIN — PANTOPRAZOLE SODIUM 40 MG: 40 TABLET, DELAYED RELEASE ORAL at 06:55

## 2023-10-14 RX ADMIN — METHOCARBAMOL 500 MG: 500 TABLET ORAL at 12:01

## 2023-10-14 RX ADMIN — HYDROCODONE BITARTRATE AND ACETAMINOPHEN 1 TABLET: 5; 325 TABLET ORAL at 18:32

## 2023-10-14 RX ADMIN — METHOCARBAMOL 500 MG: 500 TABLET ORAL at 15:41

## 2023-10-14 RX ADMIN — ONDANSETRON 4 MG: 2 INJECTION INTRAMUSCULAR; INTRAVENOUS at 12:09

## 2023-10-14 RX ADMIN — PANTOPRAZOLE SODIUM 40 MG: 40 TABLET, DELAYED RELEASE ORAL at 16:34

## 2023-10-14 RX ADMIN — TRAZODONE HYDROCHLORIDE 200 MG: 50 TABLET ORAL at 22:15

## 2023-10-14 RX ADMIN — NORTRIPTYLINE HYDROCHLORIDE 25 MG: 25 CAPSULE ORAL at 22:15

## 2023-10-14 RX ADMIN — HYDROMORPHONE HYDROCHLORIDE 0.5 MG: 1 INJECTION, SOLUTION INTRAMUSCULAR; INTRAVENOUS; SUBCUTANEOUS at 08:30

## 2023-10-14 RX ADMIN — HYDROMORPHONE HYDROCHLORIDE 0.5 MG: 1 INJECTION, SOLUTION INTRAMUSCULAR; INTRAVENOUS; SUBCUTANEOUS at 12:46

## 2023-10-14 RX ADMIN — ACETAMINOPHEN 1000 MG: 500 TABLET, FILM COATED ORAL at 15:41

## 2023-10-14 RX ADMIN — METHOCARBAMOL 500 MG: 500 TABLET ORAL at 04:47

## 2023-10-14 RX ADMIN — METHYLPREDNISOLONE SODIUM SUCCINATE 20 MG: 40 INJECTION, POWDER, FOR SOLUTION INTRAMUSCULAR; INTRAVENOUS at 12:00

## 2023-10-14 RX ADMIN — HYDROCODONE BITARTRATE AND ACETAMINOPHEN 1 TABLET: 5; 325 TABLET ORAL at 12:01

## 2023-10-14 RX ADMIN — HYDROMORPHONE HYDROCHLORIDE 0.5 MG: 1 INJECTION, SOLUTION INTRAMUSCULAR; INTRAVENOUS; SUBCUTANEOUS at 20:41

## 2023-10-14 RX ADMIN — HYDROMORPHONE HYDROCHLORIDE 0.5 MG: 1 INJECTION, SOLUTION INTRAMUSCULAR; INTRAVENOUS; SUBCUTANEOUS at 04:47

## 2023-10-14 RX ADMIN — HYDROMORPHONE HYDROCHLORIDE 0.5 MG: 1 INJECTION, SOLUTION INTRAMUSCULAR; INTRAVENOUS; SUBCUTANEOUS at 16:36

## 2023-10-14 RX ADMIN — METHYLPREDNISOLONE SODIUM SUCCINATE 20 MG: 40 INJECTION, POWDER, FOR SOLUTION INTRAMUSCULAR; INTRAVENOUS at 04:34

## 2023-10-14 RX ADMIN — METHYLPREDNISOLONE SODIUM SUCCINATE 20 MG: 40 INJECTION, POWDER, FOR SOLUTION INTRAMUSCULAR; INTRAVENOUS at 20:34

## 2023-10-14 RX ADMIN — MONTELUKAST 10 MG: 10 TABLET, FILM COATED ORAL at 22:15

## 2023-10-14 ASSESSMENT — ACTIVITIES OF DAILY LIVING (ADL)
ADLS_ACUITY_SCORE: 22
ADLS_ACUITY_SCORE: 24
ADLS_ACUITY_SCORE: 22
ADLS_ACUITY_SCORE: 22
ADLS_ACUITY_SCORE: 24
ADLS_ACUITY_SCORE: 24
ADLS_ACUITY_SCORE: 22
ADLS_ACUITY_SCORE: 24
ADLS_ACUITY_SCORE: 22
ADLS_ACUITY_SCORE: 24

## 2023-10-14 NOTE — PLAN OF CARE
Goal Outcome Evaluation:       Pt alert and orientated x4, pleasant. Up independent in room, ambulated in hallways x2 this shift.  Now on regular diet. L/S clear. PIV S/L patent. Norco and then dilaudid given for pain, as pt was not getting relief from oral meds. Pt  at bedside. Tried to eat lunch, did not go very well as pt was in pain 7/10. Pt stated pain goal is 2-3. After giving dilaudid pain is 5/10. Pt is voiding fine.

## 2023-10-14 NOTE — PROGRESS NOTES
[facility]    Medicine Progress Note - Hospitalist Service    Date of Admission:  10/9/2023    Assessment & Plan      Devi Salas is a 63 year old female admitted on 10/9/2023. She has a history of Crohn's disease for which she had ileocolic resection for refractory disease in July, Remicade as needed therapy.  Surgery in July had several months of good symptom control without issue.  Over the last 5 days has had persistent watery diarrhea, upwards of 10 stools each day, no blood in the stool but mucus present.  [facility] consulted from the ED, recommended starting IV Solu-Medrol, formal GI consult placed patient underwent a colonoscopy 10/13/2023.    Crohn's disease with acute flare/ S/p ileocolic resection 7/2023  Admitted in June of this year with SBO related to Crohn's disease, initially treated medically but eventually opted for ileocolic resection 7/14.  Since then treated with steroid taper, started on Remicade and did not have any diarrhea or abdominal pain since discharge.  About 5 days higher to admission she started having worsening abdominal pain and frequent watery diarrhea with some mucus present.  No blood appreciated in the stool. CT this admission showed wall thickening and mucosal enhancement of the ascending and sigmoid colon suggesting of acute colitis, but no chris complications with the anastomosis or evidence of obstruction.  Systemic inflammatory markers were negative, fecal calprotectin was 30.1.  She was started on IV steroids and has remained on them since admission along with Remicade.  Her symptoms are slowly improving.  Her C. difficile was negative.  She was placed on a full liquid diet.  She was seen by GI and underwent a colonoscopy 10/13/2023 which did not show any significant inflammation.  Biopsies were taken, pathology pending from 10/13.  She was found to have internal hemorrhoids.  She was placed back on a full liquid diet and will advance her diet slowly.   She continues to have pain.  Her goal is to be using oral pain medication Vicodin and atarax so she can go home.  We will place the patient back on full liquid diet today and hopefully can transition her to a soft diet as her symptoms improved.  GI needs to weigh in and make a decision on ongoing steroids for home and a taper.  Transition to orals.      #Suspected urinary tract infection with isolated E. coli on earlier urine culture  -Urinalysis sent earlier during admission process showed bacteriuria and some pyuria.  Culture showed susceptible E. coli  K.  Patient is allergic to penicillin and it was resistant to Macrobid.  Patient was placed on a short course of Cipro which she will continue and finish.      Chronic kidney disease, stage II  Baseline creatinine appears to be 1-1.1.  Creatinine on arrival 1.08.  Will monitor with daily labs.  Patient's creatinine now is 1 with a GFR of 63.       Diet: Snacks/Supplements Adult: Other; Chocolate Ensure + 4 oz milk at 10 am + chocolate Ensure w/ ice cream at dinner; With Meals  Advance Diet as Tolerated: Regular Diet Adult    DVT Prophylaxis: Pneumatic Compression Devices  Monae Catheter: Not present  Lines: None     Cardiac Monitoring: None  Code Status: Full Code      Clinically Significant Risk Factors                                    Disposition Plan      Expected Discharge Date: Anticipating she will still be requiring inpatient care at least in the next 1-2 more days, need to be able to advance her diet so she can sustain herself at home.         Needs to be using oral pain medicines that she can use at home.  Also need a plan for oral steroids per GI     Discussed with bedside nursing      Familia Monteiro MD  Hospitalist Service  [facility]  Securely message with [employer] (more info)  Text page via [employer] Paging/Directory   ______________________________________________________________________    Interval History   Patient has  started to advance her diet.  She still has ongoing pain but overall seems to be doing better.  Has not had any bowel movements but has not been eating much yet.  Has had minimal flatus.      Physical Exam   Vital Signs: Temp: 98.6  F (37  C) Temp src: Oral BP: (!) 148/85 Pulse: 73   Resp: 18 SpO2: 97 % O2 Device: None (Room air)    Weight: 152 lbs 12.46 oz    Exam is really unchanged from yesterday  GEN: Patient appears her stated age, sitting up in bed, does not appear appreciably ill, is frustrated that she still here  HEENT; membranes are moist   Lungs: equal chest expansion, clear to auscultation, no wheezes, no stridor, no crackles, good air movement  Heart: normal rate, normal rhythm, no murmurs  Abdomen: Little hypoactive bowel sounds, no significant tenderness, no rebound or guarding  Extremities: no deformities, no edema moves all extremities  Neuro; follow commands, alert and oriented x3, spontaneous speech, coherent, moves all extremities spontaneously, no focal deficits    Data       Imaging results reviewed over the past 24 hrs:   No results found for this or any previous visit (from the past 24 hour(s)).

## 2023-10-14 NOTE — PROGRESS NOTES
Pts pain down to a 5/10 after dilaudid given,  Pt up walking halls x3 this shift. Family was at bedside,

## 2023-10-14 NOTE — PLAN OF CARE
RN - VSS. Pt c/o ongoing abdominal discomfort however endorsing improvement since admission. Still requiring occasional IV medication for relief. Diet adv to low fiber - Pt attempting - but not tolerating well - Diet adv to regular for tomorrow AM at req of pt. Pt up ad patrick in room. Endorsed nausea x1 and receiving zofran. No bowel movement since prior to colonoscopy earlier. Plan for ongoing monitoring of diet tolerance.

## 2023-10-14 NOTE — PROGRESS NOTES
[facility]    Medicine Progress Note - GI    Date of Admission:  10/9/2023    Assessment & Plan   Abdominal pain, etiology unclear.  This patient has a history of Crohn's ileocolitis with an unremarkable colonoscopy, and no evidence for active disease.  The abdominal pain is point like and is not consistent with Crohn's, or partial bowel obstruction.  There is no evidence for pancreatitis or biliary colic.  There may be a functional component to the pain as I cannot come up with any organic source at this time.  The pain certainly seems disproportionate to the findings and exam.  At this time we will maintain the patient's pain control and maintain a regular diet.  I am okay with discharge except that we do not have the patient's pain under control at this time.          Diet: Snacks/Supplements Adult: Other; Chocolate Ensure + 4 oz milk at 10 am + chocolate Ensure w/ ice cream at dinner; With Meals  Advance Diet as Tolerated: Regular Diet Adult    DVT Prophylaxis: Defer to primary service  Monae Catheter: Not present  Lines: None     Cardiac Monitoring: None  Code Status: Full Code      Clinically Significant Risk Factors                                             Martin Kaufman MD, MD  GI  [facility]  Securely message with [employer] (more info)  Text page via [email or web address] Paging/Directory   ______________________________________________________________________    Interval History   The patient reports no ongoing diarrhea and has actually had no stool since last night.  She still reports abdominal pain in the right mid and right lower abdomen but this is relatively localized.  There is been no nausea or vomiting the patient has a decreased appetite but says she is eating and tolerating a regular diet.  She has no heartburn or regurgitation no dysphagia or odynophagia.    Note is made that the patient's recent colonoscopy showed an unremarkable anastomosis and unremarkable colon  biopsies are pending.  CT scan did show mucosal enhancement and wall thickening in the left colon consistent with colitis but this was not seen at colonoscopy.    Physical Exam   Vital Signs: Temp: 98.6  F (37  C) Temp src: Oral BP: (!) 148/85 Pulse: 73   Resp: 18 SpO2: 97 % O2 Device: None (Room air)    Weight: 152 lbs 12.46 oz    General Appearance: normal  Respiratory: clear  Cardiovascular: rrr  GI: soft, abd. Mild tenderness without guarding or rebound in point areas of the right mid and right lower abd         25 MINUTES SPENT BY ME on the date of service doing chart review, history, exam, documentation & further activities per the note.        Data   ------------------------- PAST 24 HR DATA REVIEWED -----------------------------------------------        Imaging results reviewed over the past 24 hrs:   No results found for this or any previous visit (from the past 24 hour(s)).

## 2023-10-14 NOTE — PLAN OF CARE
/85   Pulse 73   Temp 97.9  F  Resp 16  SpO2 98%      Patient is alert and oriented x4, IV dilaudid and robaxin was given for pain, RA, IV saline locked, independent in room with transfers, on a regular diet at this time, if patient is tolerating diet well can go home.

## 2023-10-14 NOTE — PLAN OF CARE
A&Ox4, independent in the room and SBA ambulating the halls, low appetite due to pain, giving IV dilaudid and po norco for pain. VSS on RA. Due for BM. Patient frustrated that LEE ENAMORADO stated he was unable to determine cause of pain.

## 2023-10-15 VITALS
WEIGHT: 152.78 LBS | RESPIRATION RATE: 16 BRPM | HEART RATE: 82 BPM | DIASTOLIC BLOOD PRESSURE: 85 MMHG | OXYGEN SATURATION: 96 % | BODY MASS INDEX: 27.06 KG/M2 | TEMPERATURE: 98 F | SYSTOLIC BLOOD PRESSURE: 130 MMHG

## 2023-10-15 PROCEDURE — 250N000011 HC RX IP 250 OP 636: Performed by: EMERGENCY MEDICINE

## 2023-10-15 PROCEDURE — 99238 HOSP IP/OBS DSCHRG MGMT 30/<: CPT | Performed by: INTERNAL MEDICINE

## 2023-10-15 PROCEDURE — 250N000013 HC RX MED GY IP 250 OP 250 PS 637: Performed by: INTERNAL MEDICINE

## 2023-10-15 PROCEDURE — 250N000013 HC RX MED GY IP 250 OP 250 PS 637: Performed by: STUDENT IN AN ORGANIZED HEALTH CARE EDUCATION/TRAINING PROGRAM

## 2023-10-15 PROCEDURE — 250N000012 HC RX MED GY IP 250 OP 636 PS 637: Performed by: INTERNAL MEDICINE

## 2023-10-15 PROCEDURE — 250N000011 HC RX IP 250 OP 636: Performed by: STUDENT IN AN ORGANIZED HEALTH CARE EDUCATION/TRAINING PROGRAM

## 2023-10-15 RX ORDER — PREDNISONE 5 MG/1
10 TABLET ORAL DAILY
Status: DISCONTINUED | OUTPATIENT
Start: 2023-10-24 | End: 2023-10-15 | Stop reason: HOSPADM

## 2023-10-15 RX ORDER — PREDNISONE 20 MG/1
40 TABLET ORAL DAILY
Status: DISCONTINUED | OUTPATIENT
Start: 2023-10-18 | End: 2023-10-15 | Stop reason: HOSPADM

## 2023-10-15 RX ORDER — HYDROCODONE BITARTRATE AND ACETAMINOPHEN 5; 325 MG/1; MG/1
1-2 TABLET ORAL EVERY 6 HOURS PRN
Status: DISCONTINUED | OUTPATIENT
Start: 2023-10-15 | End: 2023-10-15 | Stop reason: HOSPADM

## 2023-10-15 RX ORDER — PREDNISONE 20 MG/1
60 TABLET ORAL DAILY
Status: DISCONTINUED | OUTPATIENT
Start: 2023-10-15 | End: 2023-10-15 | Stop reason: HOSPADM

## 2023-10-15 RX ORDER — HYDROXYZINE HYDROCHLORIDE 25 MG/1
25 TABLET, FILM COATED ORAL EVERY 6 HOURS PRN
Qty: 20 TABLET | Refills: 0 | Status: SHIPPED | OUTPATIENT
Start: 2023-10-15 | End: 2024-01-15

## 2023-10-15 RX ORDER — HYDROCODONE BITARTRATE AND ACETAMINOPHEN 5; 325 MG/1; MG/1
1-2 TABLET ORAL EVERY 6 HOURS PRN
Qty: 20 TABLET | Refills: 0 | Status: SHIPPED | OUTPATIENT
Start: 2023-10-15 | End: 2024-01-15

## 2023-10-15 RX ORDER — PREDNISONE 10 MG/1
TABLET ORAL
Qty: 40 TABLET | Refills: 0 | Status: SHIPPED | OUTPATIENT
Start: 2023-10-15 | End: 2023-10-28

## 2023-10-15 RX ORDER — PREDNISONE 20 MG/1
20 TABLET ORAL DAILY
Status: DISCONTINUED | OUTPATIENT
Start: 2023-10-21 | End: 2023-10-15 | Stop reason: HOSPADM

## 2023-10-15 RX ADMIN — METHOCARBAMOL 500 MG: 500 TABLET ORAL at 13:05

## 2023-10-15 RX ADMIN — HYDROCODONE BITARTRATE AND ACETAMINOPHEN 1 TABLET: 5; 325 TABLET ORAL at 11:48

## 2023-10-15 RX ADMIN — HYDROXYZINE HYDROCHLORIDE 50 MG: 50 TABLET, FILM COATED ORAL at 09:58

## 2023-10-15 RX ADMIN — METHYLPREDNISOLONE SODIUM SUCCINATE 20 MG: 40 INJECTION, POWDER, FOR SOLUTION INTRAMUSCULAR; INTRAVENOUS at 04:28

## 2023-10-15 RX ADMIN — HYDROCODONE BITARTRATE AND ACETAMINOPHEN 1 TABLET: 5; 325 TABLET ORAL at 04:27

## 2023-10-15 RX ADMIN — PREDNISONE 60 MG: 20 TABLET ORAL at 08:09

## 2023-10-15 RX ADMIN — METHOCARBAMOL 500 MG: 500 TABLET ORAL at 08:16

## 2023-10-15 RX ADMIN — HYDROMORPHONE HYDROCHLORIDE 0.5 MG: 1 INJECTION, SOLUTION INTRAMUSCULAR; INTRAVENOUS; SUBCUTANEOUS at 00:42

## 2023-10-15 RX ADMIN — HYDROCODONE BITARTRATE AND ACETAMINOPHEN 1 TABLET: 5; 325 TABLET ORAL at 09:58

## 2023-10-15 RX ADMIN — HYDROXYZINE HYDROCHLORIDE 50 MG: 50 TABLET, FILM COATED ORAL at 18:00

## 2023-10-15 RX ADMIN — ACETAMINOPHEN 1000 MG: 500 TABLET, FILM COATED ORAL at 08:16

## 2023-10-15 RX ADMIN — PANTOPRAZOLE SODIUM 40 MG: 40 TABLET, DELAYED RELEASE ORAL at 16:37

## 2023-10-15 RX ADMIN — HYDROCODONE BITARTRATE AND ACETAMINOPHEN 2 TABLET: 5; 325 TABLET ORAL at 18:00

## 2023-10-15 RX ADMIN — METHOCARBAMOL 500 MG: 500 TABLET ORAL at 18:00

## 2023-10-15 RX ADMIN — PANTOPRAZOLE SODIUM 40 MG: 40 TABLET, DELAYED RELEASE ORAL at 08:09

## 2023-10-15 ASSESSMENT — ACTIVITIES OF DAILY LIVING (ADL)
ADLS_ACUITY_SCORE: 22

## 2023-10-15 NOTE — PLAN OF CARE
Pt is alert and oriented x4 pain managed by IV Dilaudid and Norco, Independent in the room, voiding w/o difficulty, passing flatus, on Low fiber diet, possible discharge today. Continue with POC.    /83 (BP Location: Left arm, Patient Position: Semi-Bain's, Cuff Size: Adult Large)   Pulse 68   Temp 98.4  F (36.9  C) (Oral)   Resp 18   Wt 69.3 kg (152 lb 12.5 oz)   SpO2 96%   BMI 27.06 kg/m

## 2023-10-15 NOTE — DISCHARGE SUMMARY
[facility]  Hospitalist Discharge Summary      Date of Admission:  10/9/2023  Date of Discharge:  10/15/2023  Discharging Provider: Familia Monteiro MD  Discharge Service: Hospitalist Service  Primary Care Physician   Jeanette Betancourt    Discharge Diagnoses   Crohn's disease with acute flare  Abdominal pain  History of ileocolic resection  Possible E. coli UTI  Stage II chronic kidney disease    Hospital Course   Devi Salas is a 63 year old female admitted on 10/9/2023. She has a history of Crohn's disease for which she had ileocolic resection for refractory disease in July, Remicade as needed therapy.  Surgery in July had several months of good symptom control without issue.  Over the last 5 days has had persistent watery diarrhea, upwards of 10 stools each day, no blood in the stool but mucus present.  [facility] consulted from the ED, recommended starting IV Solu-Medrol, formal GI consult placed patient underwent a colonoscopy 10/13/2023.     Crohn's disease with acute flare/ S/p ileocolic resection 7/2023  Admitted in June of this year with SBO related to Crohn's disease, initially treated medically but eventually opted for ileocolic resection 7/14.  Since then treated with steroid taper, started on Remicade and did not have any diarrhea or abdominal pain since discharge.  About 5 days higher to admission she started having worsening abdominal pain and frequent watery diarrhea with some mucus present.  No blood appreciated in the stool. CT on admission showed wall thickening and mucosal enhancement of the ascending and sigmoid colon suggesting of acute colitis, but no chris complications with the anastomosis or evidence of obstruction.  Systemic inflammatory markers were negative, fecal calprotectin was 30.1.  She was started on IV steroids and has remained on them since admission along with Remicade.  Her symptoms are slowly improving.  Her C. difficile was negative.  She was placed on a full  liquid diet.  She was seen by GI and underwent a colonoscopy 10/13/2023 which did not show any significant inflammation.  Biopsies were taken, pathology pending from 10/13 I suspect will not be useful.  She was found to have internal hemorrhoids.  She was placed back on a full liquid diet and will advance her diet slowly.  She continues to have pain though seems to be doing better.  Her goal is to be using oral pain medication Vicodin and atarax so she can go home.  She has been getting Dilaudid which has been stopped.  Her diet is being tolerated.  Denies any nausea or vomiting.  Goal is for her to go home with a prednisone taper which I have ordered as well.       She will return home when she feels that her symptoms can be controlled with oral medications.        #Suspected urinary tract infection with isolated E. coli on earlier urine culture  -Urinalysis sent earlier during admission process showed bacteriuria and some pyuria.  Culture showed susceptible E. coli  K.  Patient is allergic to penicillin and it was resistant to Macrobid.  Patient was placed on a short course of Cipro which she will continue and finish.        Chronic kidney disease, stage II  Baseline creatinine appears to be 1-1.1.  Creatinine on arrival 1.08.  Will monitor with daily labs.  Patient's creatinine now is 1 with a GFR of 63.    Clinically Significant Risk Factors          Significant Results and Procedures   Most Recent 3 CBC's:  Recent Labs   Lab Test 10/13/23  0622 10/10/23  0718 10/09/23  1737   WBC 6.7 4.0 5.7   HGB 12.5 12.4 13.9   MCV 84 86 86    188 209     Most Recent 3 BMP's:  Recent Labs   Lab Test 10/13/23  0622 10/10/23  0718 10/09/23  1737    141 139   POTASSIUM 3.8 4.3 3.6   CHLORIDE 104 109* 103   CO2 28 22 23   BUN 9.2 6.4* 7.4*   CR 1.00* 0.91 1.08*   ANIONGAP 9 10 13   GIORGIO 9.5 9.2 9.4   * 123* 95     Most Recent Urinalysis:  Recent Labs   Lab Test 10/09/23  1750   COLOR Light Yellow    APPEARANCE Clear   URINEGLC Negative   URINEBILI Negative   URINEKETONE Negative   SG 1.013   UBLD Negative   URINEPH 5.0   PROTEIN Negative   NITRITE Negative   LEUKEST Small*   RBCU 1   WBCU 7*   ,   Results for orders placed or performed during the hospital encounter of 10/09/23   CT Abdomen Pelvis w Contrast    Narrative    EXAM: CT ABDOMEN PELVIS W CONTRAST  LOCATION: [facility]  DATE: 10/9/2023    INDICATION: hx Crohn's, RUQ epigastric pain  COMPARISON: CT abdomen and pelvis 07/24/2023  TECHNIQUE: CT scan of the abdomen and pelvis was performed following injection of IV contrast. Multiplanar reformats were obtained. Dose reduction techniques were used.  CONTRAST: 75mL Isovue 370    FINDINGS:   LOWER CHEST: Normal.    HEPATOBILIARY: The liver is unremarkable. Cholecystectomy.    PANCREAS: Normal.    SPLEEN: Small hypodense splenic lesion is unchanged.    ADRENAL GLANDS: Normal.    KIDNEYS/BLADDER: Right nephrectomy.    BOWEL: Ileocolic resection. Mild wall thickening and mucosal enhancement involving the residual ascending colon and sigmoid colon. Fluid within the sigmoid colon. No evidence for obstruction.    LYMPH NODES: Normal.    VASCULATURE: Unremarkable.    PELVIC ORGANS: Hysterectomy. No free pelvic fluid.    MUSCULOSKELETAL: Advanced degenerative disc disease L5 level.      Impression    IMPRESSION:   1.  Ileocolic anastomosis. Wall thickening and mucosal enhancement involving the ascending and sigmoid colon suggesting an acute colitis. No evidence for obstruction.  2.  Fluid in the sigmoid colon well likely result in diarrhea.        Follow up/instructions: Patient can follow-up with her primary care provider for ongoing pain control    Pending test results at discharge:      Unresulted Labs Ordered in the Past 30 Days of this Admission       Date and Time Order Name Status Description    10/13/2023  9:09 AM Surgical Pathology Exam In process         These results will be  followed up by primary care provider/GI    Discharge Orders      Reason for your hospital stay    Abdominal pain, crohn's     Follow-up and recommended labs and tests     Follow up with primary care provider, Jeanette Betancourt, within 7 days for hospital follow- up.  No follow up labs or test are needed.Follow up pain     Activity    Your activity upon discharge: activity as tolerated     Diet    Follow this diet upon discharge: Orders Placed This Encounter      Snacks/Supplements Adult: Other; Chocolate Ensure + 4 oz milk at 10 am + chocolate Ensure w/ ice cream at dinner; With Meals      Advance Diet as Tolerated: Regular Diet Adult       Discharge Disposition   Discharged to home  Condition at discharge: Stable      Consultations This Hospital Stay   GASTROENTEROLOGY IP CONSULT    Code Status   Full Code    Time Spent on this Encounter   I, Familia Monteiro MD, personally saw the patient today and spent less than or equal to 30 minutes discharging this patient.  Carina with nursing, GI notes reviewed         This document was created using voice recognition technology.  Please excuse any typographical errors that may have occurred.  Please call with any questions.       Familia Monteiro MD  [facility] [place]  [address] [facility] [address]  Phone: [phone]  Fax: [phone]  ______________________________________________________________________    Physical Exam   Vital Signs: Temp: 98  F (36.7  C) Temp src: Oral BP: 130/85 Pulse: 82   Resp: 16 SpO2: 96 % O2 Device: None (Room air)    Weight: 152 lbs 12.46 oz    Exam seems to be slightly better from yesterday  GEN: Patient appears her stated age, sitting up in bed, does not appear appreciably ill, wants to go home but also wants to make sure she is not can come back  HEENT; membranes are moist   Lungs: equal chest expansion, clear to auscultation, no wheezes, no stridor, no crackles, good air movement  Heart: normal rate, normal  rhythm, no murmurs  Abdomen: Little hypoactive bowel sounds, some right-sided tenderness, no rebound or guarding  Extremities: no deformities, no edema moves all extremities  Neuro; follow commands, alert and oriented x3, spontaneous speech, coherent, moves all extremities spontaneously, no focal deficits         Discharge Medications   Current Discharge Medication List        START taking these medications    Details   HYDROcodone-acetaminophen (NORCO) 5-325 MG tablet Take 1-2 tablets by mouth every 6 hours as needed for severe pain (max acetaminophen (4 gram a day including tylenol))  Qty: 20 tablet, Refills: 0    Associated Diagnoses: Acute colitis; Crohn's disease with complication, unspecified gastrointestinal tract location (H); Abdominal pain, unspecified abdominal location      hydrOXYzine (ATARAX) 25 MG tablet Take 1 tablet (25 mg) by mouth every 6 hours as needed for other (adjuvant pain)  Qty: 20 tablet, Refills: 0    Associated Diagnoses: Acute colitis; Crohn's disease with complication, unspecified gastrointestinal tract location (H); Abdominal pain, unspecified abdominal location      predniSONE (DELTASONE) 10 MG tablet Take 6 tablets (60 mg) by mouth daily for 3 days, THEN 4 tablets (40 mg) daily for 3 days, THEN 2 tablets (20 mg) daily for 3 days, THEN 1 tablet (10 mg) daily for 4 days.  Qty: 40 tablet, Refills: 0    Associated Diagnoses: Acute colitis; Crohn's disease with complication, unspecified gastrointestinal tract location (H); Abdominal pain, unspecified abdominal location           CONTINUE these medications which have NOT CHANGED    Details   acetaminophen (TYLENOL) 500 MG tablet Take 1-2 tablets (500-1,000 mg) by mouth every 6 hours as needed for mild pain    Associated Diagnoses: RLQ abdominal pain      acetaminophen (TYLENOL) 650 MG CR tablet Take 650 mg by mouth At Bedtime      albuterol (PROAIR HFA/PROVENTIL HFA/VENTOLIN HFA) 108 (90 Base) MCG/ACT inhaler Inhale 2 puffs into the lungs  every 6 hours as needed for shortness of breath, wheezing or cough      bisacodyl (DULCOLAX) 10 MG suppository Place 1 suppository (10 mg) rectally 2 times daily as needed for constipation  Qty: 10 suppository, Refills: 1    Associated Diagnoses: RLQ abdominal pain; Partial small bowel obstruction (H)      cyanocobalamin (CYANOCOBALAMIN) 1000 MCG/ML injection Inject 1,000 mcg as directed every 30 days      diphenhydrAMINE (BENADRYL) 25 MG capsule Take 1 capsule (25 mg) by mouth every 6 hours as needed for itching or allergies  Qty: 30 capsule, Refills: 0      esomeprazole (NEXIUM) 40 MG DR capsule Take 40 mg by mouth 2 times daily (before meals) Take 30-60 minutes before eating.      famotidine (PEPCID) 40 MG tablet Take 40 mg by mouth At Bedtime      methocarbamol (ROBAXIN) 500 MG tablet Take 1 tablet (500 mg) by mouth 4 times daily as needed for muscle spasms  Qty: 15 tablet, Refills: 1    Associated Diagnoses: RLQ abdominal pain; Partial small bowel obstruction (H); IBD (inflammatory bowel disease); Nausea      montelukast (SINGULAIR) 10 MG tablet Take 10 mg by mouth At Bedtime      nortriptyline (PAMELOR) 25 MG capsule Take 25 mg by mouth At Bedtime      ondansetron (ZOFRAN ODT) 4 MG ODT tab Take 1 tablet (4 mg) by mouth every 8 hours as needed for nausea  Qty: 15 tablet, Refills: 2    Associated Diagnoses: IBD (inflammatory bowel disease); Nausea      traZODone (DESYREL) 100 MG tablet Take 200 mg by mouth At Bedtime           Allergies   Allergies   Allergen Reactions    Latex Hives    Oxycodone Anaphylaxis, Hives and Swelling     Throat swelling, per pt    Penicillin G Anaphylaxis    Sumatriptan Palpitations    Aspirin Nausea and Vomiting    Demerol Hcl [Meperidine] Nausea and Vomiting     N/V    Percocet [Oxycodone-Acetaminophen] Nausea and Vomiting     N/V    Codeine Nausea and Vomiting    Mold

## 2023-10-16 NOTE — PLAN OF CARE
Removed PIV. Patient received discharge meds. Reviewed AVS with patient. All questions/concerns were addressed. Provided wheelchair transport to 's waiting car.

## 2023-11-05 ENCOUNTER — HEALTH MAINTENANCE LETTER (OUTPATIENT)
Age: 63
End: 2023-11-05

## 2023-11-30 ENCOUNTER — HOSPITAL ENCOUNTER (EMERGENCY)
Facility: CLINIC | Age: 63
Discharge: HOME | End: 2023-11-30
Attending: EMERGENCY MEDICINE | Admitting: EMERGENCY MEDICINE
Payer: COMMERCIAL

## 2023-11-30 ENCOUNTER — APPOINTMENT (OUTPATIENT)
Dept: CT IMAGING | Facility: CLINIC | Age: 63
End: 2023-11-30
Attending: EMERGENCY MEDICINE
Payer: COMMERCIAL

## 2023-11-30 VITALS
TEMPERATURE: 97 F | OXYGEN SATURATION: 99 % | SYSTOLIC BLOOD PRESSURE: 142 MMHG | HEART RATE: 92 BPM | DIASTOLIC BLOOD PRESSURE: 92 MMHG | RESPIRATION RATE: 18 BRPM

## 2023-11-30 DIAGNOSIS — K50.10 CROHN'S DISEASE OF COLON WITHOUT COMPLICATION (H): ICD-10-CM

## 2023-11-30 LAB
ALBUMIN SERPL BCG-MCNC: 4.2 G/DL (ref 3.5–5.2)
ALBUMIN UR-MCNC: NEGATIVE MG/DL
ALP SERPL-CCNC: 95 U/L (ref 40–150)
ALT SERPL W P-5'-P-CCNC: 28 U/L (ref 0–50)
ANION GAP SERPL CALCULATED.3IONS-SCNC: 11 MMOL/L (ref 7–15)
APPEARANCE UR: CLEAR
AST SERPL W P-5'-P-CCNC: 28 U/L (ref 0–45)
BASOPHILS # BLD AUTO: 0 10E3/UL (ref 0–0.2)
BASOPHILS NFR BLD AUTO: 1 %
BILIRUB SERPL-MCNC: 0.4 MG/DL
BILIRUB UR QL STRIP: NEGATIVE
BUN SERPL-MCNC: 6.8 MG/DL (ref 8–23)
CALCIUM SERPL-MCNC: 8.9 MG/DL (ref 8.8–10.2)
CHLORIDE SERPL-SCNC: 105 MMOL/L (ref 98–107)
COLOR UR AUTO: ABNORMAL
CREAT SERPL-MCNC: 1.08 MG/DL (ref 0.51–0.95)
DEPRECATED HCO3 PLAS-SCNC: 23 MMOL/L (ref 22–29)
EGFRCR SERPLBLD CKD-EPI 2021: 57 ML/MIN/1.73M2
EOSINOPHIL # BLD AUTO: 0.1 10E3/UL (ref 0–0.7)
EOSINOPHIL NFR BLD AUTO: 2 %
ERYTHROCYTE [DISTWIDTH] IN BLOOD BY AUTOMATED COUNT: 14.6 % (ref 10–15)
GLUCOSE SERPL-MCNC: 116 MG/DL (ref 70–99)
GLUCOSE UR STRIP-MCNC: NEGATIVE MG/DL
HCT VFR BLD AUTO: 40.7 % (ref 35–47)
HGB BLD-MCNC: 13.2 G/DL (ref 11.7–15.7)
HGB UR QL STRIP: NEGATIVE
IMM GRANULOCYTES # BLD: 0 10E3/UL
IMM GRANULOCYTES NFR BLD: 1 %
KETONES UR STRIP-MCNC: NEGATIVE MG/DL
LEUKOCYTE ESTERASE UR QL STRIP: NEGATIVE
LYMPHOCYTES # BLD AUTO: 1.6 10E3/UL (ref 0.8–5.3)
LYMPHOCYTES NFR BLD AUTO: 35 %
MCH RBC QN AUTO: 27.6 PG (ref 26.5–33)
MCHC RBC AUTO-ENTMCNC: 32.4 G/DL (ref 31.5–36.5)
MCV RBC AUTO: 85 FL (ref 78–100)
MONOCYTES # BLD AUTO: 0.6 10E3/UL (ref 0–1.3)
MONOCYTES NFR BLD AUTO: 13 %
MUCOUS THREADS #/AREA URNS LPF: PRESENT /LPF
NEUTROPHILS # BLD AUTO: 2.3 10E3/UL (ref 1.6–8.3)
NEUTROPHILS NFR BLD AUTO: 48 %
NITRATE UR QL: NEGATIVE
NRBC # BLD AUTO: 0 10E3/UL
NRBC BLD AUTO-RTO: 0 /100
PH UR STRIP: 5 [PH] (ref 5–7)
PLATELET # BLD AUTO: 215 10E3/UL (ref 150–450)
POTASSIUM SERPL-SCNC: 4.3 MMOL/L (ref 3.4–5.3)
PROT SERPL-MCNC: 6.9 G/DL (ref 6.4–8.3)
RBC # BLD AUTO: 4.78 10E6/UL (ref 3.8–5.2)
RBC URINE: <1 /HPF
SODIUM SERPL-SCNC: 139 MMOL/L (ref 135–145)
SP GR UR STRIP: 1.01 (ref 1–1.03)
UROBILINOGEN UR STRIP-MCNC: NORMAL MG/DL
WBC # BLD AUTO: 4.5 10E3/UL (ref 4–11)
WBC URINE: <1 /HPF

## 2023-11-30 PROCEDURE — 36415 COLL VENOUS BLD VENIPUNCTURE: CPT | Performed by: STUDENT IN AN ORGANIZED HEALTH CARE EDUCATION/TRAINING PROGRAM

## 2023-11-30 PROCEDURE — 99285 EMERGENCY DEPT VISIT HI MDM: CPT | Mod: 25

## 2023-11-30 PROCEDURE — 81001 URINALYSIS AUTO W/SCOPE: CPT | Performed by: EMERGENCY MEDICINE

## 2023-11-30 PROCEDURE — 74177 CT ABD & PELVIS W/CONTRAST: CPT

## 2023-11-30 PROCEDURE — 85025 COMPLETE CBC W/AUTO DIFF WBC: CPT | Performed by: STUDENT IN AN ORGANIZED HEALTH CARE EDUCATION/TRAINING PROGRAM

## 2023-11-30 PROCEDURE — 250N000011 HC RX IP 250 OP 636: Performed by: EMERGENCY MEDICINE

## 2023-11-30 PROCEDURE — 96374 THER/PROPH/DIAG INJ IV PUSH: CPT | Mod: 59

## 2023-11-30 PROCEDURE — 85025 COMPLETE CBC W/AUTO DIFF WBC: CPT | Performed by: EMERGENCY MEDICINE

## 2023-11-30 PROCEDURE — 80053 COMPREHEN METABOLIC PANEL: CPT | Performed by: STUDENT IN AN ORGANIZED HEALTH CARE EDUCATION/TRAINING PROGRAM

## 2023-11-30 PROCEDURE — 250N000011 HC RX IP 250 OP 636: Mod: JZ | Performed by: EMERGENCY MEDICINE

## 2023-11-30 PROCEDURE — 80053 COMPREHEN METABOLIC PANEL: CPT | Performed by: EMERGENCY MEDICINE

## 2023-11-30 RX ORDER — ONDANSETRON 2 MG/ML
4 INJECTION INTRAMUSCULAR; INTRAVENOUS ONCE
Status: COMPLETED | OUTPATIENT
Start: 2023-11-30 | End: 2023-11-30

## 2023-11-30 RX ORDER — PREDNISONE 20 MG/1
40 TABLET ORAL DAILY
Qty: 10 TABLET | Refills: 0 | Status: SHIPPED | OUTPATIENT
Start: 2023-11-30 | End: 2023-12-05

## 2023-11-30 RX ORDER — HYDROMORPHONE HYDROCHLORIDE 1 MG/ML
0.5 INJECTION, SOLUTION INTRAMUSCULAR; INTRAVENOUS; SUBCUTANEOUS
Status: DISCONTINUED | OUTPATIENT
Start: 2023-11-30 | End: 2023-11-30 | Stop reason: HOSPADM

## 2023-11-30 RX ORDER — IOPAMIDOL 755 MG/ML
500 INJECTION, SOLUTION INTRAVASCULAR ONCE
Status: COMPLETED | OUTPATIENT
Start: 2023-11-30 | End: 2023-11-30

## 2023-11-30 RX ADMIN — IOPAMIDOL 78 ML: 755 INJECTION, SOLUTION INTRAVENOUS at 18:11

## 2023-11-30 RX ADMIN — ONDANSETRON 4 MG: 2 INJECTION INTRAMUSCULAR; INTRAVENOUS at 17:34

## 2023-11-30 ASSESSMENT — ACTIVITIES OF DAILY LIVING (ADL)
ADLS_ACUITY_SCORE: 35
ADLS_ACUITY_SCORE: 33

## 2023-11-30 NOTE — ED TRIAGE NOTES
Pt here for diarrhea, nausea, and vomiting. Hx of crohn's and usually flares shortly before her next Remicade infusion which is scheduled for next week.

## 2023-11-30 NOTE — ED PROVIDER NOTES
History     Chief Complaint:  Diarrhea     The history is provided by the patient.      Devi Salas is a 63 year old female with history of Crohn's disease who presents to the ED for evaluation of nausea, vomiting, and diarrhea. Devi reports diffuse lower abdominal pain that wraps around her right side to her back. She had a dark stool today that gradually lightened with 8 bowel movements today. She states she usually experiences a flare of her Crohn's shortly before her Remicade infusions. Her next infusion is scheduled for next week. She has received 4 doses so far. She has been taking Tylenol at home for these symptoms. She was recently on steroids during her admission 10/9-10/15 and was discharged home with them.     Independent Historian:   None - Patient Only    Review of External Notes:   Discharge summary from October of this year, admission for Crohn's flare required IV high-dose steroids.    Medications:    Bisacodyl  Albuterol   Cyanocobalamin  Diphenhydramine   Esomeprazole   Famotidine   Hydromorphone   Methocarbamol  Montelukast  Nortriptyline   Zofran   Senokot   Trazodone     Past Medical History:    Crohn's disease  Allergic rhinitis due to pollen  Headache  Grave's disease  PONV     Past Surgical History:    Laparoscopic resect ileocecal    Appendectomy   Cholecystectomy   Nephrectomy  Colonoscopy    Physical Exam   Patient Vitals for the past 24 hrs:   BP Temp Temp src Pulse Resp SpO2   11/30/23 1920 -- -- -- -- -- 99 %   11/30/23 1917 (!) 142/92 -- -- -- -- --   11/30/23 1420 126/88 97  F (36.1  C) Temporal 92 18 100 %      Physical Exam  Constitutional: Alert, attentive, GCS 15   Eyes: EOM are normal, anicteric, conjugate gaze  CV: distal extremities warm, well perfused  Chest: Non-labored breathing on RA  GI:  No distension. No guarding or rebound. Focal RLQ tenderness.  Neurological: Alert, attentive, moving all extremities equally.   Skin: Skin is warm and dry.     Emergency  Department Course     Imaging:  Abd/pelvis CT,  IV  contrast only TRAUMA / AAA   Final Result   IMPRESSION:    1.  Mild acute colitis of the rectum and residual colon, improved from prior.   2.  Ileocolic resection.   3.  Right nephrectomy.   4.  Hysterectomy.   5.  Cholecystectomy.               Report per radiology    Laboratory:  Labs Ordered and Resulted from Time of ED Arrival to Time of ED Departure   COMPREHENSIVE METABOLIC PANEL - Abnormal       Result Value    Sodium 139      Potassium 4.3      Carbon Dioxide (CO2) 23      Anion Gap 11      Urea Nitrogen 6.8 (*)     Creatinine 1.08 (*)     GFR Estimate 57 (*)     Calcium 8.9      Chloride 105      Glucose 116 (*)     Alkaline Phosphatase 95      AST 28      ALT 28      Protein Total 6.9      Albumin 4.2      Bilirubin Total 0.4     ROUTINE UA WITH MICROSCOPIC - Abnormal    Color Urine Light Yellow      Appearance Urine Clear      Glucose Urine Negative      Bilirubin Urine Negative      Ketones Urine Negative      Specific Gravity Urine 1.006      Blood Urine Negative      pH Urine 5.0      Protein Albumin Urine Negative      Urobilinogen Urine Normal      Nitrite Urine Negative      Leukocyte Esterase Urine Negative      Mucus Urine Present (*)     RBC Urine <1      WBC Urine <1     CBC WITH PLATELETS AND DIFFERENTIAL    WBC Count 4.5      RBC Count 4.78      Hemoglobin 13.2      Hematocrit 40.7      MCV 85      MCH 27.6      MCHC 32.4      RDW 14.6      Platelet Count 215      % Neutrophils 48      % Lymphocytes 35      % Monocytes 13      % Eosinophils 2      % Basophils 1      % Immature Granulocytes 1      NRBCs per 100 WBC 0      Absolute Neutrophils 2.3      Absolute Lymphocytes 1.6      Absolute Monocytes 0.6      Absolute Eosinophils 0.1      Absolute Basophils 0.0      Absolute Immature Granulocytes 0.0      Absolute NRBCs 0.0       Procedures   None    Emergency Department Course & Assessments:    Interventions:  Medications   HYDROmorphone (PF)  (DILAUDID) injection 0.5 mg (has no administration in time range)   ondansetron (ZOFRAN) injection 4 mg (4 mg Intravenous $Given 23 1734)   sodium chloride (PF) 0.9% PF flush 100 mL (59 mLs Intravenous $Given 23)   iopamidol (ISOVUE-370) solution 500 mL (78 mLs Intravenous $Given 23)     Assessments:   I obtained history and examined the patient as noted above.    I rechecked the patient and explained findings. Patient discharged home with instructions regarding supportive care, medications, and reasons to return. The importance of close follow-up was reviewed.     Independent Interpretation (X-rays, CTs, rhythm strip):  None    Consultations/Discussion of Management or Tests:   I spoke with Dr. Carmichael of [facility] regarding the patient's history and presentation in the emergency department today.      Social Determinants of Health affecting care:   None    Disposition:  The patient was discharged to home.     Impression & Plan    CMS Diagnoses: None    Medical Decision Makin-year-old woman past medical history seen for Crohn's disease status post ileocolic anastomosis on Remicade presenting for worsening lower abdominal pain and diarrhea which she reports is typical to happen several days prior to her next Remicade infusion which is due next week.  Due to her degree of tenderness, I did proceed with CT imaging which shows mild colitis, she does not have any overt infectious symptoms, I did review her presentation with on-call GI provider given her previous admission for high-dose of steroids, he felt this time she can be managed as an outpatient with oral prednisone, 40 mg she was given enough for through the weekend, Farnaz will plan to reach out to her to arrange follow-up in clinic and will plan to either increase her Remicade dose or move up her frequency.  Precautions reviewed and she was discharged.    Diagnosis:    ICD-10-CM    1. Crohn's disease of colon without  complication (H)  K50.10           Discharge Medications:  Discharge Medication List as of 11/30/2023  7:47 PM        START taking these medications    Details   predniSONE (DELTASONE) 20 MG tablet Take 2 tablets (40 mg) by mouth daily for 5 days, Disp-10 tablet, R-0, Local Print            Blu Beatty MD  [employer]  12:15 AM 12/01/23     Scribe Disclosure:  SINDY, Sari Edmonds, am serving as a scribe at 4:20 PM on 11/30/2023 to document services personally performed by Blu Beatty MD based on my observations and the provider's statements to me.   11/30/2023   Blu Beatty MD Dunbar, John Forrest, MD  12/01/23 0015

## 2023-12-01 NOTE — DISCHARGE INSTRUCTIONS
We will call you to try to arrange close follow-up for you early next week.  You should take 40 mg of prednisone to help treat your flare, you should return here if she develop worsening pain, high fever or significant bloody stool.

## 2024-01-15 ENCOUNTER — APPOINTMENT (OUTPATIENT)
Dept: CT IMAGING | Facility: CLINIC | Age: 64
End: 2024-01-15
Attending: EMERGENCY MEDICINE
Payer: COMMERCIAL

## 2024-01-15 ENCOUNTER — HOSPITAL ENCOUNTER (OUTPATIENT)
Facility: CLINIC | Age: 64
Setting detail: OBSERVATION
Discharge: HOME | End: 2024-01-18
Attending: EMERGENCY MEDICINE | Admitting: HOSPITALIST
Payer: COMMERCIAL

## 2024-01-15 DIAGNOSIS — K50.919 CROHN'S DISEASE WITH COMPLICATION, UNSPECIFIED GASTROINTESTINAL TRACT LOCATION (H): ICD-10-CM

## 2024-01-15 DIAGNOSIS — K52.9 ACUTE COLITIS: ICD-10-CM

## 2024-01-15 DIAGNOSIS — R10.9 INTRACTABLE ABDOMINAL PAIN: Primary | ICD-10-CM

## 2024-01-15 DIAGNOSIS — R10.9 ABDOMINAL PAIN, UNSPECIFIED ABDOMINAL LOCATION: ICD-10-CM

## 2024-01-15 DIAGNOSIS — Z87.19 HISTORY OF CROHN'S DISEASE: ICD-10-CM

## 2024-01-15 DIAGNOSIS — R19.7 DIARRHEA, UNSPECIFIED TYPE: ICD-10-CM

## 2024-01-15 DIAGNOSIS — R10.30 INTRACTABLE LOWER ABDOMINAL PAIN: ICD-10-CM

## 2024-01-15 LAB
ALBUMIN SERPL BCG-MCNC: 4.5 G/DL (ref 3.5–5.2)
ALBUMIN UR-MCNC: NEGATIVE MG/DL
ALP SERPL-CCNC: 71 U/L (ref 40–150)
ALT SERPL W P-5'-P-CCNC: 15 U/L (ref 0–50)
ANION GAP SERPL CALCULATED.3IONS-SCNC: 11 MMOL/L (ref 7–15)
APPEARANCE UR: CLEAR
AST SERPL W P-5'-P-CCNC: 15 U/L (ref 0–45)
BACTERIA #/AREA URNS HPF: ABNORMAL /HPF
BASOPHILS # BLD AUTO: 0 10E3/UL (ref 0–0.2)
BASOPHILS NFR BLD AUTO: 1 %
BILIRUB SERPL-MCNC: 0.7 MG/DL
BILIRUB UR QL STRIP: NEGATIVE
BUN SERPL-MCNC: 7.8 MG/DL (ref 8–23)
CALCIUM SERPL-MCNC: 9 MG/DL (ref 8.8–10.2)
CHLORIDE SERPL-SCNC: 104 MMOL/L (ref 98–107)
COLOR UR AUTO: ABNORMAL
CREAT SERPL-MCNC: 1.03 MG/DL (ref 0.51–0.95)
CRP SERPL-MCNC: <3 MG/L
DEPRECATED HCO3 PLAS-SCNC: 25 MMOL/L (ref 22–29)
EGFRCR SERPLBLD CKD-EPI 2021: 60 ML/MIN/1.73M2
EOSINOPHIL # BLD AUTO: 0.1 10E3/UL (ref 0–0.7)
EOSINOPHIL NFR BLD AUTO: 1 %
ERYTHROCYTE [DISTWIDTH] IN BLOOD BY AUTOMATED COUNT: 14.5 % (ref 10–15)
ERYTHROCYTE [SEDIMENTATION RATE] IN BLOOD BY WESTERGREN METHOD: 6 MM/HR (ref 0–30)
GLUCOSE SERPL-MCNC: 100 MG/DL (ref 70–99)
GLUCOSE UR STRIP-MCNC: NEGATIVE MG/DL
HCT VFR BLD AUTO: 40.7 % (ref 35–47)
HGB BLD-MCNC: 13.4 G/DL (ref 11.7–15.7)
HGB UR QL STRIP: NEGATIVE
IMM GRANULOCYTES # BLD: 0.1 10E3/UL
IMM GRANULOCYTES NFR BLD: 1 %
KETONES UR STRIP-MCNC: NEGATIVE MG/DL
LACTATE SERPL-SCNC: 1.5 MMOL/L (ref 0.7–2)
LEUKOCYTE ESTERASE UR QL STRIP: NEGATIVE
LYMPHOCYTES # BLD AUTO: 1.1 10E3/UL (ref 0.8–5.3)
LYMPHOCYTES NFR BLD AUTO: 16 %
MCH RBC QN AUTO: 30.2 PG (ref 26.5–33)
MCHC RBC AUTO-ENTMCNC: 32.9 G/DL (ref 31.5–36.5)
MCV RBC AUTO: 92 FL (ref 78–100)
MONOCYTES # BLD AUTO: 0.5 10E3/UL (ref 0–1.3)
MONOCYTES NFR BLD AUTO: 7 %
NEUTROPHILS # BLD AUTO: 5.3 10E3/UL (ref 1.6–8.3)
NEUTROPHILS NFR BLD AUTO: 74 %
NITRATE UR QL: NEGATIVE
NRBC # BLD AUTO: 0 10E3/UL
NRBC BLD AUTO-RTO: 0 /100
PH UR STRIP: 5 [PH] (ref 5–7)
PLATELET # BLD AUTO: 223 10E3/UL (ref 150–450)
POTASSIUM SERPL-SCNC: 4.2 MMOL/L (ref 3.4–5.3)
PROT SERPL-MCNC: 7 G/DL (ref 6.4–8.3)
RBC # BLD AUTO: 4.44 10E6/UL (ref 3.8–5.2)
RBC URINE: <1 /HPF
SODIUM SERPL-SCNC: 140 MMOL/L (ref 135–145)
SP GR UR STRIP: 1 (ref 1–1.03)
UROBILINOGEN UR STRIP-MCNC: NORMAL MG/DL
WBC # BLD AUTO: 7 10E3/UL (ref 4–11)
WBC URINE: <1 /HPF

## 2024-01-15 PROCEDURE — 250N000011 HC RX IP 250 OP 636: Performed by: EMERGENCY MEDICINE

## 2024-01-15 PROCEDURE — 96376 TX/PRO/DX INJ SAME DRUG ADON: CPT

## 2024-01-15 PROCEDURE — 99222 1ST HOSP IP/OBS MODERATE 55: CPT | Mod: AI | Performed by: HOSPITALIST

## 2024-01-15 PROCEDURE — 74177 CT ABD & PELVIS W/CONTRAST: CPT

## 2024-01-15 PROCEDURE — 99285 EMERGENCY DEPT VISIT HI MDM: CPT | Mod: 25

## 2024-01-15 PROCEDURE — 83605 ASSAY OF LACTIC ACID: CPT | Performed by: EMERGENCY MEDICINE

## 2024-01-15 PROCEDURE — 80053 COMPREHEN METABOLIC PANEL: CPT | Performed by: EMERGENCY MEDICINE

## 2024-01-15 PROCEDURE — 96374 THER/PROPH/DIAG INJ IV PUSH: CPT | Mod: 59

## 2024-01-15 PROCEDURE — 96375 TX/PRO/DX INJ NEW DRUG ADDON: CPT

## 2024-01-15 PROCEDURE — G0378 HOSPITAL OBSERVATION PER HR: HCPCS

## 2024-01-15 PROCEDURE — 86140 C-REACTIVE PROTEIN: CPT | Performed by: EMERGENCY MEDICINE

## 2024-01-15 PROCEDURE — 96361 HYDRATE IV INFUSION ADD-ON: CPT

## 2024-01-15 PROCEDURE — 258N000003 HC RX IP 258 OP 636: Performed by: EMERGENCY MEDICINE

## 2024-01-15 PROCEDURE — 250N000009 HC RX 250: Performed by: EMERGENCY MEDICINE

## 2024-01-15 PROCEDURE — 36415 COLL VENOUS BLD VENIPUNCTURE: CPT | Performed by: EMERGENCY MEDICINE

## 2024-01-15 PROCEDURE — 85025 COMPLETE CBC W/AUTO DIFF WBC: CPT | Performed by: EMERGENCY MEDICINE

## 2024-01-15 PROCEDURE — 85652 RBC SED RATE AUTOMATED: CPT | Performed by: EMERGENCY MEDICINE

## 2024-01-15 PROCEDURE — 81001 URINALYSIS AUTO W/SCOPE: CPT | Performed by: EMERGENCY MEDICINE

## 2024-01-15 RX ORDER — AMOXICILLIN 250 MG
1 CAPSULE ORAL 2 TIMES DAILY PRN
Status: DISCONTINUED | OUTPATIENT
Start: 2024-01-15 | End: 2024-01-18 | Stop reason: HOSPADM

## 2024-01-15 RX ORDER — METHYLPREDNISOLONE SODIUM SUCCINATE 125 MG/2ML
60 INJECTION, POWDER, LYOPHILIZED, FOR SOLUTION INTRAMUSCULAR; INTRAVENOUS DAILY
Status: DISCONTINUED | OUTPATIENT
Start: 2024-01-15 | End: 2024-01-17

## 2024-01-15 RX ORDER — PANTOPRAZOLE SODIUM 40 MG/1
40 TABLET, DELAYED RELEASE ORAL
Status: DISCONTINUED | OUTPATIENT
Start: 2024-01-16 | End: 2024-01-18 | Stop reason: HOSPADM

## 2024-01-15 RX ORDER — IOPAMIDOL 755 MG/ML
500 INJECTION, SOLUTION INTRAVASCULAR ONCE
Status: COMPLETED | OUTPATIENT
Start: 2024-01-15 | End: 2024-01-15

## 2024-01-15 RX ORDER — ONDANSETRON 2 MG/ML
4 INJECTION INTRAMUSCULAR; INTRAVENOUS EVERY 6 HOURS PRN
Status: DISCONTINUED | OUTPATIENT
Start: 2024-01-15 | End: 2024-01-18 | Stop reason: HOSPADM

## 2024-01-15 RX ORDER — ONDANSETRON 4 MG/1
4 TABLET, ORALLY DISINTEGRATING ORAL EVERY 6 HOURS PRN
Status: DISCONTINUED | OUTPATIENT
Start: 2024-01-15 | End: 2024-01-18 | Stop reason: HOSPADM

## 2024-01-15 RX ORDER — ALBUTEROL SULFATE 90 UG/1
2 AEROSOL, METERED RESPIRATORY (INHALATION) EVERY 6 HOURS PRN
Status: DISCONTINUED | OUTPATIENT
Start: 2024-01-15 | End: 2024-01-18 | Stop reason: HOSPADM

## 2024-01-15 RX ORDER — SODIUM CHLORIDE 9 MG/ML
INJECTION, SOLUTION INTRAVENOUS CONTINUOUS
Status: DISCONTINUED | OUTPATIENT
Start: 2024-01-15 | End: 2024-01-17

## 2024-01-15 RX ORDER — MONTELUKAST SODIUM 10 MG/1
10 TABLET ORAL AT BEDTIME
Status: DISCONTINUED | OUTPATIENT
Start: 2024-01-16 | End: 2024-01-18 | Stop reason: HOSPADM

## 2024-01-15 RX ORDER — HYDROMORPHONE HYDROCHLORIDE 1 MG/ML
0.5 INJECTION, SOLUTION INTRAMUSCULAR; INTRAVENOUS; SUBCUTANEOUS
Status: COMPLETED | OUTPATIENT
Start: 2024-01-15 | End: 2024-01-15

## 2024-01-15 RX ORDER — HYDROMORPHONE HCL IN WATER/PF 6 MG/30 ML
0.2 PATIENT CONTROLLED ANALGESIA SYRINGE INTRAVENOUS
Status: DISCONTINUED | OUTPATIENT
Start: 2024-01-15 | End: 2024-01-18 | Stop reason: HOSPADM

## 2024-01-15 RX ORDER — NORTRIPTYLINE HCL 25 MG
25 CAPSULE ORAL AT BEDTIME
Status: DISCONTINUED | OUTPATIENT
Start: 2024-01-16 | End: 2024-01-18 | Stop reason: HOSPADM

## 2024-01-15 RX ORDER — HYDROMORPHONE HCL IN WATER/PF 6 MG/30 ML
0.4 PATIENT CONTROLLED ANALGESIA SYRINGE INTRAVENOUS
Status: DISCONTINUED | OUTPATIENT
Start: 2024-01-15 | End: 2024-01-18 | Stop reason: HOSPADM

## 2024-01-15 RX ORDER — ACETAMINOPHEN 325 MG/1
650 TABLET ORAL AT BEDTIME
Status: DISCONTINUED | OUTPATIENT
Start: 2024-01-16 | End: 2024-01-18 | Stop reason: HOSPADM

## 2024-01-15 RX ORDER — ONDANSETRON 2 MG/ML
4 INJECTION INTRAMUSCULAR; INTRAVENOUS
Status: DISCONTINUED | OUTPATIENT
Start: 2024-01-15 | End: 2024-01-18

## 2024-01-15 RX ORDER — AMOXICILLIN 250 MG
2 CAPSULE ORAL 2 TIMES DAILY PRN
Status: DISCONTINUED | OUTPATIENT
Start: 2024-01-15 | End: 2024-01-18 | Stop reason: HOSPADM

## 2024-01-15 RX ORDER — TRAZODONE HYDROCHLORIDE 50 MG/1
200 TABLET, FILM COATED ORAL AT BEDTIME
Status: DISCONTINUED | OUTPATIENT
Start: 2024-01-16 | End: 2024-01-18 | Stop reason: HOSPADM

## 2024-01-15 RX ORDER — HYDROMORPHONE HYDROCHLORIDE 2 MG/1
2 TABLET ORAL EVERY 4 HOURS PRN
Status: DISCONTINUED | OUTPATIENT
Start: 2024-01-15 | End: 2024-01-18 | Stop reason: HOSPADM

## 2024-01-15 RX ORDER — HYDROXYZINE HYDROCHLORIDE 25 MG/1
25 TABLET, FILM COATED ORAL EVERY 6 HOURS PRN
Status: DISCONTINUED | OUTPATIENT
Start: 2024-01-15 | End: 2024-01-18 | Stop reason: HOSPADM

## 2024-01-15 RX ORDER — DICYCLOMINE HYDROCHLORIDE 10 MG/1
10 CAPSULE ORAL
Status: ON HOLD | COMMUNITY
End: 2024-01-18

## 2024-01-15 RX ADMIN — HYDROMORPHONE HYDROCHLORIDE 0.5 MG: 1 INJECTION, SOLUTION INTRAMUSCULAR; INTRAVENOUS; SUBCUTANEOUS at 19:25

## 2024-01-15 RX ADMIN — SODIUM CHLORIDE 59 ML: 9 INJECTION, SOLUTION INTRAVENOUS at 19:11

## 2024-01-15 RX ADMIN — HYDROMORPHONE HYDROCHLORIDE 0.5 MG: 1 INJECTION, SOLUTION INTRAMUSCULAR; INTRAVENOUS; SUBCUTANEOUS at 18:23

## 2024-01-15 RX ADMIN — HYDROMORPHONE HYDROCHLORIDE 0.5 MG: 1 INJECTION, SOLUTION INTRAMUSCULAR; INTRAVENOUS; SUBCUTANEOUS at 21:22

## 2024-01-15 RX ADMIN — IOPAMIDOL 76 ML: 755 INJECTION, SOLUTION INTRAVENOUS at 19:11

## 2024-01-15 RX ADMIN — ONDANSETRON 4 MG: 2 INJECTION INTRAMUSCULAR; INTRAVENOUS at 18:23

## 2024-01-15 RX ADMIN — SODIUM CHLORIDE 1000 ML: 9 INJECTION, SOLUTION INTRAVENOUS at 21:21

## 2024-01-15 RX ADMIN — SODIUM CHLORIDE 1000 ML: 9 INJECTION, SOLUTION INTRAVENOUS at 18:22

## 2024-01-15 ASSESSMENT — ACTIVITIES OF DAILY LIVING (ADL)
ADLS_ACUITY_SCORE: 35

## 2024-01-15 NOTE — ED PROVIDER NOTES
History     Chief Complaint:  Abdominal Pain    HPI   Devi Salas is a 63 year old female with the history of Crohn's disease who presents to the emergency room with abdominal pain. The patient states that she has had a pain in the right lower quadrant of her abdomen that radiates to her back for the past week. She reports that her pain is constant. She has also had a lot of diarrhea and reports having to use the restroom 11 times today. She has had chills and hot flashes. She reports that this pain feels like her Crohns disease flare in March. She denies being constipated, recorded fevers, issues urinating, bloody stools, and vomiting. Denies recent travel out of country, new foods, and recent courses of antibiotics. She did take Aleve which did help slightly with the pain. She did also take Tylenol every 6 hours but it did not help with pain. She started taking dicyclomine on the 8th of this month. She does receive Remicade infusions. Her last one was in December and her next on is 2/1/23. She has tried to take medications for diarrhea but they have left her constipated for days.     Independent Historian:    None.    Review of External Notes:  None      Medications:    Albuterol inhaler   Cyanocobalamin  Nexium  Pepcid  Robaxin  Pamelor  Trazodone   Remicade    Past Medical History:    Crohn's disease  Graves' disease    Past Surgical History:    Appendectomy   Cholecystectomy   Colonoscopy   Ileocecal resection  Nephrectomy    Physical Exam   Patient Vitals for the past 24 hrs:   BP Temp Pulse Resp SpO2   01/15/24 2200 123/76 -- 75 -- 93 %   01/15/24 2102 137/84 -- 76 -- 98 %   01/15/24 2000 128/73 -- 78 -- 98 %   01/15/24 1930 122/80 -- -- -- 99 %   01/15/24 1902 -- -- -- -- 98 %   01/15/24 1901 -- -- -- -- 99 %   01/15/24 1900 (!) 126/96 -- 75 -- 98 %   01/15/24 1859 -- -- -- -- 99 %   01/15/24 1858 -- -- -- -- 98 %   01/15/24 1857 -- -- -- -- 99 %   01/15/24 1855 -- -- -- -- 99 %   01/15/24 1854 -- --  -- -- 99 %   01/15/24 1853 -- -- -- -- 100 %   01/15/24 1852 -- -- -- -- 99 %   01/15/24 1851 -- -- -- -- 99 %   01/15/24 1850 -- -- -- -- 99 %   01/15/24 1849 -- -- -- -- 100 %   01/15/24 1848 -- -- -- -- 98 %   01/15/24 1847 -- -- -- -- 98 %   01/15/24 1846 -- -- -- -- 98 %   01/15/24 1845 -- -- -- -- 98 %   01/15/24 1843 -- -- -- -- 98 %   01/15/24 1842 -- -- -- -- 98 %   01/15/24 1831 -- -- -- -- 96 %   01/15/24 1830 -- -- -- -- 96 %   01/15/24 1828 -- -- -- -- 97 %   01/15/24 1827 -- -- -- -- 97 %   01/15/24 1826 (!) 138/92 -- 91 -- 98 %   01/15/24 1825 -- -- -- -- 96 %   01/15/24 1654 (!) 134/91 98.9  F (37.2  C) 77 20 100 %        Physical Exam  General: Alert, no acute distress  HEENT:  Moist mucous membranes.  Conjunctiva normal.   CV:  Skin warm and well perfused  Pulm:  No acute distress, breathing comfortably  GI:  Soft, right side/lower abdominal tenderness, nondistended.  No guarding.  + rebound tenderness  MSK:  Moving all extremities.  No focal areas of edema, erythema  Skin:  WWP, no rashes, skin color normal, no diaphoresis  Psych:  Well-appearing, normal affect, regular speech    Emergency Department Course   Imaging:  Abd/pelvis CT,  IV  contrast only TRAUMA / AAA   Final Result   IMPRESSION:    1.  No acute findings in the abdomen or pelvis.        Report per radiology    Laboratory:  Labs Ordered and Resulted from Time of ED Arrival to Time of ED Departure   COMPREHENSIVE METABOLIC PANEL - Abnormal       Result Value    Sodium 140      Potassium 4.2      Carbon Dioxide (CO2) 25      Anion Gap 11      Urea Nitrogen 7.8 (*)     Creatinine 1.03 (*)     GFR Estimate 60 (*)     Calcium 9.0      Chloride 104      Glucose 100 (*)     Alkaline Phosphatase 71      AST 15      ALT 15      Protein Total 7.0      Albumin 4.5      Bilirubin Total 0.7     ROUTINE UA WITH MICROSCOPIC REFLEX TO CULTURE - Abnormal    Color Urine Straw      Appearance Urine Clear      Glucose Urine Negative      Bilirubin Urine  Negative      Ketones Urine Negative      Specific Gravity Urine 1.005      Blood Urine Negative      pH Urine 5.0      Protein Albumin Urine Negative      Urobilinogen Urine Normal      Nitrite Urine Negative      Leukocyte Esterase Urine Negative      Bacteria Urine Few (*)     RBC Urine <1      WBC Urine <1     LACTIC ACID WHOLE BLOOD - Normal    Lactic Acid 1.5     CRP INFLAMMATION - Normal    CRP Inflammation <3.00     ERYTHROCYTE SEDIMENTATION RATE AUTO - Normal    Erythrocyte Sedimentation Rate 6     CBC WITH PLATELETS AND DIFFERENTIAL    WBC Count 7.0      RBC Count 4.44      Hemoglobin 13.4      Hematocrit 40.7      MCV 92      MCH 30.2      MCHC 32.9      RDW 14.5      Platelet Count 223      % Neutrophils 74      % Lymphocytes 16      % Monocytes 7      % Eosinophils 1      % Basophils 1      % Immature Granulocytes 1      NRBCs per 100 WBC 0      Absolute Neutrophils 5.3      Absolute Lymphocytes 1.1      Absolute Monocytes 0.5      Absolute Eosinophils 0.1      Absolute Basophils 0.0      Absolute Immature Granulocytes 0.1      Absolute NRBCs 0.0        Emergency Department Course & Assessments:       Interventions:  Medications   ondansetron (ZOFRAN) injection 4 mg (4 mg Intravenous $Given 1/15/24 1883)   hydrOXYzine HCl (ATARAX) tablet 25 mg (has no administration in time range)   montelukast (SINGULAIR) tablet 10 mg (has no administration in time range)   nortriptyline (PAMELOR) capsule 25 mg (has no administration in time range)   traZODone (DESYREL) tablet 200 mg (has no administration in time range)   pantoprazole (PROTONIX) EC tablet 40 mg (has no administration in time range)   albuterol (PROVENTIL HFA/VENTOLIN HFA) inhaler (has no administration in time range)   acetaminophen (TYLENOL) CR tablet 650 mg (has no administration in time range)   senna-docusate (SENOKOT-S/PERICOLACE) 8.6-50 MG per tablet 1 tablet (has no administration in time range)     Or   senna-docusate (SENOKOT-S/PERICOLACE)  8.6-50 MG per tablet 2 tablet (has no administration in time range)   ondansetron (ZOFRAN ODT) ODT tab 4 mg (has no administration in time range)     Or   ondansetron (ZOFRAN) injection 4 mg (has no administration in time range)   sodium chloride 0.9 % infusion (has no administration in time range)   HYDROmorphone (DILAUDID) half-tab 1 mg (has no administration in time range)   HYDROmorphone (DILAUDID) tablet 2 mg (has no administration in time range)   HYDROmorphone (DILAUDID) injection 0.2 mg (has no administration in time range)   HYDROmorphone (DILAUDID) injection 0.4 mg (has no administration in time range)   methylPREDNISolone sodium succinate (solu-MEDROL) injection 62.5 mg (has no administration in time range)   sodium chloride 0.9% BOLUS 1,000 mL (0 mLs Intravenous Stopped 1/15/24 2112)   HYDROmorphone (PF) (DILAUDID) injection 0.5 mg (0.5 mg Intravenous $Given 1/15/24 2122)   iopamidol (ISOVUE-370) solution 500 mL (76 mLs Intravenous $Given 1/15/24 1911)   CT scan flush (59 mLs Intravenous $Given 1/15/24 1911)   sodium chloride 0.9% BOLUS 1,000 mL (1,000 mLs Intravenous $New Bag 1/15/24 2121)      Assessments:   I obtained the history and examined the patient as noted above.     Independent Interpretation (X-rays, CTs, rhythm strip):  None    Consultations/Discussion of Management or Tests:  ED Course as of 01/15/24 2358   Mon Josue 15, 2024   2048 I rechecked and updated the patient. She still has 7/10 pain. We will admit the patient.    I consulted with Dr. Madera, of the hospitalist team, regarding the patient. They accepted the patient for admission.        Social Determinants of Health affecting care:  None     Disposition:  The patient was admitted to the hospital under the care of Dr. Madera.     Impression & Plan    Medical Decision Makin-year-old female with history of Crohn's disease on Remicade presents emergency department valuation of 1 week of lower abdominal pain with diarrhea.   Please above details of HPI and exam.  She is afebrile vitally stable.  She has lower abdominal tenderness on exam with notable rebound tenderness.  I did consider broad differential including Crohn's flare, gastroenteritis, nonspecific colitis, mesenteric ischemia, amongst other things.  UA is normal.  The rest of her basic lab studies including her inflammatory markers are normal.  CT was obtained without any evidence of any acute findings.  Unfortunately, patient continues to have intractable abdominal pain on reassessment despite the interventions provided above.  Given her continued pain and IBD history, I feel hospital observation may be of benefit would likely consultation to GI.  Patient is comfortable with this plan.  Discussed case with the medicine service who accepted the patient for continued monitoring and care.    Diagnosis:    ICD-10-CM    1. Intractable lower abdominal pain  R10.30       2. Diarrhea, unspecified type  R19.7       3. History of Crohn's disease  Z87.19          Scribe Disclosure:  Jacqueline CALLAHAN, am serving as a scribe  for George Allen at 7:41 PM on 1/15/2024 to document services personally performed by Gianni Hays MD based on my observations and the provider's statements to me.    Scribe Disclosure:  Deborah CALLAHAN, am serving as a scribe at 8:04 PM on 1/15/2024 to document services personally performed by Gainni Hays MD based on my observations and the provider's statements to me.  1/15/2024   Gianni Hays MD [place], Edgar Ronald, MD  01/15/24 9233

## 2024-01-15 NOTE — ED TRIAGE NOTES
Pt referred from  for a Chron's flair up. States she is having 9/10 pain and frequent diarrhea. ABCs intact.     BP (!) 134/91   Pulse 77   Temp 98.9  F (37.2  C)   Resp 20   SpO2 100%        Triage Assessment (Adult)       Row Name 01/15/24 4328          Triage Assessment    Airway WDL WDL        Respiratory WDL    Respiratory WDL WDL        Cardiac WDL    Cardiac WDL WDL        Cognitive/Neuro/Behavioral WDL    Cognitive/Neuro/Behavioral WDL WDL

## 2024-01-16 ENCOUNTER — APPOINTMENT (OUTPATIENT)
Dept: MRI IMAGING | Facility: CLINIC | Age: 64
End: 2024-01-16
Attending: INTERNAL MEDICINE
Payer: COMMERCIAL

## 2024-01-16 LAB
ALBUMIN SERPL BCG-MCNC: 3.9 G/DL (ref 3.5–5.2)
ALP SERPL-CCNC: 64 U/L (ref 40–150)
ALT SERPL W P-5'-P-CCNC: 11 U/L (ref 0–50)
ANION GAP SERPL CALCULATED.3IONS-SCNC: 8 MMOL/L (ref 7–15)
AST SERPL W P-5'-P-CCNC: 16 U/L (ref 0–45)
BILIRUB SERPL-MCNC: 0.6 MG/DL
BUN SERPL-MCNC: 7.1 MG/DL (ref 8–23)
CALCIUM SERPL-MCNC: 8.6 MG/DL (ref 8.8–10.2)
CHLORIDE SERPL-SCNC: 107 MMOL/L (ref 98–107)
CREAT SERPL-MCNC: 0.85 MG/DL (ref 0.51–0.95)
DEPRECATED HCO3 PLAS-SCNC: 24 MMOL/L (ref 22–29)
EGFRCR SERPLBLD CKD-EPI 2021: 76 ML/MIN/1.73M2
ERYTHROCYTE [DISTWIDTH] IN BLOOD BY AUTOMATED COUNT: 14.3 % (ref 10–15)
GLUCOSE SERPL-MCNC: 133 MG/DL (ref 70–99)
HCT VFR BLD AUTO: 38.4 % (ref 35–47)
HGB BLD-MCNC: 12.6 G/DL (ref 11.7–15.7)
MCH RBC QN AUTO: 29.7 PG (ref 26.5–33)
MCHC RBC AUTO-ENTMCNC: 32.8 G/DL (ref 31.5–36.5)
MCV RBC AUTO: 91 FL (ref 78–100)
PLATELET # BLD AUTO: 191 10E3/UL (ref 150–450)
POTASSIUM SERPL-SCNC: 4.5 MMOL/L (ref 3.4–5.3)
PROT SERPL-MCNC: 6 G/DL (ref 6.4–8.3)
RBC # BLD AUTO: 4.24 10E6/UL (ref 3.8–5.2)
SODIUM SERPL-SCNC: 139 MMOL/L (ref 135–145)
WBC # BLD AUTO: 6 10E3/UL (ref 4–11)

## 2024-01-16 PROCEDURE — G0378 HOSPITAL OBSERVATION PER HR: HCPCS

## 2024-01-16 PROCEDURE — 36415 COLL VENOUS BLD VENIPUNCTURE: CPT | Performed by: HOSPITALIST

## 2024-01-16 PROCEDURE — 250N000013 HC RX MED GY IP 250 OP 250 PS 637: Performed by: INTERNAL MEDICINE

## 2024-01-16 PROCEDURE — 250N000011 HC RX IP 250 OP 636: Performed by: STUDENT IN AN ORGANIZED HEALTH CARE EDUCATION/TRAINING PROGRAM

## 2024-01-16 PROCEDURE — 258N000003 HC RX IP 258 OP 636: Performed by: HOSPITALIST

## 2024-01-16 PROCEDURE — 74183 MRI ABD W/O CNTR FLWD CNTR: CPT

## 2024-01-16 PROCEDURE — 250N000011 HC RX IP 250 OP 636: Performed by: INTERNAL MEDICINE

## 2024-01-16 PROCEDURE — 80053 COMPREHEN METABOLIC PANEL: CPT | Performed by: HOSPITALIST

## 2024-01-16 PROCEDURE — 255N000002 HC RX 255 OP 636: Performed by: INTERNAL MEDICINE

## 2024-01-16 PROCEDURE — 250N000013 HC RX MED GY IP 250 OP 250 PS 637: Performed by: STUDENT IN AN ORGANIZED HEALTH CARE EDUCATION/TRAINING PROGRAM

## 2024-01-16 PROCEDURE — 85027 COMPLETE CBC AUTOMATED: CPT | Performed by: HOSPITALIST

## 2024-01-16 PROCEDURE — 250N000011 HC RX IP 250 OP 636: Performed by: HOSPITALIST

## 2024-01-16 PROCEDURE — A9585 GADOBUTROL INJECTION: HCPCS | Performed by: INTERNAL MEDICINE

## 2024-01-16 PROCEDURE — 250N000013 HC RX MED GY IP 250 OP 250 PS 637: Performed by: HOSPITALIST

## 2024-01-16 PROCEDURE — 96375 TX/PRO/DX INJ NEW DRUG ADDON: CPT

## 2024-01-16 PROCEDURE — 96376 TX/PRO/DX INJ SAME DRUG ADON: CPT | Mod: XU

## 2024-01-16 PROCEDURE — 96361 HYDRATE IV INFUSION ADD-ON: CPT

## 2024-01-16 PROCEDURE — 99232 SBSQ HOSP IP/OBS MODERATE 35: CPT | Performed by: STUDENT IN AN ORGANIZED HEALTH CARE EDUCATION/TRAINING PROGRAM

## 2024-01-16 RX ORDER — PROCHLORPERAZINE MALEATE 5 MG
5 TABLET ORAL EVERY 6 HOURS PRN
Status: DISCONTINUED | OUTPATIENT
Start: 2024-01-16 | End: 2024-01-18 | Stop reason: HOSPADM

## 2024-01-16 RX ORDER — LORAZEPAM 0.5 MG/1
0.5 TABLET ORAL EVERY 4 HOURS PRN
Status: DISCONTINUED | OUTPATIENT
Start: 2024-01-16 | End: 2024-01-18 | Stop reason: HOSPADM

## 2024-01-16 RX ORDER — DICYCLOMINE HCL 20 MG
20 TABLET ORAL
Status: DISCONTINUED | OUTPATIENT
Start: 2024-01-16 | End: 2024-01-18 | Stop reason: HOSPADM

## 2024-01-16 RX ORDER — PROCHLORPERAZINE 25 MG
25 SUPPOSITORY, RECTAL RECTAL EVERY 12 HOURS PRN
Status: DISCONTINUED | OUTPATIENT
Start: 2024-01-16 | End: 2024-01-18 | Stop reason: HOSPADM

## 2024-01-16 RX ORDER — NALOXONE HYDROCHLORIDE 0.4 MG/ML
0.4 INJECTION, SOLUTION INTRAMUSCULAR; INTRAVENOUS; SUBCUTANEOUS
Status: DISCONTINUED | OUTPATIENT
Start: 2024-01-16 | End: 2024-01-18 | Stop reason: HOSPADM

## 2024-01-16 RX ORDER — GADOBUTROL 604.72 MG/ML
7 INJECTION INTRAVENOUS ONCE
Status: COMPLETED | OUTPATIENT
Start: 2024-01-16 | End: 2024-01-16

## 2024-01-16 RX ORDER — NALOXONE HYDROCHLORIDE 0.4 MG/ML
0.2 INJECTION, SOLUTION INTRAMUSCULAR; INTRAVENOUS; SUBCUTANEOUS
Status: DISCONTINUED | OUTPATIENT
Start: 2024-01-16 | End: 2024-01-18 | Stop reason: HOSPADM

## 2024-01-16 RX ORDER — LORATADINE 10 MG/1
10 TABLET ORAL EVERY MORNING
Status: COMPLETED | OUTPATIENT
Start: 2024-01-17 | End: 2024-01-17

## 2024-01-16 RX ORDER — LORATADINE 10 MG/1
10 TABLET ORAL AT BEDTIME
Status: COMPLETED | OUTPATIENT
Start: 2024-01-16 | End: 2024-01-16

## 2024-01-16 RX ADMIN — LORAZEPAM 0.5 MG: 0.5 TABLET ORAL at 18:58

## 2024-01-16 RX ADMIN — NORTRIPTYLINE HYDROCHLORIDE 25 MG: 25 CAPSULE ORAL at 21:47

## 2024-01-16 RX ADMIN — SODIUM CHLORIDE: 9 INJECTION, SOLUTION INTRAVENOUS at 10:08

## 2024-01-16 RX ADMIN — PANTOPRAZOLE SODIUM 40 MG: 40 TABLET, DELAYED RELEASE ORAL at 09:31

## 2024-01-16 RX ADMIN — METHYLPREDNISOLONE SODIUM SUCCINATE 62.5 MG: 125 INJECTION, POWDER, FOR SOLUTION INTRAMUSCULAR; INTRAVENOUS at 09:31

## 2024-01-16 RX ADMIN — NORTRIPTYLINE HYDROCHLORIDE 25 MG: 25 CAPSULE ORAL at 00:32

## 2024-01-16 RX ADMIN — ONDANSETRON 4 MG: 2 INJECTION INTRAMUSCULAR; INTRAVENOUS at 19:34

## 2024-01-16 RX ADMIN — MONTELUKAST 10 MG: 10 TABLET, FILM COATED ORAL at 21:47

## 2024-01-16 RX ADMIN — DICYCLOMINE HYDROCHLORIDE 20 MG: 20 TABLET ORAL at 21:47

## 2024-01-16 RX ADMIN — ACETAMINOPHEN 650 MG: 325 TABLET, FILM COATED ORAL at 21:46

## 2024-01-16 RX ADMIN — HYDROMORPHONE HYDROCHLORIDE 2 MG: 2 TABLET ORAL at 09:45

## 2024-01-16 RX ADMIN — ONDANSETRON 4 MG: 4 TABLET, ORALLY DISINTEGRATING ORAL at 00:32

## 2024-01-16 RX ADMIN — ONDANSETRON 4 MG: 2 INJECTION INTRAMUSCULAR; INTRAVENOUS at 09:50

## 2024-01-16 RX ADMIN — LORATADINE 10 MG: 10 TABLET ORAL at 21:47

## 2024-01-16 RX ADMIN — HYDROMORPHONE HYDROCHLORIDE 0.4 MG: 0.2 INJECTION, SOLUTION INTRAMUSCULAR; INTRAVENOUS; SUBCUTANEOUS at 00:08

## 2024-01-16 RX ADMIN — METHYLPREDNISOLONE SODIUM SUCCINATE 62.5 MG: 125 INJECTION, POWDER, FOR SOLUTION INTRAMUSCULAR; INTRAVENOUS at 00:08

## 2024-01-16 RX ADMIN — HYDROMORPHONE HYDROCHLORIDE 0.4 MG: 0.2 INJECTION, SOLUTION INTRAMUSCULAR; INTRAVENOUS; SUBCUTANEOUS at 03:51

## 2024-01-16 RX ADMIN — GLUCAGON 1 MG: KIT at 21:28

## 2024-01-16 RX ADMIN — HYDROMORPHONE HYDROCHLORIDE 0.4 MG: 0.2 INJECTION, SOLUTION INTRAMUSCULAR; INTRAVENOUS; SUBCUTANEOUS at 19:33

## 2024-01-16 RX ADMIN — PROCHLORPERAZINE EDISYLATE 5 MG: 5 INJECTION INTRAMUSCULAR; INTRAVENOUS at 14:51

## 2024-01-16 RX ADMIN — TRAZODONE HYDROCHLORIDE 200 MG: 50 TABLET ORAL at 00:32

## 2024-01-16 RX ADMIN — SODIUM CHLORIDE: 9 INJECTION, SOLUTION INTRAVENOUS at 00:09

## 2024-01-16 RX ADMIN — DICYCLOMINE HYDROCHLORIDE 20 MG: 20 TABLET ORAL at 16:54

## 2024-01-16 RX ADMIN — HYDROMORPHONE HYDROCHLORIDE 0.4 MG: 0.2 INJECTION, SOLUTION INTRAMUSCULAR; INTRAVENOUS; SUBCUTANEOUS at 13:07

## 2024-01-16 RX ADMIN — GADOBUTROL 7 ML: 604.72 INJECTION INTRAVENOUS at 21:27

## 2024-01-16 RX ADMIN — MONTELUKAST 10 MG: 10 TABLET, FILM COATED ORAL at 00:33

## 2024-01-16 RX ADMIN — SODIUM CHLORIDE: 9 INJECTION, SOLUTION INTRAVENOUS at 23:27

## 2024-01-16 RX ADMIN — TRAZODONE HYDROCHLORIDE 200 MG: 50 TABLET ORAL at 21:46

## 2024-01-16 ASSESSMENT — ACTIVITIES OF DAILY LIVING (ADL)
ADLS_ACUITY_SCORE: 35

## 2024-01-16 NOTE — DISCHARGE INSTRUCTIONS
Discharge Instructions  Abdominal Pain    Abdominal pain (belly pain) can be caused by many things. Your evaluation today does not show the exact cause for your pain. Your provider today has decided that it is unlikely your pain is due to a life threatening problem, or a problem requiring surgery or hospital admission. Sometimes those problems cannot be found right away, so it is very important that you follow up as directed.  Sometimes only the changes which occur over time allow the cause of your pain to be found.    Generally, every Emergency Department visit should have a follow-up clinic visit with either a primary or a specialty clinic/provider. Please follow-up as instructed by your emergency provider today. With abdominal pain, we often recommend very close follow-up, such as the following day.    ADULTS:  Return to the Emergency Department right away if:    You get an oral temperature above 102oF or as directed by your provider.  You have blood in your stools. This may be bright red or appear as black, tarry stools.    You keep vomiting (throwing up) or cannot drink liquids.  You see blood when you vomit.   You cannot have a bowel movement or you cannot pass gas.  Your stomach gets bloated or bigger.  Your skin or the whites of your eyes look yellow.  You faint.  You have bloody, frequent or painful urination (peeing).  You have new symptoms or anything that worries you.    CHILDREN:  Return to the Emergency Department right away if your child has any of the above-listed symptoms or the following:    Pushes your hand away or screams/cries when his/her belly is touched.  You notice your child is very fussy or weak.  Your child is very tired and is too tired to eat or drink.  Your child is dehydrated.  Signs of dehydration can be:  Significant change in the amount of wet diapers/urine.  Your infant or child starts to have dry mouth and lips, or no saliva (spit) or tears.    PREGNANT WOMEN:  Return to the  Emergency Department right away if you have any of the above-listed symptoms or the following:    You have bleeding, leaking fluid or passing tissue from the vagina.  You have worse pain or cramping, or pain in your shoulder or back.  You have vomiting that will not stop.  You have a temperature of 100oF or more.  Your baby is not moving as much as usual.  You faint.  You get a bad headache with or without eye problems and abdominal pain.  You have a seizure.  You have unusual discharge from your vagina and abdominal pain.    Abdominal pain is pretty common during pregnancy.  Your pain may or may not be related to your pregnancy. You should follow-up closely with your OB provider so they can evaluate you and your baby.  Until you follow-up with your regular provider, do the following:     Avoid sex and do not put anything in your vagina.  Drink clear fluids.  Only take medications approved by your provider.    MORE INFORMATION:    Appendicitis:  A possible cause of abdominal pain in any person who still has their appendix is acute appendicitis. Appendicitis is often hard to diagnose.  Testing does not always rule out early appendicitis or other causes of abdominal pain. Close follow-up with your provider and re-evaluations may be needed to figure out the reason for your abdominal pain.    Follow-up:  It is very important that you make an appointment with your clinic and go to the appointment.  If you do not follow-up with your primary provider, it may result in missing an important development which could result in permanent injury or disability and/or lasting pain.  If there is any problem keeping your appointment, call your provider or return to the Emergency Department.    Medications:  Take your medications as directed by your provider today.  Before using over-the-counter medications, ask your provider and make sure to take the medications as directed.  If you have any questions about medications, ask your  "provider.    Diet:  Resume your normal diet as much as possible, but do not eat fried, fatty or spicy foods while you have pain.  Do not drink alcohol or have caffeine.  Do not smoke tobacco.    Probiotics: If you have been given an antibiotic, you may want to also take a probiotic pill or eat yogurt with live cultures. Probiotics have \"good bacteria\" to help your intestines stay healthy. Studies have shown that probiotics help prevent diarrhea (loose stools) and other intestine problems (including C. diff infection) when you take antibiotics. You can buy these without a prescription in the pharmacy section of the store.     If you were given a prescription for medicine here today, be sure to read all of the information (including the package insert) that comes with your prescription.  This will include important information about the medicine, its side effects, and any warnings that you need to know about.  The pharmacist who fills the prescription can provide more information and answer questions you may have about the medicine.  If you have questions or concerns that the pharmacist cannot address, please call or return to the Emergency Department.       Remember that you can always come back to the Emergency Department if you are not able to see your regular provider in the amount of time listed above, if you get any new symptoms, or if there is anything that worries you.              Discharge Instructions  Adult Diarrhea    You have been seen today for diarrhea (loose stools). This is usually caused by a virus, but some bacteria, parasites, medicines, or other medical conditions can cause similar symptoms. At this time your provider does not find that your diarrhea is a sign of anything dangerous or life-threatening. However, sometimes the signs of serious illness do not show up right away. If you have new or worse symptoms, you may need to be seen again in the Emergency Department or by your primary provider. "     Generally, every Emergency Department visit should have a follow-up clinic visit with either a primary or a specialty clinic/provider. Please follow-up as instructed by your emergency provider today.    Return to the Emergency Department if:  You feel you are getting dehydrated, such as being very thirsty, not urinating (peeing) like usual, or feeling faint or lightheaded.   You develop a new fever.  You have abdominal (belly) pain that seems worse than cramps, is in one spot, or is getting worse over time.   You have blood in your stool or your stool becomes black.  (Remember that if you take Pepto-Bismol , this will turn your stool black).   You feel very weak.    What can I do to help myself?  The most important thing to do is to drink clear liquids.   It is best to have only small, frequent sips of liquids. Drinking too much at once may cause more diarrhea. You should also replace minerals, sodium and potassium lost with diarrhea. Pedialyte  and sports drinks can help you replace these minerals. You can also drink clear liquids such as water, weak tea, apple juice, and 7-Up . Avoid acidic liquids (orange juice), caffeine (coffee) or alcohol. Milk products will make the diarrhea worse.  Eat bland (plain) foods. Soda crackers, toast, plain noodles, gelatin, applesauce and bananas are good first choices. Avoid foods that have acid, are spicy, fatty or fibrous (such as meats, coarse grains, vegetables). You may start eating these foods again in about 3 days when you are better.   Sometimes treatment includes prescription medicine to prevent diarrhea. If your provider prescribes these for you, take them as directed.   Nonprescription medicine is available for the treatment of diarrhea and can be very effective. If you use it, make sure you use the dose recommended on the package. Check with your healthcare provider before you use any medicine for diarrhea.   Do not take ibuprofen, or other nonsteroidal  "anti-inflammatory medicines, without checking with your healthcare provider.   Probiotics: If you have been given an antibiotic, you may want to also take a probiotic pill or eat yogurt with live cultures. Probiotics have \"good bacteria\" to help your intestines stay healthy. Studies have shown that probiotics help prevent diarrhea and other intestine problems (including C. diff infection) when you take antibiotics. You can buy these without a prescription in the pharmacy section of the store.   If you were given a prescription for medicine here today, be sure to read all of the information (including the package insert) that comes with your prescription.  This will include important information about the medicine, its side effects, and any warnings that you need to know about.  The pharmacist who fills the prescription can provide more information and answer questions you may have about the medicine.  If you have questions or concerns that the pharmacist cannot address, please call or return to the Emergency Department.  Remember that you can always come back to the Emergency Department if you are not able to see your regular provider in the amount of time listed above, if you get any new symptoms, or if there is anything that worries you.    "

## 2024-01-16 NOTE — PLAN OF CARE
Afebrile. Received Dilaudid times one for c/o abdominal pain with relief. Received Compazine times one for c/o nausea with relief.. Abdomen distended. Denies passing flatus. Hypoactive bowel sounds. Abdomen tender in LLQ, RLQ and RUQ. Loose stool times one. Voiding. Up in room independently. Plan: IVF. IV steroids. Pain and nausea control. NPO for MRI this evening. Remicade tomorrow. Encourage ambulation. GI consulting. Monitor and provide for needs.

## 2024-01-16 NOTE — PROGRESS NOTES
[facility]    Medicine Progress Note - Hospitalist Service    Date of Admission:  1/15/2024    Assessment & Plan     63-year-old female with history of Crohn's disease status post ileocecal resection 7/2023, renal cell carcinoma status post nephrectomy with resulting CKD stage III, on Remicade chronic steroid therapy admitted with diarrhea, right-sided abdominal pain, admitted for suspected Crohn's flare.  GI consulted while inpatient,    Crohn's disease  Stricturing ileal Crohn's disease diagnosed in May 2023, underwent ileal cecal resection for stricture in July 2023.  She is currently on Remicade and prednisone, seems to get symptoms several weeks before Remicade due. Per GI, increasing frequency of Remicade and if no response, they will consider another biologic.   -remicade infusion in AM  -premedicating with claritin 10 mg tonight and tomorrow morning along with solumedrol prior to infusion  -continue pain control and IVF           Diet: Regular Diet Adult    DVT Prophylaxis: Pneumatic Compression Devices  Monae Catheter: Not present  Lines: None     Cardiac Monitoring: None  Code Status: Full Code      Clinically Significant Risk Factors Present on Admission                                  Disposition Plan     Expected Discharge Date: 01/16/2024                  Estelle Rodriguez DO  Hospitalist Service  [facility]  Securely message with [employer] (more info)  Text page via [employer] Paging/Directory   ______________________________________________________________________    Interval History   pain is relatively well-controlled today, no further diarrhea     Physical Exam   Vital Signs: Temp: 97.9  F (36.6  C) Temp src: Oral BP: (!) 149/84 Pulse: 82   Resp: 16 SpO2: 95 % O2 Device: None (Room air)    Weight: 155 lbs 8 oz    General: Very pleasant female , NAD  Respiratory: Normal work of breathing.    Musculoskeletal: Moving all extremities appropriately.  Skin: No rashes  or abrasions on exposed skin.  Neurologic: Alert. No gross focal deficits.   Psychologic: Appropriate mood and affect.    Medical Decision Making       40 MINUTES SPENT BY ME on the date of service doing chart review, history, exam, documentation & further activities per the note.      Data     I have personally reviewed the following data over the past 24 hrs:    6.0  \   12.6   / 191     139 107 7.1 (L) /  133 (H)   4.5 24 0.85 \     ALT: 11 AST: 16 AP: 64 TBILI: 0.6   ALB: 3.9 TOT PROTEIN: 6.0 (L) LIPASE: N/A     Procal: N/A CRP: <3.00 Lactic Acid: 1.5         Imaging results reviewed over the past 24 hrs:   Recent Results (from the past 24 hour(s))   Abd/pelvis CT,  IV  contrast only TRAUMA / AAA    Narrative    EXAM: CT ABDOMEN PELVIS W CONTRAST  LOCATION: [facility]  DATE: 1/15/2024    INDICATION: Abdominal pain, hx of Crohns  COMPARISON: 11/30/2023  TECHNIQUE: CT scan of the abdomen and pelvis was performed following injection of IV contrast. Multiplanar reformats were obtained. Dose reduction techniques were used.  CONTRAST: 76mL Isovue 370    FINDINGS:   LOWER CHEST: Normal.    HEPATOBILIARY: Cholecystectomy.    PANCREAS: Normal.    SPLEEN: Normal size.    ADRENAL GLANDS: Normal.    KIDNEYS/BLADDER: Status post right nephrectomy.    BOWEL: Status post ileocecectomy. No bowel obstruction or inflammatory change.    LYMPH NODES: Normal.    VASCULATURE: Unremarkable.    PELVIC ORGANS: Hysterectomy    MUSCULOSKELETAL: Unremarkable.      Impression    IMPRESSION:   1.  No acute findings in the abdomen or pelvis.

## 2024-01-16 NOTE — PHARMACY-ADMISSION MEDICATION HISTORY
Pharmacy Intern Admission Medication History    Admission medication history is complete. The information provided in this note is only as accurate as the sources available at the time of the update.    Information Source(s): Patient via in-person    Pertinent Information: NA    Changes made to PTA medication list:  Added: Dicyclomine, VITAMIN D3, VITAMIN C, Remicade  Deleted: norco, hydroxyzine, hydromorphone   Changed: None    Medication Affordability:  Not including over the counter (OTC) medications, was there a time in the past 3 months when you did not take your medications as prescribed because of cost?: No    Allergies reviewed with patient and updates made in EHR: yes    Medication History Completed By: Marleny Penn 1/15/2024 10:51 PM    PTA Med List   Medication Sig Last Dose    acetaminophen (TYLENOL) 500 MG tablet Take 1-2 tablets (500-1,000 mg) by mouth every 6 hours as needed for mild pain 1/14/2024    acetaminophen (TYLENOL) 650 MG CR tablet Take 650 mg by mouth At Bedtime 1/14/2024    albuterol (PROAIR HFA/PROVENTIL HFA/VENTOLIN HFA) 108 (90 Base) MCG/ACT inhaler Inhale 2 puffs into the lungs every 6 hours as needed for shortness of breath, wheezing or cough Past Month    Bioflavonoid Products (VITAMIN C) CHEW Take 1 tablet by mouth daily 1/14/2024 at AM    bisacodyl (DULCOLAX) 10 MG suppository Place 1 suppository (10 mg) rectally 2 times daily as needed for constipation More than a month    cholecalciferol (VITAMIN D3) 10 mcg (400 units) TABS tablet Take 10 mcg by mouth daily 1/14/2024 at AM    cyanocobalamin (CYANOCOBALAMIN) 1000 MCG/ML injection Inject 1,000 mcg as directed every 30 days 1/8/2024    dicyclomine (BENTYL) 10 MG capsule Take 10 mg by mouth 4 times daily (before meals and nightly) 1/15/2024 at AM    diphenhydrAMINE (BENADRYL) 25 MG capsule Take 1 capsule (25 mg) by mouth every 6 hours as needed for itching or allergies More than a month    esomeprazole (NEXIUM) 40 MG DR capsule Take  40 mg by mouth 2 times daily (before meals) Take 30-60 minutes before eating. 1/15/2024 at AM    famotidine (PEPCID) 40 MG tablet Take 40 mg by mouth At Bedtime 1/14/2024 at PM    inFLIXimab (REMICADE IV) Inject 10 mg into the vein once every eight weeks 12/7/2023    medical cannabis (Patient's own supply) Take 1 Dose by mouth See Admin Instructions (The purpose of this order is to document that the patient reports taking medical cannabis.  This is not a prescription, and is not used to certify that the patient has a qualifying medical condition.)  25 MG EDIBLE 1/14/2024 at PM    methocarbamol (ROBAXIN) 500 MG tablet Take 1 tablet (500 mg) by mouth 4 times daily as needed for muscle spasms 1/15/2024 at AM    montelukast (SINGULAIR) 10 MG tablet Take 10 mg by mouth At Bedtime 1/14/2024 at PM    nortriptyline (PAMELOR) 25 MG capsule Take 25 mg by mouth At Bedtime 1/14/2024 at PM    ondansetron (ZOFRAN ODT) 4 MG ODT tab Take 1 tablet (4 mg) by mouth every 8 hours as needed for nausea Past Month    traZODone (DESYREL) 100 MG tablet Take 200 mg by mouth At Bedtime 1/14/2024 at PM

## 2024-01-16 NOTE — ED NOTES
[facility]  ED Nurse Handoff Report    ED Chief complaint: Abdominal Pain  . ED Diagnosis:   Final diagnoses:   Intractable lower abdominal pain   Diarrhea, unspecified type   History of Crohn's disease       Allergies:   Allergies   Allergen Reactions    Latex Hives    Oxycodone Anaphylaxis, Hives and Swelling     Throat swelling, per pt    Pt tolerates [place]/Vicodin (10/15/2023)    Penicillin G Anaphylaxis    Sumatriptan Palpitations    Aspirin Nausea and Vomiting    Demerol Hcl [Meperidine] Nausea and Vomiting     N/V    Percocet [Oxycodone-Acetaminophen] Nausea and Vomiting     N/V    Codeine Nausea and Vomiting    Mold        Code Status: Full Code    Activity level - Baseline/Home:  independent.  Activity Level - Current:   independent.   Lift room needed: No.   Bariatric: No   Needed: No   Isolation: Yes.   Infection: Not Applicable  C-Diff Pending.     Respiratory status: Room air    Vital Signs (within 30 minutes):   Vitals:    01/15/24 1902 01/15/24 1930 01/15/24 2000 01/15/24 2102   BP:  122/80 128/73 137/84   Pulse:   78 76   Resp:       Temp:       SpO2: 98% 99% 98% 98%       Cardiac Rhythm:  ,      Pain level:    Patient confused: No.   Patient Falls Risk: nonskid shoes/slippers when out of bed, arm band in place, and patient and family education.   Elimination Status: Has voided     Patient Report - Initial Complaint: Abdominal pain .   Focused Assessment: Devi Salas is a 63 year old female with the history of Crohn's disease who presents to the emergency room with abdominal pain. The patient states that she has had a pain in the right lower quadrant of her abdomen that radiates to her back for the past week. She reports that her pain is constant. She has also had a lot of diarrhea and reports having to use the restroom 11 times today. She has had chills and hot flashes. She reports that this pain feels like her Crohns disease flare in March. She denies being  constipated, recorded fevers, issues urinating, bloody stools, and vomiting. Denies recent travel out of country, new foods, and recent courses of antibiotics. She did take Aleve which did help slightly with the pain. She did also take Tylenol every 6 hours but it did not help with pain. She started taking dicyclomine on the 8th of this month. She does receive Remicade infusions. Her last one was in December and her next on is 2/1/23. She has tried to take medications for diarrhea but they have left her constipated for days.     Abnormal Results:   Labs Ordered and Resulted from Time of ED Arrival to Time of ED Departure   COMPREHENSIVE METABOLIC PANEL - Abnormal       Result Value    Sodium 140      Potassium 4.2      Carbon Dioxide (CO2) 25      Anion Gap 11      Urea Nitrogen 7.8 (*)     Creatinine 1.03 (*)     GFR Estimate 60 (*)     Calcium 9.0      Chloride 104      Glucose 100 (*)     Alkaline Phosphatase 71      AST 15      ALT 15      Protein Total 7.0      Albumin 4.5      Bilirubin Total 0.7     ROUTINE UA WITH MICROSCOPIC REFLEX TO CULTURE - Abnormal    Color Urine Straw      Appearance Urine Clear      Glucose Urine Negative      Bilirubin Urine Negative      Ketones Urine Negative      Specific Gravity Urine 1.005      Blood Urine Negative      pH Urine 5.0      Protein Albumin Urine Negative      Urobilinogen Urine Normal      Nitrite Urine Negative      Leukocyte Esterase Urine Negative      Bacteria Urine Few (*)     RBC Urine <1      WBC Urine <1     LACTIC ACID WHOLE BLOOD - Normal    Lactic Acid 1.5     CRP INFLAMMATION - Normal    CRP Inflammation <3.00     ERYTHROCYTE SEDIMENTATION RATE AUTO - Normal    Erythrocyte Sedimentation Rate 6     CBC WITH PLATELETS AND DIFFERENTIAL    WBC Count 7.0      RBC Count 4.44      Hemoglobin 13.4      Hematocrit 40.7      MCV 92      MCH 30.2      MCHC 32.9      RDW 14.5      Platelet Count 223      % Neutrophils 74      % Lymphocytes 16      % Monocytes 7       % Eosinophils 1      % Basophils 1      % Immature Granulocytes 1      NRBCs per 100 WBC 0      Absolute Neutrophils 5.3      Absolute Lymphocytes 1.1      Absolute Monocytes 0.5      Absolute Eosinophils 0.1      Absolute Basophils 0.0      Absolute Immature Granulocytes 0.1      Absolute NRBCs 0.0     ENTERIC BACTERIA AND VIRUS PANEL BY PCR   C. DIFFICILE TOXIN B PCR WITH REFLEX TO C. DIFFICILE ANTIGEN AND TOXINS A/B EIA        Abd/pelvis CT,  IV  contrast only TRAUMA / AAA   Final Result   IMPRESSION:    1.  No acute findings in the abdomen or pelvis.          Treatments provided: See MAR  Family Comments: none  OBS brochure/video discussed/provided to patient:  Yes  ED Medications:   Medications   ondansetron (ZOFRAN) injection 4 mg (4 mg Intravenous $Given 1/15/24 1823)   sodium chloride 0.9% BOLUS 1,000 mL (1,000 mLs Intravenous $New Bag 1/15/24 2121)   sodium chloride 0.9% BOLUS 1,000 mL (0 mLs Intravenous Stopped 1/15/24 2112)   HYDROmorphone (PF) (DILAUDID) injection 0.5 mg (0.5 mg Intravenous $Given 1/15/24 2122)   iopamidol (ISOVUE-370) solution 500 mL (76 mLs Intravenous $Given 1/15/24 1911)   CT scan flush (59 mLs Intravenous $Given 1/15/24 1911)       Drips infusing:  No  For the majority of the shift this patient was Green.   Interventions performed were n/a.    Sepsis treatment initiated: No    Cares/treatment/interventions/medications to be completed following ED care: n/a    ED Nurse Name: Sarita Nguyen RN  9:44 PM

## 2024-01-16 NOTE — CONSULTS
GASTROENTEROLOGY CONSULTATION      Devi Salas  [address] [facility] [address]  63 year old female     Admission Date/Time: 1/15/2024  Primary Care Provider: Jeanette Betancourt  Referring / Attending Physician: Dr. Madera     We were asked to see the patient in consultation by Dr. Madera for evaluation of abdominal pain and diarrhea.        HPI:  Devi Salas is a 63 year old female with stricturing ileal crohn's disease diagnosed in May 2023, s/p ileocecal resection for TI structure in July 2023, currently on infliximab treatment. Additional history includes RCC s/p nephrectomy with CKD stage 3 and previous cholecystectomy.  Patient presents to the emergency room with ongoing right-sided abdominal pain and loose stool.    Patient reports she has been struggling with right-sided abdominal pain for the past few months.  She has been experiencing loose stool and diarrhea since her ileocecal resection in July.  Her IBD provider did start a course of prednisone in November.  The patient notes that at higher doses it does alleviate her abdominal pain and somewhat helps with loose stool.  When she gets down to 10 and 5 mg of prednisone her abdominal pain comes back.  She has tried both Imodium and cholestyramine for loose stool.  The cholestyramine works very well but on a 2 g dose she was constipated for 3 days and does not want to use it again.  She is not experiencing any GI bleeding.  Recent C. difficile testing through [facility] was negative in early January.  Her dose of infliximab was recently increased to 10 mg/kg every 8 weeks.  She received 1 infusion at this dosage in early December and notes that she had relief of all her symptoms for at least 3 weeks.    Additionally in a clinic, she was given a prescription for dicyclomine.  She notes that  this does also help her abdominal pain when she takes it 4 times a day.    Colonoscopy 10/10/2023 revealed normal colon and TI, patent anastomosis, and internal  hemorrhoids. Random colon biopsy was unremarkable.     A CT scan of the abdomen and pelvis with contrast did not reveal any inflammatory change nor bowel obstruction.  CBC was entirely normal with normal hemoglobin and normal white count.  Electrolytes and kidney function are stable.  CRP is normal.     PAST MEDICAL HISTORY:  Patient Active Problem List    Diagnosis Date Noted    History of Crohn's disease 01/15/2024     Priority: Medium    Intractable lower abdominal pain 01/15/2024     Priority: Medium    Diarrhea, unspecified type 01/15/2024     Priority: Medium    Acute colitis 10/09/2023     Priority: Medium    Dizziness 07/24/2023     Priority: Medium    Abnormal stools 07/24/2023     Priority: Medium    History of colon surgery 07/24/2023     Priority: Medium    Abdominal pain, unspecified abdominal location 07/24/2023     Priority: Medium    Intractable abdominal pain 07/12/2023     Priority: Medium    Crohn's disease of colon with complication (H) 07/12/2023     Priority: Medium    RLQ abdominal pain 06/05/2023     Priority: Medium          ROS: A comprehensive ten point review of systems was negative aside from those in mentioned in the HPI.       MEDICATIONS:   Prior to Admission medications    Medication Sig Start Date End Date Taking? Authorizing Provider   acetaminophen (TYLENOL) 500 MG tablet Take 1-2 tablets (500-1,000 mg) by mouth every 6 hours as needed for mild pain 7/26/23  Yes Li Ochoa PA-C   acetaminophen (TYLENOL) 650 MG CR tablet Take 650 mg by mouth At Bedtime   Yes Unknown, Entered By History   albuterol (PROAIR HFA/PROVENTIL HFA/VENTOLIN HFA) 108 (90 Base) MCG/ACT inhaler Inhale 2 puffs into the lungs every 6 hours as needed for shortness of breath, wheezing or cough   Yes Unknown, Entered By History   Bioflavonoid Products (VITAMIN C) CHEW Take 1 tablet by mouth daily   Yes Unknown, Entered By History   bisacodyl (DULCOLAX) 10 MG suppository Place 1 suppository (10 mg)  rectally 2 times daily as needed for constipation 6/18/23  Yes Vikki Duran DO   cholecalciferol (VITAMIN D3) 10 mcg (400 units) TABS tablet Take 10 mcg by mouth daily   Yes Unknown, Entered By History   cyanocobalamin (CYANOCOBALAMIN) 1000 MCG/ML injection Inject 1,000 mcg as directed every 30 days   Yes Unknown, Entered By History   dicyclomine (BENTYL) 10 MG capsule Take 10 mg by mouth 4 times daily (before meals and nightly)   Yes Unknown, Entered By History   diphenhydrAMINE (BENADRYL) 25 MG capsule Take 1 capsule (25 mg) by mouth every 6 hours as needed for itching or allergies 9/7/23  Yes Ray Henning MD   esomeprazole (NEXIUM) 40 MG DR capsule Take 40 mg by mouth 2 times daily (before meals) Take 30-60 minutes before eating.   Yes Unknown, Entered By History   famotidine (PEPCID) 40 MG tablet Take 40 mg by mouth At Bedtime   Yes Unknown, Entered By History   inFLIXimab (REMICADE IV) Inject 10 mg/kg into the vein once every eight weeks   Yes Unknown, Entered By History   medical cannabis (Patient's own supply) Take 1 Dose by mouth See Admin Instructions (The purpose of this order is to document that the patient reports taking medical cannabis.  This is not a prescription, and is not used to certify that the patient has a qualifying medical condition.)  25 MG EDIBLE   Yes Unknown, Entered By History   methocarbamol (ROBAXIN) 500 MG tablet Take 1 tablet (500 mg) by mouth 4 times daily as needed for muscle spasms 6/18/23  Yes Vikki Duran DO   montelukast (SINGULAIR) 10 MG tablet Take 10 mg by mouth At Bedtime   Yes Unknown, Entered By History   nortriptyline (PAMELOR) 25 MG capsule Take 25 mg by mouth At Bedtime   Yes Unknown, Entered By History   ondansetron (ZOFRAN ODT) 4 MG ODT tab Take 1 tablet (4 mg) by mouth every 8 hours as needed for nausea 6/18/23  Yes Vikki Duran DO   traZODone (DESYREL) 100 MG tablet Take 200 mg by mouth At Bedtime   Yes Unknown, Entered By History         ALLERGIES:   Allergies   Allergen Reactions    Latex Hives    Oxycodone Anaphylaxis, Hives and Swelling     Throat swelling, per pt    Pt tolerates [place]/Vicodin (10/15/2023)    Penicillin G Anaphylaxis    Sumatriptan Palpitations    Aspirin Nausea and Vomiting    Demerol Hcl [Meperidine] Nausea and Vomiting     N/V    Percocet [Oxycodone-Acetaminophen] Nausea and Vomiting     N/V    Codeine Nausea and Vomiting    Mold         SOCIAL HISTORY:        FAMILY HISTORY:  Family History   Problem Relation Age of Onset    Colon Cancer Niece         PHYSICAL EXAM:     /87 (BP Location: Right arm, Patient Position: Semi-Bain's, Cuff Size: Adult Regular)   Pulse 94   Temp 97.7  F (36.5  C) (Oral)   Resp 18   SpO2 98%      PHYSICAL EXAM:  GENERAL: No distress, resting comfortably  SKIN: no suspicious lesions, rashes, jaundice  HEAD: Normocephalic. Atraumatic.  NECK: Neck supple. No adenopathy.   EYES: No scleral icterus  GASTROINTESTINAL: +BS, soft, RLQ tenderness, non distended, no guarding/rebound  JOINT/EXTREMITIES:  no gross deformities noted, normal muscle tone  NEURO: CN 2-12 grossly intact, no focal deficits  PSYCH: Normal affect       ADDITIONAL COMMENTS:   I reviewed the patient's new clinical lab test results.     Recent Labs   Lab 01/16/24  0816 01/15/24  1823   WBC 6.0 7.0   RBC 4.24 4.44   HGB 12.6 13.4   HCT 38.4 40.7   MCV 91 92   MCH 29.7 30.2   MCHC 32.8 32.9   RDW 14.3 14.5    223     Recent Labs   Lab Test 01/16/24  0816 01/15/24  1823 11/30/23  1505   POTASSIUM 4.5 4.2 4.3   CHLORIDE 107 104 105   CO2 24 25 23   BUN 7.1* 7.8* 6.8*   ANIONGAP 8 11 11     Recent Labs   Lab Test 01/16/24  0816 01/15/24  1823 01/15/24  1801 11/30/23  1726 11/30/23  1505 10/09/23  1750 10/09/23  1737 07/24/23  1717 07/24/23  1608   ALBUMIN 3.9 4.5  --   --  4.2  --  4.7  --  3.9   BILITOTAL 0.6 0.7  --   --  0.4  --  0.4  --  0.3   ALT 11 15  --   --  28  --  15  --  28   AST 16 15  --   --  28  --  24   --  29   PROTEIN  --   --  Negative Negative  --  Negative  --    < >  --    LIPASE  --   --   --   --   --   --  27  --  56    < > = values in this interval not displayed.        IMAGING / ENDOSCOPY     Recent Results (from the past 24 hour(s))   Abd/pelvis CT,  IV  contrast only TRAUMA / AAA    Narrative    EXAM: CT ABDOMEN PELVIS W CONTRAST  LOCATION: [facility]  DATE: 1/15/2024    INDICATION: Abdominal pain, hx of Crohns  COMPARISON: 11/30/2023  TECHNIQUE: CT scan of the abdomen and pelvis was performed following injection of IV contrast. Multiplanar reformats were obtained. Dose reduction techniques were used.  CONTRAST: 76mL Isovue 370    FINDINGS:   LOWER CHEST: Normal.    HEPATOBILIARY: Cholecystectomy.    PANCREAS: Normal.    SPLEEN: Normal size.    ADRENAL GLANDS: Normal.    KIDNEYS/BLADDER: Status post right nephrectomy.    BOWEL: Status post ileocecectomy. No bowel obstruction or inflammatory change.    LYMPH NODES: Normal.    VASCULATURE: Unremarkable.    PELVIC ORGANS: Hysterectomy    MUSCULOSKELETAL: Unremarkable.      Impression    IMPRESSION:   1.  No acute findings in the abdomen or pelvis.         CONSULTATION ASSESSMENT AND PLAN:    Devi Salas is a 63 year old female with stricturing ileal crohn's disease diagnosed in May 2023, s/p ileocecal resection for TI structure in July 2023, currently on infliximab treatment who  presents to the emergency room with ongoing right-sided abdominal pain and loose stool.    1.  Right lower quadrant abdominal pain with loose stool: Patient's been struggling with abdominal pain for the past few months.  She has found relief with higher doses of prednisone, higher dose of infliximab, and with use of dicyclomine.  CT scan of her abdomen and pelvis does not reveal any obstruction or inflammatory change.  CRP is normal.  She has no white count or fever.  No evidence of GI bleeding.  LFTs, electrolytes, and lactate are all normal.  Most  recent colonoscopy, 4 months ago did not show evidence of active disease.  Patient has struggled with loose stool since her ileocecal resection in July.     -- High suspicion for functional disorder in addition to her Crohn's disease.  -- Will discuss plan for IV solumedrol and possible MRe w Dr. Blandon.  -- Bentyl 20 mg QID        I discussed the patient plan with Dr. Blandon, GI staff physician. Thank you for asking us to participate in the care of this patient.     70 min of total time was spent providing patient care, including patient evaluation, reviewing documentation/ test results, and .     Reema Romero PA-C  [facility] ( [facility])

## 2024-01-16 NOTE — PLAN OF CARE
PRIMARY DIAGNOSIS: NURSING - Abdominal Pain  OUTPATIENT/OBSERVATION GOALS TO BE MET BEFORE DISCHARGE:  ADLs back to baseline: No    Activity and level of assistance: Up with standby assistance.    Pain status: Improved-controlled with oral pain medications.    Return to near baseline physical activity: No     Discharge Planner Nurse   Safe discharge environment identified: Yes  Barriers to discharge: Yes       Entered by: Aixa Padilla RN 01/16/2024    A&Ox4. VSS. C/o 7/10 abdominal pain. PRN Dilaudid given. C/o nausea. PRN IV Zofran given. No further needs at this time.   Please review provider order for any additional goals.   Nurse to notify provider when observation goals have been met and patient is ready for discharge.   toileting/standing/walking

## 2024-01-16 NOTE — H&P
[facility]    History and Physical - Hospitalist Service       Date of Admission:  1/15/2024    Assessment & Plan     Devi Salas is a 63 year old female w/PMH crohns disease s/p ileocolic resection on remicade and chronic steroids who presents with diarrhea and abd pain and admitted for possible crohns flare    Possible crohn's flare  Hx ileocolic resection on remicade,  steroids  -has long hx of resistant crohns, has been on remicade and steroids added in November. Had been trying to taper her steroids down but symptoms have been persistent with nausea, occasional vomiting and large volume diarrhea despite aggressive outpt management acutely worsening past 10 days. She was told by her GI team to present to ED today. They had been considering an MRI enterography study but I'm unclear if we do that here. Recent C dif testing neg as outpt  -in ED no leukocytosis, afebrile. CT abd/pelvis without acute findings. Inflammatory markers are normal  -discussed with patient, she feels she's improved on IV steroids in past, she's unsure if she's absorbing the prednisone   -IV solumedrol 60 daily and will consult GI  -antiemetics, pain control, IVF. States immodium constipates her so will avoid    Mild LIDIA  -Cr 1.03, likely from diarrhea  -gentle fluids, serial labs     Diet:  regular  DVT Prophylaxis: Pneumatic Compression Devices  Monae Catheter: Not present  Lines: None     Cardiac Monitoring: None  Code Status:  full code        Puma Madera DO  Hospitalist Service  [facility]  Securely message with [employer] (more info)  Text page via [employer] Paging/Directory     ______________________________________________________________________    Chief Complaint   Abd pain, diarrhea      History of Present Illness   Devi Salas is a 63 year old female w/PMH crohns disease s/p ileocolic resection on remicade and chronic steroids who presents with diarrhea and abd pain. She reports  ongoing diarrhea and abd pain despite being on remicade and steroids since November. Over past 10 days has been getting worse and talked with her GI team who recommended she present to ED today. She denies any fever, CP, sob but reports chills, chronic nausea, occasional vomiting. Had C dif testing recently as outpt that was negative. Notes that she's unsure if she is absorbing her prednisone very well, feels she had improved previously while on IV steroids.     In ED had CT abdomen/pelvis negative for any acute findings. No leukocytosis or fever. Cr up at 1.03. UA negative      Past Medical History    Past Medical History:   Diagnosis Date    Crohn's disease (H)     PONV (postoperative nausea and vomiting)        Past Surgical History   Past Surgical History:   Procedure Laterality Date    APPENDECTOMY      CHOLECYSTECTOMY      COLONOSCOPY N/A 10/13/2023    Procedure: Colonoscopy with biopsies;  Surgeon: Isaiah Mathur MD;  Location: [facility]    LAPAROSCOPIC RESECTION ILEOCECAL N/A 07/14/2023    Procedure: Laparoscopic assisted ileocolic resection;  Surgeon: Marleny Mendez MD;  Location: [facility] OR    NEPHRECTOMY Right 2009       Prior to Admission Medications   Prior to Admission Medications   Prescriptions Last Dose Informant Patient Reported? Taking?   HYDROcodone-acetaminophen (NORCO) 5-325 MG tablet   No No   Sig: Take 1-2 tablets by mouth every 6 hours as needed for severe pain (max acetaminophen (4 gram a day including tylenol))   acetaminophen (TYLENOL) 500 MG tablet   No No   Sig: Take 1-2 tablets (500-1,000 mg) by mouth every 6 hours as needed for mild pain   acetaminophen (TYLENOL) 650 MG CR tablet   Yes No   Sig: Take 650 mg by mouth At Bedtime   albuterol (PROAIR HFA/PROVENTIL HFA/VENTOLIN HFA) 108 (90 Base) MCG/ACT inhaler   Yes No   Sig: Inhale 2 puffs into the lungs every 6 hours as needed for shortness of breath, wheezing or cough   bisacodyl (DULCOLAX) 10 MG suppository   No No   Sig: Place 1  suppository (10 mg) rectally 2 times daily as needed for constipation   cyanocobalamin (CYANOCOBALAMIN) 1000 MCG/ML injection   Yes No   Sig: Inject 1,000 mcg as directed every 30 days   diphenhydrAMINE (BENADRYL) 25 MG capsule   No No   Sig: Take 1 capsule (25 mg) by mouth every 6 hours as needed for itching or allergies   esomeprazole (NEXIUM) 40 MG DR capsule   Yes No   Sig: Take 40 mg by mouth 2 times daily (before meals) Take 30-60 minutes before eating.   famotidine (PEPCID) 40 MG tablet   Yes No   Sig: Take 40 mg by mouth At Bedtime   hydrOXYzine (ATARAX) 25 MG tablet   No No   Sig: Take 1 tablet (25 mg) by mouth every 6 hours as needed for other (adjuvant pain)   methocarbamol (ROBAXIN) 500 MG tablet   No No   Sig: Take 1 tablet (500 mg) by mouth 4 times daily as needed for muscle spasms   montelukast (SINGULAIR) 10 MG tablet   Yes No   Sig: Take 10 mg by mouth At Bedtime   nortriptyline (PAMELOR) 25 MG capsule   Yes No   Sig: Take 25 mg by mouth At Bedtime   ondansetron (ZOFRAN ODT) 4 MG ODT tab   No No   Sig: Take 1 tablet (4 mg) by mouth every 8 hours as needed for nausea   traZODone (DESYREL) 100 MG tablet   Yes No   Sig: Take 200 mg by mouth At Bedtime      Facility-Administered Medications: None        Review of Systems    The 10 point Review of Systems is negative other than noted in the HPI or here.    Social History   I have reviewed this patient's social history and updated it with pertinent information if needed.         Family History   I have reviewed this patient's family history and updated it with pertinent information if needed.  Family History   Problem Relation Age of Onset    Colon Cancer Niece          Allergies   Allergies   Allergen Reactions    Latex Hives    Oxycodone Anaphylaxis, Hives and Swelling     Throat swelling, per pt    Pt tolerates [place]/Vicodin (10/15/2023)    Penicillin G Anaphylaxis    Sumatriptan Palpitations    Aspirin Nausea and Vomiting    Demerol Hcl [Meperidine]  Nausea and Vomiting     N/V    Percocet [Oxycodone-Acetaminophen] Nausea and Vomiting     N/V    Codeine Nausea and Vomiting    Mold         Physical Exam   Vital Signs: Temp: 98.9  F (37.2  C)   BP: 137/84 Pulse: 76   Resp: 20 SpO2: 98 %      Weight: 0 lbs 0 oz    Constitutional: awake, alert, and cooperative  Eyes: pupils equal, round and reactive to light and conjunctiva normal  ENT: normocepalic, without obvious abnormality, atramatic  Respiratory: no increased work of breathing, good air exchange, and clear to auscultation  Cardiovascular: regular rate and rhythm and no murmur noted  GI: hypoactive bowel sounds, soft, non-distended, very tender to palpation mostly R side with some questionable rebound  Skin: no bruising or bleeding  Neurologic: alert, oriented, no focal deficits    60 MINUTES SPENT BY ME on the date of service doing chart review, history, exam, documentation & further activities per the note.      Data   ------------------------- PAST 24 HR DATA REVIEWED -----------------------------------------------    I have personally reviewed the following data over the past 24 hrs:    7.0  \   13.4   / 223     140 104 7.8 (L) /  100 (H)   4.2 25 1.03 (H) \     ALT: 15 AST: 15 AP: 71 TBILI: 0.7   ALB: 4.5 TOT PROTEIN: 7.0 LIPASE: N/A     Procal: N/A CRP: <3.00 Lactic Acid: 1.5         Imaging results reviewed over the past 24 hrs:   Recent Results (from the past 24 hour(s))   Abd/pelvis CT,  IV  contrast only TRAUMA / AAA    Narrative    EXAM: CT ABDOMEN PELVIS W CONTRAST  LOCATION: [facility]  DATE: 1/15/2024    INDICATION: Abdominal pain, hx of Crohns  COMPARISON: 11/30/2023  TECHNIQUE: CT scan of the abdomen and pelvis was performed following injection of IV contrast. Multiplanar reformats were obtained. Dose reduction techniques were used.  CONTRAST: 76mL Isovue 370    FINDINGS:   LOWER CHEST: Normal.    HEPATOBILIARY: Cholecystectomy.    PANCREAS: Normal.    SPLEEN: Normal  size.    ADRENAL GLANDS: Normal.    KIDNEYS/BLADDER: Status post right nephrectomy.    BOWEL: Status post ileocecectomy. No bowel obstruction or inflammatory change.    LYMPH NODES: Normal.    VASCULATURE: Unremarkable.    PELVIC ORGANS: Hysterectomy    MUSCULOSKELETAL: Unremarkable.      Impression    IMPRESSION:   1.  No acute findings in the abdomen or pelvis.

## 2024-01-16 NOTE — CONSULTS
Care Management Follow Up    Length of Stay (days): 0    Expected Discharge Date: 01/16/2024     Concerns to be Addressed:     elevated risk score   Patient plan of care discussed at interdisciplinary rounds: No    Anticipated Discharge Disposition:  home      Anticipated Discharge Services:  none  Anticipated Discharge DME:      Referrals Placed by CM/SW:  none  Private pay costs discussed: Not applicable    Additional Information:  Patient has a high URR of 24%. Patient has hx of crohn's. SW reviewed chart and there are no anticipated discharge needs at this time. Please consult care management if needs arise.     RAFAEL Salcedo, LGSW  Emergency Room   Please contact the SW on the floor in which the patient is staying for any questions or concerns

## 2024-01-16 NOTE — ED NOTES
[facility]    ED Boarding Nurse Handoff Addendum Report:    Date/time: 1/16/2024, 12:12 PM    Activity Level: standby    Fall Risk: No    Active Infusions:  mL/hr    Current Meds Due: None    Current care needs: None    Oxygen requirements (liters/min and/or FiO2): None    Respiratory status: Room air    Vital signs (within last 30 minutes):    Vitals:    01/16/24 0048 01/16/24 0620 01/16/24 0900 01/16/24 1208   BP: 131/83 119/78 133/87 (!) 131/97   BP Location: Right arm Right arm Right arm Right arm   Patient Position:   Semi-Bain's Semi-Bain's   Cuff Size:   Adult Regular Adult Regular   Pulse: 63 77 94 82   Resp: 20 18 18 18   Temp: 98.8  F (37.1  C) 97.6  F (36.4  C) 97.7  F (36.5  C)    TempSrc: Oral Oral Oral    SpO2: 97% 96% 98% 96%       Focused assessment within last 30 minutes:    A&Ox4. VSS. C/o 7/10 pain. PRN PO Dilaudid given. Pt stated symptoms improved to 5/10 pain. C/o nausea. PRN IV Zofran given. Pt stated symptoms improved.     ED Boarding Nurse name: Aixa Padilla RN

## 2024-01-16 NOTE — PROVIDER NOTIFICATION
Pt reporting that she has been getting migraines with remicade infusions. At her next outpatient infusion, they were going to prescribe some pre-meds to help mitigate those migraines. Pt hoping those can be ordered inpatient before her dose of remicade tomorrow. Called [facility] to inquire what those medications were. Spoke with Petrona LOPEZ who said the pt was to get 10mg claritin the night before and the day of the infusion, along with 40mg IV SoluMedrol before the remicade to decrease risk of migraines.     Reema Romero paged [phone] to relay this information and hopefully get premeds ordered before remicade infusion. Awaiting call back.

## 2024-01-16 NOTE — PLAN OF CARE
[facility]    ED Boarding Nurse Handoff Addendum Report:    Date/time: 1/16/2024, 6:41 AM    Activity Level: independent    Fall Risk: No    Active Infusions: NS @ 100 mL    Current Meds Due: 0800 med due    Current care needs: pain management, fluid maintenance    Oxygen requirements (liters/min and/or FiO2): no    Respiratory status: Room air    Vital signs (within last 30 minutes):    Vitals:    01/15/24 2102 01/15/24 2200 01/16/24 0048 01/16/24 0620   BP: 137/84 123/76 131/83 119/78   BP Location:   Right arm Right arm   Pulse: 76 75 63 77   Resp:   20 18   Temp:   98.8  F (37.1  C) 97.6  F (36.4  C)   TempSrc:   Oral Oral   SpO2: 98% 93% 97% 96%       Focused assessment within last 30 minutes:    A&Ox4. VSS. Pt reports 8/10 pain, IV PRN dilaudid given. Pt reports of nausea, PRN IV zofran was given. Pt did decline tylenol as she already received dilaudid. Meds given via IV has been causing pain/ burning feeling to pt's veins. Oral medications should just be given. IV meds should be pushed very slow.     ED Boarding Nurse name: Adan Marshall RN    RECEIVING UNIT ED HANDOFF REVIEW    Above ED Nurse Handoff Report was reviewed: Yes  Reviewed by: Nevin Pierre RN on January 16, 2024 at 11:41 AM

## 2024-01-17 PROCEDURE — 96376 TX/PRO/DX INJ SAME DRUG ADON: CPT

## 2024-01-17 PROCEDURE — 99232 SBSQ HOSP IP/OBS MODERATE 35: CPT | Performed by: INTERNAL MEDICINE

## 2024-01-17 PROCEDURE — 96375 TX/PRO/DX INJ NEW DRUG ADDON: CPT

## 2024-01-17 PROCEDURE — 96409 CHEMO IV PUSH SNGL DRUG: CPT

## 2024-01-17 PROCEDURE — 250N000013 HC RX MED GY IP 250 OP 250 PS 637: Performed by: STUDENT IN AN ORGANIZED HEALTH CARE EDUCATION/TRAINING PROGRAM

## 2024-01-17 PROCEDURE — 258N000003 HC RX IP 258 OP 636: Performed by: INTERNAL MEDICINE

## 2024-01-17 PROCEDURE — 250N000013 HC RX MED GY IP 250 OP 250 PS 637: Performed by: HOSPITALIST

## 2024-01-17 PROCEDURE — 250N000011 HC RX IP 250 OP 636: Performed by: HOSPITALIST

## 2024-01-17 PROCEDURE — 250N000011 HC RX IP 250 OP 636: Mod: JZ | Performed by: INTERNAL MEDICINE

## 2024-01-17 PROCEDURE — 250N000013 HC RX MED GY IP 250 OP 250 PS 637: Performed by: INTERNAL MEDICINE

## 2024-01-17 PROCEDURE — 96361 HYDRATE IV INFUSION ADD-ON: CPT

## 2024-01-17 PROCEDURE — 258N000003 HC RX IP 258 OP 636: Performed by: HOSPITALIST

## 2024-01-17 PROCEDURE — G0378 HOSPITAL OBSERVATION PER HR: HCPCS

## 2024-01-17 RX ADMIN — MONTELUKAST 10 MG: 10 TABLET, FILM COATED ORAL at 22:34

## 2024-01-17 RX ADMIN — HYDROMORPHONE HYDROCHLORIDE 2 MG: 2 TABLET ORAL at 07:56

## 2024-01-17 RX ADMIN — HYDROXYZINE HYDROCHLORIDE 25 MG: 25 TABLET, FILM COATED ORAL at 19:41

## 2024-01-17 RX ADMIN — METHYLPREDNISOLONE SODIUM SUCCINATE 62.5 MG: 125 INJECTION, POWDER, FOR SOLUTION INTRAMUSCULAR; INTRAVENOUS at 07:56

## 2024-01-17 RX ADMIN — HYDROMORPHONE HYDROCHLORIDE 0.4 MG: 0.2 INJECTION, SOLUTION INTRAMUSCULAR; INTRAVENOUS; SUBCUTANEOUS at 00:09

## 2024-01-17 RX ADMIN — HYDROMORPHONE HYDROCHLORIDE 2 MG: 2 TABLET ORAL at 14:22

## 2024-01-17 RX ADMIN — HYDROMORPHONE HYDROCHLORIDE 2 MG: 2 TABLET ORAL at 18:27

## 2024-01-17 RX ADMIN — ACETAMINOPHEN 650 MG: 325 TABLET, FILM COATED ORAL at 22:34

## 2024-01-17 RX ADMIN — TRAZODONE HYDROCHLORIDE 200 MG: 50 TABLET ORAL at 22:34

## 2024-01-17 RX ADMIN — NORTRIPTYLINE HYDROCHLORIDE 25 MG: 25 CAPSULE ORAL at 22:34

## 2024-01-17 RX ADMIN — LORATADINE 10 MG: 10 TABLET ORAL at 07:56

## 2024-01-17 RX ADMIN — ONDANSETRON 4 MG: 4 TABLET, ORALLY DISINTEGRATING ORAL at 14:25

## 2024-01-17 RX ADMIN — PANTOPRAZOLE SODIUM 40 MG: 40 TABLET, DELAYED RELEASE ORAL at 07:56

## 2024-01-17 RX ADMIN — HYDROMORPHONE HYDROCHLORIDE 2 MG: 2 TABLET ORAL at 22:34

## 2024-01-17 RX ADMIN — SODIUM CHLORIDE: 9 INJECTION, SOLUTION INTRAVENOUS at 09:25

## 2024-01-17 RX ADMIN — DICYCLOMINE HYDROCHLORIDE 20 MG: 20 TABLET ORAL at 11:49

## 2024-01-17 RX ADMIN — DICYCLOMINE HYDROCHLORIDE 20 MG: 20 TABLET ORAL at 07:56

## 2024-01-17 RX ADMIN — DICYCLOMINE HYDROCHLORIDE 20 MG: 20 TABLET ORAL at 22:34

## 2024-01-17 RX ADMIN — HYDROMORPHONE HYDROCHLORIDE 0.4 MG: 0.2 INJECTION, SOLUTION INTRAMUSCULAR; INTRAVENOUS; SUBCUTANEOUS at 11:46

## 2024-01-17 RX ADMIN — DICYCLOMINE HYDROCHLORIDE 20 MG: 20 TABLET ORAL at 17:00

## 2024-01-17 RX ADMIN — INFLIXIMAB 700 MG: 100 INJECTION, POWDER, LYOPHILIZED, FOR SOLUTION INTRAVENOUS at 09:54

## 2024-01-17 ASSESSMENT — ACTIVITIES OF DAILY LIVING (ADL)
ADLS_ACUITY_SCORE: 20

## 2024-01-17 NOTE — PROGRESS NOTES
GASTROENTEROLOGY PROGRESS NOTE        SUBJECTIVE: Improvement in right-sided abdominal pain.  Some bloating this morning.  No diarrhea.  No hematochezia.  Eating regular diet without symptoms     OBJECTIVE:    BP (!) 140/82   Pulse 86   Temp 97.7  F (36.5  C) (Oral)   Resp 20   Wt 70.5 kg (155 lb 8 oz)   SpO2 97%   BMI 27.55 kg/m    Temp (24hrs), Av.9  F (36.6  C), Min:97.7  F (36.5  C), Max:98  F (36.7  C)    Patient Vitals for the past 72 hrs:   Weight   24 1455 70.5 kg (155 lb 8 oz)        PHYSICAL EXAM     Constitutional: No distress, resting comfortably    Abdomen: Soft, minimal right lower quadrant abdominal discomfort, no rebound or guarding         Additional Comments:  ROS, FH, SH: See initial GI consult for details.     I have reviewed the patient's new clinical lab results:     Recent Labs   Lab Test 24  0816 01/15/24  1823 23  1505   WBC 6.0 7.0 4.5   HGB 12.6 13.4 13.2   MCV 91 92 85    223 215     Recent Labs   Lab Test 24  0816 01/15/24  1823 23  1505   POTASSIUM 4.5 4.2 4.3   CHLORIDE 107 104 105   CO2 24 25 23   BUN 7.1* 7.8* 6.8*   ANIONGAP 8 11 11     Recent Labs   Lab Test 24  0816 01/15/24  1823 01/15/24  1801 23  1726 23  1505 10/09/23  1750 10/09/23  1737 23  1717 23  1608   ALBUMIN 3.9 4.5  --   --  4.2  --  4.7  --  3.9   BILITOTAL 0.6 0.7  --   --  0.4  --  0.4  --  0.3   ALT 11 15  --   --  28  --  15  --  28   AST 16 15  --   --  28  --  24  --  29   PROTEIN  --   --  Negative Negative  --  Negative  --    < >  --    LIPASE  --   --   --   --   --   --  27  --  56    < > = values in this interval not displayed.       ASSESSMENT/ PLAN  Devi Salas is a 63 year old female with stricturing ileal crohn's disease diagnosed in May 2023, s/p ileocecal resection for TI structure in 2023, currently on infliximab treatment who  presents to the emergency room with ongoing right-sided abdominal pain and loose  stool.     1.  Right lower quadrant abdominal pain with loose stool: Patient's been struggling with abdominal pain for the past few months.  She has found relief with higher doses of prednisone, higher dose of infliximab, and with use of dicyclomine.  CT scan of her abdomen and pelvis does not reveal any obstruction or inflammatory change.  CRP is normal.  Enterography just completed yesterday does not show any small bowel or colonic inflammation.  No stricturing or evidence of bowel obstruction.    --Given clinical picture of breakthrough symptoms at week 5-6 post Remicade, gave dose of Remicade today with plans for approval of Remicade every 6 weeks.  [facility] will contact the patient once approved to schedule infusions.    -- Patient does have GI follow-up in 3 weeks with IBD MD in [place]    Total time: 35 minutes of total time was spent providing patient care, including patient evaluation, reviewing documentation/test results, and .     Discussed with Dr. Barber Romero PA-C  [facility] ( [facility])

## 2024-01-17 NOTE — PROGRESS NOTES
.Hospitalist Medicine Progress Note   [facility]       Devi Salas is a 63 year old lady with history of Crohn's disease s/p ileocecal resection in July 2023 on chronic treatment with Remicade and steroids which are being tapered, renal cell carcinoma s/p right nephrectomy 2009, appendicectomy, cholecystectomy who came to [facility] 1/15/2024 with symptoms of diarrhea, abdominal pain, nausea and vomiting despite aggressive outpatient management she was getting worse for the past 10 days.  At admission her temperature was 98.9  F pulse 76 blood pressure 137/84 WBC 7000 hemoglobin 13.4 platelet count 223,000 sodium 140 potassium 4.2 chloride 104 bicarb 25 BUN 7.8 creatinine 1.03 CRP less than 3 lactic acid 1.5 CT abdomen and pelvis did not show any acute findings.  Patient was evaluated by gastroenterology.  Dr. Blandon felt that the patient symptoms very beginning at 7 weeks after Remicade and therefore Remicade infusion was given in the hospital with a change plan to give it every 6 weeks as outpatient.  MR enterography showed Crohn disease status post ileocecectomy. No imaging signs of active inflammation.  Patient's diet was advanced as tolerated       Date of Admission:  1/15/2024  Assessment & Plan     Crohn's disease  Stricturing ileal Crohn's disease diagnosed in May 2023, underwent ileal cecal resection for stricture in July 2023.  She is currently on Remicade and prednisone, seems to get symptoms several weeks before Remicade due. Per GI, increasing frequency of Remicade and if no response, they will consider another biologic.   -remicade infusion was given this AM  with solumedrol prior to infusion  -continue pain control and advance diet as tolerated            Plan:   Check CRP in a.m.  Discharge 1/18/2024    Diet: Regular Diet Adult    DVT Prophylaxis: Pneumatic Compression Devices  Monae Catheter: Not present  Code Status: Full Code               The patient's  care was discussed with the Patient .    Josh Bruce MD  Hospitalist Service  [facility]    ______________________________________________________________________    Interval History     Symptoms   Patient says that the abdominal pain was improved but after pressing on it this has increased to 10/10    Review of Systems:   She feels that she needs to go to the bathroom at the time of my examination    Data reviewed today: I reviewed all medications, new labs and imaging results over the last 24 hours.     Physical Exam   Vital Signs: Temp: 97.7  F (36.5  C) Temp src: Oral BP: (!) 144/97 Pulse: 96   Resp: 20 SpO2: 96 % O2 Device: None (Room air)    Weight: 155 lbs 8 oz      GENERAL: Patient is not in acute distress  HEENT: EOM+,Conjunctiva is clear   NECK:  no Jugular Venous distention  HEART: S1 S2 regular Rate and Rhythm, there is  no murmur,   LUNGS: Respirations are  not laboured, Lungs are  clear to auscultation without Crepitations or Wheezing   ABDOMEN: Soft , there is minima  tenderness  ,Bowel Sounds are Positive   LOWER LIMBS: no Pedal Edema  Bilaterally   CNS:  Alert,  Oriented x 3, Moving all the Four Limbs     Data   Recent Labs   Lab 01/16/24  0816 01/15/24  1823   WBC 6.0 7.0   HGB 12.6 13.4   MCV 91 92    223    140   POTASSIUM 4.5 4.2   CHLORIDE 107 104   CO2 24 25   BUN 7.1* 7.8*   CR 0.85 1.03*   ANIONGAP 8 11   GIORGIO 8.6* 9.0   * 100*   ALBUMIN 3.9 4.5   PROTTOTAL 6.0* 7.0   BILITOTAL 0.6 0.7   ALKPHOS 64 71   ALT 11 15   AST 16 15         Recent Results (from the past 24 hour(s))   MR Enterography w/o & wContrast    Narrative    EXAM: MRI ENTEROGRAPHY/MRI ABDOMEN AND MRI PELVIS WITHOUT AND WITH CONTRAST  LOCATION: [facility]  DATE: 1/16/2024    INDICATION: History of small bowel Crohn's  COMPARISON: CT 1/15/2024    TECHNIQUE: Routine enterography protocol including imaging of abdomen and pelvis with axial T1 in/out phase, axial T2,  coronal T2. Post contrast abdomen and pelvis axial and coronal thin-section T1 with fat sat. 1 mg Glucagon. Oral VoLumen.  CONTRAST: 7mL Gadavist    FINDINGS:     ENTEROGRAPHY: Post surgical changes from ileocecectomy. No mural thickening, enhancement or stratification of the bowel wall. No strictures or dilatation. No stranding in the adjacent mesenteric fat or engorgement of the vasa recta. No fistulas, abscess,   or obstruction.     The stomach and colon are within normal limits.    ADDITIONAL FINDINGS: No significant abnormality in the liver, spleen, left kidney, pancreas, and adrenal glands where visualized. The right kidney is absent. Cholecystectomy. No adenopathy. Normal pelvis.      Impression    IMPRESSION:  1.  Crohn disease status post ileocecectomy. No imaging signs of active inflammation.    2.  No extraintestinal manifestations.

## 2024-01-17 NOTE — UTILIZATION REVIEW
"[facility] Utilization Review  Admission Status; Secondary Review Determination     Admission Date: 1/15/2024  5:27 PM      Under the authority of the Utilization Management Committee, the utilization review process indicated a secondary review on the above patient.  The review outcome is based on review of the medical records, discussions with staff, and applying clinical experience noted on the date of the review.        (X) Observation Status Appropriate - This patient does not meet hospital inpatient criteria and is placed in observation status. If this patient's primary payer is Medicare and was admitted as an inpatient, Condition Code 44 should be used and patient status changed to \"observation\".   () Observation Status concurrent Review           RATIONALE FOR DETERMINATION   63-year-old female with history of Crohn's disease status post ileocecal resection on chronic treatment with Remicade and steroids, tapered, renal cell carcinoma status post right nephrectomy, appendicectomy, cholecystectomy, admitted with diarrhea, abdominal pain, nausea and vomiting, worsening for the past 10 days, found to have low-grade fevers with normal CRP, CT abdomen pelvis unremarkable. Patient was seen by GI team and recommended to change interval of Remicade, after receiving dose this morning with Solu-Medrol and plan for advancing diet and pain control with plan for discharge tomorrow morning, does not meet criteria for inpatient stay, recommend continue observation status       The severity of illness, intensity of service provided, expected LOS make the care appropriate for observation status at this time.        The information on this document is developed by the utilization review team in order for the business office to ensure compliance.  This only denotes the appropriateness of proper admission status and does not reflect the quality of care rendered.              Sincerely,       Sandy Stevenson, " MD  Physician Advisor  Utilization Review-[place]    Phone: [phone]

## 2024-01-17 NOTE — PLAN OF CARE
Shift: 0700 - 1930  Goal Outcome Evaluation:   Plan of Care reviewed with: patient   Overall patient progress: stable     Vitals: VSS   Pain: PO dilaudid given x3, IV dilaudid given x1 for pain   Notable Events: Pt A&O x4, up ind in room with IV pole. Denies dizziness, lightheadedness, n/v/d. Remicade infusion administered, tolerated well. Pt got up to shower, states she had to sit down on the edge of the tub d/t shaking, reports the source as being from pain. PIV access lost after shower, removed PIV and paged MD regarding need for new one to be placed. Pt ambulated in hallways.   Plan: monitor VS, manage pain/discomfort, promote rest

## 2024-01-17 NOTE — PLAN OF CARE
Goal Outcome Evaluation:    Orientation: Alert and oriented x4  VSS. 95% on RA. afebrile.   LS: clear and equal bilaterally.   GI: denies Passing gas. Loose BMs, intermittently incontinent per pt report. Denies N/V.   : Adequate urine output.   Skin: intact  Activity: Independent. Pt slept comfortably throughout shift.   Pain: 5/10 abdominal pain. Well controlled with IV pain medication. Dilaudid x1.   Updates/Plan: pain control. Plan for Remicade infusion later today. Continue with current cares.

## 2024-01-17 NOTE — PLAN OF CARE
Goal Outcome Evaluation:  1900-2330: Theresa is doing well, up independently. Dilaudid and zofran given x1 for abdominal pain 9/10. Went to MRI at the beginning of the shift, tolerated okay per pt. Ate cream of wheat upon return to the floor. Bedtime meds given, resting comfortably currently. NS@100. Plan: provide for comfort and needs, remicade and solumedrol tomorrow, GI following.

## 2024-01-18 VITALS
WEIGHT: 155.5 LBS | BODY MASS INDEX: 27.55 KG/M2 | HEART RATE: 97 BPM | DIASTOLIC BLOOD PRESSURE: 92 MMHG | OXYGEN SATURATION: 98 % | TEMPERATURE: 97.9 F | RESPIRATION RATE: 16 BRPM | SYSTOLIC BLOOD PRESSURE: 126 MMHG

## 2024-01-18 LAB — CRP SERPL-MCNC: <3 MG/L

## 2024-01-18 PROCEDURE — 250N000013 HC RX MED GY IP 250 OP 250 PS 637: Performed by: INTERNAL MEDICINE

## 2024-01-18 PROCEDURE — G0378 HOSPITAL OBSERVATION PER HR: HCPCS

## 2024-01-18 PROCEDURE — 250N000013 HC RX MED GY IP 250 OP 250 PS 637: Performed by: HOSPITALIST

## 2024-01-18 PROCEDURE — 99239 HOSP IP/OBS DSCHRG MGMT >30: CPT | Performed by: INTERNAL MEDICINE

## 2024-01-18 PROCEDURE — 250N000011 HC RX IP 250 OP 636: Performed by: HOSPITALIST

## 2024-01-18 PROCEDURE — 36415 COLL VENOUS BLD VENIPUNCTURE: CPT | Performed by: INTERNAL MEDICINE

## 2024-01-18 PROCEDURE — 86140 C-REACTIVE PROTEIN: CPT | Performed by: INTERNAL MEDICINE

## 2024-01-18 RX ORDER — SIMETHICONE 80 MG
80 TABLET,CHEWABLE ORAL EVERY 6 HOURS PRN
Qty: 30 TABLET | Refills: 0 | Status: SHIPPED | OUTPATIENT
Start: 2024-01-18

## 2024-01-18 RX ORDER — HYDROXYZINE HYDROCHLORIDE 25 MG/1
25 TABLET, FILM COATED ORAL EVERY 6 HOURS PRN
Qty: 30 TABLET | Refills: 0 | Status: ON HOLD | OUTPATIENT
Start: 2024-01-18 | End: 2024-04-02

## 2024-01-18 RX ORDER — HYDROMORPHONE HYDROCHLORIDE 2 MG/1
2 TABLET ORAL EVERY 4 HOURS PRN
Qty: 12 TABLET | Refills: 0 | Status: ON HOLD | OUTPATIENT
Start: 2024-01-18 | End: 2024-04-02

## 2024-01-18 RX ORDER — SIMETHICONE 80 MG
80 TABLET,CHEWABLE ORAL EVERY 6 HOURS PRN
Status: DISCONTINUED | OUTPATIENT
Start: 2024-01-18 | End: 2024-01-18 | Stop reason: HOSPADM

## 2024-01-18 RX ORDER — DICYCLOMINE HCL 20 MG
20 TABLET ORAL
Qty: 30 TABLET | Refills: 0 | Status: SHIPPED | OUTPATIENT
Start: 2024-01-18

## 2024-01-18 RX ADMIN — HYDROMORPHONE HYDROCHLORIDE 1 MG: 2 TABLET ORAL at 12:33

## 2024-01-18 RX ADMIN — DICYCLOMINE HYDROCHLORIDE 20 MG: 20 TABLET ORAL at 12:33

## 2024-01-18 RX ADMIN — HYDROMORPHONE HYDROCHLORIDE 1 MG: 2 TABLET ORAL at 12:42

## 2024-01-18 RX ADMIN — ONDANSETRON 4 MG: 4 TABLET, ORALLY DISINTEGRATING ORAL at 08:44

## 2024-01-18 RX ADMIN — HYDROXYZINE HYDROCHLORIDE 25 MG: 25 TABLET, FILM COATED ORAL at 02:20

## 2024-01-18 RX ADMIN — PANTOPRAZOLE SODIUM 40 MG: 40 TABLET, DELAYED RELEASE ORAL at 08:35

## 2024-01-18 RX ADMIN — HYDROMORPHONE HYDROCHLORIDE 2 MG: 2 TABLET ORAL at 02:20

## 2024-01-18 RX ADMIN — SIMETHICONE 80 MG: 80 TABLET, CHEWABLE ORAL at 08:35

## 2024-01-18 RX ADMIN — SENNOSIDES AND DOCUSATE SODIUM 1 TABLET: 50; 8.6 TABLET ORAL at 08:48

## 2024-01-18 RX ADMIN — DICYCLOMINE HYDROCHLORIDE 20 MG: 20 TABLET ORAL at 08:35

## 2024-01-18 RX ADMIN — HYDROMORPHONE HYDROCHLORIDE 2 MG: 2 TABLET ORAL at 06:37

## 2024-01-18 ASSESSMENT — ACTIVITIES OF DAILY LIVING (ADL)
ADLS_ACUITY_SCORE: 20

## 2024-01-18 NOTE — PROGRESS NOTES
VSS.  Pain controlled with simethicone, ambulation and oral pain medication prior to discharge.  Tolerating regular diet.  Feels full.  Ambulating often in halls.  Oral zofran given; states she is generally nauseated at baseline.  Discharged home to self care.  Discharge instructions given; all questions answered.  Aware of follow up recommendations.

## 2024-01-18 NOTE — PLAN OF CARE
Goal Outcome Evaluation:         Vital Signs: WNL.   Pain/Comfort: Patient rating pain as a 5-8/10. PRN pain medications given per MAR - oxy x3. Heat also applied. Patient encouraged multiple times to call RN if pain gets worse and additional IV pain medications could be given and PIV could be placed. Patient stated an understanding.   Assessment: Abdomen soft, rounded and tender. Patient passing gas.   Diet: patient tolerating PO intake. Drinking well - not much of an appetite.   Output: patient voiding independently. No BMs this shift .   Activity/Ambulation: patient up independently in room. OOB encouraged as tolerated.   Social: patient calm and cooperative.   Plan: Pain control. Monitor VS. Monitor I&O. Encourage PO intake as tolerated. Will continue to monitor and will notify service with any questions or concerns.          Patient reminded multiple time that an PIV could be placed if needed for additional pain medications if pain not adequately controlled with oral pain medications. Patient stated an understanding.

## 2024-01-18 NOTE — PLAN OF CARE
Goal Outcome Evaluation:      Plan of Care Reviewed With: patient    Overall Patient Progress: improvingOverall Patient Progress: improving       VSS.  Pain controlled with po pain medication.  Tolerated OT this am; ambulated in halls and up to chair. Ice to extremity.  Tolerating diet.  CMS intact.  Discharged home with .  Discharge instructions given; all questions answered.  Aware of follow up recommendations.

## 2024-01-18 NOTE — DISCHARGE SUMMARY
[facility]  Hospitalist Discharge Summary      Date of Admission:  1/15/2024  Date of Discharge:  1/18/2024  Discharging Provider: Josh Bruce MD  Discharge Service: Hospitalist Service    Discharge Diagnoses   Crohn's disease with acute exacerbation        Clinically Significant Risk Factors          Follow-ups Needed After Discharge   Follow-up Appointments     Follow-up and recommended labs and tests       Follow up with primary care provider, Jeanette Betancourt, within 7 days for   hospital follow- up.  The following labs/tests are recommended: CRP    Follow-up with gastroenterology as planned.            Discharge Disposition   Discharged to home  Condition at discharge: Good    Hospital Course   Devi Salas is a 63 year old lady with history of Crohn's disease s/p ileocecal resection in July 2023 on chronic treatment with Remicade and steroids which are being tapered, renal cell carcinoma s/p right nephrectomy 2009, appendicectomy, cholecystectomy who came to [facility] 1/15/2024 with symptoms of diarrhea, abdominal pain, nausea and vomiting despite aggressive outpatient management she was getting worse for the past 10 days.  At admission her temperature was 98.9  F pulse 76 blood pressure 137/84 WBC 7000 hemoglobin 13.4 platelet count 223,000 sodium 140 potassium 4.2 chloride 104 bicarb 25 BUN 7.8 creatinine 1.03 CRP less than 3 lactic acid 1.5 CT abdomen and pelvis did not show any acute findings.  Patient was evaluated by gastroenterology.  Dr. Blandon felt that the patient symptoms very beginning at 7 weeks after Remicade and therefore Remicade infusion was given in the hospital with a change plan to give it every 6 weeks as outpatient.  MR enterography showed Crohn disease status post ileocecectomy. No imaging signs of active inflammation.  Patient's diet was advanced as tolerated   She wanted to go home on the day of discharge    Crohn's disease  Stricturing ileal  Crohn's disease diagnosed in May 2023, underwent ileal cecal resection for stricture in July 2023.  She is currently on Remicade and prednisone, seems to get symptoms several weeks before Remicade due. Per GI, increasing frequency of Remicade and if no response, they will consider another biologic.   -remicade infusion was given  with solumedrol prior to infusion  -continue pain control and advance diet as tolerated    Consultations This Hospital Stay   GASTROENTEROLOGY IP CONSULT  CARE MANAGEMENT / SOCIAL WORK IP CONSULT    Code Status   Full Code    Time Spent on this Encounter   I, Josh Bruce MD, personally saw the patient today and spent greater than 30 minutes discharging this patient.       Josh Bruce MD  [facility]  [address] [place] [address]  Phone: [phone]  Fax: [phone]  ______________________________________________________________________    Physical Exam   Vital Signs: Temp: 97.9  F (36.6  C) Temp src: Oral BP: (!) 126/92 Pulse: 97   Resp: 16 SpO2: 98 % O2 Device: None (Room air)    Weight: 155 lbs 8 oz  GENERAL: Patient is not in acute distress  HEENT: EOM+,Conjunctiva is clear   NECK:  no Jugular Venous distention  HEART: S1 S2 regular Rate and Rhythm, there is  no murmur,   LUNGS: Respirations are  not laboured, Lungs are  clear to auscultation without Crepitations or Wheezing   ABDOMEN: Soft , there is minima  tenderness  ,Bowel Sounds are Positive   LOWER LIMBS: no Pedal Edema  Bilaterally   CNS:  Alert,  Oriented x 3, Moving all the Four Limbs        Primary Care Physician   Jeaentte Betancourt    Discharge Orders      Reason for your hospital stay    came to [facility] 1/15/2024 with symptoms of diarrhea, abdominal pain, nausea and vomiting     Follow-up and recommended labs and tests     Follow up with primary care provider, Jeanette Betancourt, within 7 days for hospital follow- up.  The following labs/tests are recommended:  CRP    Follow-up with gastroenterology as planned.     Activity    Your activity upon discharge: activity as tolerated     Diet    Follow this diet upon discharge: Orders Placed This Encounter      Regular Diet Adult       Significant Results and Procedures   Most Recent 3 CBC's:  Recent Labs   Lab Test 01/16/24  0816 01/15/24  1823 11/30/23  1505   WBC 6.0 7.0 4.5   HGB 12.6 13.4 13.2   MCV 91 92 85    223 215     Most Recent 3 BMP's:  Recent Labs   Lab Test 01/16/24  0816 01/15/24  1823 11/30/23  1505    140 139   POTASSIUM 4.5 4.2 4.3   CHLORIDE 107 104 105   CO2 24 25 23   BUN 7.1* 7.8* 6.8*   CR 0.85 1.03* 1.08*   ANIONGAP 8 11 11   GIORGIO 8.6* 9.0 8.9   * 100* 116*     Most Recent 2 LFT's:  Recent Labs   Lab Test 01/16/24  0816 01/15/24  1823   AST 16 15   ALT 11 15   ALKPHOS 64 71   BILITOTAL 0.6 0.7     Most Recent Urinalysis:  Recent Labs   Lab Test 01/15/24  1801   COLOR Straw   APPEARANCE Clear   URINEGLC Negative   URINEBILI Negative   URINEKETONE Negative   SG 1.005   UBLD Negative   URINEPH 5.0   PROTEIN Negative   NITRITE Negative   LEUKEST Negative   RBCU <1   WBCU <1   ,   Results for orders placed or performed during the hospital encounter of 01/15/24   Abd/pelvis CT,  IV  contrast only TRAUMA / AAA    Narrative    EXAM: CT ABDOMEN PELVIS W CONTRAST  LOCATION: [facility]  DATE: 1/15/2024    INDICATION: Abdominal pain, hx of Crohns  COMPARISON: 11/30/2023  TECHNIQUE: CT scan of the abdomen and pelvis was performed following injection of IV contrast. Multiplanar reformats were obtained. Dose reduction techniques were used.  CONTRAST: 76mL Isovue 370    FINDINGS:   LOWER CHEST: Normal.    HEPATOBILIARY: Cholecystectomy.    PANCREAS: Normal.    SPLEEN: Normal size.    ADRENAL GLANDS: Normal.    KIDNEYS/BLADDER: Status post right nephrectomy.    BOWEL: Status post ileocecectomy. No bowel obstruction or inflammatory change.    LYMPH NODES: Normal.    VASCULATURE:  Unremarkable.    PELVIC ORGANS: Hysterectomy    MUSCULOSKELETAL: Unremarkable.      Impression    IMPRESSION:   1.  No acute findings in the abdomen or pelvis.   MR Enterography w/o & wContrast    Narrative    EXAM: MRI ENTEROGRAPHY/MRI ABDOMEN AND MRI PELVIS WITHOUT AND WITH CONTRAST  LOCATION: [facility]  DATE: 1/16/2024    INDICATION: History of small bowel Crohn's  COMPARISON: CT 1/15/2024    TECHNIQUE: Routine enterography protocol including imaging of abdomen and pelvis with axial T1 in/out phase, axial T2, coronal T2. Post contrast abdomen and pelvis axial and coronal thin-section T1 with fat sat. 1 mg Glucagon. Oral VoLumen.  CONTRAST: 7mL Gadavist    FINDINGS:     ENTEROGRAPHY: Post surgical changes from ileocecectomy. No mural thickening, enhancement or stratification of the bowel wall. No strictures or dilatation. No stranding in the adjacent mesenteric fat or engorgement of the vasa recta. No fistulas, abscess,   or obstruction.     The stomach and colon are within normal limits.    ADDITIONAL FINDINGS: No significant abnormality in the liver, spleen, left kidney, pancreas, and adrenal glands where visualized. The right kidney is absent. Cholecystectomy. No adenopathy. Normal pelvis.      Impression    IMPRESSION:  1.  Crohn disease status post ileocecectomy. No imaging signs of active inflammation.    2.  No extraintestinal manifestations.       Discharge Medications   Current Discharge Medication List        START taking these medications    Details   dicyclomine (BENTYL) 20 MG tablet Take 1 tablet (20 mg) by mouth 4 times daily (before meals and nightly)  Qty: 30 tablet, Refills: 0    Associated Diagnoses: Intractable abdominal pain      HYDROmorphone (DILAUDID) 2 MG tablet Take 1 tablet (2 mg) by mouth every 4 hours as needed for severe pain (IF pain not managed with non-pharmacological and non-opioid interventions)  Qty: 12 tablet, Refills: 0    Associated Diagnoses:  Intractable abdominal pain      simethicone (MYLICON) 80 MG chewable tablet Take 1 tablet (80 mg) by mouth every 6 hours as needed for cramping  Qty: 30 tablet, Refills: 0    Associated Diagnoses: Intractable abdominal pain           CONTINUE these medications which have CHANGED    Details   hydrOXYzine HCl (ATARAX) 25 MG tablet Take 1 tablet (25 mg) by mouth every 6 hours as needed for other (adjuvant pain)  Qty: 30 tablet, Refills: 0    Associated Diagnoses: Acute colitis; Crohn's disease with complication, unspecified gastrointestinal tract location (H); Abdominal pain, unspecified abdominal location           CONTINUE these medications which have NOT CHANGED    Details   acetaminophen (TYLENOL) 500 MG tablet Take 1-2 tablets (500-1,000 mg) by mouth every 6 hours as needed for mild pain    Associated Diagnoses: RLQ abdominal pain      acetaminophen (TYLENOL) 650 MG CR tablet Take 650 mg by mouth At Bedtime      albuterol (PROAIR HFA/PROVENTIL HFA/VENTOLIN HFA) 108 (90 Base) MCG/ACT inhaler Inhale 2 puffs into the lungs every 6 hours as needed for shortness of breath, wheezing or cough      Bioflavonoid Products (VITAMIN C) CHEW Take 1 tablet by mouth daily      bisacodyl (DULCOLAX) 10 MG suppository Place 1 suppository (10 mg) rectally 2 times daily as needed for constipation  Qty: 10 suppository, Refills: 1    Associated Diagnoses: RLQ abdominal pain; Partial small bowel obstruction (H)      cholecalciferol (VITAMIN D3) 10 mcg (400 units) TABS tablet Take 10 mcg by mouth daily      cyanocobalamin (CYANOCOBALAMIN) 1000 MCG/ML injection Inject 1,000 mcg as directed every 30 days      esomeprazole (NEXIUM) 40 MG DR capsule Take 40 mg by mouth 2 times daily (before meals) Take 30-60 minutes before eating.      famotidine (PEPCID) 40 MG tablet Take 40 mg by mouth At Bedtime      inFLIXimab (REMICADE IV) Inject 10 mg/kg into the vein once every eight weeks      medical cannabis (Patient's own supply) Take 1 Dose by  mouth See Admin Instructions (The purpose of this order is to document that the patient reports taking medical cannabis.  This is not a prescription, and is not used to certify that the patient has a qualifying medical condition.)  25 MG EDIBLE      methocarbamol (ROBAXIN) 500 MG tablet Take 1 tablet (500 mg) by mouth 4 times daily as needed for muscle spasms  Qty: 15 tablet, Refills: 1    Associated Diagnoses: RLQ abdominal pain; Partial small bowel obstruction (H); IBD (inflammatory bowel disease); Nausea      montelukast (SINGULAIR) 10 MG tablet Take 10 mg by mouth At Bedtime      nortriptyline (PAMELOR) 25 MG capsule Take 25 mg by mouth At Bedtime      ondansetron (ZOFRAN ODT) 4 MG ODT tab Take 1 tablet (4 mg) by mouth every 8 hours as needed for nausea  Qty: 15 tablet, Refills: 2    Associated Diagnoses: IBD (inflammatory bowel disease); Nausea      traZODone (DESYREL) 100 MG tablet Take 200 mg by mouth At Bedtime           STOP taking these medications       dicyclomine (BENTYL) 10 MG capsule Comments:   Reason for Stopping:         diphenhydrAMINE (BENADRYL) 25 MG capsule Comments:   Reason for Stopping:             Allergies   Allergies   Allergen Reactions    Latex Hives    Oxycodone Anaphylaxis, Hives and Swelling     Throat swelling, per pt    Pt tolerates [place]/Vicodin (10/15/2023)    Penicillin G Anaphylaxis    Sumatriptan Palpitations    Aspirin Nausea and Vomiting    Demerol Hcl [Meperidine] Nausea and Vomiting     N/V    Percocet [Oxycodone-Acetaminophen] Nausea and Vomiting     N/V    Codeine Nausea and Vomiting    Mold

## 2024-03-23 ENCOUNTER — HOSPITAL ENCOUNTER (INPATIENT)
Facility: CLINIC | Age: 64
LOS: 10 days | Discharge: HOME | End: 2024-04-02
Attending: EMERGENCY MEDICINE | Admitting: INTERNAL MEDICINE
Payer: COMMERCIAL

## 2024-03-23 ENCOUNTER — APPOINTMENT (OUTPATIENT)
Dept: CT IMAGING | Facility: CLINIC | Age: 64
End: 2024-03-23
Attending: EMERGENCY MEDICINE
Payer: COMMERCIAL

## 2024-03-23 DIAGNOSIS — R11.0 NAUSEA: ICD-10-CM

## 2024-03-23 DIAGNOSIS — R10.84 GENERALIZED ABDOMINAL PAIN: ICD-10-CM

## 2024-03-23 DIAGNOSIS — K50.919 CROHN'S DISEASE WITH COMPLICATION, UNSPECIFIED GASTROINTESTINAL TRACT LOCATION (H): Primary | ICD-10-CM

## 2024-03-23 DIAGNOSIS — K52.9 IBD (INFLAMMATORY BOWEL DISEASE): ICD-10-CM

## 2024-03-23 DIAGNOSIS — K50.919 EXACERBATION OF CROHN'S DISEASE WITH COMPLICATION (H): ICD-10-CM

## 2024-03-23 PROBLEM — K50.90 CROHN DISEASE (H): Status: ACTIVE | Noted: 2024-03-23

## 2024-03-23 LAB
ALBUMIN SERPL BCG-MCNC: 4.7 G/DL (ref 3.5–5.2)
ALBUMIN UR-MCNC: NEGATIVE MG/DL
ALP SERPL-CCNC: 71 U/L (ref 40–150)
ALT SERPL W P-5'-P-CCNC: 13 U/L (ref 0–50)
ANION GAP SERPL CALCULATED.3IONS-SCNC: 12 MMOL/L (ref 7–15)
APPEARANCE UR: CLEAR
AST SERPL W P-5'-P-CCNC: 17 U/L (ref 0–45)
BACTERIA #/AREA URNS HPF: ABNORMAL /HPF
BASOPHILS # BLD AUTO: 0 10E3/UL (ref 0–0.2)
BASOPHILS NFR BLD AUTO: 0 %
BILIRUB SERPL-MCNC: 0.3 MG/DL
BILIRUB UR QL STRIP: NEGATIVE
BUN SERPL-MCNC: 13 MG/DL (ref 8–23)
CALCIUM SERPL-MCNC: 9.6 MG/DL (ref 8.8–10.2)
CHLORIDE SERPL-SCNC: 102 MMOL/L (ref 98–107)
COLOR UR AUTO: ABNORMAL
CREAT SERPL-MCNC: 1.02 MG/DL (ref 0.51–0.95)
CRP SERPL-MCNC: <3 MG/L
DEPRECATED HCO3 PLAS-SCNC: 24 MMOL/L (ref 22–29)
EGFRCR SERPLBLD CKD-EPI 2021: 61 ML/MIN/1.73M2
EOSINOPHIL # BLD AUTO: 0 10E3/UL (ref 0–0.7)
EOSINOPHIL NFR BLD AUTO: 0 %
ERYTHROCYTE [DISTWIDTH] IN BLOOD BY AUTOMATED COUNT: 12.5 % (ref 10–15)
ERYTHROCYTE [SEDIMENTATION RATE] IN BLOOD BY WESTERGREN METHOD: 3 MM/HR (ref 0–30)
GLUCOSE SERPL-MCNC: 111 MG/DL (ref 70–99)
GLUCOSE UR STRIP-MCNC: NEGATIVE MG/DL
HCT VFR BLD AUTO: 41.3 % (ref 35–47)
HGB BLD-MCNC: 13.6 G/DL (ref 11.7–15.7)
HGB UR QL STRIP: NEGATIVE
IMM GRANULOCYTES # BLD: 0.1 10E3/UL
IMM GRANULOCYTES NFR BLD: 1 %
KETONES UR STRIP-MCNC: NEGATIVE MG/DL
LEUKOCYTE ESTERASE UR QL STRIP: NEGATIVE
LYMPHOCYTES # BLD AUTO: 1 10E3/UL (ref 0.8–5.3)
LYMPHOCYTES NFR BLD AUTO: 11 %
MCH RBC QN AUTO: 29.5 PG (ref 26.5–33)
MCHC RBC AUTO-ENTMCNC: 32.9 G/DL (ref 31.5–36.5)
MCV RBC AUTO: 90 FL (ref 78–100)
MONOCYTES # BLD AUTO: 0.3 10E3/UL (ref 0–1.3)
MONOCYTES NFR BLD AUTO: 4 %
NEUTROPHILS # BLD AUTO: 7.5 10E3/UL (ref 1.6–8.3)
NEUTROPHILS NFR BLD AUTO: 84 %
NITRATE UR QL: NEGATIVE
NRBC # BLD AUTO: 0 10E3/UL
NRBC BLD AUTO-RTO: 0 /100
PH UR STRIP: 5.5 [PH] (ref 5–7)
PLATELET # BLD AUTO: 276 10E3/UL (ref 150–450)
POTASSIUM SERPL-SCNC: 4.4 MMOL/L (ref 3.4–5.3)
PROT SERPL-MCNC: 7.3 G/DL (ref 6.4–8.3)
RBC # BLD AUTO: 4.61 10E6/UL (ref 3.8–5.2)
RBC URINE: <1 /HPF
SODIUM SERPL-SCNC: 138 MMOL/L (ref 135–145)
SP GR UR STRIP: 1.01 (ref 1–1.03)
SQUAMOUS EPITHELIAL: <1 /HPF
UROBILINOGEN UR STRIP-MCNC: NORMAL MG/DL
WBC # BLD AUTO: 8.9 10E3/UL (ref 4–11)
WBC URINE: <1 /HPF

## 2024-03-23 PROCEDURE — 36415 COLL VENOUS BLD VENIPUNCTURE: CPT | Performed by: STUDENT IN AN ORGANIZED HEALTH CARE EDUCATION/TRAINING PROGRAM

## 2024-03-23 PROCEDURE — 250N000011 HC RX IP 250 OP 636: Performed by: EMERGENCY MEDICINE

## 2024-03-23 PROCEDURE — 99221 1ST HOSP IP/OBS SF/LOW 40: CPT | Mod: AI | Performed by: STUDENT IN AN ORGANIZED HEALTH CARE EDUCATION/TRAINING PROGRAM

## 2024-03-23 PROCEDURE — 250N000009 HC RX 250: Performed by: EMERGENCY MEDICINE

## 2024-03-23 PROCEDURE — 86140 C-REACTIVE PROTEIN: CPT | Performed by: EMERGENCY MEDICINE

## 2024-03-23 PROCEDURE — 96361 HYDRATE IV INFUSION ADD-ON: CPT

## 2024-03-23 PROCEDURE — 96375 TX/PRO/DX INJ NEW DRUG ADDON: CPT

## 2024-03-23 PROCEDURE — 96374 THER/PROPH/DIAG INJ IV PUSH: CPT | Mod: 59

## 2024-03-23 PROCEDURE — 85652 RBC SED RATE AUTOMATED: CPT | Performed by: EMERGENCY MEDICINE

## 2024-03-23 PROCEDURE — 81001 URINALYSIS AUTO W/SCOPE: CPT | Performed by: EMERGENCY MEDICINE

## 2024-03-23 PROCEDURE — 86645 CMV ANTIBODY IGM: CPT | Performed by: STUDENT IN AN ORGANIZED HEALTH CARE EDUCATION/TRAINING PROGRAM

## 2024-03-23 PROCEDURE — 74177 CT ABD & PELVIS W/CONTRAST: CPT

## 2024-03-23 PROCEDURE — 250N000013 HC RX MED GY IP 250 OP 250 PS 637: Performed by: STUDENT IN AN ORGANIZED HEALTH CARE EDUCATION/TRAINING PROGRAM

## 2024-03-23 PROCEDURE — 258N000003 HC RX IP 258 OP 636: Performed by: EMERGENCY MEDICINE

## 2024-03-23 PROCEDURE — 36415 COLL VENOUS BLD VENIPUNCTURE: CPT | Performed by: EMERGENCY MEDICINE

## 2024-03-23 PROCEDURE — 258N000003 HC RX IP 258 OP 636: Performed by: INTERNAL MEDICINE

## 2024-03-23 PROCEDURE — 99285 EMERGENCY DEPT VISIT HI MDM: CPT | Mod: 25

## 2024-03-23 PROCEDURE — 86644 CMV ANTIBODY: CPT | Performed by: STUDENT IN AN ORGANIZED HEALTH CARE EDUCATION/TRAINING PROGRAM

## 2024-03-23 PROCEDURE — 250N000011 HC RX IP 250 OP 636: Performed by: STUDENT IN AN ORGANIZED HEALTH CARE EDUCATION/TRAINING PROGRAM

## 2024-03-23 PROCEDURE — 120N000004 HC R&B MS OVERFLOW

## 2024-03-23 PROCEDURE — 80053 COMPREHEN METABOLIC PANEL: CPT | Performed by: EMERGENCY MEDICINE

## 2024-03-23 PROCEDURE — 85025 COMPLETE CBC W/AUTO DIFF WBC: CPT | Performed by: EMERGENCY MEDICINE

## 2024-03-23 RX ORDER — CALCIUM CARBONATE 500 MG/1
1000 TABLET, CHEWABLE ORAL 4 TIMES DAILY PRN
Status: DISCONTINUED | OUTPATIENT
Start: 2024-03-23 | End: 2024-04-02 | Stop reason: HOSPADM

## 2024-03-23 RX ORDER — NALOXONE HYDROCHLORIDE 0.4 MG/ML
0.2 INJECTION, SOLUTION INTRAMUSCULAR; INTRAVENOUS; SUBCUTANEOUS
Status: DISCONTINUED | OUTPATIENT
Start: 2024-03-23 | End: 2024-04-02 | Stop reason: HOSPADM

## 2024-03-23 RX ORDER — DICYCLOMINE HCL 20 MG
20 TABLET ORAL
Status: DISCONTINUED | OUTPATIENT
Start: 2024-03-23 | End: 2024-03-27

## 2024-03-23 RX ORDER — METHYLPREDNISOLONE SODIUM SUCCINATE 40 MG/ML
20 INJECTION, POWDER, LYOPHILIZED, FOR SOLUTION INTRAMUSCULAR; INTRAVENOUS ONCE
Status: DISCONTINUED | OUTPATIENT
Start: 2024-03-23 | End: 2024-03-23

## 2024-03-23 RX ORDER — NALOXONE HYDROCHLORIDE 0.4 MG/ML
0.4 INJECTION, SOLUTION INTRAMUSCULAR; INTRAVENOUS; SUBCUTANEOUS
Status: DISCONTINUED | OUTPATIENT
Start: 2024-03-23 | End: 2024-04-02 | Stop reason: HOSPADM

## 2024-03-23 RX ORDER — ONDANSETRON 2 MG/ML
4 INJECTION INTRAMUSCULAR; INTRAVENOUS EVERY 6 HOURS PRN
Status: DISCONTINUED | OUTPATIENT
Start: 2024-03-23 | End: 2024-04-02 | Stop reason: HOSPADM

## 2024-03-23 RX ORDER — TRAZODONE HYDROCHLORIDE 50 MG/1
200 TABLET, FILM COATED ORAL AT BEDTIME
Status: DISCONTINUED | OUTPATIENT
Start: 2024-03-23 | End: 2024-04-02 | Stop reason: HOSPADM

## 2024-03-23 RX ORDER — ACETAMINOPHEN 325 MG/1
650 TABLET ORAL EVERY 4 HOURS PRN
Status: DISCONTINUED | OUTPATIENT
Start: 2024-03-23 | End: 2024-04-02 | Stop reason: HOSPADM

## 2024-03-23 RX ORDER — MULTIVIT WITH MINERALS/LUTEIN
1000 TABLET ORAL DAILY
COMMUNITY

## 2024-03-23 RX ORDER — MORPHINE SULFATE 4 MG/ML
4 INJECTION, SOLUTION INTRAMUSCULAR; INTRAVENOUS
Status: DISCONTINUED | OUTPATIENT
Start: 2024-03-23 | End: 2024-03-23

## 2024-03-23 RX ORDER — FAMOTIDINE 20 MG/1
40 TABLET, FILM COATED ORAL AT BEDTIME
Status: DISCONTINUED | OUTPATIENT
Start: 2024-03-23 | End: 2024-03-25

## 2024-03-23 RX ORDER — AMOXICILLIN 250 MG
2 CAPSULE ORAL 2 TIMES DAILY PRN
Status: DISCONTINUED | OUTPATIENT
Start: 2024-03-23 | End: 2024-04-02 | Stop reason: HOSPADM

## 2024-03-23 RX ORDER — AMOXICILLIN 250 MG
1 CAPSULE ORAL 2 TIMES DAILY PRN
Status: DISCONTINUED | OUTPATIENT
Start: 2024-03-23 | End: 2024-04-02 | Stop reason: HOSPADM

## 2024-03-23 RX ORDER — PANTOPRAZOLE SODIUM 40 MG/1
40 TABLET, DELAYED RELEASE ORAL
Status: DISCONTINUED | OUTPATIENT
Start: 2024-03-24 | End: 2024-04-01

## 2024-03-23 RX ORDER — HYDROMORPHONE HCL IN WATER/PF 6 MG/30 ML
.2-.4 PATIENT CONTROLLED ANALGESIA SYRINGE INTRAVENOUS
Status: DISCONTINUED | OUTPATIENT
Start: 2024-03-23 | End: 2024-03-24

## 2024-03-23 RX ORDER — NORTRIPTYLINE HCL 25 MG
25 CAPSULE ORAL AT BEDTIME
Status: DISCONTINUED | OUTPATIENT
Start: 2024-03-23 | End: 2024-04-02 | Stop reason: HOSPADM

## 2024-03-23 RX ORDER — SIMETHICONE 80 MG
80 TABLET,CHEWABLE ORAL EVERY 6 HOURS PRN
Status: DISCONTINUED | OUTPATIENT
Start: 2024-03-23 | End: 2024-04-02 | Stop reason: HOSPADM

## 2024-03-23 RX ORDER — LIDOCAINE 40 MG/G
CREAM TOPICAL
Status: DISCONTINUED | OUTPATIENT
Start: 2024-03-23 | End: 2024-04-02 | Stop reason: HOSPADM

## 2024-03-23 RX ORDER — PREDNISONE 5 MG/1
TABLET ORAL DAILY
Status: ON HOLD | COMMUNITY
End: 2024-06-25

## 2024-03-23 RX ORDER — METHYLPREDNISOLONE SODIUM SUCCINATE 40 MG/ML
20 INJECTION, POWDER, LYOPHILIZED, FOR SOLUTION INTRAMUSCULAR; INTRAVENOUS EVERY 12 HOURS
Status: DISCONTINUED | OUTPATIENT
Start: 2024-03-23 | End: 2024-03-26

## 2024-03-23 RX ORDER — SODIUM CHLORIDE 9 MG/ML
INJECTION, SOLUTION INTRAVENOUS CONTINUOUS
Status: DISCONTINUED | OUTPATIENT
Start: 2024-03-23 | End: 2024-03-25

## 2024-03-23 RX ORDER — IOPAMIDOL 755 MG/ML
500 INJECTION, SOLUTION INTRAVASCULAR ONCE
Status: COMPLETED | OUTPATIENT
Start: 2024-03-23 | End: 2024-03-23

## 2024-03-23 RX ORDER — ONDANSETRON 2 MG/ML
4 INJECTION INTRAMUSCULAR; INTRAVENOUS EVERY 30 MIN PRN
Status: DISCONTINUED | OUTPATIENT
Start: 2024-03-23 | End: 2024-03-23

## 2024-03-23 RX ORDER — ACETAMINOPHEN 650 MG/1
650 SUPPOSITORY RECTAL EVERY 4 HOURS PRN
Status: DISCONTINUED | OUTPATIENT
Start: 2024-03-23 | End: 2024-04-02 | Stop reason: HOSPADM

## 2024-03-23 RX ORDER — MONTELUKAST SODIUM 10 MG/1
10 TABLET ORAL AT BEDTIME
Status: DISCONTINUED | OUTPATIENT
Start: 2024-03-23 | End: 2024-04-02 | Stop reason: HOSPADM

## 2024-03-23 RX ORDER — VITAMIN B COMPLEX
25 TABLET ORAL DAILY
COMMUNITY

## 2024-03-23 RX ORDER — ONDANSETRON 4 MG/1
4 TABLET, ORALLY DISINTEGRATING ORAL EVERY 6 HOURS PRN
Status: DISCONTINUED | OUTPATIENT
Start: 2024-03-23 | End: 2024-04-02 | Stop reason: HOSPADM

## 2024-03-23 RX ADMIN — SODIUM CHLORIDE, POTASSIUM CHLORIDE, SODIUM LACTATE AND CALCIUM CHLORIDE 1000 ML: 600; 310; 30; 20 INJECTION, SOLUTION INTRAVENOUS at 15:33

## 2024-03-23 RX ADMIN — METHYLPREDNISOLONE SODIUM SUCCINATE 20 MG: 40 INJECTION, POWDER, FOR SOLUTION INTRAMUSCULAR; INTRAVENOUS at 19:07

## 2024-03-23 RX ADMIN — ONDANSETRON 4 MG: 2 INJECTION INTRAMUSCULAR; INTRAVENOUS at 15:33

## 2024-03-23 RX ADMIN — NORTRIPTYLINE HYDROCHLORIDE 25 MG: 25 CAPSULE ORAL at 22:57

## 2024-03-23 RX ADMIN — ONDANSETRON 4 MG: 4 TABLET, ORALLY DISINTEGRATING ORAL at 20:08

## 2024-03-23 RX ADMIN — HYDROMORPHONE HYDROCHLORIDE 0.2 MG: 0.2 INJECTION, SOLUTION INTRAMUSCULAR; INTRAVENOUS; SUBCUTANEOUS at 19:48

## 2024-03-23 RX ADMIN — FAMOTIDINE 40 MG: 20 TABLET ORAL at 22:57

## 2024-03-23 RX ADMIN — TRAZODONE HYDROCHLORIDE 200 MG: 50 TABLET ORAL at 22:58

## 2024-03-23 RX ADMIN — MONTELUKAST 10 MG: 10 TABLET, FILM COATED ORAL at 22:57

## 2024-03-23 RX ADMIN — MORPHINE SULFATE 4 MG: 4 INJECTION, SOLUTION INTRAMUSCULAR; INTRAVENOUS at 17:50

## 2024-03-23 RX ADMIN — DICYCLOMINE HYDROCHLORIDE 20 MG: 20 TABLET ORAL at 22:57

## 2024-03-23 RX ADMIN — HYDROMORPHONE HYDROCHLORIDE 0.4 MG: 0.2 INJECTION, SOLUTION INTRAMUSCULAR; INTRAVENOUS; SUBCUTANEOUS at 23:05

## 2024-03-23 RX ADMIN — MORPHINE SULFATE 4 MG: 4 INJECTION, SOLUTION INTRAMUSCULAR; INTRAVENOUS at 15:33

## 2024-03-23 RX ADMIN — SODIUM CHLORIDE: 9 INJECTION, SOLUTION INTRAVENOUS at 20:59

## 2024-03-23 RX ADMIN — SODIUM CHLORIDE 58 ML: 9 INJECTION, SOLUTION INTRAVENOUS at 16:05

## 2024-03-23 RX ADMIN — IOPAMIDOL 78 ML: 755 INJECTION, SOLUTION INTRAVENOUS at 16:05

## 2024-03-23 ASSESSMENT — COLUMBIA-SUICIDE SEVERITY RATING SCALE - C-SSRS
1. IN THE PAST MONTH, HAVE YOU WISHED YOU WERE DEAD OR WISHED YOU COULD GO TO SLEEP AND NOT WAKE UP?: NO
6. HAVE YOU EVER DONE ANYTHING, STARTED TO DO ANYTHING, OR PREPARED TO DO ANYTHING TO END YOUR LIFE?: NO
2. HAVE YOU ACTUALLY HAD ANY THOUGHTS OF KILLING YOURSELF IN THE PAST MONTH?: NO

## 2024-03-23 ASSESSMENT — ACTIVITIES OF DAILY LIVING (ADL)
ADLS_ACUITY_SCORE: 37
ADLS_ACUITY_SCORE: 35
ADLS_ACUITY_SCORE: 37

## 2024-03-23 NOTE — H&P
[facility]    History and Physical - Hospitalist Service       Date of Admission:  3/23/2024    Assessment & Plan      Devi Salas is a 63 year old female with past medical history significant for Chron's disease who presented to [facility] on 3/23/2024 with 1 week history of profuse diarrhea.    Acute Exacerbation of Chron's Disease  She is now s/p ileocolic resection and has been maintained on Remicade infusions. Unfortunately, she did not initially respond to 8-week spacing of remicade and this was increased in frequency to every 6 weeks. She now presents with 1 week of profuse diarrhea not improved with oral steroids. CT abdomen/pelvis with mild wall thickening and mucosal hyperenhancement throughout the colon. She recently had a Cdiff test through [facility], but will repeat enteric panel here to rule out infectious colitis; she has no prior history of C.diff. Suspect this is Chron's flare and will treat with solumedrol for now.  -[facility] consulted, appreciate recommendations  -Solumedrol 20mg IV Q12H  -Enteric panel pending  -CMV IgM/IgG pending    Acute Kidney Injury  Creatinine 1.02 on presentation, increased from prior baseline 0.9-0.85. Suspect due to mild hypovolemia in setting of profuse diarrhea. Providing IV fluids, and monitoring.    Hypertension  //85; suspect due to stress and/or mild hypovolemia. Is not on antihypertensives at home. Monitoring.        Diet:  Regular  DVT Prophylaxis: Pneumatic Compression Devices  Monae Catheter: Not present  Lines: None     Cardiac Monitoring: None  Code Status:  Full    Clinically Significant Risk Factors Present on Admission                                  Disposition Plan      Expected Discharge Date: 03/25/2024                  Wenceslao Garcia MD  Hospitalist Service  [facility]  Securely message with [employer] (more info)  Text page via [employer] Paging/Directory  "    ______________________________________________________________________    Chief Complaint   Diarrhea    History is obtained from the patient    History of Present Illness   Devi Salas is a 63 year old female with past medical history significant for Chron's disease who presented to [facility] on 3/23/2024 with 1 week history of profuse diarrhea.    Hx of Chron's disease s/p ileocolic reseaction. She is being maintained on Remicade infusions, but had frequent flares when her infusions were every 8 weeks. She reports they were recently increased in frequency to every 6 weeks, but notices that her symptoms start to get worse at the 4 week hunter. Patient stated her last infusion was 2/28/2024, and for the past 1 week she has had profuse diarrhea. She stated there is no blood in her stool, but that it is very \"mucousy.\" She has 10-15 bowel movements daily when it is at its worst. Patient had been started on a course of oral steroids by [facility], but stated that it has not helped much. She also reports a Cdiff test that was performed and is negative.    In the emergency department, she was noted to be afebrile and vitally stable. Her laboratory studies were only notable for creatinine of 1.02; the remainder of BMP and CBC were WNL. Urinalysis was bland. CT abdomen/pelvis with mild wall thickening and mucosal hyper-enhancement of the colon. She received 1L LR + 20mg solumedrol, and was admitted for further cares.      Past Medical History    Past Medical History:   Diagnosis Date    Crohn's disease (H)     PONV (postoperative nausea and vomiting)        Past Surgical History   Past Surgical History:   Procedure Laterality Date    APPENDECTOMY      CHOLECYSTECTOMY      COLONOSCOPY N/A 10/13/2023    Procedure: Colonoscopy with biopsies;  Surgeon: Isaiah Mathur MD;  Location:  GI    LAPAROSCOPIC RESECTION ILEOCECAL N/A 07/14/2023    Procedure: Laparoscopic assisted ileocolic resection;  Surgeon: Andrea" Marleny Stokes MD;  Location: [facility] OR    NEPHRECTOMY Right 2009       Prior to Admission Medications   Prior to Admission Medications   Prescriptions Last Dose Informant Patient Reported? Taking?   Bioflavonoid Products (VITAMIN C) CHEW   Yes No   Sig: Take 1 tablet by mouth daily   HYDROmorphone (DILAUDID) 2 MG tablet   No No   Sig: Take 1 tablet (2 mg) by mouth every 4 hours as needed for severe pain (IF pain not managed with non-pharmacological and non-opioid interventions)   acetaminophen (TYLENOL) 500 MG tablet   No No   Sig: Take 1-2 tablets (500-1,000 mg) by mouth every 6 hours as needed for mild pain   acetaminophen (TYLENOL) 650 MG CR tablet   Yes No   Sig: Take 650 mg by mouth At Bedtime   albuterol (PROAIR HFA/PROVENTIL HFA/VENTOLIN HFA) 108 (90 Base) MCG/ACT inhaler   Yes No   Sig: Inhale 2 puffs into the lungs every 6 hours as needed for shortness of breath, wheezing or cough   bisacodyl (DULCOLAX) 10 MG suppository   No No   Sig: Place 1 suppository (10 mg) rectally 2 times daily as needed for constipation   cholecalciferol (VITAMIN D3) 10 mcg (400 units) TABS tablet   Yes No   Sig: Take 10 mcg by mouth daily   cyanocobalamin (CYANOCOBALAMIN) 1000 MCG/ML injection   Yes No   Sig: Inject 1,000 mcg as directed every 30 days   dicyclomine (BENTYL) 20 MG tablet   No No   Sig: Take 1 tablet (20 mg) by mouth 4 times daily (before meals and nightly)   esomeprazole (NEXIUM) 40 MG DR capsule   Yes No   Sig: Take 40 mg by mouth 2 times daily (before meals) Take 30-60 minutes before eating.   famotidine (PEPCID) 40 MG tablet   Yes No   Sig: Take 40 mg by mouth At Bedtime   hydrOXYzine HCl (ATARAX) 25 MG tablet   No No   Sig: Take 1 tablet (25 mg) by mouth every 6 hours as needed for other (adjuvant pain)   inFLIXimab (REMICADE IV)   Yes No   Sig: Inject 10 mg/kg into the vein once every eight weeks   medical cannabis (Patient's own supply)   Yes No   Sig: Take 1 Dose by mouth See Admin Instructions (The purpose of  "this order is to document that the patient reports taking medical cannabis.  This is not a prescription, and is not used to certify that the patient has a qualifying medical condition.)  25 MG EDIBLE   methocarbamol (ROBAXIN) 500 MG tablet   No No   Sig: Take 1 tablet (500 mg) by mouth 4 times daily as needed for muscle spasms   montelukast (SINGULAIR) 10 MG tablet   Yes No   Sig: Take 10 mg by mouth At Bedtime   nortriptyline (PAMELOR) 25 MG capsule   Yes No   Sig: Take 25 mg by mouth At Bedtime   ondansetron (ZOFRAN ODT) 4 MG ODT tab   No No   Sig: Take 1 tablet (4 mg) by mouth every 8 hours as needed for nausea   simethicone (MYLICON) 80 MG chewable tablet   No No   Sig: Take 1 tablet (80 mg) by mouth every 6 hours as needed for cramping   traZODone (DESYREL) 100 MG tablet   Yes No   Sig: Take 200 mg by mouth At Bedtime      Facility-Administered Medications: None        Physical Exam   Temp: 98.5  F (36.9  C) Temp src: Temporal BP: (!) 149/85 Pulse: 99   Resp: 16 SpO2: 99 % O2 Device: None (Room air)        Estimated body mass index is 27.55 kg/m  as calculated from the following:    Height as of 7/24/23: 1.6 m (5' 3\").    Weight as of 1/16/24: 70.5 kg (155 lb 8 oz).    General: Very pleasant female resting comfortably in hospital bed.  Awake, alert, interactive.  at bedside.  HEENT: Normocephalic, atraumatic.  PERRL, EOMI.  Conjunctiva clear, sclerae anicteric.  Mucous membranes moist.  Cardiac: Regular rate and rhythm without murmur, gallop, or rub.  No peripheral edema.  Respiratory: Normal work of breathing.  Clear to auscultation bilaterally without wheezing, rales, or rhonchi.  GI: Normal, active bowel sounds.  Abdomen soft, nontender, nondistended.  : Deferred.  Musculoskeletal: Moving all extremities appropriately.  Skin: No rashes or abrasions on exposed skin.  Neurologic: Alert and oriented x4.  Cranial nerves II through XII grossly intact.  Psychologic: Appropriate mood and " affect.      Medical Decision Making       45 MINUTES SPENT BY ME on the date of service doing chart review, history, exam, documentation & further activities per the note.      Data     I have personally reviewed the following data over the past 24 hrs:    8.9  \   13.6   / 276     138 102 13.0 /  111 (H)   4.4 24 1.02 (H) \     ALT: 13 AST: 17 AP: 71 TBILI: 0.3   ALB: 4.7 TOT PROTEIN: 7.3 LIPASE: N/A     Procal: N/A CRP: <3.00 Lactic Acid: N/A         Imaging results reviewed over the past 24 hrs:   Recent Results (from the past 24 hour(s))   CT Abdomen Pelvis w Contrast    Narrative    EXAM: CT ABDOMEN PELVIS W CONTRAST  LOCATION: [facility]  DATE: 3/23/2024    INDICATION: Severe abd pain and diarrhea  COMPARISON: CT abdomen pelvis, 01/15/2024  TECHNIQUE: CT scan of the abdomen and pelvis was performed following injection of IV contrast. Multiplanar reformats were obtained. Dose reduction techniques were used.  CONTRAST: 78mL Isovue 370    FINDINGS:   LOWER CHEST: Normal.    HEPATOBILIARY: Cholecystectomy.    PANCREAS: Normal.    SPLEEN: 5 mm low dense lesion within the spleen is unchanged. This is nonspecific though likely benign.    ADRENAL GLANDS: Normal.    KIDNEYS/BLADDER: Right nephrectomy.    BOWEL: Surgical changes of a right hemicolectomy. There is mild wall thickening and mucosal hyperenhancement throughout the colon. No bowel obstruction.    LYMPH NODES: Normal.    VASCULATURE: Normal.    PELVIC ORGANS: Hysterectomy.    MUSCULOSKELETAL: Normal.      Impression    IMPRESSION:   1.  Mild wall thickening and mucosal hyperenhancement of the colon. This is suggestive of a mild infectious colitis.

## 2024-03-23 NOTE — ED PROVIDER NOTES
History     Chief Complaint:  Abdominal Pain       The history is provided by the patient.      Devi Salas is a 63 year old female with history of Crohn's disease who presents with abdominal pain. She has been having a Crohn's flare up since the start of the week. She has been having abdominal pain all over her abdomin. She had a stool study that showed inflammation so her GI doctor prescribed her Prednisone which she has been taking but it's not helping. She has been having diarrhea. She had 6 episodes today but has been having over 10 episodes daily over the week. She also has nausea and chills. She called a GI doctor today and was advised to come into the hospital for admission. Denies fever, vomiting, urinary issues, leg swelling, or appetite loss.    Independent Historian:   None - Patient Only    Review of External Notes:   See MDM       Medications:    Albuterol pro-air  Bentyl  Nexium  Pepcid  Dilaudid  Atarax  Robaxin  Singulair  Pamelor  Zofran  Desyrel  Mylicon    Past Medical History:    Crohn's disease  Acute colitis    Past Surgical History:    Appendectomy  Cholecystectomy  Nephrectomy  Laparoscopic resection ileocecal       Physical Exam   Patient Vitals for the past 24 hrs:   BP Temp Temp src Pulse Resp SpO2   03/23/24 1400 (!) 130/91 98.5  F (36.9  C) Temporal 93 16 98 %        Physical Exam    Constitutional: Well developed, uncomfortable nontox appearance  Head: Atraumatic.   Mouth/Throat: Oropharynx is clear and moist.   Neck:  no stridor  Eyes: no scleral icterus  Cardiovascular: RRR, 2+ bilat radial pulses  Pulmonary/Chest: nml resp effort  Abdominal: ND, soft, diffuse tenderness, no rebound or guarding   Ext: Warm, well perfused  Neurological: A&O, symmetric facies, moves ext x4  Skin: Skin is warm and dry.   Psychiatric: Behavior is normal. Thought content normal.   Nursing note and vitals reviewed.      Emergency Department Course     Imaging:  CT Abdomen Pelvis w Contrast   Final  Result   IMPRESSION:    1.  Mild wall thickening and mucosal hyperenhancement of the colon. This is suggestive of a mild infectious colitis.        Laboratory:  Labs Ordered and Resulted from Time of ED Arrival to Time of ED Departure   COMPREHENSIVE METABOLIC PANEL - Abnormal       Result Value    Sodium 138      Potassium 4.4      Carbon Dioxide (CO2) 24      Anion Gap 12      Urea Nitrogen 13.0      Creatinine 1.02 (*)     GFR Estimate 61      Calcium 9.6      Chloride 102      Glucose 111 (*)     Alkaline Phosphatase 71      AST 17      ALT 13      Protein Total 7.3      Albumin 4.7      Bilirubin Total 0.3     ROUTINE UA WITH MICROSCOPIC REFLEX TO CULTURE - Abnormal    Color Urine Straw      Appearance Urine Clear      Glucose Urine Negative      Bilirubin Urine Negative      Ketones Urine Negative      Specific Gravity Urine 1.006      Blood Urine Negative      pH Urine 5.5      Protein Albumin Urine Negative      Urobilinogen Urine Normal      Nitrite Urine Negative      Leukocyte Esterase Urine Negative      Bacteria Urine Few (*)     RBC Urine <1      WBC Urine <1      Squamous Epithelials Urine <1     ERYTHROCYTE SEDIMENTATION RATE AUTO - Normal    Erythrocyte Sedimentation Rate 3     CRP INFLAMMATION - Normal    CRP Inflammation <3.00     CBC WITH PLATELETS AND DIFFERENTIAL    WBC Count 8.9      RBC Count 4.61      Hemoglobin 13.6      Hematocrit 41.3      MCV 90      MCH 29.5      MCHC 32.9      RDW 12.5      Platelet Count 276      % Neutrophils 84      % Lymphocytes 11      % Monocytes 4      % Eosinophils 0      % Basophils 0      % Immature Granulocytes 1      NRBCs per 100 WBC 0      Absolute Neutrophils 7.5      Absolute Lymphocytes 1.0      Absolute Monocytes 0.3      Absolute Eosinophils 0.0      Absolute Basophils 0.0      Absolute Immature Granulocytes 0.1      Absolute NRBCs 0.0     ENTERIC BACTERIA AND VIRUS PANEL BY PCR   C. DIFFICILE TOXIN B PCR WITH REFLEX TO C. DIFFICILE ANTIGEN AND  TOXINS A/B EIA        Procedures       Emergency Department Course & Assessments:    Interventions:  Medications   ondansetron (ZOFRAN) injection 4 mg (4 mg Intravenous $Given 3/23/24 1533)   morphine (PF) injection 4 mg (4 mg Intravenous $Given 3/23/24 1750)   methylPREDNISolone sodium succinate (SOLU-MEDROL) injection 20 mg (has no administration in time range)   lactated ringers BOLUS 1,000 mL (1,000 mLs Intravenous $New Bag 3/23/24 1533)   CT Scan Flush (58 mLs Intravenous $Given 3/23/24 1605)   iopamidol (ISOVUE-370) solution 500 mL (78 mLs Intravenous $Given 3/23/24 1605)        Assessments:  1435 I examined the patient and obtained history as noted above.    Independent Interpretation (X-rays, CTs, rhythm strip):  See MDM    Consultations/Discussion of Management or Tests:  1603 I spoke with Dr. Kelly, [facility], regarding the patient.         Social Determinants of Health affecting care:   See MDM    Disposition:  The patient was admitted to the hospital under the care of Dr. Garcia.     Impression & Plan    CMS Diagnoses: none  MIPS (If applicable):  N/A    Medical Decision Makin year old female presenting w/ abdominal pain, diarrhea     Social determinants affecting patient's health include: No significant social determinants negatively affecting the patient's health     I reviewed medical records from hospital admission on 1/15/2024     DDx includes Crohn's disease, electrolyte abnormality, bowel obstruction although less likely, C. difficile colitis, infectious colitis NOS, abdominal pain NOS.  Doubt atypical ACS, vascular catastrophe given history and physical exam.  Labs significant for minimal creatinine elevation, hemoglobin normal, or WBC normal .  Imaging sig for findings as noted above consistent with colitis.  Interventions as noted above.  Discussed patient with on-call gastroenterology from [facility] recommended admission for further evaluation and IV steroids.  Patient was subsequent admitted to  the hospital service for further evaluation and management.  She was counseled on the results, diagnosis and disposition prior to admission.  She is understanding agreeable plan.      Diagnosis:    ICD-10-CM    1. Exacerbation of Crohn's disease with complication (H)  K50.919       2. Generalized abdominal pain  R10.84            Discharge Medications:  New Prescriptions    No medications on file        Scribe Disclosure:  I, Jewel Douglass, am serving as a scribe at 2:15 PM on 3/23/2024 to document services personally performed by Angel Lorenzo MD based on my observations and the provider's statements to me.     3/23/2024   Angel Lorenzo MD Vaughn, Christopher E, MD  03/23/24 1800

## 2024-03-23 NOTE — ED TRIAGE NOTES
Arrives to ED with Crohn's flare up. Abdominal pain since Tuesday, 6 episodes of diarrhea since this AM. Pt reports nausea and chills. Spoke to GI doctor who told her to come in and get admitted. ABCs intact. A&OX4. Last doses Tylenol 0930. Zofran last night.     Triage Assessment (Adult)       Row Name 03/23/24 0689          Triage Assessment    Airway WDL WDL        Respiratory WDL    Respiratory WDL WDL        Skin Circulation/Temperature WDL    Skin Circulation/Temperature WDL WDL        Cardiac WDL    Cardiac WDL WDL        Peripheral/Neurovascular WDL    Peripheral Neurovascular WDL WDL        Cognitive/Neuro/Behavioral WDL    Cognitive/Neuro/Behavioral WDL WDL

## 2024-03-23 NOTE — ED NOTES
[facility]  ED Nurse Handoff Report    Devi Salas is a 63 year old female   ED Chief complaint: Abdominal Pain  . ED Diagnosis:   Final diagnoses:   Exacerbation of Crohn's disease with complication (H)   Generalized abdominal pain     Allergies:   Allergies   Allergen Reactions    Latex Hives    Oxycodone Anaphylaxis, Hives and Swelling     Throat swelling, per pt    Pt tolerates [place]/Vicodin (10/15/2023)    Penicillin G Anaphylaxis    Sumatriptan Palpitations    Aspirin Nausea and Vomiting    Demerol Hcl [Meperidine] Nausea and Vomiting     N/V    Percocet [Oxycodone-Acetaminophen] Nausea and Vomiting     N/V    Acetaminophen Nausea and Vomiting    Codeine Nausea and Vomiting    Mold        Code Status: Full Code  Activity level - Baseline/Home:  Independent.   Activity Level - Current:   Independent.   Lift room needed: No.   Bariatric: No   Needed: No   Isolation: Yes.   Infection: C-Diff Pending.     Vital Signs:   Vitals:    03/23/24 1400   BP: (!) 130/91   Pulse: 93   Resp: 16   Temp: 98.5  F (36.9  C)   TempSrc: Temporal   SpO2: 98%       Cardiac Rhythm:  ,      Pain level:    Patient confused: No.   Patient Falls Risk: No.   Elimination Status: Has voided   Patient Report - Initial Complaint: . Abdominal pain  Focused Assessment: Devi Salas is a 63 year old female with history of Crohn's disease who presents with abdominal pain. She has been having a Crohn's flare up since the start of the week. She has been having abdominal pain all over her abdomin. She had a stool study that showed inflammation so her GI doctor prescribed her Prednisone which she has been taking but it's not helping. She has been having diarrhea. She had 6 episodes today but has been having over 10 episodes daily over the week. She also has nausea and chills. She called a GI doctor today and was advised to come into the hospital for admission. Denies fever, vomiting, urinary issues, leg swelling, or  appetite loss.    Tests Performed:   Labs Ordered and Resulted from Time of ED Arrival to Time of ED Departure   COMPREHENSIVE METABOLIC PANEL - Abnormal       Result Value    Sodium 138      Potassium 4.4      Carbon Dioxide (CO2) 24      Anion Gap 12      Urea Nitrogen 13.0      Creatinine 1.02 (*)     GFR Estimate 61      Calcium 9.6      Chloride 102      Glucose 111 (*)     Alkaline Phosphatase 71      AST 17      ALT 13      Protein Total 7.3      Albumin 4.7      Bilirubin Total 0.3     ROUTINE UA WITH MICROSCOPIC REFLEX TO CULTURE - Abnormal    Color Urine Straw      Appearance Urine Clear      Glucose Urine Negative      Bilirubin Urine Negative      Ketones Urine Negative      Specific Gravity Urine 1.006      Blood Urine Negative      pH Urine 5.5      Protein Albumin Urine Negative      Urobilinogen Urine Normal      Nitrite Urine Negative      Leukocyte Esterase Urine Negative      Bacteria Urine Few (*)     RBC Urine <1      WBC Urine <1      Squamous Epithelials Urine <1     ERYTHROCYTE SEDIMENTATION RATE AUTO - Normal    Erythrocyte Sedimentation Rate 3     CRP INFLAMMATION - Normal    CRP Inflammation <3.00     CBC WITH PLATELETS AND DIFFERENTIAL    WBC Count 8.9      RBC Count 4.61      Hemoglobin 13.6      Hematocrit 41.3      MCV 90      MCH 29.5      MCHC 32.9      RDW 12.5      Platelet Count 276      % Neutrophils 84      % Lymphocytes 11      % Monocytes 4      % Eosinophils 0      % Basophils 0      % Immature Granulocytes 1      NRBCs per 100 WBC 0      Absolute Neutrophils 7.5      Absolute Lymphocytes 1.0      Absolute Monocytes 0.3      Absolute Eosinophils 0.0      Absolute Basophils 0.0      Absolute Immature Granulocytes 0.1      Absolute NRBCs 0.0     ENTERIC BACTERIA AND VIRUS PANEL BY PCR   C. DIFFICILE TOXIN B PCR WITH REFLEX TO C. DIFFICILE ANTIGEN AND TOXINS A/B EIA     CT Abdomen Pelvis w Contrast   Preliminary Result   IMPRESSION:    1.  Mild wall thickening and mucosal  hyperenhancement of the colon. This is suggestive of a mild infectious colitis.        Treatments provided: See Epic  Family Comments: NA  OBS brochure/video discussed/provided to patient:  N/A  ED Medications:   Medications   ondansetron (ZOFRAN) injection 4 mg (4 mg Intravenous $Given 3/23/24 1533)   morphine (PF) injection 4 mg (4 mg Intravenous $Given 3/23/24 1533)   methylPREDNISolone sodium succinate (SOLU-MEDROL) injection 20 mg (has no administration in time range)   lactated ringers BOLUS 1,000 mL (1,000 mLs Intravenous $New Bag 3/23/24 1533)   CT Scan Flush (58 mLs Intravenous $Given 3/23/24 1605)   iopamidol (ISOVUE-370) solution 500 mL (78 mLs Intravenous $Given 3/23/24 1605)     Drips infusing:  No  For the majority of the shift, the patient's behavior Green.   Interventions performed were NA.    Sepsis treatment initiated: No     Patient tested for COVID 19 prior to admission: NO    ED Nurse Name/Phone Number: Umu Romero RN,   5:21 PM    RECEIVING UNIT ED HANDOFF REVIEW    Above ED Nurse Handoff Report was reviewed: Yes  Reviewed by: Nevin Pierre RN on March 23, 2024 at 5:50 PM   SINDY Pisano called the ED to inform them the note was read: No

## 2024-03-24 LAB
ANION GAP SERPL CALCULATED.3IONS-SCNC: 6 MMOL/L (ref 7–15)
BUN SERPL-MCNC: 12.3 MG/DL (ref 8–23)
CALCIUM SERPL-MCNC: 9 MG/DL (ref 8.8–10.2)
CHLORIDE SERPL-SCNC: 107 MMOL/L (ref 98–107)
CREAT SERPL-MCNC: 0.91 MG/DL (ref 0.51–0.95)
DEPRECATED HCO3 PLAS-SCNC: 29 MMOL/L (ref 22–29)
EGFRCR SERPLBLD CKD-EPI 2021: 70 ML/MIN/1.73M2
ERYTHROCYTE [DISTWIDTH] IN BLOOD BY AUTOMATED COUNT: 12.1 % (ref 10–15)
GLUCOSE SERPL-MCNC: 100 MG/DL (ref 70–99)
HCT VFR BLD AUTO: 38.8 % (ref 35–47)
HGB BLD-MCNC: 12.7 G/DL (ref 11.7–15.7)
MAGNESIUM SERPL-MCNC: 2.3 MG/DL (ref 1.7–2.3)
MCH RBC QN AUTO: 29.4 PG (ref 26.5–33)
MCHC RBC AUTO-ENTMCNC: 32.7 G/DL (ref 31.5–36.5)
MCV RBC AUTO: 90 FL (ref 78–100)
PLATELET # BLD AUTO: 251 10E3/UL (ref 150–450)
POTASSIUM SERPL-SCNC: 4.6 MMOL/L (ref 3.4–5.3)
POTASSIUM SERPL-SCNC: 5.4 MMOL/L (ref 3.4–5.3)
RBC # BLD AUTO: 4.32 10E6/UL (ref 3.8–5.2)
SODIUM SERPL-SCNC: 142 MMOL/L (ref 135–145)
WBC # BLD AUTO: 10.5 10E3/UL (ref 4–11)

## 2024-03-24 PROCEDURE — 250N000011 HC RX IP 250 OP 636: Performed by: INTERNAL MEDICINE

## 2024-03-24 PROCEDURE — 258N000003 HC RX IP 258 OP 636: Performed by: INTERNAL MEDICINE

## 2024-03-24 PROCEDURE — 36415 COLL VENOUS BLD VENIPUNCTURE: CPT | Performed by: STUDENT IN AN ORGANIZED HEALTH CARE EDUCATION/TRAINING PROGRAM

## 2024-03-24 PROCEDURE — 36415 COLL VENOUS BLD VENIPUNCTURE: CPT | Performed by: INTERNAL MEDICINE

## 2024-03-24 PROCEDURE — 85027 COMPLETE CBC AUTOMATED: CPT | Performed by: STUDENT IN AN ORGANIZED HEALTH CARE EDUCATION/TRAINING PROGRAM

## 2024-03-24 PROCEDURE — 250N000013 HC RX MED GY IP 250 OP 250 PS 637: Performed by: INTERNAL MEDICINE

## 2024-03-24 PROCEDURE — 80048 BASIC METABOLIC PNL TOTAL CA: CPT | Performed by: STUDENT IN AN ORGANIZED HEALTH CARE EDUCATION/TRAINING PROGRAM

## 2024-03-24 PROCEDURE — 83735 ASSAY OF MAGNESIUM: CPT | Performed by: STUDENT IN AN ORGANIZED HEALTH CARE EDUCATION/TRAINING PROGRAM

## 2024-03-24 PROCEDURE — G0378 HOSPITAL OBSERVATION PER HR: HCPCS

## 2024-03-24 PROCEDURE — 84132 ASSAY OF SERUM POTASSIUM: CPT | Performed by: INTERNAL MEDICINE

## 2024-03-24 PROCEDURE — 250N000011 HC RX IP 250 OP 636: Performed by: STUDENT IN AN ORGANIZED HEALTH CARE EDUCATION/TRAINING PROGRAM

## 2024-03-24 PROCEDURE — 120N000004 HC R&B MS OVERFLOW

## 2024-03-24 PROCEDURE — 99232 SBSQ HOSP IP/OBS MODERATE 35: CPT | Performed by: INTERNAL MEDICINE

## 2024-03-24 PROCEDURE — 250N000013 HC RX MED GY IP 250 OP 250 PS 637: Performed by: STUDENT IN AN ORGANIZED HEALTH CARE EDUCATION/TRAINING PROGRAM

## 2024-03-24 RX ORDER — KETOROLAC TROMETHAMINE 30 MG/ML
30 INJECTION, SOLUTION INTRAMUSCULAR; INTRAVENOUS EVERY 6 HOURS PRN
Status: DISPENSED | OUTPATIENT
Start: 2024-03-24 | End: 2024-03-29

## 2024-03-24 RX ORDER — HYDROMORPHONE HYDROCHLORIDE 2 MG/1
2 TABLET ORAL
Status: DISCONTINUED | OUTPATIENT
Start: 2024-03-24 | End: 2024-03-31

## 2024-03-24 RX ORDER — HYDROXYZINE HYDROCHLORIDE 25 MG/1
50 TABLET, FILM COATED ORAL EVERY 6 HOURS PRN
Status: DISCONTINUED | OUTPATIENT
Start: 2024-03-24 | End: 2024-04-02 | Stop reason: HOSPADM

## 2024-03-24 RX ORDER — HYDROMORPHONE HYDROCHLORIDE 1 MG/ML
0.5 INJECTION, SOLUTION INTRAMUSCULAR; INTRAVENOUS; SUBCUTANEOUS
Status: DISCONTINUED | OUTPATIENT
Start: 2024-03-24 | End: 2024-04-02 | Stop reason: HOSPADM

## 2024-03-24 RX ORDER — HYDROXYZINE HYDROCHLORIDE 25 MG/1
25 TABLET, FILM COATED ORAL EVERY 6 HOURS PRN
Status: DISCONTINUED | OUTPATIENT
Start: 2024-03-24 | End: 2024-04-02 | Stop reason: HOSPADM

## 2024-03-24 RX ADMIN — DICYCLOMINE HYDROCHLORIDE 20 MG: 20 TABLET ORAL at 12:16

## 2024-03-24 RX ADMIN — HYDROMORPHONE HYDROCHLORIDE 0.5 MG: 1 INJECTION, SOLUTION INTRAMUSCULAR; INTRAVENOUS; SUBCUTANEOUS at 14:42

## 2024-03-24 RX ADMIN — MONTELUKAST 10 MG: 10 TABLET, FILM COATED ORAL at 23:19

## 2024-03-24 RX ADMIN — SODIUM CHLORIDE: 9 INJECTION, SOLUTION INTRAVENOUS at 18:51

## 2024-03-24 RX ADMIN — HYDROMORPHONE HYDROCHLORIDE 0.4 MG: 0.2 INJECTION, SOLUTION INTRAMUSCULAR; INTRAVENOUS; SUBCUTANEOUS at 02:41

## 2024-03-24 RX ADMIN — HYDROMORPHONE HYDROCHLORIDE 0.4 MG: 0.2 INJECTION, SOLUTION INTRAMUSCULAR; INTRAVENOUS; SUBCUTANEOUS at 06:53

## 2024-03-24 RX ADMIN — HYDROMORPHONE HYDROCHLORIDE 0.5 MG: 1 INJECTION, SOLUTION INTRAMUSCULAR; INTRAVENOUS; SUBCUTANEOUS at 23:19

## 2024-03-24 RX ADMIN — METHYLPREDNISOLONE SODIUM SUCCINATE 20 MG: 40 INJECTION, POWDER, FOR SOLUTION INTRAMUSCULAR; INTRAVENOUS at 20:19

## 2024-03-24 RX ADMIN — ONDANSETRON 4 MG: 2 INJECTION INTRAMUSCULAR; INTRAVENOUS at 10:04

## 2024-03-24 RX ADMIN — SODIUM CHLORIDE: 9 INJECTION, SOLUTION INTRAVENOUS at 06:35

## 2024-03-24 RX ADMIN — ONDANSETRON 4 MG: 2 INJECTION INTRAMUSCULAR; INTRAVENOUS at 02:42

## 2024-03-24 RX ADMIN — DICYCLOMINE HYDROCHLORIDE 20 MG: 20 TABLET ORAL at 06:52

## 2024-03-24 RX ADMIN — NORTRIPTYLINE HYDROCHLORIDE 25 MG: 25 CAPSULE ORAL at 23:19

## 2024-03-24 RX ADMIN — METHYLPREDNISOLONE SODIUM SUCCINATE 20 MG: 40 INJECTION, POWDER, FOR SOLUTION INTRAMUSCULAR; INTRAVENOUS at 06:52

## 2024-03-24 RX ADMIN — KETOROLAC TROMETHAMINE 30 MG: 30 INJECTION, SOLUTION INTRAMUSCULAR; INTRAVENOUS at 13:40

## 2024-03-24 RX ADMIN — HYDROMORPHONE HYDROCHLORIDE 0.4 MG: 0.2 INJECTION, SOLUTION INTRAMUSCULAR; INTRAVENOUS; SUBCUTANEOUS at 10:07

## 2024-03-24 RX ADMIN — FAMOTIDINE 40 MG: 20 TABLET ORAL at 23:19

## 2024-03-24 RX ADMIN — HYDROXYZINE HYDROCHLORIDE 25 MG: 25 TABLET, FILM COATED ORAL at 20:18

## 2024-03-24 RX ADMIN — HYDROXYZINE HYDROCHLORIDE 25 MG: 25 TABLET, FILM COATED ORAL at 14:42

## 2024-03-24 RX ADMIN — DICYCLOMINE HYDROCHLORIDE 20 MG: 20 TABLET ORAL at 16:30

## 2024-03-24 RX ADMIN — HYDROMORPHONE HYDROCHLORIDE 0.5 MG: 1 INJECTION, SOLUTION INTRAMUSCULAR; INTRAVENOUS; SUBCUTANEOUS at 20:19

## 2024-03-24 RX ADMIN — PANTOPRAZOLE SODIUM 40 MG: 40 TABLET, DELAYED RELEASE ORAL at 06:52

## 2024-03-24 RX ADMIN — TRAZODONE HYDROCHLORIDE 200 MG: 50 TABLET ORAL at 23:19

## 2024-03-24 RX ADMIN — DICYCLOMINE HYDROCHLORIDE 20 MG: 20 TABLET ORAL at 23:19

## 2024-03-24 ASSESSMENT — ACTIVITIES OF DAILY LIVING (ADL)
ADLS_ACUITY_SCORE: 22

## 2024-03-24 NOTE — PLAN OF CARE
"Goal Outcome Evaluation:           Overall Patient Progress: improvingOverall Patient Progress: improving  Afebrile, VSS, lungs clear, pt passing gas, abdomen rounded, feeling bloated for pt. Pt rating abdominal pain to a 7, describing it as stabbing and feeling like her bowel is twisting. IV dilaudid 0.4mg given  x1 for minimal relief ( pt rating pain at a 5) IV dilaudid 0.5 mg then given with atarax at 1440. Will continue to assess pain. IV toradol given for \"not much relief. IV fluids continue at 100/hr. Pt rolo coffee, ate few bites of solids for breakfast and pain then increased. Pt not sure what to order, no appetite for lunch. Family here to visit x1. Potassium redrawn today and was 4.6. Lab work- CRP in am.          "

## 2024-03-24 NOTE — PLAN OF CARE
Goal Outcome Evaluation:      Plan of Care Reviewed With: patient      VS/Orientation: VSS, AxO x4  Activity: Pt stand-by assist for IV assistance to bathroom  Significant events: Pt reported feeling nauseous after eating dinner and intermittent - gave PRN zofran x2 with relief. Gave Dilaudid x3 - pt reported relief from 7-8/10 to 5/10. No stool noted overnight, abdomen soft and tender, voiding well. L PIV infusing NS @100 mL/hr.   Plan: Monitor VS, manage pain and discomfort, pending enteric and CMV IgM/IgG panel, Solumedrol q12 hrs.

## 2024-03-24 NOTE — PHARMACY-ADMISSION MEDICATION HISTORY
Pharmacist Admission Medication History    Admission medication history is complete. The information provided in this note is only as accurate as the sources available at the time of the update.    Information Source(s): Patient and CareEverywhere/SureScripts via in-person    Pertinent Information: none    Changes made to PTA medication list:  Added: prednisone  Deleted: arthritic tylenol  Changed: robaxin, vit c. Vit d    Allergies reviewed with patient and updates made in EHR: yes    Medication History Completed By: Martin Avelar [facility] 3/23/2024 8:07 PM    PTA Med List   Medication Sig Last Dose    acetaminophen (TYLENOL) 500 MG tablet Take 1-2 tablets (500-1,000 mg) by mouth every 6 hours as needed for mild pain 3/23/2024 at 0900    albuterol (PROAIR HFA/PROVENTIL HFA/VENTOLIN HFA) 108 (90 Base) MCG/ACT inhaler Inhale 2 puffs into the lungs every 6 hours as needed for shortness of breath, wheezing or cough     bisacodyl (DULCOLAX) 10 MG suppository Place 1 suppository (10 mg) rectally 2 times daily as needed for constipation     cyanocobalamin (CYANOCOBALAMIN) 1000 MCG/ML injection Inject 1,000 mcg as directed every 30 days 2/15/2024    dicyclomine (BENTYL) 20 MG tablet Take 1 tablet (20 mg) by mouth 4 times daily (before meals and nightly) 3/23/2024 at x1    esomeprazole (NEXIUM) 40 MG DR capsule Take 40 mg by mouth 2 times daily (before meals) Take 30-60 minutes before eating. 3/23/2024 at x1    famotidine (PEPCID) 40 MG tablet Take 40 mg by mouth At Bedtime 3/22/2024 at hs    inFLIXimab (REMICADE IV) Inject 10 mg/kg into the vein once every eight weeks 2/28/2024    medical cannabis (Patient's own supply) Take 1 Dose by mouth See Admin Instructions (The purpose of this order is to document that the patient reports taking medical cannabis.  This is not a prescription, and is not used to certify that the patient has a qualifying medical condition.)  25 MG EDIBLE     methocarbamol (ROBAXIN) 500 MG tablet Take 1  tablet (500 mg) by mouth 4 times daily as needed for muscle spasms (Patient taking differently: Take 1,000 mg by mouth 2 times daily) 3/23/2024 at x1    montelukast (SINGULAIR) 10 MG tablet Take 10 mg by mouth At Bedtime 3/22/2024    nortriptyline (PAMELOR) 25 MG capsule Take 25 mg by mouth At Bedtime 3/22/2024    ondansetron (ZOFRAN ODT) 4 MG ODT tab Take 1 tablet (4 mg) by mouth every 8 hours as needed for nausea     predniSONE (DELTASONE) 5 MG tablet Take by mouth daily Starting 3/20/24   80 mg dailly for 1 week, then  70 mg dailly for 1 week  60 mg dailly for 1 week  50 mg dailly for 1 week  40 mg dailly for 1 week  30 mg dailly for 1 week  20 mg dailly for 1 week  10 mg dailly for 1 week 3/23/2024 at am 80 mg    simethicone (MYLICON) 80 MG chewable tablet Take 1 tablet (80 mg) by mouth every 6 hours as needed for cramping     traZODone (DESYREL) 100 MG tablet Take 200 mg by mouth At Bedtime 3/22/2024    vitamin C (ASCORBIC ACID) 1000 MG TABS Take 1,000 mg by mouth daily 3/23/2024 at am    Vitamin D3 (CHOLECALCIFEROL) 25 mcg (1000 units) tablet Take 25 mcg by mouth daily 3/23/2024 at am

## 2024-03-24 NOTE — UTILIZATION REVIEW
"  Admission Status; Secondary Review Determination         Under the authority of the Utilization Management Committee, the utilization review process indicated a secondary review on the above patient.  The review outcome is based on review of the medical records, discussions with staff, and applying clinical experience noted on the date of the review.       (x) Observation Status Appropriate - This patient does not meet hospital inpatient criteria and is placed in observation status. If this patient's primary payer is Medicare and was admitted as an inpatient, Condition Code 44 should be used and patient status changed to \"observation\".     RATIONALE FOR DETERMINATION     Patient is a 63-year-old female admitted on 3/23/2024.  Patient came to the ED with abdominal pain.  White count was normal at 8.9.  Patient has a history of Crohn's disease with exacerbations.  She is on Remicade.  CT scan showed wall thickening and mucosal hyper enhancement of the colon suggestive of mild infectious colitis.  Creatinine is stable at 0.91 today and was slightly elevated at 1.02 on admission.  The patient has not had a stool since the day prior to admission.  Patient was placed on IV Solu-Medrol every day 40 mg.  GI consulted and recommended C diff test in addition.  Likely discharge tomorrow if improved.  [place] [facility] [place]am bowel ds, recommend OBS  Dr Ro notified via amcom      MCG criteria for IP below:    Admission is indicated for 1 or more of the following(1)(2)(3)(4)(5)(6):Supporting evidence, suggestions, and alternatives  Hemodynamic instability  Expand Acute severe ulcerative colitis, as indicated by  Severe signs or symptoms (eg, bloody diarrhea) insufficiently responsive to outpatient therapy (eg, requires inpatient treatment such as IV immunosuppression)  Abdominal pain that persists despite observation care  Abdominal abscess  Fistulizing Crohn disease  Toxic megacolon  Intestinal perforation  Signs of " intestinal obstruction (eg, vomiting with inability to tolerate oral intake, abdominal pain and distention, imaging consistent with bowel obstruction)(11)  Peritoneal signs  Severe electrolyte abnormalities requiring inpatient care  Vomiting that is severe or persistent  Dehydration that is severe or persistent  Expand Anemia requiring inpatient admission, as indicated by  Acute kidney injury (stage 2)  Acute renal failure (stage 3 acute kidney injury)  Bacteremia (if blood cultures performed)    The severity of illness, intensity of service provided, expected LOS and risk for adverse outcome make the care complex, high risk and appropriate for hospital admission.        The information on this document is developed by the utilization review team in order for the business office to ensure compliance.  This only denotes the appropriateness of proper admission status and does not reflect the quality of care rendered.         The definitions of Inpatient Status and Observation Status used in making the determination above are those provided in the CMS Coverage Manual, Chapter 1 and Chapter 6, section 70.4.      Sincerely,     Sukh Green MD  Physician Advisor  Utilization Review/ Case Management  [facility].

## 2024-03-24 NOTE — PROVIDER NOTIFICATION
"Paged provider \"240 KW is wondering if she could have continuous fluids overnight, as she states that she feels dehydrated and that she feels better when they are running.\"    Paged provider \"She also is concerned that a few of her important home meds are not ordered, including Singulair, Trazadone, and Nortriptyline; she also stated she is overdue for her Vitamin B12 shot and would like that ordered as well.\"    Received order for home meds and IV fluids.  "

## 2024-03-24 NOTE — PROGRESS NOTES
[facility]    Hospitalist Progress Note  Name: Devi Salas    MRN: [id number]  Provider:  Ko Ro DO  Date of Service: 03/24/2024    Summary of Stay: Devi Salas is a 63 year old female with a history of Crohn's disease admitted on 3/23/2024 with abdominal pain.  In the emergency department, the patient found to have a BUN/creatinine 13.0/1.02, glucose 111, WBC 8.9.  Urinalysis was negative for signs of infection.  CT abdomen and pelvis showed mild wall thickening and mucosal hyperenhancement of the colon suggestive of mild infectious colitis.  The patient was started on IV Solu-Medrol with concern for Crohn's flare.  Gastroenterology was consulted to see the patient.    TODAY'S PLAN:  Continue IV solumedrol and appreciate GI recommendations.  Doubt infectious colitis.  Has not had a BM yet today, but will try to get stool sample for cdif pcr and enteric panel.  Continue pain control and added non-narcotic options to avoid any motility issues caused by narcotics.  Monitor symptoms throughout the day today.  Repeat potassium later today.    Problem List:   Acute Intractable Abdominal Pain  Crohn's Flare  - Appreciate GI recommendations  - On infliximab, last dose was 2/28.  Outpatient GI has been increasing frequency and dose of infliximab as her symptoms improve initially but only last about 1.5 weeks  - IV solumedrol  - Enteric panel and cdif pcr pending  - Pain control  - CMV IgM/IgG pending  - CRP <3 upon arrival.  Trend CRP    Acute Kidney Injury  - Baseline Cr = 0.8-0.9  - IVF  - Daily BMP    Mild Hyperkalemia  - Recheck K at 1400    I spent 48 minutes in reviewing this patient's labs, imaging, medications, medical history.  In addition time was spent interviewing the patient, communicating with family, and medical decision making.     DVT Prophylaxis: Pneumatic Compression Devices  Code Status: Full Code  Diet: Combination Diet Regular Diet Adult    Monae Catheter: Not  present  Disposition: Expected discharge pending improvement in bowel function and abd pain.  Family updated today: No     Interval History   Pt seen and examined.  Pt reports ongoing lower abd and RUQ pain.  No BM yet today.    -Data reviewed today: I personally reviewed all new labs and imaging results over the last 24 hours.     Physical Exam   Temp: 97.5  F (36.4  C) Temp src: Oral BP: 132/80 Pulse: 68   Resp: 16 SpO2: 97 % O2 Device: None (Room air)    Vitals:    03/24/24 0318   Weight: 67.5 kg (148 lb 13 oz)     Vital Signs with Ranges  Temp:  [97.5  F (36.4  C)-98.5  F (36.9  C)] 97.5  F (36.4  C)  Pulse:  [67-99] 68  Resp:  [16] 16  BP: (130-149)/(79-91) 132/80  SpO2:  [96 %-99 %] 97 %  No intake/output data recorded.    GENERAL: No apparent distress. Awake, alert, and fully oriented.  HEENT: Normocephalic, atraumatic. Extraocular movements intact.  CARDIOVASCULAR: Regular rate and rhythm without murmurs or rubs. No S3.  PULMONARY: Clear bilaterally.  GASTROINTESTINAL: Soft,  diffusely tender, non-distended. Bowel sounds hypoactive.   EXTREMITIES: No cyanosis or clubbing. No edema.  NEUROLOGICAL: CN 2-12 grossly intact, no focal neurological deficits.  DERMATOLOGICAL: No rash, ulcer, bruising, nor jaundice.    Medications    sodium chloride 100 mL/hr at 03/24/24 0946      dicyclomine  20 mg Oral 4x Daily AC & HS    famotidine  40 mg Oral At Bedtime    methylPREDNISolone  20 mg Intravenous Q12H    montelukast  10 mg Oral At Bedtime    nortriptyline  25 mg Oral At Bedtime    pantoprazole  40 mg Oral QAM AC    sodium chloride (PF)  3 mL Intracatheter Q8H    traZODone  200 mg Oral At Bedtime     Data     Laboratory:  Recent Labs   Lab 03/24/24  0706 03/23/24  1528   WBC 10.5 8.9   HGB 12.7 13.6   HCT 38.8 41.3   MCV 90 90    276     Recent Labs   Lab 03/24/24  0706 03/23/24  1528    138   POTASSIUM 5.4* 4.4   CHLORIDE 107 102   CO2 29 24   ANIONGAP 6* 12   * 111*   BUN 12.3 13.0   CR 0.91  "1.02*   GFRESTIMATED 70 61   GIORGIO 9.0 9.6     No results for input(s): \"CULT\" in the last 168 hours.    Imaging:  Recent Results (from the past 24 hour(s))   CT Abdomen Pelvis w Contrast    Narrative    EXAM: CT ABDOMEN PELVIS W CONTRAST  LOCATION: [facility]  DATE: 3/23/2024    INDICATION: Severe abd pain and diarrhea  COMPARISON: CT abdomen pelvis, 01/15/2024  TECHNIQUE: CT scan of the abdomen and pelvis was performed following injection of IV contrast. Multiplanar reformats were obtained. Dose reduction techniques were used.  CONTRAST: 78mL Isovue 370    FINDINGS:   LOWER CHEST: Normal.    HEPATOBILIARY: Cholecystectomy.    PANCREAS: Normal.    SPLEEN: 5 mm low dense lesion within the spleen is unchanged. This is nonspecific though likely benign.    ADRENAL GLANDS: Normal.    KIDNEYS/BLADDER: Right nephrectomy.    BOWEL: Surgical changes of a right hemicolectomy. There is mild wall thickening and mucosal hyperenhancement throughout the colon. No bowel obstruction.    LYMPH NODES: Normal.    VASCULATURE: Normal.    PELVIC ORGANS: Hysterectomy.    MUSCULOSKELETAL: Normal.      Impression    IMPRESSION:   1.  Mild wall thickening and mucosal hyperenhancement of the colon. This is suggestive of a mild infectious colitis.         Ko Ro DO  [facility] Hospitalist  [address] [place] [address]  Securely message with [employer] (more info)  Text page via [employer] Paging/Directory   03/24/2024   "

## 2024-03-24 NOTE — CONSULTS
GASTROENTEROLOGY CONSULTATION      Devi Salas  [address] [facility] [address]  63 year old female     Admission Date/Time: 3/23/2024  Primary Care Provider: Jeanette Betancourt     We were asked to see the patient in consultation by Dr. Ro for evaluation of abdominal pain.    IRP:    #1 Crohn's disease with fibrostenotic phenotype s/p ileocecal resection and anastomosis  #2 Abdominal pain with intermittent flareups    CT with no SBO or stricture or fistula. Some luminal content in colon is non-specific. Pt has had no BM since yesterday afternoon.  - continue IV solumedrol 40 every day (or 20 BID).  - if ongoing pain, then would do liquid only diet, otherwise as tolerated low fiber.   - will need remicade trough/antibody drawn before next dose.    - no plan for mucosal eval now but we will continue to monitor her status.  - send C. Diff/path panel if loose stools.    GI team will follow.    Felix Kelly MD   [facility]  [phone]      ________________________________________________________________________        HPI:  Devi Salas is a 63 year old female who was diagnosed with Crohn's disease 2023, needed ileo-cecectomy for fibrostenotic disease, and has been on remicade monotherapy. Last colonoscopy 10/23- no endoscopic disease. Pt has continued to experience diarrhea and lower abd pain. She was admitted yesterday with flare up of pain. After presentation to ED, she has no had any bowel movements. Today, pain is better. Due to ongoing symptoms and low levels, remicade was increased to 10mg/kg 2 doses ago, and d7jxcpm, She is about 3 weeks out from last dose. Admission CT unremarkable.       ROS: A comprehensive ten point review of systems was negative aside from those in mentioned in the HPI.      PAST MEDICAL HISTORY:  Past Medical History:   Diagnosis Date    Crohn's disease (H)     PONV (postoperative nausea and vomiting)      SOCIAL HISTORY:     FAMILY HISTORY:  Family History    Problem Relation Age of Onset    Colon Cancer Niece      ALLERGIES:   Allergies   Allergen Reactions    Latex Hives    Oxycodone Anaphylaxis, Hives and Swelling     Throat swelling, per pt    Pt tolerates Whiteface/Vicodin (10/15/2023)    Penicillin G Anaphylaxis    Sumatriptan Palpitations    Aspirin Nausea and Vomiting    Demerol Hcl [Meperidine] Nausea and Vomiting     N/V    Percocet [Oxycodone-Acetaminophen] Nausea and Vomiting     N/V    Acetaminophen Nausea and Vomiting    Codeine Nausea and Vomiting    Mold      MEDICATIONS:   Prior to Admission medications    Medication Sig Start Date End Date Taking? Authorizing Provider   acetaminophen (TYLENOL) 500 MG tablet Take 1-2 tablets (500-1,000 mg) by mouth every 6 hours as needed for mild pain 7/26/23  Yes Li Ochoa PA-C   albuterol (PROAIR HFA/PROVENTIL HFA/VENTOLIN HFA) 108 (90 Base) MCG/ACT inhaler Inhale 2 puffs into the lungs every 6 hours as needed for shortness of breath, wheezing or cough   Yes Unknown, Entered By History   bisacodyl (DULCOLAX) 10 MG suppository Place 1 suppository (10 mg) rectally 2 times daily as needed for constipation 6/18/23  Yes Vikki Duran,    cyanocobalamin (CYANOCOBALAMIN) 1000 MCG/ML injection Inject 1,000 mcg as directed every 30 days   Yes Unknown, Entered By History   dicyclomine (BENTYL) 20 MG tablet Take 1 tablet (20 mg) by mouth 4 times daily (before meals and nightly) 1/18/24  Yes Josh Bruce MD   esomeprazole (NEXIUM) 40 MG DR capsule Take 40 mg by mouth 2 times daily (before meals) Take 30-60 minutes before eating.   Yes Unknown, Entered By History   famotidine (PEPCID) 40 MG tablet Take 40 mg by mouth At Bedtime   Yes Unknown, Entered By History   inFLIXimab (REMICADE IV) Inject 10 mg/kg into the vein once every eight weeks   Yes Unknown, Entered By History   medical cannabis (Patient's own supply) Take 1 Dose by mouth See Admin Instructions (The purpose of this order is to document that the  patient reports taking medical cannabis.  This is not a prescription, and is not used to certify that the patient has a qualifying medical condition.)  25 MG EDIBLE   Yes Unknown, Entered By History   methocarbamol (ROBAXIN) 500 MG tablet Take 1 tablet (500 mg) by mouth 4 times daily as needed for muscle spasms  Patient taking differently: Take 1,000 mg by mouth 2 times daily 6/18/23  Yes Vikki Duran DO   montelukast (SINGULAIR) 10 MG tablet Take 10 mg by mouth At Bedtime   Yes Unknown, Entered By History   nortriptyline (PAMELOR) 25 MG capsule Take 25 mg by mouth At Bedtime   Yes Unknown, Entered By History   ondansetron (ZOFRAN ODT) 4 MG ODT tab Take 1 tablet (4 mg) by mouth every 8 hours as needed for nausea 6/18/23  Yes Vikki Duran DO   predniSONE (DELTASONE) 5 MG tablet Take by mouth daily Starting 3/20/24   80 mg dailly for 1 week, then  70 mg dailly for 1 week  60 mg dailly for 1 week  50 mg dailly for 1 week  40 mg dailly for 1 week  30 mg dailly for 1 week  20 mg dailly for 1 week  10 mg dailly for 1 week   Yes Unknown, Entered By History   simethicone (MYLICON) 80 MG chewable tablet Take 1 tablet (80 mg) by mouth every 6 hours as needed for cramping 1/18/24  Yes Josh Bruce MD   traZODone (DESYREL) 100 MG tablet Take 200 mg by mouth At Bedtime   Yes Unknown, Entered By History   vitamin C (ASCORBIC ACID) 1000 MG TABS Take 1,000 mg by mouth daily   Yes Unknown, Entered By History   Vitamin D3 (CHOLECALCIFEROL) 25 mcg (1000 units) tablet Take 25 mcg by mouth daily   Yes Unknown, Entered By History   HYDROmorphone (DILAUDID) 2 MG tablet Take 1 tablet (2 mg) by mouth every 4 hours as needed for severe pain (IF pain not managed with non-pharmacological and non-opioid interventions)  Patient not taking: Reported on 3/23/2024 1/18/24   Josh Bruce MD   hydrOXYzine HCl (ATARAX) 25 MG tablet Take 1 tablet (25 mg) by mouth every 6 hours as needed for other (adjuvant pain)  Patient not taking:  "Reported on 3/23/2024 1/18/24   Josh Bruce MD     PHYSICAL EXAM:   /80   Pulse 68   Temp 97.5  F (36.4  C) (Oral)   Resp 16   Ht 1.6 m (5' 3\")   Wt 67.5 kg (148 lb 13 oz)   SpO2 97%   BMI 26.36 kg/m     GEN: No distress, Alert, comfortable.  HAMZAH: No pallor, No icterus, oral mucosa moist.    NECK: No thyromegaly. No mass.    HEART: RRR, S1 S2 normal.   LUNGS: CTA BL  ABD: soft, healed surg scar, non-tender, non-distended, no peritoneal signs.  NEURO: No gross motor deficits.  PSYCH: A O X 3.  EXTREMITIES: No pedal edema.      ADDITIONAL DATA:   I reviewed the patient's new clinical lab test results.   Recent Labs   Lab Test 03/24/24  0706 03/23/24  1528 01/16/24  0816   WBC 10.5 8.9 6.0   HGB 12.7 13.6 12.6   MCV 90 90 91    276 191     Recent Labs   Lab Test 03/24/24  0706 03/23/24  1528 01/16/24  0816    138 139   POTASSIUM 5.4* 4.4 4.5   CHLORIDE 107 102 107   CO2 29 24 24   BUN 12.3 13.0 7.1*   CR 0.91 1.02* 0.85   ANIONGAP 6* 12 8   GIORGIO 9.0 9.6 8.6*   * 111* 133*     Recent Labs   Lab Test 03/23/24  1528 03/23/24  1526 01/16/24  0816 01/15/24  1823 01/15/24  1801 11/30/23  1726 10/09/23  1750 10/09/23  1737 07/24/23  1717 07/24/23  1608   ALBUMIN 4.7  --  3.9 4.5  --   --    < > 4.7  --  3.9   BILITOTAL 0.3  --  0.6 0.7  --   --    < > 0.4  --  0.3   ALT 13  --  11 15  --   --    < > 15  --  28   AST 17  --  16 15  --   --    < > 24  --  29   PROTEIN  --  Negative  --   --  Negative Negative   < >  --    < >  --    LIPASE  --   --   --   --   --   --   --  27  --  56    < > = values in this interval not displayed.        I reviewed the patient's new imaging results.    Total time: 45 minutes.    "

## 2024-03-25 LAB
ANION GAP SERPL CALCULATED.3IONS-SCNC: 9 MMOL/L (ref 7–15)
BUN SERPL-MCNC: 13.2 MG/DL (ref 8–23)
CALCIUM SERPL-MCNC: 8.6 MG/DL (ref 8.8–10.2)
CHLORIDE SERPL-SCNC: 107 MMOL/L (ref 98–107)
CMV IGG SERPL IA-ACNC: 6.8 U/ML
CMV IGG SERPL IA-ACNC: ABNORMAL
CMV IGM SERPL IA-ACNC: <8 AU/ML
CMV IGM SERPL IA-ACNC: NEGATIVE
CREAT SERPL-MCNC: 0.95 MG/DL (ref 0.51–0.95)
CRP SERPL-MCNC: <3 MG/L
DEPRECATED HCO3 PLAS-SCNC: 26 MMOL/L (ref 22–29)
EGFRCR SERPLBLD CKD-EPI 2021: 67 ML/MIN/1.73M2
ERYTHROCYTE [DISTWIDTH] IN BLOOD BY AUTOMATED COUNT: 12.1 % (ref 10–15)
GLUCOSE SERPL-MCNC: 100 MG/DL (ref 70–99)
HCT VFR BLD AUTO: 36.3 % (ref 35–47)
HGB BLD-MCNC: 11.9 G/DL (ref 11.7–15.7)
MAGNESIUM SERPL-MCNC: 2.2 MG/DL (ref 1.7–2.3)
MCH RBC QN AUTO: 29 PG (ref 26.5–33)
MCHC RBC AUTO-ENTMCNC: 32.8 G/DL (ref 31.5–36.5)
MCV RBC AUTO: 89 FL (ref 78–100)
PLATELET # BLD AUTO: 229 10E3/UL (ref 150–450)
POTASSIUM SERPL-SCNC: 4.3 MMOL/L (ref 3.4–5.3)
RBC # BLD AUTO: 4.1 10E6/UL (ref 3.8–5.2)
SODIUM SERPL-SCNC: 142 MMOL/L (ref 135–145)
WBC # BLD AUTO: 7.8 10E3/UL (ref 4–11)

## 2024-03-25 PROCEDURE — 86140 C-REACTIVE PROTEIN: CPT | Performed by: INTERNAL MEDICINE

## 2024-03-25 PROCEDURE — 250N000011 HC RX IP 250 OP 636: Performed by: STUDENT IN AN ORGANIZED HEALTH CARE EDUCATION/TRAINING PROGRAM

## 2024-03-25 PROCEDURE — 80048 BASIC METABOLIC PNL TOTAL CA: CPT | Performed by: INTERNAL MEDICINE

## 2024-03-25 PROCEDURE — 250N000013 HC RX MED GY IP 250 OP 250 PS 637: Performed by: STUDENT IN AN ORGANIZED HEALTH CARE EDUCATION/TRAINING PROGRAM

## 2024-03-25 PROCEDURE — 83735 ASSAY OF MAGNESIUM: CPT | Performed by: STUDENT IN AN ORGANIZED HEALTH CARE EDUCATION/TRAINING PROGRAM

## 2024-03-25 PROCEDURE — 36415 COLL VENOUS BLD VENIPUNCTURE: CPT | Performed by: INTERNAL MEDICINE

## 2024-03-25 PROCEDURE — 250N000013 HC RX MED GY IP 250 OP 250 PS 637: Performed by: INTERNAL MEDICINE

## 2024-03-25 PROCEDURE — 99232 SBSQ HOSP IP/OBS MODERATE 35: CPT | Performed by: INTERNAL MEDICINE

## 2024-03-25 PROCEDURE — 85027 COMPLETE CBC AUTOMATED: CPT | Performed by: INTERNAL MEDICINE

## 2024-03-25 PROCEDURE — 120N000004 HC R&B MS OVERFLOW

## 2024-03-25 PROCEDURE — 250N000011 HC RX IP 250 OP 636: Performed by: INTERNAL MEDICINE

## 2024-03-25 PROCEDURE — 258N000003 HC RX IP 258 OP 636: Performed by: INTERNAL MEDICINE

## 2024-03-25 RX ORDER — FAMOTIDINE 20 MG/1
20 TABLET, FILM COATED ORAL AT BEDTIME
Status: DISCONTINUED | OUTPATIENT
Start: 2024-03-25 | End: 2024-04-02 | Stop reason: HOSPADM

## 2024-03-25 RX ADMIN — METHYLPREDNISOLONE SODIUM SUCCINATE 20 MG: 40 INJECTION, POWDER, FOR SOLUTION INTRAMUSCULAR; INTRAVENOUS at 19:27

## 2024-03-25 RX ADMIN — DICYCLOMINE HYDROCHLORIDE 20 MG: 20 TABLET ORAL at 22:14

## 2024-03-25 RX ADMIN — HYDROMORPHONE HYDROCHLORIDE 0.5 MG: 1 INJECTION, SOLUTION INTRAMUSCULAR; INTRAVENOUS; SUBCUTANEOUS at 11:05

## 2024-03-25 RX ADMIN — HYDROMORPHONE HYDROCHLORIDE 0.5 MG: 1 INJECTION, SOLUTION INTRAMUSCULAR; INTRAVENOUS; SUBCUTANEOUS at 08:12

## 2024-03-25 RX ADMIN — HYDROXYZINE HYDROCHLORIDE 50 MG: 25 TABLET, FILM COATED ORAL at 03:19

## 2024-03-25 RX ADMIN — DICYCLOMINE HYDROCHLORIDE 20 MG: 20 TABLET ORAL at 16:09

## 2024-03-25 RX ADMIN — KETOROLAC TROMETHAMINE 30 MG: 30 INJECTION, SOLUTION INTRAMUSCULAR; INTRAVENOUS at 16:19

## 2024-03-25 RX ADMIN — ONDANSETRON 4 MG: 2 INJECTION INTRAMUSCULAR; INTRAVENOUS at 10:44

## 2024-03-25 RX ADMIN — KETOROLAC TROMETHAMINE 30 MG: 30 INJECTION, SOLUTION INTRAMUSCULAR; INTRAVENOUS at 22:21

## 2024-03-25 RX ADMIN — DICYCLOMINE HYDROCHLORIDE 20 MG: 20 TABLET ORAL at 10:31

## 2024-03-25 RX ADMIN — DICYCLOMINE HYDROCHLORIDE 20 MG: 20 TABLET ORAL at 08:04

## 2024-03-25 RX ADMIN — ONDANSETRON 4 MG: 2 INJECTION INTRAMUSCULAR; INTRAVENOUS at 23:45

## 2024-03-25 RX ADMIN — HYDROXYZINE HYDROCHLORIDE 50 MG: 25 TABLET, FILM COATED ORAL at 19:31

## 2024-03-25 RX ADMIN — PANTOPRAZOLE SODIUM 40 MG: 40 TABLET, DELAYED RELEASE ORAL at 08:04

## 2024-03-25 RX ADMIN — MONTELUKAST 10 MG: 10 TABLET, FILM COATED ORAL at 22:21

## 2024-03-25 RX ADMIN — METHYLPREDNISOLONE SODIUM SUCCINATE 20 MG: 40 INJECTION, POWDER, FOR SOLUTION INTRAMUSCULAR; INTRAVENOUS at 08:04

## 2024-03-25 RX ADMIN — SODIUM CHLORIDE: 9 INJECTION, SOLUTION INTRAVENOUS at 04:35

## 2024-03-25 RX ADMIN — KETOROLAC TROMETHAMINE 30 MG: 30 INJECTION, SOLUTION INTRAMUSCULAR; INTRAVENOUS at 10:21

## 2024-03-25 RX ADMIN — HYDROMORPHONE HYDROCHLORIDE 0.5 MG: 1 INJECTION, SOLUTION INTRAMUSCULAR; INTRAVENOUS; SUBCUTANEOUS at 19:21

## 2024-03-25 RX ADMIN — HYDROMORPHONE HYDROCHLORIDE 0.5 MG: 1 INJECTION, SOLUTION INTRAMUSCULAR; INTRAVENOUS; SUBCUTANEOUS at 13:55

## 2024-03-25 RX ADMIN — HYDROMORPHONE HYDROCHLORIDE 0.5 MG: 1 INJECTION, SOLUTION INTRAMUSCULAR; INTRAVENOUS; SUBCUTANEOUS at 03:19

## 2024-03-25 RX ADMIN — FAMOTIDINE 20 MG: 20 TABLET ORAL at 22:13

## 2024-03-25 RX ADMIN — TRAZODONE HYDROCHLORIDE 200 MG: 50 TABLET ORAL at 22:14

## 2024-03-25 RX ADMIN — SENNOSIDES AND DOCUSATE SODIUM 2 TABLET: 8.6; 5 TABLET ORAL at 10:31

## 2024-03-25 RX ADMIN — NORTRIPTYLINE HYDROCHLORIDE 25 MG: 25 CAPSULE ORAL at 22:13

## 2024-03-25 RX ADMIN — HYDROMORPHONE HYDROCHLORIDE 0.5 MG: 1 INJECTION, SOLUTION INTRAMUSCULAR; INTRAVENOUS; SUBCUTANEOUS at 23:45

## 2024-03-25 ASSESSMENT — ACTIVITIES OF DAILY LIVING (ADL)
ADLS_ACUITY_SCORE: 22

## 2024-03-25 NOTE — PLAN OF CARE
PRIMARY DIAGNOSIS: ACUTE PAIN  OUTPATIENT/OBSERVATION GOALS TO BE MET BEFORE DISCHARGE:  1. Pain Status: Improved but still requiring IV narcotics.    2. Return to near baseline physical activity: Yes    3. Cleared for discharge by consultants (if involved): No    Discharge Planner Nurse   Safe discharge environment identified: Yes  Barriers to discharge: Yes       Entered by: Nithya Vasquez RN 03/25/2024      Please review provider order for any additional goals.   Nurse to notify provider when observation goals have been met and patient is ready for discharge.    A/O x4. IV saline locked. Reg diet. Edema LEs. Abdominal pain- IV Dilaudid and Toradol given with some relief. Independent. K and Mg protocol. No BM yet- senna given.

## 2024-03-25 NOTE — UTILIZATION REVIEW
[facility] Utilization Review  Admission Status; Secondary Review Determination     Admission Date: 3/23/2024  2:06 PM      Under the authority of the Utilization Management Committee, the utilization review process indicated a secondary review on the above patient.  The review outcome is based on review of the medical records, discussions with staff, and applying clinical experience noted on the date of the review.        (X)      Inpatient Status Appropriate - This patient's medical care is consistent with medical management for inpatient care and reasonable inpatient medical practice.          RATIONALE FOR DETERMINATION   Devi Salas is a 63 year old female with past medical history of Crohn's disease, who presented with abdominal pain, nausea and emesis along with diarrhea, and is admitted with acute Crohn's flare, intractable abdominal pain and failed outpatient infliximab therapy.  She is started on high-dose IV Solu-Medrol, IV fluids, bowel rest and supportive cares.  Still has abdominal pain and difficulty with oral intake due to nausea and abdominal pain this morning.  Plan to continue current regimen and close monitoring until resolution of symptoms.  In light of failed observation cares, suboptimal clinical improvement and need for ongoing IV steroids and close monitoring with anticipated length of stay more than 2 midnights, it is reasonable to advance to inpatient status.  Recommendations communicated to the primary team Dr. Guadarrama.            The information on this document is developed by the utilization review team in order for the business office to ensure compliance.  This only denotes the appropriateness of proper admission status and does not reflect the quality of care rendered.              Sincerely,       Jarrell Stevenson MD, MS  Physician Advisor  Utilization Review-[place]    Phone: [phone]

## 2024-03-25 NOTE — PROGRESS NOTES
[facility]    Medicine Progress Note - Hospitalist Service    Date of Admission:  3/23/2024    Assessment & Plan      Summary of Stay: Devi Salas is a 63 year old female with a history of Crohn's disease admitted on 3/23/2024 with abdominal pain.  In the emergency department, the patient found to have a BUN/creatinine 13.0/1.02, glucose 111, WBC 8.9.  Urinalysis was negative for signs of infection.  CT abdomen and pelvis showed mild wall thickening and mucosal hyperenhancement of the colon suggestive of mild infectious colitis.  The patient was started on IV Solu-Medrol with concern for Crohn's flare.  Gastroenterology was consulted to see the patient.     TODAY'S PLAN:      -We appreciate continuous input from [facility]   -Patient has no further diarrhea here.    -GI wanting enteric stool panel   -Ruled out for C. difficile recently  -Ruled out for C. difficile recently last March 8 at [facility] labs   -Continued on IV Solu-Medrol currently at 20 mg every 12 hours   -Will defer to GI regarding further plan in continuation of her Biologics   -As needed antiemetics, as needed pain regimen  Stop IV fluids as patient is now on regular diet      Problem List:   Acute Intractable Abdominal Pain  Crohn's Flare  - Appreciate GI recommendations  - On infliximab, last dose was 2/28.  Outpatient GI has been increasing frequency and dose of infliximab as her symptoms improve initially but only last about 1.5 weeks  - IV solumedrol  - Enteric panel and cdif pcr pending  - Pain control  - CMV IgM/IgG pending  - CRP <3 upon arrival.  Trend CRP     Acute Kidney Injury  - Baseline Cr = 0.8-0.9  -Stable creatinine, currently at normal levels     Mild Hyperkalemia   -Resolved     Diet: Combination Diet Regular Diet Adult    DVT Prophylaxis: Pneumatic Compression Devices and Ambulate every shift  Monae Catheter: Not present  Lines: None     Cardiac Monitoring: None  Code Status: Full Code   "    Clinically Significant Risk Factors Present on Admission        # Hyperkalemia: Highest K = 5.4 mmol/L in last 2 days, will monitor as appropriate                # Overweight: Estimated body mass index is 26.36 kg/m  as calculated from the following:    Height as of this encounter: 1.6 m (5' 3\").    Weight as of this encounter: 67.5 kg (148 lb 13 oz).              Disposition Plan      Expected Discharge Date: 03/27/2024                    Ino Mora MD, MD  Hospitalist Service  [facility]  Securely message with [employer] (more info)  Text page via [facility] Paging/Directory   ______________________________________________________________________    Interval History   I assumed medicine service care for this pleasant 63-year-old lady whom I met this morning while she is laying comfortably in bed.  Reportedly she was able to tolerate regular diet earlier but in the last hour or so she starts having some nausea and shooting abdominal pain and discomfort.  Denies any shortness of breath, chest pain and no reported actual vomiting spells or any bleeding tendencies.  No BM yet.  No mental status changes.  Remain afebrile and not requiring any oxygen support.    Physical Exam   Vital Signs: Temp: 98.1  F (36.7  C) Temp src: Oral BP: (!) 142/83 Pulse: 73   Resp: 16 SpO2: 98 % O2 Device: None (Room air)    Weight: 148 lbs 12.97 oz    HEENT; Atraumatic, normocephalic, pinkish conjuctiva, pupils bilateral reactive   Skin: warm and moist, no rashes  Lymphatics: no cervical or axillary lymphandenopathy  Lungs: equal chest expansion, clear to auscultation, no wheezes, no stridor, no crackles,   Heart: normal rate, normal rhythm, no rubs or gallops.   Abdomen: normal bowel sounds, some epigastric tenderness upon palpation, no guarding, no peritoneal signs  Extremities: no deformities, no edema   Neuro; follow commands, alert and oriented x3, spontaneous speech, coherent, moves all extremities " spontaneously  Psych; no hallucination, euthymic mood, not agitated      Medical Decision Making       40 MINUTES SPENT BY ME on the date of service doing chart review, history, exam, documentation & further activities per the note.  MANAGEMENT DISCUSSED with the following over the past 24 hours: Yes   NOTE(S)/MEDICAL RECORDS REVIEWED over the past 24 hours: Yes       Data     I have personally reviewed the following data over the past 24 hrs:    7.8  \   11.9   / 229     142 107 13.2 /  100 (H)   4.3 26 0.95 \     Procal: N/A CRP: <3.00 Lactic Acid: N/A         Imaging results reviewed over the past 24 hrs:   No results found for this or any previous visit (from the past 24 hour(s)).

## 2024-03-25 NOTE — PLAN OF CARE
"Goal Outcome Evaluation: [id number]    Vitals: VSS. Afebrile. Independent in room.   Resp: WDL.  LS clear and equal   GI/: abdomen rounded, soft and tender in all four quadrants, pt states feeling bloated, not passing flatus per pt, no BM this shift, BS active and audible. Denies N/V. Tolerating regular diet. Voiding WDL.  IV: WDL dressing c/d/i, infusing NS @100  Pain/Comfort: 5/10. Heat applied, declined pain medication  Skin: WDL  Social: WDL coping effectively  Plan: collect stool sample once pt has BM, K & mg protocol, monitor CRP, and provide for comfort and needs.      Problem: Adult Inpatient Plan of Care  Goal: Plan of Care Review  Description: The Plan of Care Review/Shift note should be completed every shift.  The Outcome Evaluation is a brief statement about your assessment that the patient is improving, declining, or no change.  This information will be displayed automatically on your shift  note.  Outcome: Progressing  Goal: Patient-Specific Goal (Individualized)  Description: You can add care plan individualizations to a care plan. Examples of Individualization might be:  \"Parent requests to be called daily at 9am for status\", \"I have a hard time hearing out of my right ear\", or \"Do not touch me to wake me up as it startles  me\".  Outcome: Progressing  Goal: Absence of Hospital-Acquired Illness or Injury  Outcome: Progressing  Intervention: Identify and Manage Fall Risk  Recent Flowsheet Documentation  Taken 3/24/2024 1625 by Jannet Graf, RN  Safety Promotion/Fall Prevention:   patient and family education   room organization consistent   safety round/check completed  Intervention: Prevent Skin Injury  Recent Flowsheet Documentation  Taken 3/24/2024 1625 by Jannet Graf, RN  Body Position: position changed independently  Skin Protection: adhesive use limited  Device Skin Pressure Protection:   tubing/devices free from skin contact   adhesive use limited  Intervention: Prevent and Manage VTE " (Venous Thromboembolism) Risk  Recent Flowsheet Documentation  Taken 3/24/2024 1625 by Jannet Graf RN  VTE Prevention/Management: (mobile) SCDs (sequential compression devices) off  Intervention: Prevent Infection  Recent Flowsheet Documentation  Taken 3/24/2024 1625 by Jannet Graf RN  Infection Prevention:   equipment surfaces disinfected   hand hygiene promoted   personal protective equipment utilized   rest/sleep promoted   single patient room provided  Goal: Optimal Comfort and Wellbeing  Outcome: Progressing  Intervention: Monitor Pain and Promote Comfort  Recent Flowsheet Documentation  Taken 3/24/2024 1625 by Jannet Graf RN  Pain Management Interventions: heat applied  Taken 3/24/2024 1531 by Jannet Graf RN  Pain Management Interventions:   quiet environment facilitated   rest  Goal: Readiness for Transition of Care  Outcome: Progressing     Problem: Pain Acute  Goal: Optimal Pain Control and Function  Outcome: Progressing  Intervention: Develop Pain Management Plan  Recent Flowsheet Documentation  Taken 3/24/2024 1625 by Jannet Graf RN  Pain Management Interventions: heat applied  Taken 3/24/2024 1531 by Jannet Graf RN  Pain Management Interventions:   quiet environment facilitated   rest  Intervention: Prevent or Manage Pain  Recent Flowsheet Documentation  Taken 3/24/2024 1625 by Jannet Graf RN  Medication Review/Management: medications reviewed  Intervention: Optimize Psychosocial Wellbeing  Recent Flowsheet Documentation  Taken 3/24/2024 1625 by Jannet Graf RN  Supportive Measures:   active listening utilized   problem-solving facilitated   decision-making supported   verbalization of feelings encouraged

## 2024-03-25 NOTE — PLAN OF CARE
PRIMARY DIAGNOSIS: ACUTE PAIN  OUTPATIENT/OBSERVATION GOALS TO BE MET BEFORE DISCHARGE:  1. Pain Status: Improved but still requiring IV narcotics.    2. Return to near baseline physical activity: Yes    3. Cleared for discharge by consultants (if involved): No    Discharge Planner Nurse   Safe discharge environment identified: Yes  Barriers to discharge: Yes       Entered by: Nithya Vasquez RN 03/25/2024      Please review provider order for any additional goals.   Nurse to notify provider when observation goals have been met and patient is ready for discharge.    A/O x4. NS @100. Reg diet. Edema LEs. Independent. K and Mg protocol.

## 2024-03-25 NOTE — PLAN OF CARE
Shift: 1900 - 0730  Goal Outcome Evaluation:   Plan of Care reviewed with: patient   Overall patient progress: stable    Vitals: VSS   Pain: pain ranging from 5-8/10, pt states pain never gets below a 5 even with medication.   Notable Events: Pt A&O x4, up ind in room. Pt reports she has been unable to have a BM all day, concerned as she feels bloated and like there's pressure in her abdomen - denies being able to pass gas. PRN dilaudid given x3, PRN atarax given x2, see MAR. Pt ambulated in hallways. Clustered cares to promote rest.    Plan: monitor VS, manage pain/discomfort, encourage activity

## 2024-03-25 NOTE — PROGRESS NOTES
"GASTROENTEROLOGY PROGRESS NOTE        SUBJECTIVE: Continued abdominal discomfort with some radiation to her back.  Controlled with pain medication.  Patient has not had a bowel movement for 1 day.  Typically she struggles with loose stool.  No fever no chills.  She is passing gas.     OBJECTIVE:    BP (!) 142/83 (BP Location: Right arm)   Pulse 73   Temp 98.1  F (36.7  C) (Oral)   Resp 16   Ht 1.6 m (5' 3\")   Wt 67.5 kg (148 lb 13 oz)   SpO2 98%   BMI 26.36 kg/m    Temp (24hrs), Av  F (36.7  C), Min:97.9  F (36.6  C), Max:98.1  F (36.7  C)    Patient Vitals for the past 72 hrs:   Weight   24 0318 67.5 kg (148 lb 13 oz)       Intake/Output Summary (Last 24 hours) at 3/25/2024 1031  Last data filed at 3/25/2024 0800  Gross per 24 hour   Intake 4297.67 ml   Output --   Net 4297.67 ml        PHYSICAL EXAM     Constitutional: No distress, sitting up in bed looking comfortable    Abdomen: Soft, nondistended, right and left lower quadrant discomfort to light palpation, no rebound.         Additional Comments:  ROS, FH, SH: See initial GI consult for details.     I have reviewed the patient's new clinical lab results:     Recent Labs   Lab Test 24  0701 24  0706 24  1528   WBC 7.8 10.5 8.9   HGB 11.9 12.7 13.6   MCV 89 90 90    251 276     Recent Labs   Lab Test 24  0701 24  1348 24  0706 24  1528   POTASSIUM 4.3 4.6 5.4* 4.4   CHLORIDE 107  --  107 102   CO2 26  --  29 24   BUN 13.2  --  12.3 13.0   ANIONGAP 9  --  6* 12     Recent Labs   Lab Test 24  1528 24  1526 24  0816 01/15/24  1823 01/15/24  1801 23  1726 10/09/23  1750 10/09/23  1737 23  1717 23  1608   ALBUMIN 4.7  --  3.9 4.5  --   --    < > 4.7  --  3.9   BILITOTAL 0.3  --  0.6 0.7  --   --    < > 0.4  --  0.3   ALT 13  --  11 15  --   --    < > 15  --  28   AST 17  --  16 15  --   --    < > 24  --  29   PROTEIN  --  Negative  --   --  Negative Negative   < >  " --    < >  --    LIPASE  --   --   --   --   --   --   --  27  --  56    < > = values in this interval not displayed.       ASSESSMENT/ PLAN  Devi Salas is a 63-year-old female diagnosed with Crohn's disease in 2023 who underwent a ileocecectomy for fibrostenotic disease currently on Remicade monotherapy presenting with diarrhea and severe abdominal pain.  Patient has been on prednisone as an outpatient without response in symptoms.    1.  Crohn's disease: Diagnosis in 2023 with ileocecal resection.  CT scan does not show any obstruction.  No evidence of stricture or fistula.  There was some mild wall thickening and mucosal hyperenhancement of the colon.  This was suggestive of mild infectious colitis.  C. difficile and fecal calprotectin were checked as an outpatient on 3/8.  These were both negative/unremarkable.  Fecal calprotectin was 54.  MR enterography on January 16, 2024 did not show any signs of active inflammation.  Patient has been struggling with these recurrent symptoms of abdominal pain and loose stool.  [facility] at increasing her Remicade dose in addition to receiving more frequent infusions.  He is recently received 10 mg/kg every 6 weeks.  With her last infusion 3 weeks ago.  Plans are in place to recheck Remicade drug levels.  Pending results she may need to receive Remicade every 4 weeks.    -- She does appear to be responding to the IV steroids ( solumedrol 20 mg q12)  -- Awaiting enteric stool panel.  -- C. difficile negative on 3/8 at [facility]  -- Advance diet as tolerated.      Total time: 40 minutes of total time was spent providing patient care, including patient evaluation, reviewing documentation/test results, and .     Discussed with ALEX MackayC  [facility] ( [facility])

## 2024-03-26 LAB
CRP SERPL-MCNC: <3 MG/L
MAGNESIUM SERPL-MCNC: 2.3 MG/DL (ref 1.7–2.3)
POTASSIUM SERPL-SCNC: 4.2 MMOL/L (ref 3.4–5.3)

## 2024-03-26 PROCEDURE — 250N000011 HC RX IP 250 OP 636: Performed by: STUDENT IN AN ORGANIZED HEALTH CARE EDUCATION/TRAINING PROGRAM

## 2024-03-26 PROCEDURE — 36415 COLL VENOUS BLD VENIPUNCTURE: CPT | Performed by: INTERNAL MEDICINE

## 2024-03-26 PROCEDURE — 120N000004 HC R&B MS OVERFLOW

## 2024-03-26 PROCEDURE — 250N000011 HC RX IP 250 OP 636: Performed by: INTERNAL MEDICINE

## 2024-03-26 PROCEDURE — 99232 SBSQ HOSP IP/OBS MODERATE 35: CPT | Performed by: INTERNAL MEDICINE

## 2024-03-26 PROCEDURE — 250N000013 HC RX MED GY IP 250 OP 250 PS 637: Performed by: INTERNAL MEDICINE

## 2024-03-26 PROCEDURE — 84132 ASSAY OF SERUM POTASSIUM: CPT | Performed by: INTERNAL MEDICINE

## 2024-03-26 PROCEDURE — 250N000013 HC RX MED GY IP 250 OP 250 PS 637: Performed by: STUDENT IN AN ORGANIZED HEALTH CARE EDUCATION/TRAINING PROGRAM

## 2024-03-26 PROCEDURE — 86140 C-REACTIVE PROTEIN: CPT | Performed by: INTERNAL MEDICINE

## 2024-03-26 PROCEDURE — 83735 ASSAY OF MAGNESIUM: CPT | Performed by: INTERNAL MEDICINE

## 2024-03-26 PROCEDURE — 250N000011 HC RX IP 250 OP 636: Performed by: PHYSICIAN ASSISTANT

## 2024-03-26 RX ORDER — METHYLPREDNISOLONE SODIUM SUCCINATE 40 MG/ML
20 INJECTION, POWDER, LYOPHILIZED, FOR SOLUTION INTRAMUSCULAR; INTRAVENOUS EVERY 8 HOURS
Status: DISCONTINUED | OUTPATIENT
Start: 2024-03-26 | End: 2024-04-01

## 2024-03-26 RX ORDER — LORAZEPAM 2 MG/ML
0.5 INJECTION INTRAMUSCULAR ONCE
Status: COMPLETED | OUTPATIENT
Start: 2024-03-26 | End: 2024-03-27

## 2024-03-26 RX ADMIN — DICYCLOMINE HYDROCHLORIDE 20 MG: 20 TABLET ORAL at 22:15

## 2024-03-26 RX ADMIN — MONTELUKAST 10 MG: 10 TABLET, FILM COATED ORAL at 22:15

## 2024-03-26 RX ADMIN — DICYCLOMINE HYDROCHLORIDE 20 MG: 20 TABLET ORAL at 08:21

## 2024-03-26 RX ADMIN — KETOROLAC TROMETHAMINE 30 MG: 30 INJECTION, SOLUTION INTRAMUSCULAR; INTRAVENOUS at 10:58

## 2024-03-26 RX ADMIN — METHYLPREDNISOLONE SODIUM SUCCINATE 20 MG: 40 INJECTION, POWDER, FOR SOLUTION INTRAMUSCULAR; INTRAVENOUS at 23:40

## 2024-03-26 RX ADMIN — ONDANSETRON 4 MG: 2 INJECTION INTRAMUSCULAR; INTRAVENOUS at 12:31

## 2024-03-26 RX ADMIN — PANTOPRAZOLE SODIUM 40 MG: 40 TABLET, DELAYED RELEASE ORAL at 08:21

## 2024-03-26 RX ADMIN — HYDROMORPHONE HYDROCHLORIDE 2 MG: 2 TABLET ORAL at 08:21

## 2024-03-26 RX ADMIN — METHYLPREDNISOLONE SODIUM SUCCINATE 20 MG: 40 INJECTION, POWDER, FOR SOLUTION INTRAMUSCULAR; INTRAVENOUS at 16:23

## 2024-03-26 RX ADMIN — HYDROXYZINE HYDROCHLORIDE 50 MG: 25 TABLET, FILM COATED ORAL at 01:37

## 2024-03-26 RX ADMIN — SENNOSIDES AND DOCUSATE SODIUM 2 TABLET: 8.6; 5 TABLET ORAL at 08:29

## 2024-03-26 RX ADMIN — FAMOTIDINE 20 MG: 20 TABLET ORAL at 22:15

## 2024-03-26 RX ADMIN — KETOROLAC TROMETHAMINE 30 MG: 30 INJECTION, SOLUTION INTRAMUSCULAR; INTRAVENOUS at 16:56

## 2024-03-26 RX ADMIN — DICYCLOMINE HYDROCHLORIDE 20 MG: 20 TABLET ORAL at 11:15

## 2024-03-26 RX ADMIN — NORTRIPTYLINE HYDROCHLORIDE 25 MG: 25 CAPSULE ORAL at 22:15

## 2024-03-26 RX ADMIN — HYDROMORPHONE HYDROCHLORIDE 0.5 MG: 1 INJECTION, SOLUTION INTRAMUSCULAR; INTRAVENOUS; SUBCUTANEOUS at 16:25

## 2024-03-26 RX ADMIN — ONDANSETRON 4 MG: 2 INJECTION INTRAMUSCULAR; INTRAVENOUS at 20:57

## 2024-03-26 RX ADMIN — HYDROMORPHONE HYDROCHLORIDE 0.5 MG: 1 INJECTION, SOLUTION INTRAMUSCULAR; INTRAVENOUS; SUBCUTANEOUS at 04:25

## 2024-03-26 RX ADMIN — DICYCLOMINE HYDROCHLORIDE 20 MG: 20 TABLET ORAL at 16:23

## 2024-03-26 RX ADMIN — HYDROXYZINE HYDROCHLORIDE 50 MG: 25 TABLET, FILM COATED ORAL at 08:21

## 2024-03-26 RX ADMIN — HYDROMORPHONE HYDROCHLORIDE 0.5 MG: 1 INJECTION, SOLUTION INTRAMUSCULAR; INTRAVENOUS; SUBCUTANEOUS at 20:57

## 2024-03-26 RX ADMIN — METHYLPREDNISOLONE SODIUM SUCCINATE 20 MG: 40 INJECTION, POWDER, FOR SOLUTION INTRAMUSCULAR; INTRAVENOUS at 08:22

## 2024-03-26 RX ADMIN — KETOROLAC TROMETHAMINE 30 MG: 30 INJECTION, SOLUTION INTRAMUSCULAR; INTRAVENOUS at 04:25

## 2024-03-26 RX ADMIN — HYDROMORPHONE HYDROCHLORIDE 2 MG: 2 TABLET ORAL at 01:37

## 2024-03-26 RX ADMIN — HYDROMORPHONE HYDROCHLORIDE 0.5 MG: 1 INJECTION, SOLUTION INTRAMUSCULAR; INTRAVENOUS; SUBCUTANEOUS at 12:31

## 2024-03-26 RX ADMIN — HYDROXYZINE HYDROCHLORIDE 50 MG: 25 TABLET, FILM COATED ORAL at 14:24

## 2024-03-26 RX ADMIN — KETOROLAC TROMETHAMINE 30 MG: 30 INJECTION, SOLUTION INTRAMUSCULAR; INTRAVENOUS at 23:40

## 2024-03-26 RX ADMIN — HYDROXYZINE HYDROCHLORIDE 50 MG: 25 TABLET, FILM COATED ORAL at 20:56

## 2024-03-26 RX ADMIN — TRAZODONE HYDROCHLORIDE 200 MG: 50 TABLET ORAL at 22:14

## 2024-03-26 ASSESSMENT — ACTIVITIES OF DAILY LIVING (ADL)
ADLS_ACUITY_SCORE: 22

## 2024-03-26 NOTE — PLAN OF CARE
"Goal Outcome Evaluation:      Plan of Care Reviewed With: patient    Overall Patient Progress: no changeOverall Patient Progress: no change  Afebrile, VSS, BP slightly up at 147/93. Pt continues with cramping pain, rating pain to a 8. Dilaudid 0.5 mg given with atarax , pt then stating pain decreases to a 4 but \"doesn't last\", pain just creeps back up again. IV toradol also given x2 this shift. Aqua K pad to abdomen for comfort. Restless at times with pain and  frustrated and teary at times tonight with progress. Out in dietz to ambulate x1. Voiding well, no stool. Did eat about 1/2 her supper. Drinking water. IV saline locked between pain meds. Continues on IV solumedrol. Aware of plan of care.            "

## 2024-03-26 NOTE — PROGRESS NOTES
"GASTROENTEROLOGY PROGRESS NOTE        SUBJECTIVE: Worsening left lower quadrant abdominal pain.  No bowel movement for the past 2 and half days.  She is eating.  No fever no chills.     OBJECTIVE:    /85 (BP Location: Left arm)   Pulse 79   Temp 98.1  F (36.7  C) (Oral)   Resp 16   Ht 1.6 m (5' 3\")   Wt 67.5 kg (148 lb 13 oz)   SpO2 96%   BMI 26.36 kg/m    Temp (24hrs), Av  F (36.7  C), Min:97.9  F (36.6  C), Max:98.1  F (36.7  C)    Patient Vitals for the past 72 hrs:   Weight   24 0318 67.5 kg (148 lb 13 oz)       Intake/Output Summary (Last 24 hours) at 3/25/2024 1031  Last data filed at 3/25/2024 0800  Gross per 24 hour   Intake 4297.67 ml   Output --   Net 4297.67 ml        PHYSICAL EXAM     Constitutional: No distress, sitting up in bed looking comfortable    Abdomen: Soft, nondistended, tenderness to light palpation of the left lower quadrant.         Additional Comments:  ROS, FH, SH: See initial GI consult for details.     I have reviewed the patient's new clinical lab results:     Recent Labs   Lab Test 24  0701 24  0706 24  1528   WBC 7.8 10.5 8.9   HGB 11.9 12.7 13.6   MCV 89 90 90    251 276     Recent Labs   Lab Test 24  0745 24  0701 24  1348 24  0706 24  1528   POTASSIUM 4.2 4.3 4.6 5.4* 4.4   CHLORIDE  --  107  --  107 102   CO2  --  26  --  29 24   BUN  --  13.2  --  12.3 13.0   ANIONGAP  --  9  --  6* 12     Recent Labs   Lab Test 24  1528 24  1526 24  0816 01/15/24  1823 01/15/24  1801 23  1726 10/09/23  1750 10/09/23  1737 23  1717 23  1608   ALBUMIN 4.7  --  3.9 4.5  --   --    < > 4.7  --  3.9   BILITOTAL 0.3  --  0.6 0.7  --   --    < > 0.4  --  0.3   ALT 13  --  11 15  --   --    < > 15  --  28   AST 17  --  16 15  --   --    < > 24  --  29   PROTEIN  --  Negative  --   --  Negative Negative   < >  --    < >  --    LIPASE  --   --   --   --   --   --   --  27  --  56    < > = " values in this interval not displayed.       ASSESSMENT/ PLAN  Devi Salas is a 63-year-old female diagnosed with Crohn's disease in 2023 who underwent a ileocecectomy for fibrostenotic disease currently on Remicade monotherapy presenting with diarrhea and severe abdominal pain.  Patient has been on prednisone as an outpatient without response in symptoms.    1.  Crohn's disease: Diagnosis in 2023 with ileocecal resection.  CT scan does not show any obstruction.  No evidence of stricture or fistula.  There was some mild wall thickening and mucosal hyperenhancement of the colon.  This was suggestive of mild infectious colitis.  C. difficile and fecal calprotectin were checked as an outpatient on 3/8.  These were both negative/unremarkable.  Fecal calprotectin was 54.  MR enterography on January 16, 2024 did not show any signs of active inflammation.  Patient has been struggling with these recurrent symptoms of abdominal pain and loose stool.  She has been working with Dr. Marie ([facility]) at increasing her Remicade dose in addition to receiving more frequent infusions.  He is recently received 10 mg/kg every 6 weeks.  With her last infusion 3 weeks ago.  Plans are in place to recheck Remicade drug levels.  Pending results she may need to receive Remicade every 4 weeks.    -- She does appear to be responding to the IV steroids, will change to every 8 hours  -- Awaiting enteric stool panel.  -- C. [facility] negative on 3/8 at [facility]  -- Will obtain MR enterography today given worsening abdominal pain to evaluate for continued colonic inflammation and possible small bowel inflammation.  If MR enterography indicates colonic inflammation, would proceed with flexible sigmoidoscopy tomorrow.  -- Can consider consulting pain management team if abdominal pain becomes difficult to control.      Total time: 40 minutes of total time was spent providing patient care, including patient evaluation, reviewing  documentation/test results, and .     Discussed with Dr. Omid Romero PA-C  [facility] ( [facility])

## 2024-03-26 NOTE — PLAN OF CARE
Goal Outcome Evaluation:  Plan of Care Reviewed With: patient  Overall Patient Progress: no change    Orientation: Alert and oriented x4. Appropriately expressing frustrations with prolonged illness and course.   VSS. 97% on RA. afebrile.   LS: clear and equal bilaterally.   GI: Passing gas. Audible bowel sounds. no stool overnight. Denies N/V.   : Adequate urine output.   Skin: intact  Activity: Independent. Pt sleeping between cares. Up to bathroom ad patrick. In recliner for comfort as needed. .   Pain: 8/10 abdominal pain described as cramping radiating to back spasms. IV dilaudid x2. Oral dilaudid x1. Atarax x1. Toradol x1. Heating pad for comfort.   Updates/Plan: Continue with current cares.

## 2024-03-26 NOTE — PLAN OF CARE
"Goal Outcome Evaluation:      Plan of Care Reviewed With: patient    Overall Patient Progress: no changeOverall Patient Progress: no change    Outcome Evaluation: Pain is being managed with heating pad, PO atarax and dilaudid, IV toradol and dilaudid. IV zofran x1 given for nausea. Senna also given per patient request. On IV solumedrol. Patient is eating regular diet and ambulating. No stools. GI ordered MRI which will be completed in the morning as patient needs to be NPO for 6 hours prior, ativan ordered to be given prior to MRI per patient request.    /85 (BP Location: Left arm)   Pulse 79   Temp 98.1  F (36.7  C) (Oral)   Resp 16   Ht 1.6 m (5' 3\")   Wt 67.5 kg (148 lb 13 oz)   SpO2 96%   BMI 26.36 kg/m       Problem: Adult Inpatient Plan of Care  Goal: Plan of Care Review  Description: The Plan of Care Review/Shift note should be completed every shift.  The Outcome Evaluation is a brief statement about your assessment that the patient is improving, declining, or no change.  This information will be displayed automatically on your shift  note.  Flowsheets (Taken 3/26/2024 1351)  Outcome Evaluation: Pain is being managed with heating pad, PO atarax and dilaudid, IV toradol and dilaudid. IV zofran x1 given for nausea. Senna also given per patient request. On IV solumedrol. Patient is eating regular diet and ambulating. No stools. GI ordered MRI which will be completed in the morning as patient needs to be NPO for 6 hours prior, ativan ordered to be given prior to MRI per patient request.  Plan of Care Reviewed With: patient  Overall Patient Progress: no change  Goal: Absence of Hospital-Acquired Illness or Injury  Intervention: Identify and Manage Fall Risk  Recent Flowsheet Documentation  Taken 3/26/2024 0817 by Patricia Santamaria, RN  Safety Promotion/Fall Prevention: safety round/check completed  Intervention: Prevent Skin Injury  Recent Flowsheet Documentation  Taken 3/26/2024 0817 by Patricia Santamaria " JESSICA PEREZ  Body Position: position changed independently  Intervention: Prevent and Manage VTE (Venous Thromboembolism) Risk  Recent Flowsheet Documentation  Taken 3/26/2024 0817 by Patricia Santamaria RN  VTE Prevention/Management: SCDs (sequential compression devices) off  Goal: Optimal Comfort and Wellbeing  Intervention: Monitor Pain and Promote Comfort  Recent Flowsheet Documentation  Taken 3/26/2024 1231 by Patricia Santamaria RN  Pain Management Interventions: medication (see MAR)  Taken 3/26/2024 1058 by Patricia Santamaria RN  Pain Management Interventions: medication (see MAR)  Taken 3/26/2024 0900 by Patricia Santamaria RN  Pain Management Interventions: ambulation/increased activity  Taken 3/26/2024 0817 by Patricia Santamaria RN  Pain Management Interventions: medication (see MAR)     Problem: Pain Acute  Goal: Optimal Pain Control and Function  Intervention: Develop Pain Management Plan  Recent Flowsheet Documentation  Taken 3/26/2024 1231 by Patricia Santamaria RN  Pain Management Interventions: medication (see MAR)  Taken 3/26/2024 1058 by Patricia Santamaria RN  Pain Management Interventions: medication (see MAR)  Taken 3/26/2024 0900 by Patricia Santamaria RN  Pain Management Interventions: ambulation/increased activity  Taken 3/26/2024 0817 by Patricia Santamaria RN  Pain Management Interventions: medication (see MAR)  Intervention: Prevent or Manage Pain  Recent Flowsheet Documentation  Taken 3/26/2024 0817 by Patricia Santamaria RN  Medication Review/Management: medications reviewed     Problem: Bowel Disease, Inflammatory (Ulcerative Colitis or Crohn's Disease)  Goal: Optimal Pain Control and Function  Intervention: Prevent or Manage Pain  Recent Flowsheet Documentation  Taken 3/26/2024 1231 by Patricia Santamaria RN  Pain Management Interventions: medication (see MAR)  Taken 3/26/2024 1058 by Patricia Santamaria RN  Pain Management Interventions: medication (see MAR)  Taken 3/26/2024 0900 by Patricia Santamaria RN  Pain  Management Interventions: ambulation/increased activity  Taken 3/26/2024 0817 by Patricia Santamaria, RN  Pain Management Interventions: medication (see MAR)

## 2024-03-26 NOTE — PROGRESS NOTES
[facility]    Medicine Progress Note - Hospitalist Service    Date of Admission:  3/23/2024    Assessment & Plan      Summary of Stay: Devi Salas is a 63 year old female with a history of Crohn's disease admitted on 3/23/2024 with abdominal pain.  In the emergency department, the patient found to have a BUN/creatinine 13.0/1.02, glucose 111, WBC 8.9.  Urinalysis was negative for signs of infection.  CT abdomen and pelvis showed mild wall thickening and mucosal hyperenhancement of the colon suggestive of mild infectious colitis.  The patient was started on IV Solu-Medrol with concern for Crohn's flare.  Gastroenterology was consulted to see the patient.     TODAY'S PLAN:      -We appreciate continuous input from [facility]   -Patient has no further diarrhea here.    -Enteric panel  -Ruled out for C. difficile recently  -Ruled out for C. difficile recently last March 8 at [facility] labs   -Continued on IV Solu-Medrol currently at 20 mg every 12 hours   -Will defer to GI regarding further plan in continuation of her Biologics   -As needed antiemetics, as needed pain regimen  Stop IV fluids as patient is now on regular diet      Problem List:   Acute Intractable Abdominal Pain  Crohn's Flare  - Appreciate GI recommendations  - On infliximab, last dose was 2/28.  Outpatient GI has been increasing frequency and dose of infliximab as her symptoms improve initially but only last about 1.5 weeks  - IV solumedrol  - Enteric panel and cdif pcr pending  - Pain control  - CMV IgM/IgG pending  - CRP <3 upon arrival.  Trend CRP     Acute Kidney Injury  - Baseline Cr = 0.8-0.9  -Stable creatinine, currently at normal levels     Mild Hyperkalemia   -Resolved     Diet: Combination Diet Regular Diet Adult    DVT Prophylaxis: Pneumatic Compression Devices and Ambulate every shift  Monae Catheter: Not present  Lines: None     Cardiac Monitoring: None  Code Status: Full Code      Clinically Significant  "Risk Factors                         # Overweight: Estimated body mass index is 26.36 kg/m  as calculated from the following:    Height as of this encounter: 1.6 m (5' 3\").    Weight as of this encounter: 67.5 kg (148 lb 13 oz)., PRESENT ON ADMISSION            Disposition Plan     Expected Discharge Date: 03/27/2024                    Ino Mora MD, MD  Hospitalist Service  [facility]  Securely message with [employer] (more info)  Text page via [facility] Paging/Directory   ______________________________________________________________________    Interval History   Continuing medicine service care for this pleasant lady whom I met this morning while she is laying comfortably in bed.    She appears to be getting frustrated with her clinical situation, due to intermittent nausea and recurrent abdominal discomfort.  She mentioned that she was able to tolerate oral diet but later on started having some boring abdominal discomfort mostly at the lower quadrant area.    Voiding freely.  Passing flatus, no BM yet.  Denies any bleeding tendencies.  Not requiring oxygen support.  Ambulatory with no assistance     Physical Exam   Vital Signs: Temp: 98.1  F (36.7  C) Temp src: Oral BP: 129/85 Pulse: 79   Resp: 16 SpO2: 96 % O2 Device: None (Room air)    Weight: 148 lbs 12.97 oz    HEENT; Atraumatic, normocephalic, pinkish conjuctiva, pupils bilateral reactive   Skin: warm and moist, no rashes  Lymphatics: no cervical or axillary lymphandenopathy  Lungs: equal chest expansion, clear to auscultation, no wheezes, no stridor, no crackles,   Heart: normal rate, normal rhythm, no rubs or gallops.   Abdomen: normal bowel sounds, some epigastric tenderness upon palpation, no guarding, no peritoneal signs  Extremities: no deformities, no edema   Neuro; follow commands, alert and oriented x3, spontaneous speech, coherent, moves all extremities spontaneously  Psych; no hallucination, euthymic mood, not " agitated      Medical Decision Making       35 MINUTES SPENT BY ME on the date of service doing chart review, history, exam, documentation & further activities per the note.  MANAGEMENT DISCUSSED with the following over the past 24 hours: Yes   NOTE(S)/MEDICAL RECORDS REVIEWED over the past 24 hours: Yes       Data     I have personally reviewed the following data over the past 24 hrs:    N/A  \   N/A   / N/A     N/A N/A N/A /  N/A   4.2 N/A N/A \     Procal: N/A CRP: <3.00 Lactic Acid: N/A         Imaging results reviewed over the past 24 hrs:   No results found for this or any previous visit (from the past 24 hour(s)).

## 2024-03-27 ENCOUNTER — APPOINTMENT (OUTPATIENT)
Dept: MRI IMAGING | Facility: CLINIC | Age: 64
End: 2024-03-27
Attending: PHYSICIAN ASSISTANT
Payer: COMMERCIAL

## 2024-03-27 LAB
ANION GAP SERPL CALCULATED.3IONS-SCNC: 11 MMOL/L (ref 7–15)
BASOPHILS # BLD AUTO: 0 10E3/UL (ref 0–0.2)
BASOPHILS NFR BLD AUTO: 0 %
BUN SERPL-MCNC: 25.2 MG/DL (ref 8–23)
CALCIUM SERPL-MCNC: 8.5 MG/DL (ref 8.8–10.2)
CHLORIDE SERPL-SCNC: 105 MMOL/L (ref 98–107)
CREAT SERPL-MCNC: 0.98 MG/DL (ref 0.51–0.95)
CRP SERPL-MCNC: <3 MG/L
DEPRECATED HCO3 PLAS-SCNC: 22 MMOL/L (ref 22–29)
EGFRCR SERPLBLD CKD-EPI 2021: 64 ML/MIN/1.73M2
EOSINOPHIL # BLD AUTO: 0 10E3/UL (ref 0–0.7)
EOSINOPHIL NFR BLD AUTO: 0 %
ERYTHROCYTE [DISTWIDTH] IN BLOOD BY AUTOMATED COUNT: 12.4 % (ref 10–15)
GLUCOSE SERPL-MCNC: 118 MG/DL (ref 70–99)
HCT VFR BLD AUTO: 40.2 % (ref 35–47)
HGB BLD-MCNC: 13.5 G/DL (ref 11.7–15.7)
IMM GRANULOCYTES # BLD: 0.2 10E3/UL
IMM GRANULOCYTES NFR BLD: 1 %
LYMPHOCYTES # BLD AUTO: 1.1 10E3/UL (ref 0.8–5.3)
LYMPHOCYTES NFR BLD AUTO: 8 %
MAGNESIUM SERPL-MCNC: 2.5 MG/DL (ref 1.7–2.3)
MCH RBC QN AUTO: 30 PG (ref 26.5–33)
MCHC RBC AUTO-ENTMCNC: 33.6 G/DL (ref 31.5–36.5)
MCV RBC AUTO: 89 FL (ref 78–100)
MONOCYTES # BLD AUTO: 0.9 10E3/UL (ref 0–1.3)
MONOCYTES NFR BLD AUTO: 6 %
NEUTROPHILS # BLD AUTO: 11.4 10E3/UL (ref 1.6–8.3)
NEUTROPHILS NFR BLD AUTO: 85 %
NRBC # BLD AUTO: 0 10E3/UL
NRBC BLD AUTO-RTO: 0 /100
PLATELET # BLD AUTO: 241 10E3/UL (ref 150–450)
POTASSIUM SERPL-SCNC: 4.8 MMOL/L (ref 3.4–5.3)
RBC # BLD AUTO: 4.5 10E6/UL (ref 3.8–5.2)
SODIUM SERPL-SCNC: 138 MMOL/L (ref 135–145)
WBC # BLD AUTO: 13.6 10E3/UL (ref 4–11)

## 2024-03-27 PROCEDURE — 250N000013 HC RX MED GY IP 250 OP 250 PS 637: Performed by: INTERNAL MEDICINE

## 2024-03-27 PROCEDURE — 85025 COMPLETE CBC W/AUTO DIFF WBC: CPT | Performed by: INTERNAL MEDICINE

## 2024-03-27 PROCEDURE — 74183 MRI ABD W/O CNTR FLWD CNTR: CPT

## 2024-03-27 PROCEDURE — 250N000011 HC RX IP 250 OP 636: Performed by: STUDENT IN AN ORGANIZED HEALTH CARE EDUCATION/TRAINING PROGRAM

## 2024-03-27 PROCEDURE — 250N000013 HC RX MED GY IP 250 OP 250 PS 637: Performed by: PHYSICIAN ASSISTANT

## 2024-03-27 PROCEDURE — 83735 ASSAY OF MAGNESIUM: CPT | Performed by: INTERNAL MEDICINE

## 2024-03-27 PROCEDURE — 250N000011 HC RX IP 250 OP 636: Performed by: INTERNAL MEDICINE

## 2024-03-27 PROCEDURE — 36415 COLL VENOUS BLD VENIPUNCTURE: CPT | Performed by: INTERNAL MEDICINE

## 2024-03-27 PROCEDURE — 250N000011 HC RX IP 250 OP 636: Performed by: PHYSICIAN ASSISTANT

## 2024-03-27 PROCEDURE — 120N000004 HC R&B MS OVERFLOW

## 2024-03-27 PROCEDURE — 250N000013 HC RX MED GY IP 250 OP 250 PS 637: Performed by: STUDENT IN AN ORGANIZED HEALTH CARE EDUCATION/TRAINING PROGRAM

## 2024-03-27 PROCEDURE — A9585 GADOBUTROL INJECTION: HCPCS | Performed by: STUDENT IN AN ORGANIZED HEALTH CARE EDUCATION/TRAINING PROGRAM

## 2024-03-27 PROCEDURE — 80048 BASIC METABOLIC PNL TOTAL CA: CPT | Performed by: INTERNAL MEDICINE

## 2024-03-27 PROCEDURE — 86140 C-REACTIVE PROTEIN: CPT | Performed by: INTERNAL MEDICINE

## 2024-03-27 PROCEDURE — 255N000002 HC RX 255 OP 636: Performed by: STUDENT IN AN ORGANIZED HEALTH CARE EDUCATION/TRAINING PROGRAM

## 2024-03-27 PROCEDURE — 99232 SBSQ HOSP IP/OBS MODERATE 35: CPT | Performed by: INTERNAL MEDICINE

## 2024-03-27 RX ORDER — POLYETHYLENE GLYCOL 3350 17 G/17G
17 POWDER, FOR SOLUTION ORAL DAILY
Status: DISCONTINUED | OUTPATIENT
Start: 2024-03-27 | End: 2024-04-02 | Stop reason: HOSPADM

## 2024-03-27 RX ORDER — GADOBUTROL 604.72 MG/ML
7 INJECTION INTRAVENOUS ONCE
Status: COMPLETED | OUTPATIENT
Start: 2024-03-27 | End: 2024-03-27

## 2024-03-27 RX ORDER — BISACODYL 10 MG
10 SUPPOSITORY, RECTAL RECTAL ONCE
Status: COMPLETED | OUTPATIENT
Start: 2024-03-27 | End: 2024-03-27

## 2024-03-27 RX ADMIN — METHYLPREDNISOLONE SODIUM SUCCINATE 20 MG: 40 INJECTION, POWDER, FOR SOLUTION INTRAMUSCULAR; INTRAVENOUS at 09:25

## 2024-03-27 RX ADMIN — DICYCLOMINE HYDROCHLORIDE 20 MG: 20 TABLET ORAL at 07:56

## 2024-03-27 RX ADMIN — KETOROLAC TROMETHAMINE 30 MG: 30 INJECTION, SOLUTION INTRAMUSCULAR; INTRAVENOUS at 11:48

## 2024-03-27 RX ADMIN — FAMOTIDINE 20 MG: 20 TABLET ORAL at 22:20

## 2024-03-27 RX ADMIN — HYDROMORPHONE HYDROCHLORIDE 2 MG: 2 TABLET ORAL at 05:35

## 2024-03-27 RX ADMIN — HYDROMORPHONE HYDROCHLORIDE 2 MG: 2 TABLET ORAL at 18:59

## 2024-03-27 RX ADMIN — GLUCAGON 1 MG: KIT at 08:27

## 2024-03-27 RX ADMIN — PANTOPRAZOLE SODIUM 40 MG: 40 TABLET, DELAYED RELEASE ORAL at 07:56

## 2024-03-27 RX ADMIN — MONTELUKAST 10 MG: 10 TABLET, FILM COATED ORAL at 22:20

## 2024-03-27 RX ADMIN — KETOROLAC TROMETHAMINE 30 MG: 30 INJECTION, SOLUTION INTRAMUSCULAR; INTRAVENOUS at 18:59

## 2024-03-27 RX ADMIN — BISACODYL 10 MG: 10 SUPPOSITORY RECTAL at 12:33

## 2024-03-27 RX ADMIN — SENNOSIDES AND DOCUSATE SODIUM 2 TABLET: 8.6; 5 TABLET ORAL at 11:38

## 2024-03-27 RX ADMIN — KETOROLAC TROMETHAMINE 30 MG: 30 INJECTION, SOLUTION INTRAMUSCULAR; INTRAVENOUS at 05:35

## 2024-03-27 RX ADMIN — ONDANSETRON 4 MG: 4 TABLET, ORALLY DISINTEGRATING ORAL at 11:49

## 2024-03-27 RX ADMIN — HYDROMORPHONE HYDROCHLORIDE 0.5 MG: 1 INJECTION, SOLUTION INTRAMUSCULAR; INTRAVENOUS; SUBCUTANEOUS at 22:23

## 2024-03-27 RX ADMIN — METHYLPREDNISOLONE SODIUM SUCCINATE 20 MG: 40 INJECTION, POWDER, FOR SOLUTION INTRAMUSCULAR; INTRAVENOUS at 17:36

## 2024-03-27 RX ADMIN — GADOBUTROL 7 ML: 604.72 INJECTION INTRAVENOUS at 08:26

## 2024-03-27 RX ADMIN — HYDROMORPHONE HYDROCHLORIDE 0.5 MG: 1 INJECTION, SOLUTION INTRAMUSCULAR; INTRAVENOUS; SUBCUTANEOUS at 16:08

## 2024-03-27 RX ADMIN — HYDROXYZINE HYDROCHLORIDE 50 MG: 25 TABLET, FILM COATED ORAL at 22:20

## 2024-03-27 RX ADMIN — LORAZEPAM 0.5 MG: 2 INJECTION, SOLUTION INTRAMUSCULAR; INTRAVENOUS at 08:14

## 2024-03-27 RX ADMIN — ONDANSETRON 4 MG: 4 TABLET, ORALLY DISINTEGRATING ORAL at 18:00

## 2024-03-27 RX ADMIN — HYDROMORPHONE HYDROCHLORIDE 0.5 MG: 1 INJECTION, SOLUTION INTRAMUSCULAR; INTRAVENOUS; SUBCUTANEOUS at 09:28

## 2024-03-27 RX ADMIN — HYDROXYZINE HYDROCHLORIDE 50 MG: 25 TABLET, FILM COATED ORAL at 11:49

## 2024-03-27 RX ADMIN — TRAZODONE HYDROCHLORIDE 200 MG: 50 TABLET ORAL at 22:20

## 2024-03-27 RX ADMIN — NORTRIPTYLINE HYDROCHLORIDE 25 MG: 25 CAPSULE ORAL at 22:20

## 2024-03-27 ASSESSMENT — ACTIVITIES OF DAILY LIVING (ADL)
ADLS_ACUITY_SCORE: 22

## 2024-03-27 NOTE — PROGRESS NOTES
[facility]    Medicine Progress Note - Hospitalist Service    Date of Admission:  3/23/2024    Assessment & Plan      Summary of Stay: Devi Salas is a 63 year old female with a history of Crohn's disease admitted on 3/23/2024 with abdominal pain.  In the emergency department, the patient found to have a BUN/creatinine 13.0/1.02, glucose 111, WBC 8.9.  Urinalysis was negative for signs of infection.  CT abdomen and pelvis showed mild wall thickening and mucosal hyperenhancement of the colon suggestive of mild infectious colitis.  The patient was started on IV Solu-Medrol with concern for Crohn's flare.  Gastroenterology was consulted to see the patient.     TODAY'S PLAN:      -We appreciate continuous input from [facility]   -Enteric panel has been discontinued since she has been in the hospital for more than 3 days now  MR enterography did show findings of-distal ileum inflammation likely from Crohn's  Concerning for ileus and constipation  -Bowel regimen added currently with stool softeners, MiraLAX and Dulcolax  If no significant improvement GI considering also pursuing an enema  -Diet changed to liquid diet.  -Minimize narcotics if able as this can also worsen and a big risk factor for underlying constipation  -Remain on IV corticosteroids currently on every 8 hours    Problem List:   Acute Intractable Abdominal Pain  Crohn's Flare  - Appreciate GI recommendations  - On infliximab, last dose was 2/28.  Outpatient GI has been increasing frequency and dose of infliximab as her symptoms improve initially but only last about 1.5 weeks  - IV solumedrol  - Enteric panel and cdif pcr pending  - Pain control  - CMV IgM/IgG pending  - CRP <3 upon arrival.  Trend CRP     Acute Kidney Injury  - Baseline Cr = 0.8-0.9  -Stable creatinine, currently at normal levels     Mild Hyperkalemia   -Resolved     Diet: Combination Diet Regular Diet Adult    DVT Prophylaxis: Pneumatic Compression Devices and  "Ambulate every shift  Monae Catheter: Not present  Lines: None     Cardiac Monitoring: None  Code Status: Full Code      Clinically Significant Risk Factors                         # Overweight: Estimated body mass index is 26.36 kg/m  as calculated from the following:    Height as of this encounter: 1.6 m (5' 3\").    Weight as of this encounter: 67.5 kg (148 lb 13 oz)., PRESENT ON ADMISSION            Disposition Plan      Expected Discharge Date: To be determined.  Awaiting resumption of bowel function, resolution of constipation and improvement with her abdominal symptomatology of pain, discomfort and nausea.                  Ino Mora MD, MD  Hospitalist Service  [facility]  Securely message with [employer] (more info)  Text page via [facility] Paging/Directory   ______________________________________________________________________    Interval History   Continuing medicine service care for this pleasant lady whom I met this morning while she is laying comfortably in bed.    She is still having some intermittent abdominal discomfort.  No nausea or nausea or vomiting.  Overnight still not feeling comfortable about her pain level.  Ambulatory with no assistance.  Still no bowel movement.  Voiding freely.  Not requiring oxygen support.  Remained afebrile.        Physical Exam   Vital Signs: Temp: 97.6  F (36.4  C) Temp src: Oral BP: (!) 155/99 Pulse: 83   Resp: 16 SpO2: 99 % O2 Device: None (Room air)    Weight: 148 lbs 12.97 oz    HEENT; Atraumatic, normocephalic, pinkish conjuctiva, pupils bilateral reactive   Skin: warm and moist, no rashes  Lymphatics: no cervical or axillary lymphandenopathy  Lungs: equal chest expansion, clear to auscultation, no wheezes, no stridor, no crackles,   Heart: normal rate, normal rhythm, no rubs or gallops.   Abdomen: normal bowel sounds, some epigastric tenderness upon palpation, no guarding, no peritoneal signs  Extremities: no deformities, no edema "   Neuro; follow commands, alert and oriented x3, spontaneous speech, coherent, moves all extremities spontaneously  Psych; no hallucination, euthymic mood, not agitated      Medical Decision Making       40 MINUTES SPENT BY ME on the date of service doing chart review, history, exam, documentation & further activities per the note.  MANAGEMENT DISCUSSED with the following over the past 24 hours: yes   NOTE(S)/MEDICAL RECORDS REVIEWED over the past 24 hours: Yes       Data     I have personally reviewed the following data over the past 24 hrs:    13.6 (H)  \   13.5   / 241     138 105 25.2 (H) /  118 (H)   4.8 22 0.98 (H) \     Procal: N/A CRP: <3.00 Lactic Acid: N/A         Imaging results reviewed over the past 24 hrs:   Recent Results (from the past 24 hour(s))   MR Enterography w/o & wContrast    Narrative    MR ENTEROGRAPHY WITHOUT AND WITH CONTRAST 3/27/2024 9:11 AM    INDICATION: Worsening abdominal pain. History of Crohn's disease.    COMPARISON: MR enterography 1/16/2024.    TECHNIQUE: Routine enterography protocol including imaging of abdomen  and pelvis with axial T1 in/out phase, axial T2, coronal T2. Post  contrast abdomen and pelvis axial and coronal thin-section T1 with fat  sat. 1 mg Glucagon. Oral VoLumen.    CONTRAST: 7 mL Gadavist    FINDINGS:     ENTEROGRAPHY: Postoperative changes of ileocecal resection. There is a  short segment of mild bowel wall thickening in the distal ileum  measuring approximately 3.5 cm, new since the previous exam (series 12  image 23). There are numerous loops of mildly prominent fluid and  stool-filled loops of mid and distal small bowel noted. Findings  suggest ileus, although a small bowel obstruction is difficult to  exclude entirely from this appearance. No evidence for abscess or  fistula. Moderate to large amount of stool throughout the colon  suggests constipation. The appendix is not seen, and is surgically  absent by history. Stomach is grossly  unremarkable.    ADDITIONAL FINDINGS: Cholecystectomy. No intrahepatic biliary  dilatation. The common duct is mildly prominent at 0.9 cm, possibly  related to the patient's age and postcholecystectomy state. Right  nephrectomy. No lymphadenopathy. There are multiple mildly prominent  mesenteric lymph nodes in the right lower quadrant, new since the  previous exam, and likely reactive. The visualized portions of the  liver and spleen are unremarkable. The pancreas, adrenal glands, and  left kidney are unremarkable. No hydronephrosis. There is a trace  amount of nonspecific free fluid in the right lower quadrant.      Impression    IMPRESSION:  1.  There is a short segment of bowel wall thickening and enhancement  in the distal ileum, new since the previous exam and suspicious for  active Crohn's inflammation.  2.  Trace amount of free fluid in the right lower quadrant and several  mildly prominent mesenteric lymph nodes in the right lower quadrant  are nonspecific, but likely also related to distal small bowel  inflammation.  3.  Moderate to large amount of stool throughout the colon suggests  constipation.  4.  Multiple mildly prominent loops of mid and distal small bowel with  no definite transition point suggests ileus, although an early small  bowel obstruction is difficult to exclude from this appearance, and  close follow-up is recommended.    RYAN ROSAS MD         SYSTEM ID:  [id number]

## 2024-03-27 NOTE — PLAN OF CARE
"Goal Outcome Evaluation:      Plan of Care Reviewed With: patient    Overall Patient Progress: no change    VS: Afebrile. Hypertensive.   Respiratory: WDL; lung sounds clear.   GI: No BM overnight. Hypoactive bowel sounds. Intermittent nausea; PRN Zofran x1 given. Soft, tender abdomen.   : Voiding.   Activity: Independent . Pt showered in evening hours.   Pain: Rating pain 6-8. PRN Dilaudid x2, PRN Atarax x1, and PRN Toradol x2 given.   Lines: R PIV SL.   Plan: Pain management. Nausea management. IV Solumedrol. Monitor GI status.       Problem: Adult Inpatient Plan of Care  Goal: Plan of Care Review  Description: The Plan of Care Review/Shift note should be completed every shift.  The Outcome Evaluation is a brief statement about your assessment that the patient is improving, declining, or no change.  This information will be displayed automatically on your shift  note.  3/27/2024 0109 by Valentine Vuong RN  Outcome: Progressing  Flowsheets (Taken 3/27/2024 0109)  Plan of Care Reviewed With: patient  Overall Patient Progress: no change  3/27/2024 0107 by Valentine Vuong RN  Outcome: Not Progressing  Flowsheets (Taken 3/27/2024 0107)  Plan of Care Reviewed With: patient  Overall Patient Progress: no change  Goal: Patient-Specific Goal (Individualized)  Description: You can add care plan individualizations to a care plan. Examples of Individualization might be:  \"Parent requests to be called daily at 9am for status\", \"I have a hard time hearing out of my right ear\", or \"Do not touch me to wake me up as it startles  me\".  3/27/2024 0109 by Valentine Vuong, RN  Outcome: Progressing  3/27/2024 0107 by Valentine Vuong, RN  Outcome: Not Progressing  Goal: Absence of Hospital-Acquired Illness or Injury  3/27/2024 0109 by Valentine Vuong, RN  Outcome: Progressing  3/27/2024 0107 by Valentine Vuong, RN  Outcome: Not Progressing  Intervention: Identify and Manage Fall Risk  Recent Flowsheet Documentation  Taken 3/26/2024 2057 by " Teiken, Valentine G, RN  Safety Promotion/Fall Prevention:   assistive device/personal items within reach   clutter free environment maintained   lighting adjusted   nonskid shoes/slippers when out of bed   patient and family education   room near nurse's station   room organization consistent   safety round/check completed  Intervention: Prevent Skin Injury  Recent Flowsheet Documentation  Taken 3/26/2024 2340 by Valentine Vuong RN  Body Position: position changed independently  Taken 3/26/2024 2057 by Valentine Vuong RN  Body Position: position changed independently  Skin Protection: adhesive use limited  Device Skin Pressure Protection:   adhesive use limited   tubing/devices free from skin contact  Intervention: Prevent and Manage VTE (Venous Thromboembolism) Risk  Recent Flowsheet Documentation  Taken 3/26/2024 2057 by Valentine Vuong RN  VTE Prevention/Management: (Ambulatory) SCDs (sequential compression devices) off  Intervention: Prevent Infection  Recent Flowsheet Documentation  Taken 3/26/2024 2057 by Valentine Vuong RN  Infection Prevention:   environmental surveillance performed   equipment surfaces disinfected   hand hygiene promoted   personal protective equipment utilized   rest/sleep promoted   single patient room provided  Goal: Optimal Comfort and Wellbeing  3/27/2024 0109 by Valentine Vuong RN  Outcome: Progressing  3/27/2024 0107 by Valentine Vuong RN  Outcome: Not Progressing  Intervention: Monitor Pain and Promote Comfort  Recent Flowsheet Documentation  Taken 3/26/2024 2340 by Valentine Vuong RN  Pain Management Interventions:   medication (see MAR)   heat applied   rest   repositioned  Taken 3/26/2024 2115 by Valentine Vuong RN  Pain Management Interventions:   heat applied   rest   repositioned   shower  Taken 3/26/2024 2057 by Valentine Vuong RN  Pain Management Interventions:   heat applied   medication (see MAR)  Goal: Readiness for Transition of Care  3/27/2024 0109 by Valentine Vuong  RN  Outcome: Progressing  3/27/2024 0107 by Valentine Vuong RN  Outcome: Not Progressing     Problem: Pain Acute  Goal: Optimal Pain Control and Function  3/27/2024 0109 by Valentine Vuong RN  Outcome: Progressing  3/27/2024 0107 by Valentine Vuong RN  Outcome: Not Progressing  Intervention: Develop Pain Management Plan  Recent Flowsheet Documentation  Taken 3/26/2024 2340 by Valentine Vuong RN  Pain Management Interventions:   medication (see MAR)   heat applied   rest   repositioned  Taken 3/26/2024 2115 by Valentine Vuong RN  Pain Management Interventions:   heat applied   rest   repositioned   shower  Taken 3/26/2024 2057 by Valentine Vuong RN  Pain Management Interventions:   heat applied   medication (see MAR)  Intervention: Prevent or Manage Pain  Recent Flowsheet Documentation  Taken 3/26/2024 2057 by Valentine Vuong RN  Sensory Stimulation Regulation:   care clustered   lighting decreased   quiet environment promoted   television on  Sleep/Rest Enhancement:   awakenings minimized   comfort measures   regular sleep/rest pattern promoted   room darkened  Bowel Elimination Promotion:   adequate fluid intake promoted   ambulation promoted  Medication Review/Management:   medications reviewed   high-risk medications identified  Intervention: Optimize Psychosocial Wellbeing  Recent Flowsheet Documentation  Taken 3/26/2024 2057 by Valentine Vuong RN  Supportive Measures:   active listening utilized   decision-making supported   goal-setting facilitated   relaxation techniques promoted   self-care encouraged   verbalization of feelings encouraged     Problem: Bowel Disease, Inflammatory (Ulcerative Colitis or Crohn's Disease)  Goal: Optimal Adaptation to Chronic Illness  3/27/2024 0109 by Valentine Vuong RN  Outcome: Progressing  3/27/2024 0107 by Valentine Vuong RN  Outcome: Not Progressing  Intervention: Support Psychosocial Response to Illness  Recent Flowsheet Documentation  Taken 3/26/2024 2057 by Valentine Vuong  JESSICA CORTEZ  Supportive Measures:   active listening utilized   decision-making supported   goal-setting facilitated   relaxation techniques promoted   self-care encouraged   verbalization of feelings encouraged  Goal: Diarrhea Symptom Relief  3/27/2024 0109 by Valentine Vuong RN  Outcome: Progressing  3/27/2024 0107 by Valentine Vuong RN  Outcome: Not Progressing  Goal: Absence of Infection Signs and Symptoms  3/27/2024 0109 by Valentine Vuong RN  Outcome: Progressing  3/27/2024 0107 by Valentine Vuong RN  Outcome: Not Progressing  Goal: Optimal Nutrition Delivery  3/27/2024 0109 by Valentine Vuong RN  Outcome: Progressing  3/27/2024 0107 by Valentine Vuong RN  Outcome: Not Progressing  Goal: Optimal Pain Control and Function  3/27/2024 0109 by Valentine Vuong RN  Outcome: Progressing  3/27/2024 0107 by Valentine Vuong RN  Outcome: Not Progressing  Intervention: Prevent or Manage Pain  Recent Flowsheet Documentation  Taken 3/26/2024 2340 by Valentine Vuong RN  Pain Management Interventions:   medication (see MAR)   heat applied   rest   repositioned  Taken 3/26/2024 2115 by Valentine Vuong RN  Pain Management Interventions:   heat applied   rest   repositioned   shower  Taken 3/26/2024 2057 by Valentine Vuong RN  Pain Management Interventions:   heat applied   medication (see MAR)  Sleep/Rest Enhancement:   awakenings minimized   comfort measures   regular sleep/rest pattern promoted   room darkened

## 2024-03-27 NOTE — PROGRESS NOTES
"GASTROENTEROLOGY PROGRESS NOTE        SUBJECTIVE: Abdominal pain is about the same in nature as yesterday.  No nausea or vomiting.  Unable to have a bowel movement in the past 3 days.  No fever no chills.     OBJECTIVE:    BP (!) 155/99   Pulse 83   Temp 97.6  F (36.4  C) (Oral)   Resp 16   Ht 1.6 m (5' 3\")   Wt 67.5 kg (148 lb 13 oz)   SpO2 99%   BMI 26.36 kg/m    Temp (24hrs), Av.9  F (36.6  C), Min:97.6  F (36.4  C), Max:98.3  F (36.8  C)    No data found.    Intake/Output Summary (Last 24 hours) at 3/27/2024 1145  Last data filed at 3/27/2024 1100  Gross per 24 hour   Intake 730 ml   Output --   Net 730 ml        PHYSICAL EXAM     Constitutional: No acute distress    Abdomen: Hypoactive bowel sounds, soft, diffusely tender in lower abdomen, no rebound or guarding.         Additional Comments:  ROS, FH, SH: See initial GI consult for details.     I have reviewed the patient's new clinical lab results:     Recent Labs   Lab Test 24  0720 24  0701 24  0706   WBC 13.6* 7.8 10.5   HGB 13.5 11.9 12.7   MCV 89 89 90    229 251     Recent Labs   Lab Test 24  0720 24  0745 24  0701 24  1348 24  0706   POTASSIUM 4.8 4.2 4.3   < > 5.4*   CHLORIDE 105  --  107  --  107   CO2 22  --  26  --  29   BUN 25.2*  --  13.2  --  12.3   ANIONGAP 11  --  9  --  6*    < > = values in this interval not displayed.     Recent Labs   Lab Test 24  1528 24  1526 24  0816 01/15/24  1823 01/15/24  1801 23  1726 10/09/23  1750 10/09/23  1737 23  1717 23  1608   ALBUMIN 4.7  --  3.9 4.5  --   --    < > 4.7  --  3.9   BILITOTAL 0.3  --  0.6 0.7  --   --    < > 0.4  --  0.3   ALT 13  --  11 15  --   --    < > 15  --  28   AST 17  --  16 15  --   --    < > 24  --  29   PROTEIN  --  Negative  --   --  Negative Negative   < >  --    < >  --    LIPASE  --   --   --   --   --   --   --  27  --  56    < > = values in this interval not displayed. "       ASSESSMENT/ PLAN  Devi Salas is a 63-year-old female diagnosed with Crohn's disease in 2023 who underwent a ileocecectomy for fibrostenotic disease currently on Remicade monotherapy presenting with diarrhea and severe abdominal pain.  Patient has been on prednisone as an outpatient without response in symptoms.     1.  Crohn's disease: Diagnosis in 2023 with ileocecal resection.  CT scan does not show any obstruction.  No evidence of stricture or fistula.  There was some mild wall thickening and mucosal hyperenhancement of the colon.  This was suggestive of mild infectious colitis.  C. difficile and fecal calprotectin were checked as an outpatient on 3/8.  These were both negative/unremarkable.  Fecal calprotectin was 54.  MR enterography on January 16, 2024 did not show any signs of active inflammation.  Patient has been struggling with these recurrent symptoms of abdominal pain and loose stool.  She has been [facility] with [facility] at increasing her Remicade dose in addition to receiving more frequent infusions.  He is recently received 10 mg/kg every 6 weeks.  With her last infusion 3 weeks ago.  Plans are in place to recheck Remicade drug levels.  Pending results she may need to receive Remicade every 4 weeks.     -- Preliminary read on MR enterography-short segment of wall thickening and enhancement in the distal ileum suggestive of active Crohn's inflammation multiple mildly prominent loops of mid and distal small bowel without definite transition point (ileus), and moderate to large amount of stool throughout the colon.  No report of colonic inflammation.  -- C. difficile negative on 3/8 at [facility]  -- Continue IV Solu-Medrol every 8 hours  -- Clear liquids  -- Dulcolax suppository and tap water enemas as needed    Total time: 45 minutes of total time was spent providing patient care, including patient evaluation, reviewing documentation/test results, and .     Discussed with  Dr. Omid Romero PA-C  [facility] ( [facility])

## 2024-03-27 NOTE — PLAN OF CARE
"Goal Outcome Evaluation:      Plan of Care Reviewed With: patient    Overall Patient Progress: improvingOverall Patient Progress: improving    Afebrile, VSS, BP continues slightly elevated, 155/99, 142/96,pt c/o abdominal pain prior to MR enterography but increased to an 8 after lying on stomach during scan. IV dilaudid then given per pt request and pt's pain then to a 4. Pt passing gas but no stool, given senna and dulcolax for good results. Has had 3 stools. Had skim milk and cream of rice for lunch which was ok'd per [facility].  Pt has continued with abdominal pain/soreness, IV dilaudid given at 1608 for a pain of 7, pt then took po dilaudid with toradol at 1900. Has had 2 additional stools and was feeling hungry at 1800. Continues on IV Solumedrol. Will continue with plan of care.            Problem: Adult Inpatient Plan of Care  Goal: Plan of Care Review  Description: The Plan of Care Review/Shift note should be completed every shift.  The Outcome Evaluation is a brief statement about your assessment that the patient is improving, declining, or no change.  This information will be displayed automatically on your shift  note.  Outcome: Progressing  Flowsheets (Taken 3/27/2024 1428)  Plan of Care Reviewed With: patient  Overall Patient Progress: improving  Goal: Patient-Specific Goal (Individualized)  Description: You can add care plan individualizations to a care plan. Examples of Individualization might be:  \"Parent requests to be called daily at 9am for status\", \"I have a hard time hearing out of my right ear\", or \"Do not touch me to wake me up as it startles  me\".  Outcome: Progressing  Goal: Absence of Hospital-Acquired Illness or Injury  Outcome: Progressing  Intervention: Identify and Manage Fall Risk  Recent Flowsheet Documentation  Taken 3/27/2024 1000 by Noa Fernández RN  Safety Promotion/Fall Prevention:   clutter free environment maintained   assistive device/personal items within " reach  Intervention: Prevent Skin Injury  Recent Flowsheet Documentation  Taken 3/27/2024 1000 by Noa Fernández, RN  Body Position: position changed independently  Skin Protection: adhesive use limited  Device Skin Pressure Protection:   adhesive use limited   tubing/devices free from skin contact  Intervention: Prevent and Manage VTE (Venous Thromboembolism) Risk  Recent Flowsheet Documentation  Taken 3/27/2024 1000 by Noa Fernández RN  VTE Prevention/Management: (ambulatory) SCDs (sequential compression devices) off  Intervention: Prevent Infection  Recent Flowsheet Documentation  Taken 3/27/2024 1000 by Noa Fernández RN  Infection Prevention:   rest/sleep promoted   hand hygiene promoted   single patient room provided  Goal: Optimal Comfort and Wellbeing  Outcome: Progressing  Intervention: Monitor Pain and Promote Comfort  Recent Flowsheet Documentation  Taken 3/27/2024 1148 by Noa Fernández RN  Pain Management Interventions:   medication (see MAR)   around-the-clock dosing utilized   care clustered   heat applied  Taken 3/27/2024 0928 by Noa Fernández RN  Pain Management Interventions:   medication (see MAR)   care clustered   heat applied  Goal: Readiness for Transition of Care  Outcome: Progressing

## 2024-03-28 LAB
CRP SERPL-MCNC: 21.12 MG/L
MAGNESIUM SERPL-MCNC: 2.3 MG/DL (ref 1.7–2.3)
POTASSIUM SERPL-SCNC: 4.9 MMOL/L (ref 3.4–5.3)

## 2024-03-28 PROCEDURE — 250N000011 HC RX IP 250 OP 636: Performed by: STUDENT IN AN ORGANIZED HEALTH CARE EDUCATION/TRAINING PROGRAM

## 2024-03-28 PROCEDURE — 250N000011 HC RX IP 250 OP 636: Performed by: INTERNAL MEDICINE

## 2024-03-28 PROCEDURE — 250N000013 HC RX MED GY IP 250 OP 250 PS 637: Performed by: STUDENT IN AN ORGANIZED HEALTH CARE EDUCATION/TRAINING PROGRAM

## 2024-03-28 PROCEDURE — 250N000011 HC RX IP 250 OP 636: Performed by: PHYSICIAN ASSISTANT

## 2024-03-28 PROCEDURE — 250N000013 HC RX MED GY IP 250 OP 250 PS 637: Performed by: INTERNAL MEDICINE

## 2024-03-28 PROCEDURE — 86140 C-REACTIVE PROTEIN: CPT | Performed by: INTERNAL MEDICINE

## 2024-03-28 PROCEDURE — 84132 ASSAY OF SERUM POTASSIUM: CPT | Performed by: STUDENT IN AN ORGANIZED HEALTH CARE EDUCATION/TRAINING PROGRAM

## 2024-03-28 PROCEDURE — 83735 ASSAY OF MAGNESIUM: CPT | Performed by: STUDENT IN AN ORGANIZED HEALTH CARE EDUCATION/TRAINING PROGRAM

## 2024-03-28 PROCEDURE — 99232 SBSQ HOSP IP/OBS MODERATE 35: CPT | Performed by: INTERNAL MEDICINE

## 2024-03-28 PROCEDURE — 120N000004 HC R&B MS OVERFLOW

## 2024-03-28 PROCEDURE — 36415 COLL VENOUS BLD VENIPUNCTURE: CPT | Performed by: INTERNAL MEDICINE

## 2024-03-28 RX ORDER — BISACODYL 10 MG
10 SUPPOSITORY, RECTAL RECTAL DAILY PRN
Status: DISCONTINUED | OUTPATIENT
Start: 2024-03-28 | End: 2024-04-02 | Stop reason: HOSPADM

## 2024-03-28 RX ADMIN — FAMOTIDINE 20 MG: 10 INJECTION, SOLUTION INTRAVENOUS at 13:59

## 2024-03-28 RX ADMIN — MONTELUKAST 10 MG: 10 TABLET, FILM COATED ORAL at 22:43

## 2024-03-28 RX ADMIN — BISACODYL 10 MG: 10 SUPPOSITORY RECTAL at 13:58

## 2024-03-28 RX ADMIN — HYDROXYZINE HYDROCHLORIDE 50 MG: 25 TABLET, FILM COATED ORAL at 20:51

## 2024-03-28 RX ADMIN — KETOROLAC TROMETHAMINE 30 MG: 30 INJECTION, SOLUTION INTRAMUSCULAR; INTRAVENOUS at 13:58

## 2024-03-28 RX ADMIN — METHYLPREDNISOLONE SODIUM SUCCINATE 20 MG: 40 INJECTION, POWDER, FOR SOLUTION INTRAMUSCULAR; INTRAVENOUS at 01:31

## 2024-03-28 RX ADMIN — ONDANSETRON 4 MG: 4 TABLET, ORALLY DISINTEGRATING ORAL at 08:12

## 2024-03-28 RX ADMIN — SENNOSIDES AND DOCUSATE SODIUM 2 TABLET: 8.6; 5 TABLET ORAL at 20:51

## 2024-03-28 RX ADMIN — HYDROMORPHONE HYDROCHLORIDE 2 MG: 2 TABLET ORAL at 09:56

## 2024-03-28 RX ADMIN — KETOROLAC TROMETHAMINE 30 MG: 30 INJECTION, SOLUTION INTRAMUSCULAR; INTRAVENOUS at 01:31

## 2024-03-28 RX ADMIN — HYDROXYZINE HYDROCHLORIDE 50 MG: 25 TABLET, FILM COATED ORAL at 08:12

## 2024-03-28 RX ADMIN — METHYLPREDNISOLONE SODIUM SUCCINATE 20 MG: 40 INJECTION, POWDER, FOR SOLUTION INTRAMUSCULAR; INTRAVENOUS at 09:48

## 2024-03-28 RX ADMIN — TRAZODONE HYDROCHLORIDE 200 MG: 50 TABLET ORAL at 22:43

## 2024-03-28 RX ADMIN — HYDROMORPHONE HYDROCHLORIDE 0.5 MG: 1 INJECTION, SOLUTION INTRAMUSCULAR; INTRAVENOUS; SUBCUTANEOUS at 18:09

## 2024-03-28 RX ADMIN — FAMOTIDINE 20 MG: 20 TABLET ORAL at 22:43

## 2024-03-28 RX ADMIN — SENNOSIDES AND DOCUSATE SODIUM 2 TABLET: 8.6; 5 TABLET ORAL at 08:11

## 2024-03-28 RX ADMIN — SIMETHICONE CHEW TAB 80 MG 80 MG: 80 TABLET ORAL at 08:31

## 2024-03-28 RX ADMIN — SIMETHICONE CHEW TAB 80 MG 80 MG: 80 TABLET ORAL at 20:52

## 2024-03-28 RX ADMIN — POLYETHYLENE GLYCOL 3350 17 G: 17 POWDER, FOR SOLUTION ORAL at 08:13

## 2024-03-28 RX ADMIN — HYDROMORPHONE HYDROCHLORIDE 0.5 MG: 1 INJECTION, SOLUTION INTRAMUSCULAR; INTRAVENOUS; SUBCUTANEOUS at 15:09

## 2024-03-28 RX ADMIN — HYDROMORPHONE HYDROCHLORIDE 0.5 MG: 1 INJECTION, SOLUTION INTRAMUSCULAR; INTRAVENOUS; SUBCUTANEOUS at 22:43

## 2024-03-28 RX ADMIN — ONDANSETRON 4 MG: 4 TABLET, ORALLY DISINTEGRATING ORAL at 20:51

## 2024-03-28 RX ADMIN — NORTRIPTYLINE HYDROCHLORIDE 25 MG: 25 CAPSULE ORAL at 22:43

## 2024-03-28 RX ADMIN — KETOROLAC TROMETHAMINE 30 MG: 30 INJECTION, SOLUTION INTRAMUSCULAR; INTRAVENOUS at 08:40

## 2024-03-28 RX ADMIN — METHYLPREDNISOLONE SODIUM SUCCINATE 20 MG: 40 INJECTION, POWDER, FOR SOLUTION INTRAMUSCULAR; INTRAVENOUS at 17:14

## 2024-03-28 RX ADMIN — KETOROLAC TROMETHAMINE 30 MG: 30 INJECTION, SOLUTION INTRAMUSCULAR; INTRAVENOUS at 20:54

## 2024-03-28 RX ADMIN — ONDANSETRON 4 MG: 4 TABLET, ORALLY DISINTEGRATING ORAL at 13:57

## 2024-03-28 RX ADMIN — HYDROXYZINE HYDROCHLORIDE 50 MG: 25 TABLET, FILM COATED ORAL at 13:57

## 2024-03-28 RX ADMIN — PANTOPRAZOLE SODIUM 40 MG: 40 TABLET, DELAYED RELEASE ORAL at 08:12

## 2024-03-28 RX ADMIN — SIMETHICONE CHEW TAB 80 MG 80 MG: 80 TABLET ORAL at 14:12

## 2024-03-28 ASSESSMENT — ACTIVITIES OF DAILY LIVING (ADL)
ADLS_ACUITY_SCORE: 22

## 2024-03-28 NOTE — PLAN OF CARE
"Goal Outcome Evaluation:      Plan of Care Reviewed With: patient    Overall Patient Progress: improvingOverall Patient Progress: improving    Updates: Theresa is doing okay today. Was feeling better this morning before having miralax in orange juice and has had increasing pain since. VSS. Independent in room, ambulating in hallways.  GI/: Abdominal discomfort/cramping and tender to palpation. Minimal flatus, hyperactive BS, voiding WDL. 2 small/soft/formed BM this shift after dulcolax - 2 senna and miralax also given today. Intermittent nausea, ODT zofran given x2. Continuing to attempt to tolerate bites of regular diet.   IV: Restarted in L wrist, SL b/t meds, flushes well. Some redness noted at insertion site but pt has no complaints of discomfort.  Pain/Comfort: 6-10/10. Toradol, atarax, simethicone, oral dilaudid x1, IV dilaudid x2 providing adequate pain management per pt.  Skin: WDL x bruising from previous IVs on bilateral arms.  Social: Visitors at bedside today. Attended [employer] service this evening.  Plan: Continue to monitor GI status, encourage OOB activity, assess diet tolerance, and provide for comfort and needs. Pt considering enema and a shower this evening.      Problem: Adult Inpatient Plan of Care  Goal: Plan of Care Review  Description: The Plan of Care Review/Shift note should be completed every shift.  The Outcome Evaluation is a brief statement about your assessment that the patient is improving, declining, or no change.  This information will be displayed automatically on your shift  note.  Outcome: Progressing  Flowsheets (Taken 3/28/2024 1250)  Plan of Care Reviewed With: patient  Overall Patient Progress: improving  Goal: Patient-Specific Goal (Individualized)  Description: You can add care plan individualizations to a care plan. Examples of Individualization might be:  \"Parent requests to be called daily at 9am for status\", \"I have a hard time hearing out of my right ear\", or \"Do " "not touch me to wake me up as it startles  me\".  Outcome: Progressing  Goal: Absence of Hospital-Acquired Illness or Injury  Outcome: Progressing  Intervention: Identify and Manage Fall Risk  Recent Flowsheet Documentation  Taken 3/28/2024 0809 by Dulce Arnett RN  Safety Promotion/Fall Prevention:   assistive device/personal items within reach   clutter free environment maintained   nonskid shoes/slippers when out of bed   patient and family education   safety round/check completed   treat underlying cause  Intervention: Prevent Skin Injury  Recent Flowsheet Documentation  Taken 3/28/2024 0809 by Dulce Arnett RN  Skin Protection: adhesive use limited  Device Skin Pressure Protection: adhesive use limited  Taken 3/28/2024 0807 by Dulce Arnett RN  Body Position:   position changed independently   sitting up in bed  Intervention: Prevent and Manage VTE (Venous Thromboembolism) Risk  Recent Flowsheet Documentation  Taken 3/28/2024 0809 by Dulce Arnett RN  VTE Prevention/Management: SCDs (sequential compression devices) off  Intervention: Prevent Infection  Recent Flowsheet Documentation  Taken 3/28/2024 0809 by Dulce Arnett RN  Infection Prevention:   environmental surveillance performed   equipment surfaces disinfected   hand hygiene promoted   rest/sleep promoted   single patient room provided  Goal: Optimal Comfort and Wellbeing  Outcome: Progressing  Intervention: Monitor Pain and Promote Comfort  Recent Flowsheet Documentation  Taken 3/28/2024 0956 by Dulce Arnett RN  Pain Management Interventions: medication (see MAR)  Taken 3/28/2024 0807 by Dulce Arnett RN  Pain Management Interventions:   medication (see MAR)   heat applied  Goal: Readiness for Transition of Care  Outcome: Progressing     Problem: Pain Acute  Goal: Optimal Pain Control and Function  Outcome: Progressing  Intervention: Develop Pain Management Plan  Recent Flowsheet Documentation  Taken 3/28/2024 " 0956 by Dulce Arnett RN  Pain Management Interventions: medication (see MAR)  Taken 3/28/2024 0807 by Dulce Arnett RN  Pain Management Interventions:   medication (see MAR)   heat applied  Intervention: Prevent or Manage Pain  Recent Flowsheet Documentation  Taken 3/28/2024 0809 by Dulce Arnett RN  Sensory Stimulation Regulation: care clustered  Sleep/Rest Enhancement: relaxation techniques promoted  Bowel Elimination Promotion:   adequate fluid intake promoted   ambulation promoted  Medication Review/Management: medications reviewed  Intervention: Optimize Psychosocial Wellbeing  Recent Flowsheet Documentation  Taken 3/28/2024 0809 by Dulce Arnett RN  Supportive Measures:   active listening utilized   decision-making supported   goal-setting facilitated   positive reinforcement provided   relaxation techniques promoted   verbalization of feelings encouraged     Problem: Bowel Disease, Inflammatory (Ulcerative Colitis or Crohn's Disease)  Goal: Optimal Adaptation to Chronic Illness  Outcome: Progressing  Intervention: Support Psychosocial Response to Illness  Recent Flowsheet Documentation  Taken 3/28/2024 0809 by Dulce Arnett RN  Supportive Measures:   active listening utilized   decision-making supported   goal-setting facilitated   positive reinforcement provided   relaxation techniques promoted   verbalization of feelings encouraged  Goal: Diarrhea Symptom Relief  Outcome: Progressing  Intervention: Manage Diarrhea  Recent Flowsheet Documentation  Taken 3/28/2024 0809 by Dulce Arnett RN  Fluid/Electrolyte Management: fluids provided  Goal: Absence of Infection Signs and Symptoms  Outcome: Progressing  Intervention: Prevent or Manage Infection  Recent Flowsheet Documentation  Taken 3/28/2024 0809 by Dulce Arnett RN  Isolation Precautions: enteric precautions discontinued  Goal: Optimal Nutrition Delivery  Outcome: Progressing  Goal: Optimal Pain Control and  Function  Outcome: Progressing  Intervention: Prevent or Manage Pain  Recent Flowsheet Documentation  Taken 3/28/2024 0956 by Dulce Arnett, RN  Pain Management Interventions: medication (see MAR)  Taken 3/28/2024 0809 by Dulce Arnett, RN  Sleep/Rest Enhancement: relaxation techniques promoted  Taken 3/28/2024 0807 by Dulce Arnett, RN  Pain Management Interventions:   medication (see MAR)   heat applied

## 2024-03-28 NOTE — PLAN OF CARE
"Goal Outcome Evaluation:      Plan of Care Reviewed With: patient    Overall Patient Progress: improving       VS: Afebrile. Intermittent hypertension. Other VSS.   Respiratory: WDL; lung sounds clear.   GI: Intermittent nausea. Passing gas. Normoactive bowel sounds. Tender, soft abdomen.   : Voiding.   Activity: Independent. Encouraged ambulation as tolerated.   Pain: Rating pain 5-7. PRN Dilaudid x1, PRN Toradol x1, and PRN Atarax x1 given.   Lines: R PIV SL.   Plan: Pain management. Nausea management. IV solumedrol. Monitor GI status.       Problem: Adult Inpatient Plan of Care  Goal: Plan of Care Review  Description: The Plan of Care Review/Shift note should be completed every shift.  The Outcome Evaluation is a brief statement about your assessment that the patient is improving, declining, or no change.  This information will be displayed automatically on your shift  note.  Outcome: Progressing  Flowsheets (Taken 3/28/2024 0331)  Plan of Care Reviewed With: patient  Overall Patient Progress: improving  Goal: Patient-Specific Goal (Individualized)  Description: You can add care plan individualizations to a care plan. Examples of Individualization might be:  \"Parent requests to be called daily at 9am for status\", \"I have a hard time hearing out of my right ear\", or \"Do not touch me to wake me up as it startles  me\".  Outcome: Progressing  Goal: Absence of Hospital-Acquired Illness or Injury  Outcome: Progressing  Intervention: Identify and Manage Fall Risk  Recent Flowsheet Documentation  Taken 3/27/2024 2105 by Valentine Vuong RN  Safety Promotion/Fall Prevention:   assistive device/personal items within reach   clutter free environment maintained   lighting adjusted   nonskid shoes/slippers when out of bed   patient and family education   room near nurse's station   room organization consistent   safety round/check completed  Intervention: Prevent Skin Injury  Recent Flowsheet Documentation  Taken 3/27/2024 " 2240 by Valentine Vuong RN  Body Position: position changed independently  Taken 3/27/2024 2105 by Valentine Vuong RN  Body Position: position changed independently  Skin Protection: adhesive use limited  Device Skin Pressure Protection:   adhesive use limited   tubing/devices free from skin contact  Intervention: Prevent and Manage VTE (Venous Thromboembolism) Risk  Recent Flowsheet Documentation  Taken 3/27/2024 2105 by Valentine Vuong RN  VTE Prevention/Management: (Ambulatory) SCDs (sequential compression devices) off  Intervention: Prevent Infection  Recent Flowsheet Documentation  Taken 3/27/2024 2105 by Valentine Vuong RN  Infection Prevention:   environmental surveillance performed   equipment surfaces disinfected   hand hygiene promoted   personal protective equipment utilized   rest/sleep promoted   single patient room provided  Goal: Optimal Comfort and Wellbeing  Outcome: Progressing  Intervention: Monitor Pain and Promote Comfort  Recent Flowsheet Documentation  Taken 3/27/2024 2240 by Valentine Vuong RN  Pain Management Interventions: medication (see MAR)  Taken 3/27/2024 2105 by Valentine Vuong RN  Pain Management Interventions: medication (see MAR)  Goal: Readiness for Transition of Care  Outcome: Progressing     Problem: Pain Acute  Goal: Optimal Pain Control and Function  Outcome: Progressing  Intervention: Develop Pain Management Plan  Recent Flowsheet Documentation  Taken 3/27/2024 2240 by Valentine Vuong RN  Pain Management Interventions: medication (see MAR)  Taken 3/27/2024 2105 by Valentine Vuong RN  Pain Management Interventions: medication (see MAR)  Intervention: Prevent or Manage Pain  Recent Flowsheet Documentation  Taken 3/27/2024 2105 by Valentine Vuong RN  Sensory Stimulation Regulation:   care clustered   quiet environment promoted   television on  Sleep/Rest Enhancement:   comfort measures   consistent schedule promoted   regular sleep/rest pattern promoted  Bowel Elimination  Promotion:   adequate fluid intake promoted   ambulation promoted  Medication Review/Management: medications reviewed  Intervention: Optimize Psychosocial Wellbeing  Recent Flowsheet Documentation  Taken 3/27/2024 2105 by Valentine Vuong RN  Supportive Measures:   active listening utilized   decision-making supported   goal-setting facilitated   problem-solving facilitated   relaxation techniques promoted   self-care encouraged   verbalization of feelings encouraged     Problem: Bowel Disease, Inflammatory (Ulcerative Colitis or Crohn's Disease)  Goal: Optimal Adaptation to Chronic Illness  Outcome: Progressing  Intervention: Support Psychosocial Response to Illness  Recent Flowsheet Documentation  Taken 3/27/2024 2105 by Valentine Vuong RN  Supportive Measures:   active listening utilized   decision-making supported   goal-setting facilitated   problem-solving facilitated   relaxation techniques promoted   self-care encouraged   verbalization of feelings encouraged  Goal: Diarrhea Symptom Relief  Outcome: Progressing  Intervention: Manage Diarrhea  Recent Flowsheet Documentation  Taken 3/27/2024 2105 by Valentine Vuong RN  Fluid/Electrolyte Management: fluids provided  Goal: Absence of Infection Signs and Symptoms  Outcome: Progressing  Goal: Optimal Nutrition Delivery  Outcome: Progressing  Goal: Optimal Pain Control and Function  Outcome: Progressing  Intervention: Prevent or Manage Pain  Recent Flowsheet Documentation  Taken 3/27/2024 2240 by Valentine Vuong RN  Pain Management Interventions: medication (see MAR)  Taken 3/27/2024 2105 by Valentine Vuong RN  Pain Management Interventions: medication (see MAR)  Sleep/Rest Enhancement:   comfort measures   consistent schedule promoted   regular sleep/rest pattern promoted

## 2024-03-28 NOTE — PROGRESS NOTES
"GASTROENTEROLOGY PROGRESS NOTE     SUBJECTIVE:  Abdominal pain persists. IV steroids helping. Had a few small stools with suppository yesterday. No stools with Miralax/Senna today, just had dulcolax suppository. No flatus. Mild nausea. No vomiting. Tolerating small amounts of low fiber diet. Minimizing narcotics but difficult due to the degree of pain intermittently. Only 1 dose of IV dilaudid today.     OBJECTIVE:  /82   Pulse 81   Temp 98  F (36.7  C) (Oral)   Resp 15   Ht 1.6 m (5' 3\")   Wt 71.1 kg (156 lb 12 oz)   SpO2 97%   BMI 27.77 kg/m    Temp (24hrs), Av.8  F (36.6  C), Min:97.6  F (36.4  C), Max:98  F (36.7  C)    Patient Vitals for the past 72 hrs:   Weight   24 1200 71.1 kg (156 lb 12 oz)       Intake/Output Summary (Last 24 hours) at 3/28/2024 1600  Last data filed at 3/28/2024 1500  Gross per 24 hour   Intake 2069 ml   Output --   Net 2069 ml        PHYSICAL EXAM  Gen: alert, oriented, NAD  Abd: soft, hypoactive BS, nondistended, moderate tenderness RLQ and LUQ. No rebound or guarding.     Additional Comments:  ROS, FH, SH: See initial GI consult for details.     I have reviewed the patient's new clinical lab results:     Recent Labs   Lab Test 24  0720 24  0701 24  0706   WBC 13.6* 7.8 10.5   HGB 13.5 11.9 12.7   MCV 89 89 90    229 251     Recent Labs   Lab Test 24  0653 24  0720 24  0745 24  0701 24  1348 24  0706   POTASSIUM 4.9 4.8 4.2 4.3   < > 5.4*   CHLORIDE  --  105  --  107  --  107   CO2  --  22  --  26  --  29   BUN  --  25.2*  --  13.2  --  12.3   ANIONGAP  --  11  --  9  --  6*    < > = values in this interval not displayed.     Recent Labs   Lab Test 24  1528 24  1526 24  0816 01/15/24  1823 01/15/24  1801 23  1726 10/09/23  1750 10/09/23  1737 23  1717 23  1608   ALBUMIN 4.7  --  3.9 4.5  --   --    < > 4.7  --  3.9   BILITOTAL 0.3  --  0.6 0.7  --   --    < > 0.4  --  " 0.3   ALT 13  --  11 15  --   --    < > 15  --  28   AST 17  --  16 15  --   --    < > 24  --  29   PROTEIN  --  Negative  --   --  Negative Negative   < >  --    < >  --    LIPASE  --   --   --   --   --   --   --  27  --  56    < > = values in this interval not displayed.        Assessment:  63-year-old female diagnosed with Crohn's disease in 2023 who underwent a ileocecectomy for fibrostenotic disease currently on Remicade monotherapy presenting with diarrhea and severe abdominal pain. CT scan showed mild wall thickening and mucosal hyperenhancement of the colon.    1. Crohn's ileitis. CT scan does not show any obstruction. No evidence of stricture or fistula. There was some mild wall thickening and mucosal hyperenhancement of the colon. Now diarrhea has stopped and C diff/enteric panel not able to be collected. On 3/8 C diff was negative and fecal calprotectin was close to normal at 54 (normal <49). Remicade dosing decreased from 10mg/kg every 8 weeks to every 6 weeks with last infusion 2/28 (~4 weeks ago) prior to presentation.     3/27 MRE shows new short segment of ileitis, multiple prominent/dilated small bowel loops without inflammation or transition point suggestive of ileus, normal colon other than large amount stools. Had minimal output with suppository, senna.     3/28 No stools, ongoing pain. Suspect a portion of the pain is related to active Crohn's but ileus/constipation could be contributing. Patient is trying to minimize narcotics. Discussed patient with IBD MD Dr. Marie from our office. He is in agreement that we give Remicade inpatient and see if this helps. He will look into insurance approval for every 4 week dosing and have her follow up with him outpatient before her next scheduled dose.     2. Renal cell carcinoma s/p right nephrectomy in 2009, CKD. Creatinine 0.98 on 3/27.     Plan:  --Will discuss a dose of Remicade inpatient with patient tomorrow.   --Continue low fiber diet as  tolerated.  --Recommended enema but patient was hesitant. It is ordered prn and I encouraged her to try it if no results today.  --Continue Miralax daily and Senna/colace 2 pills BID.   --Continue IV steroids.     Patient was reviewed with Dr. Anne.     Time spent: 40 minutes, greater than 50% of the visit was spent in counseling/coordination of care.     MARILUZ Cannon  [facility] ([facility])

## 2024-03-28 NOTE — PROGRESS NOTES
[facility]    Medicine Progress Note - Hospitalist Service    Date of Admission:  3/23/2024    Assessment & Plan      Summary of Stay: Devi Salas is a 63 year old female with a history of Crohn's disease admitted on 3/23/2024 with abdominal pain.  In the emergency department, the patient found to have a BUN/creatinine 13.0/1.02, glucose 111, WBC 8.9.  Urinalysis was negative for signs of infection.  CT abdomen and pelvis showed mild wall thickening and mucosal hyperenhancement of the colon suggestive of mild infectious colitis.  The patient was started on IV Solu-Medrol with concern for Crohn's flare.  Gastroenterology was consulted to see the patient.     TODAY'S PLAN:      -We appreciate continuous input from [facility].     -Enteric panel has been discontinued since she has been in the hospital for more than 3 days now  MR enterography did show findings of-distal ileum inflammation likely from Crohn's  Concerning for ileus and constipation  -Bowel regimen added currently with stool softeners, MiraLAX and Dulcolax  -Had some small stooling yesterday  -Requesting for repeat Dulcolax suppository, hopeful to defer enema if able  -Agreeing in minimizing IV narcotics  -Will provide with IV Pepcid this morning due to complaints of epigastric discomfort and intermittent nausea  If no significant improvement GI considering also pursuing an enema  -Reportedly able to demonstrate some stooling earlier  -Diet changed to liquid diet.  -Minimize narcotics if able as this can also worsen and a big risk factor for underlying constipation  -Remain on IV corticosteroids currently on every 8 hours.  Will await further plans or modifications from GI service    Problem List:   Acute Intractable Abdominal Pain  Crohn's Flare  - Appreciate GI recommendations  - On infliximab, last dose was 2/28.  Outpatient GI has been increasing frequency and dose of infliximab as her symptoms improve initially but only  "last about 1.5 weeks  - IV solumedrol  - Enteric panel and cdif pcr pending  - Pain control  - CMV IgM/IgG pending  - CRP <3 upon arrival.  Trend CRP     Acute Kidney Injury  - Baseline Cr = 0.8-0.9  -Stable creatinine, currently at normal levels     Mild Hyperkalemia   -Resolved     Diet: Combination Diet Regular Diet Adult    DVT Prophylaxis: Pneumatic Compression Devices and Ambulate every shift  Monae Catheter: Not present  Lines: None     Cardiac Monitoring: None  Code Status: Full Code      Clinically Significant Risk Factors                         # Overweight: Estimated body mass index is 27.77 kg/m  as calculated from the following:    Height as of this encounter: 1.6 m (5' 3\").    Weight as of this encounter: 71.1 kg (156 lb 12 oz).             Disposition Plan      Expected Discharge Date: To be determined.  Awaiting resumption of bowel function, resolution of constipation and improvement with her abdominal symptomatology of pain, discomfort and nausea.                  Ino Mora MD, MD  Hospitalist Service  [facility]  Securely message with [employer] (more info)  Text page via [employer] Paging/Directory   ______________________________________________________________________    Interval History   Continuing medicine service care for this pleasant lady whom I met this morning while she is laying comfortably in bed.    -She mentions some improvement earlier with some small stooling yesterday  -However this morning she started to have some episodes of epigastric discomfort but no vomiting  -Not requiring any oxygen support   Remained afebrile.        Physical Exam   Vital Signs: Temp: 97.6  F (36.4  C) Temp src: Oral BP: 111/75 Pulse: 83   Resp: 14 SpO2: 95 % O2 Device: None (Room air)    Weight: 156 lbs 11.95 oz    HEENT; Atraumatic, normocephalic, pinkish conjuctiva, pupils bilateral reactive   Skin: warm and moist, no rashes  Lymphatics: no cervical or axillary " lymphandenopathy  Lungs: equal chest expansion, clear to auscultation, no wheezes, no stridor, no crackles,   Heart: normal rate, normal rhythm, no rubs or gallops.   Abdomen: normal bowel sounds, some epigastric tenderness upon palpation, no guarding, no peritoneal signs  Extremities: no deformities, no edema   Neuro; follow commands, alert and oriented x3, spontaneous speech, coherent, moves all extremities spontaneously  Psych; no hallucination, euthymic mood, not agitated      Medical Decision Making       35 MINUTES SPENT BY ME on the date of service doing chart review, history, exam, documentation & further activities per the note.  MANAGEMENT DISCUSSED with the following over the past 24 hours: Yes   NOTE(S)/MEDICAL RECORDS REVIEWED over the past 24 hours: Yes       Data     I have personally reviewed the following data over the past 24 hrs:    N/A  \   N/A   / N/A     N/A N/A N/A /  N/A   4.9 N/A N/A \     Procal: N/A CRP: 21.12 (H) Lactic Acid: N/A         Imaging results reviewed over the past 24 hrs:   No results found for this or any previous visit (from the past 24 hour(s)).

## 2024-03-29 LAB
CRP SERPL-MCNC: 19.09 MG/L
MAGNESIUM SERPL-MCNC: 2.5 MG/DL (ref 1.7–2.3)
POTASSIUM SERPL-SCNC: 4.9 MMOL/L (ref 3.4–5.3)

## 2024-03-29 PROCEDURE — 250N000013 HC RX MED GY IP 250 OP 250 PS 637: Performed by: INTERNAL MEDICINE

## 2024-03-29 PROCEDURE — 250N000011 HC RX IP 250 OP 636: Mod: JZ | Performed by: PHYSICIAN ASSISTANT

## 2024-03-29 PROCEDURE — 99232 SBSQ HOSP IP/OBS MODERATE 35: CPT | Performed by: INTERNAL MEDICINE

## 2024-03-29 PROCEDURE — 84132 ASSAY OF SERUM POTASSIUM: CPT | Performed by: STUDENT IN AN ORGANIZED HEALTH CARE EDUCATION/TRAINING PROGRAM

## 2024-03-29 PROCEDURE — 83735 ASSAY OF MAGNESIUM: CPT | Performed by: STUDENT IN AN ORGANIZED HEALTH CARE EDUCATION/TRAINING PROGRAM

## 2024-03-29 PROCEDURE — 258N000003 HC RX IP 258 OP 636: Performed by: PHYSICIAN ASSISTANT

## 2024-03-29 PROCEDURE — 120N000004 HC R&B MS OVERFLOW

## 2024-03-29 PROCEDURE — 250N000013 HC RX MED GY IP 250 OP 250 PS 637: Performed by: STUDENT IN AN ORGANIZED HEALTH CARE EDUCATION/TRAINING PROGRAM

## 2024-03-29 PROCEDURE — 250N000011 HC RX IP 250 OP 636: Performed by: INTERNAL MEDICINE

## 2024-03-29 PROCEDURE — 86140 C-REACTIVE PROTEIN: CPT | Performed by: INTERNAL MEDICINE

## 2024-03-29 PROCEDURE — 250N000011 HC RX IP 250 OP 636: Performed by: PHYSICIAN ASSISTANT

## 2024-03-29 PROCEDURE — 250N000011 HC RX IP 250 OP 636: Performed by: STUDENT IN AN ORGANIZED HEALTH CARE EDUCATION/TRAINING PROGRAM

## 2024-03-29 PROCEDURE — 36415 COLL VENOUS BLD VENIPUNCTURE: CPT | Performed by: INTERNAL MEDICINE

## 2024-03-29 RX ORDER — LORATADINE 10 MG/1
10 TABLET ORAL ONCE
Qty: 1 TABLET | Refills: 0 | Status: COMPLETED | OUTPATIENT
Start: 2024-03-29 | End: 2024-03-29

## 2024-03-29 RX ADMIN — LORATADINE 10 MG: 10 TABLET ORAL at 15:22

## 2024-03-29 RX ADMIN — PANTOPRAZOLE SODIUM 40 MG: 40 TABLET, DELAYED RELEASE ORAL at 08:06

## 2024-03-29 RX ADMIN — METHYLPREDNISOLONE SODIUM SUCCINATE 20 MG: 40 INJECTION, POWDER, FOR SOLUTION INTRAMUSCULAR; INTRAVENOUS at 01:39

## 2024-03-29 RX ADMIN — METHYLPREDNISOLONE SODIUM SUCCINATE 20 MG: 40 INJECTION, POWDER, FOR SOLUTION INTRAMUSCULAR; INTRAVENOUS at 22:44

## 2024-03-29 RX ADMIN — HYDROXYZINE HYDROCHLORIDE 50 MG: 25 TABLET, FILM COATED ORAL at 21:52

## 2024-03-29 RX ADMIN — ONDANSETRON 4 MG: 2 INJECTION INTRAMUSCULAR; INTRAVENOUS at 09:42

## 2024-03-29 RX ADMIN — SIMETHICONE CHEW TAB 80 MG 80 MG: 80 TABLET ORAL at 21:51

## 2024-03-29 RX ADMIN — KETOROLAC TROMETHAMINE 30 MG: 30 INJECTION, SOLUTION INTRAMUSCULAR; INTRAVENOUS at 09:42

## 2024-03-29 RX ADMIN — MONTELUKAST 10 MG: 10 TABLET, FILM COATED ORAL at 21:51

## 2024-03-29 RX ADMIN — TRAZODONE HYDROCHLORIDE 200 MG: 50 TABLET ORAL at 21:52

## 2024-03-29 RX ADMIN — HYDROXYZINE HYDROCHLORIDE 50 MG: 25 TABLET, FILM COATED ORAL at 03:31

## 2024-03-29 RX ADMIN — FAMOTIDINE 20 MG: 20 TABLET ORAL at 21:51

## 2024-03-29 RX ADMIN — HYDROXYZINE HYDROCHLORIDE 50 MG: 25 TABLET, FILM COATED ORAL at 09:42

## 2024-03-29 RX ADMIN — ONDANSETRON 4 MG: 4 TABLET, ORALLY DISINTEGRATING ORAL at 21:51

## 2024-03-29 RX ADMIN — INFLIXIMAB 700 MG: 100 INJECTION, POWDER, LYOPHILIZED, FOR SOLUTION INTRAVENOUS at 15:43

## 2024-03-29 RX ADMIN — SIMETHICONE CHEW TAB 80 MG 80 MG: 80 TABLET ORAL at 03:30

## 2024-03-29 RX ADMIN — HYDROMORPHONE HYDROCHLORIDE 0.5 MG: 1 INJECTION, SOLUTION INTRAMUSCULAR; INTRAVENOUS; SUBCUTANEOUS at 01:45

## 2024-03-29 RX ADMIN — SIMETHICONE CHEW TAB 80 MG 80 MG: 80 TABLET ORAL at 09:43

## 2024-03-29 RX ADMIN — ONDANSETRON 4 MG: 2 INJECTION INTRAMUSCULAR; INTRAVENOUS at 03:30

## 2024-03-29 RX ADMIN — METHYLPREDNISOLONE SODIUM SUCCINATE 20 MG: 40 INJECTION, POWDER, FOR SOLUTION INTRAMUSCULAR; INTRAVENOUS at 09:41

## 2024-03-29 RX ADMIN — HYDROMORPHONE HYDROCHLORIDE 2 MG: 2 TABLET ORAL at 21:59

## 2024-03-29 RX ADMIN — KETOROLAC TROMETHAMINE 30 MG: 30 INJECTION, SOLUTION INTRAMUSCULAR; INTRAVENOUS at 03:30

## 2024-03-29 RX ADMIN — HYDROMORPHONE HYDROCHLORIDE 2 MG: 2 TABLET ORAL at 08:06

## 2024-03-29 RX ADMIN — NORTRIPTYLINE HYDROCHLORIDE 25 MG: 25 CAPSULE ORAL at 21:51

## 2024-03-29 ASSESSMENT — ACTIVITIES OF DAILY LIVING (ADL)
ADLS_ACUITY_SCORE: 22

## 2024-03-29 NOTE — PROVIDER NOTIFICATION
MD Notification    Notified Person: MD    Notified Person Name: Dr. Mora    Notification Date/Time: 3/29/2024 1107    Notification Interaction: Vocera page    Purpose of Notification: Patient c/o abdominal pain with urination and orange colored/odorous urine. Do you want a UA? Last UA collected on 3/23.     Orders Received: UA ordered by MD.    Comments:

## 2024-03-29 NOTE — PROVIDER NOTIFICATION
MD Notification    Notified Person: MD    Notified Person Name: Dr. Mora    Notification Date/Time: 3/29/2024 1733    Notification Interaction: Vocera page    Purpose of Notification: Patient with edema around site of infusing remicade but getting blood return from IV. C/o pain when palpated. Spoke with pharmacy. Not a vesicant. Recommendations? Placing new PIV now to finish infusion.     Orders Received: Awaiting orders if needed.    Comments:

## 2024-03-29 NOTE — PROVIDER NOTIFICATION
MD Notification    Notified Person: MD    Notified Person Name: Halima Kearns ([place])    Notification Date/Time: 3/29/2024 1057    Notification Interaction: Phone call    Purpose of Notification: Okay to give PRN tap water enema? Had 2 small pebble-like BMs yesterday. Patient wondering if we should try PRN suppository again first before enema?    Orders Received: Okay to give enema.    Comments:

## 2024-03-29 NOTE — PROVIDER NOTIFICATION
MD Notification    Notified Person: MD    Notified Person Name:  Morgan    Notification Date/Time: 3/29/2024 9942    Notification Interaction: Vocera page    Purpose of Notification: Patient states she usually takes a claritin and steroid before receiving her remicade infusion. Does she need either of these ordered prior to her scheduled infusion at 1500?    Orders Received: MD to order claritin as patient is already receiving IV steroids.    Comments:

## 2024-03-29 NOTE — PROGRESS NOTES
[facility]    Medicine Progress Note - Hospitalist Service    Date of Admission:  3/23/2024    Assessment & Plan      Summary of Stay: Devi Salas is a 63 year old female with a history of Crohn's disease admitted on 3/23/2024 with abdominal pain.  In the emergency department, the patient found to have a BUN/creatinine 13.0/1.02, glucose 111, WBC 8.9.  Urinalysis was negative for signs of infection.  CT abdomen and pelvis showed mild wall thickening and mucosal hyperenhancement of the colon suggestive of mild infectious colitis.  The patient was started on IV Solu-Medrol with concern for Crohn's flare.  Gastroenterology was consulted to see the patient.     TODAY'S PLAN:      -We appreciate continuous input from [facility].   -GI contemplating in administering a dose of her Remicade here in the hospital  -Remain on low fiber diet as tolerated  -Continue with aggressive bowel regimen  -May need to pursue enema if no decent and satisfying bowel movement  - agreeable for tap water enema if needed  -Remain on IV steroids and defer to GI service  -Check UA  -Earlier and continue to reinforce minimizing narcotics if able as this can worsen ongoing constipation      Problem List:   Acute Intractable Abdominal Pain  Crohn's Flare  - Appreciate GI recommendations  - On infliximab, last dose was 2/28.  Outpatient GI has been increasing frequency and dose of infliximab as her symptoms improve initially but only last about 1.5 weeks  - IV solumedrol  - Enteric panel and cdif pcr pending  - Pain control  - CMV IgM/IgG pending  - CRP <3 upon arrival.  Trend CRP     Acute Kidney Injury  - Baseline Cr = 0.8-0.9  -Stable creatinine, currently at normal levels     Mild Hyperkalemia   -Resolved     Diet: Combination Diet Regular Diet Adult    DVT Prophylaxis: Pneumatic Compression Devices and Ambulate every shift  Monae Catheter: Not present  Lines: None     Cardiac Monitoring: None  Code Status: Full Code  "     Clinically Significant Risk Factors                         # Overweight: Estimated body mass index is 27.77 kg/m  as calculated from the following:    Height as of this encounter: 1.6 m (5' 3\").    Weight as of this encounter: 71.1 kg (156 lb 12 oz).             Disposition Plan      Expected Discharge Date: To be determined.  Awaiting resumption of bowel function, resolution of constipation and improvement with her abdominal symptomatology of pain, discomfort and nausea.                Ino Mora MD, MD  Hospitalist Service  [facility]  Securely message with [email or web address] (more info)  Text page via [facility] Paging/Directory   ______________________________________________________________________    Interval History   Continuing medicine service care for this pleasant lady whom I met this morning while she is laying comfortably in bed.    Nursing reported that patient is having some abdominal discomfort also during urination  -No reports of any vomiting this morning  -Stable hemodynamics            Physical Exam   Vital Signs: Temp: 98.1  F (36.7  C) Temp src: Oral BP: 115/74 Pulse: 77   Resp: 18 SpO2: 95 % O2 Device: None (Room air)    Weight: 156 lbs 11.95 oz    HEENT; Atraumatic, normocephalic, pinkish conjuctiva, pupils bilateral reactive   Skin: warm and moist, no rashes  Lymphatics: no cervical or axillary lymphandenopathy  Lungs: equal chest expansion, clear to auscultation, no wheezes, no stridor, no crackles,   Heart: normal rate, normal rhythm, no rubs or gallops.   Abdomen: normal bowel sounds, some epigastric tenderness upon palpation, no guarding, no peritoneal signs  Extremities: no deformities, no edema   Neuro; follow commands, alert and oriented x3, spontaneous speech, coherent, moves all extremities spontaneously  Psych; no hallucination, euthymic mood, not agitated      Medical Decision Making       40 MINUTES SPENT BY ME on the date of service doing chart review, " history, exam, documentation & further activities per the note.  MANAGEMENT DISCUSSED with the following over the past 24 hours: Yes   NOTE(S)/MEDICAL RECORDS REVIEWED over the past 24 hours: Yes       Data     I have personally reviewed the following data over the past 24 hrs:    N/A  \   N/A   / N/A     N/A N/A N/A /  N/A   4.9 N/A N/A \     Procal: N/A CRP: 19.09 (H) Lactic Acid: N/A         Imaging results reviewed over the past 24 hrs:   No results found for this or any previous visit (from the past 24 hour(s)).

## 2024-03-29 NOTE — PROGRESS NOTES
"GASTROENTEROLOGY PROGRESS NOTE     SUBJECTIVE:  Abdominal pain persists. IV steroids helping. Not much stool output despite suppository/senna, miralax. Had a few pebble like small stools yesterday after suppository. Mild nausea now. No vomiting. Tolerated a banana for breakfast.      OBJECTIVE:  /74 (BP Location: Right arm)   Pulse 77   Temp 98.1  F (36.7  C) (Oral)   Resp 18   Ht 1.6 m (5' 3\")   Wt 71.1 kg (156 lb 12 oz)   SpO2 95%   BMI 27.77 kg/m    Temp (24hrs), Av.8  F (36.6  C), Min:97.6  F (36.4  C), Max:98  F (36.7  C)    Patient Vitals for the past 72 hrs:   Weight   24 1200 71.1 kg (156 lb 12 oz)       Intake/Output Summary (Last 24 hours) at 3/28/2024 1600  Last data filed at 3/28/2024 1500  Gross per 24 hour   Intake 2069 ml   Output --   Net 2069 ml        PHYSICAL EXAM  Gen: alert, oriented, NAD  Abd: soft, hypoactive BS, nondistended, moderate tenderness RLQ and LUQ. No rebound or guarding.     Additional Comments:  ROS, FH, SH: See initial GI consult for details.     I have reviewed the patient's new clinical lab results:     Recent Labs   Lab Test 24  0720 24  0701 24  0706   WBC 13.6* 7.8 10.5   HGB 13.5 11.9 12.7   MCV 89 89 90    229 251     Recent Labs   Lab Test 24  0743 24  0653 24  0720 24  0745 24  0701 24  1348 24  0706   POTASSIUM 4.9 4.9 4.8   < > 4.3   < > 5.4*   CHLORIDE  --   --  105  --  107  --  107   CO2  --   --  22  --    --     BUN  --   --  25.2*  --  13.2  --  12.3   ANIONGAP  --   --  11  --  9  --  6*    < > = values in this interval not displayed.     Recent Labs   Lab Test 24  1528 24  1526 24  0816 01/15/24  1823 01/15/24  1801 23  1726 10/09/23  1750 10/09/23  1737 23  1717 23  1608   ALBUMIN 4.7  --  3.9 4.5  --   --    < > 4.7  --  3.9   BILITOTAL 0.3  --  0.6 0.7  --   --    < > 0.4  --  0.3   ALT 13  --   15  --   --    < > 15  --  28 "   AST 17  --  16 15  --   --    < > 24  --  29   PROTEIN  --  Negative  --   --  Negative Negative   < >  --    < >  --    LIPASE  --   --   --   --   --   --   --  27  --  56    < > = values in this interval not displayed.        Assessment:  63-year-old female diagnosed with Crohn's disease in 2023 who underwent a ileocecectomy for fibrostenotic disease currently on Remicade monotherapy presenting with diarrhea and severe abdominal pain. CT scan showed mild wall thickening and mucosal hyperenhancement of the colon.    1. Crohn's ileitis. CT scan does not show any obstruction. No evidence of stricture or fistula. There was some mild wall thickening and mucosal hyperenhancement of the colon. Now diarrhea has stopped and C diff/enteric panel not able to be collected. On 3/8 C diff was negative and fecal calprotectin was close to normal at 54 (normal <49). Remicade dosing decreased from 10mg/kg every 8 weeks to every 6 weeks with last infusion 2/28 (~4 weeks ago) prior to presentation.     3/27 MRE shows new short segment of ileitis, multiple prominent/dilated small bowel loops without inflammation or transition point suggestive of ileus, normal colon other than large amount stools. Had minimal output with suppository, senna.     3/28 No stools, ongoing pain. Suspect a portion of the pain is related to active Crohn's but ileus/constipation could be contributing. Patient is trying to minimize narcotics. Discussed patient with IBD MD Dr. Marie from our office. He is in agreement that we give Remicade inpatient and see if this helps. He will look into insurance approval for every 4 week dosing and have her follow up with him outpatient before her next scheduled dose.     3/29 Discussed dose of Remicade now in the hospital. Patient was in agreement with this plan. Getting enema today due to minimal stooling.    2. Renal cell carcinoma s/p right nephrectomy in 2009, CKD. Creatinine 0.98 on 3/27.     Plan:  --Remicade  10mg/kg today.   --Our office inquiring with insurance on increasing dose frequency of Remicade outpatient to every 4 weeks.   --Continue low fiber diet as tolerated.  --Continue Miralax daily, dulcolax suppository daily prn, and Senna/colace 2 pills BID.   --We will see how Remicade goes. May need more aggressive bowel meds ie prep to clear colon.   --Continue IV steroids.   --Our office will arrange outpatient IBD follow up with Dr. Marie in a couple weeks.     Patient was reviewed with Dr. Anne.     Time spent: 40 minutes, greater than 50% of the visit was spent in counseling/coordination of care.     Halima Kearns, PAC  [facility] ([facility])

## 2024-03-29 NOTE — PLAN OF CARE
"Goal Outcome Evaluation:      Plan of Care Reviewed With: patient    Overall Patient Progress: improvingOverall Patient Progress: improving       VS: Afebrile. Hypertension.   Respiratory: WDL; lung sounds clear.   GI: Intermittent nausea; PRN Zofran x2 given. PRN Senna x1 given and Simethicone x2 given. Hyperactive bowel sounds. Tender, soft abdomen.   : Voiding.   Activity: Independent.   Pain: Rating pain 6-9. PRN Atarax x2, Toradol x2, Dilaudid x2 given.   Lines: L PIV SL.   Plan: Pain management. Nausea management. Monitor GI status.         Problem: Adult Inpatient Plan of Care  Goal: Plan of Care Review  Description: The Plan of Care Review/Shift note should be completed every shift.  The Outcome Evaluation is a brief statement about your assessment that the patient is improving, declining, or no change.  This information will be displayed automatically on your shift  note.  Outcome: Progressing  Flowsheets (Taken 3/29/2024 7892)  Plan of Care Reviewed With: patient  Overall Patient Progress: improving  Goal: Patient-Specific Goal (Individualized)  Description: You can add care plan individualizations to a care plan. Examples of Individualization might be:  \"Parent requests to be called daily at 9am for status\", \"I have a hard time hearing out of my right ear\", or \"Do not touch me to wake me up as it startles  me\".  Outcome: Progressing  Goal: Absence of Hospital-Acquired Illness or Injury  Outcome: Progressing  Intervention: Identify and Manage Fall Risk  Recent Flowsheet Documentation  Taken 3/28/2024 2054 by Valentine Vuong RN  Safety Promotion/Fall Prevention:   assistive device/personal items within reach   clutter free environment maintained   lighting adjusted   nonskid shoes/slippers when out of bed   patient and family education   room near nurse's station   room organization consistent   safety round/check completed  Intervention: Prevent Skin Injury  Recent Flowsheet Documentation  Taken " 3/28/2024 2054 by Valentine Vuong RN  Body Position:   position changed independently   sitting up in bed  Skin Protection: adhesive use limited  Device Skin Pressure Protection:   adhesive use limited   tubing/devices free from skin contact  Intervention: Prevent and Manage VTE (Venous Thromboembolism) Risk  Recent Flowsheet Documentation  Taken 3/28/2024 2054 by Valentine Vuong RN  VTE Prevention/Management: (Ambulatory) SCDs (sequential compression devices) off  Intervention: Prevent Infection  Recent Flowsheet Documentation  Taken 3/28/2024 2054 by Valentine Vuong RN  Infection Prevention:   environmental surveillance performed   equipment surfaces disinfected   hand hygiene promoted   personal protective equipment utilized   rest/sleep promoted   single patient room provided  Goal: Optimal Comfort and Wellbeing  Outcome: Progressing  Intervention: Monitor Pain and Promote Comfort  Recent Flowsheet Documentation  Taken 3/29/2024 0145 by Valentine Vuong RN  Pain Management Interventions: medication (see MAR)  Taken 3/28/2024 2054 by Valentine Vuong RN  Pain Management Interventions:   medication (see MAR)   rest  Goal: Readiness for Transition of Care  Outcome: Progressing     Problem: Pain Acute  Goal: Optimal Pain Control and Function  Outcome: Progressing  Intervention: Develop Pain Management Plan  Recent Flowsheet Documentation  Taken 3/29/2024 0145 by Valentine Vuong RN  Pain Management Interventions: medication (see MAR)  Taken 3/28/2024 2054 by Valentine Vuong RN  Pain Management Interventions:   medication (see MAR)   rest  Intervention: Prevent or Manage Pain  Recent Flowsheet Documentation  Taken 3/28/2024 2054 by Valentine Vuong RN  Sensory Stimulation Regulation:   care clustered   quiet environment promoted   television on  Sleep/Rest Enhancement:   comfort measures   consistent schedule promoted   regular sleep/rest pattern promoted  Bowel Elimination Promotion:   adequate fluid intake promoted    ambulation promoted  Medication Review/Management: medications reviewed  Intervention: Optimize Psychosocial Wellbeing  Recent Flowsheet Documentation  Taken 3/28/2024 2054 by Valentine Vuong RN  Supportive Measures:   active listening utilized   decision-making supported   goal-setting facilitated   problem-solving facilitated   relaxation techniques promoted   self-care encouraged   verbalization of feelings encouraged     Problem: Bowel Disease, Inflammatory (Ulcerative Colitis or Crohn's Disease)  Goal: Optimal Adaptation to Chronic Illness  Outcome: Progressing  Intervention: Support Psychosocial Response to Illness  Recent Flowsheet Documentation  Taken 3/28/2024 2054 by Valentine Vuong RN  Supportive Measures:   active listening utilized   decision-making supported   goal-setting facilitated   problem-solving facilitated   relaxation techniques promoted   self-care encouraged   verbalization of feelings encouraged  Goal: Diarrhea Symptom Relief  Outcome: Progressing  Intervention: Manage Diarrhea  Recent Flowsheet Documentation  Taken 3/28/2024 2054 by Valentine Vuong RN  Fluid/Electrolyte Management: fluids provided  Goal: Absence of Infection Signs and Symptoms  Outcome: Progressing  Goal: Optimal Nutrition Delivery  Outcome: Progressing  Goal: Optimal Pain Control and Function  Outcome: Progressing  Intervention: Prevent or Manage Pain  Recent Flowsheet Documentation  Taken 3/29/2024 0145 by Valentine Vuong RN  Pain Management Interventions: medication (see MAR)  Taken 3/28/2024 2054 by Valentine Vuong RN  Pain Management Interventions:   medication (see MAR)   rest  Sleep/Rest Enhancement:   comfort measures   consistent schedule promoted   regular sleep/rest pattern promoted

## 2024-03-29 NOTE — CONSULTS
"SPIRITUAL HEALTH SERVICES - Consult Note  Peds  Referral Source/Reason for Visit: responding to consult request for emotional support.    Summary and Recommendations -  Theresa is in considerable pain and waiting for some other treatment options to be available.  Theresa's family, friends, and basilio provide a great amount of support to her. The hospital staff are also a source of connection and comfort.  Theresa is part of a [employer] in [place] and her  is checking in with her.    Plan: [facility] remains available. I will check in next week if Theresa is still in patient.    Lakeisha Elam, PhD   Intern    [facility] available  for emergent requests/referrals, either by paging the on-call  or by entering an ASAP/STAT consult in [employer], which will also page the on-call .     Assessment    Saw pt Devi Salas per consult request for emotional support..    Patient/Family Understanding of Illness and Goals of Care - Theresa is waiting for an insurance approval for a specific treatment option. She is in severe pain at the moment and hesitates to take pain medication while her symptoms are resolving.     Distress and Loss - Theresa's father  when she was 15. She is the eighth out of 11 siblings, and after her father's death, the family struggled financially. She also experienced a painful loss when her first marriage ended in divorce and the  told her she couldn't participate in [employer] anymore unless she got an annulment, which she didn't want. 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    Strengths, Coping, and Resources - Theresa had an important spiritual experience two years ago: \"I received the Holy Spirit.\" This experience has allowed her to feel connected to God and God's presence has been a major support. \"I wouldn't be alive if it wasn't for Him,\" she said. Her  is also very supportive, and they have seven " children, 18 grandchildren, and 1 great-grandchild between them. They've been  for 21 years. Theersa also has friends who visit her and her  from [place] is in contact over the phone. Theresa's  has suggested hosting a [employer] cookout for the family, to make up for missing Easter this year.    Meaning, Beliefs, and Spirituality - Theresa's experience with the Holy Spirit two years ago in a [facility] [employer] in [place] has been transformational. Theresa remains connected long-distance with that Religion and , and participates in donations to their food pantry, which is very meaningful to her. Theresa welcomes prayer and on-going support from Spiritual Health Services.

## 2024-03-29 NOTE — PROGRESS NOTES
"CLINICAL NUTRITION SERVICES  -  ASSESSMENT NOTE    Recommendations Ordered by Registered Dietitian (RD):   Diet per MD   Ordered oral nutritional supplements PRN    Malnutrition:   % Weight Loss:  None noted  % Intake:  Decreased intake does not meet criteria for malnutrition - 6 days of <75% of nutritional needs   Subcutaneous Fat Loss:  None observed  Muscle Loss:  None observed  Fluid Retention:  trace    Malnutrition Diagnosis: Patient does not meet two of the above criteria necessary for diagnosing malnutrition - at risk for developing malnutrition       REASON FOR ASSESSMENT  Devi Salas is a 63 year old female seen by Registered Dietitian for LOS    Past medical history: Chron's disease     Admitted for:   Acute Exacerbation of Chron's Disease   Acute Kidney Injury     NUTRITION HISTORY  - Information obtained from patient and chart   - Typical food/fluid intake PTA: pt reports a good appetite at home, no recent changes.   - Supplements: none - has tried in the past during her last admission   - Food allergies: NKFA    CURRENT NUTRITION ORDERS  Diet Order:     Regular Diet     Current Intake/Tolerance:  Upon review of the flowsheets, pt is consuming % of meals ordered. Ordering 2-3 meals per day. Pt endorses pain and constipation as largest barriers to PO intake.     Obtained from Chart/Interdisciplinary Team:  - GI following   - Reviewed stooling patterns  - Please refer to flowsheets for more information on skin     ANTHROPOMETRICS  Height: 5' 3\"  Weight: 71.1 kg ( 156 lbs 11.95 oz)   Body mass index is 27.77 kg/m .  Weight Status:  Overweight BMI 25-29.9  Weight History: weight fairly stable, even increased. Patient reports that her weight fluctuates  by 5-6 lbs.   Wt Readings from Last 10 Encounters:   03/27/24 71.1 kg (156 lb 12 oz)   01/16/24 70.5 kg (155 lb 8 oz)   10/12/23 69.3 kg (152 lb 12.5 oz)   07/24/23 67.6 kg (149 lb)   07/18/23 70.8 kg (156 lb 1.6 oz)   06/16/23 64.7 kg (142 lb 11.2 " oz)     LABS  Labs reviewed    Labs:  Electrolytes  Potassium (mmol/L)   Date Value   03/29/2024 4.9   03/28/2024 4.9   03/27/2024 4.8     Phosphorus (mg/dL)   Date Value   06/18/2023 2.8   06/17/2023 3.5   06/16/2023 3.8    Blood Glucose  Glucose (mg/dL)   Date Value   03/27/2024 118 (H)   03/25/2024 100 (H)   03/24/2024 100 (H)   03/23/2024 111 (H)   01/16/2024 133 (H)     GLUCOSE BY METER POCT (mg/dL)   Date Value   07/14/2023 107 (H)    Inflammatory Markers  WBC Count (10e3/uL)   Date Value   03/27/2024 13.6 (H)   03/25/2024 7.8   03/24/2024 10.5     Albumin (g/dL)   Date Value   03/23/2024 4.7   01/16/2024 3.9   01/15/2024 4.5      Magnesium (mg/dL)   Date Value   03/29/2024 2.5 (H)   03/28/2024 2.3   03/27/2024 2.5 (H)     Sodium (mmol/L)   Date Value   03/27/2024 138   03/25/2024 142   03/24/2024 142    Renal  Urea Nitrogen (mg/dL)   Date Value   03/27/2024 25.2 (H)   03/25/2024 13.2   03/24/2024 12.3     Creatinine (mg/dL)   Date Value   03/27/2024 0.98 (H)   03/25/2024 0.95   03/24/2024 0.91     Additional  Ketones Urine (mg/dL)   Date Value   03/23/2024 Negative   03/25/2005 Negative        MEDICATIONS  Medications reviewed     famotidine  20 mg Oral At Bedtime    inFLIXimab  10 mg/kg Intravenous Once    methylPREDNISolone  20 mg Intravenous Q8H    montelukast  10 mg Oral At Bedtime    nortriptyline  25 mg Oral At Bedtime    pantoprazole  40 mg Oral QAM AC    polyethylene glycol  17 g Oral Daily    sodium chloride (PF)  3 mL Intracatheter Q8H    traZODone  200 mg Oral At Bedtime         acetaminophen **OR** acetaminophen, bisacodyl, calcium carbonate, HYDROmorphone, HYDROmorphone, hydrOXYzine HCl **OR** hydrOXYzine HCl, lidocaine 4%, lidocaine (buffered or not buffered), naloxone **OR** naloxone **OR** naloxone **OR** naloxone, ondansetron **OR** ondansetron, senna-docusate **OR** senna-docusate, simethicone, sodium chloride (PF)     ASSESSED NUTRITION NEEDS PER APPROVED PRACTICE GUIDELINES:    Dosing  Weight 71.1 kg   Estimated Energy Needs: 8135-7985 kcals (25-30 Kcal/Kg)  Justification: maintenance  Estimated Protein Needs: 71-85 grams protein (1-1.2 g pro/Kg)  Justification: maintenance  Estimated Fluid Needs: per MD     NUTRITION DIAGNOSIS:  Inadequate oral intake related to altered GI function, constipation and pain as evidenced by patient likely consuming <75% of nutritional needs over the past 6 days     NUTRITION INTERVENTIONS  Recommendations / Nutrition Prescription  Diet per MD   Ordered oral nutritional supplements PRN     Implementation  Nutrition education: Provided education on the different oral nutritional supplements offered + indications for use. Patient would like to be able to order PRN. Encouraged food from home   Medical Food Supplement: as above     Nutrition Goals  Patient to consume 75% of meals or oral nutritional supplements ordered TID    MONITORING AND EVALUATION:  Progress towards goals will be monitored and evaluated per protocol and Practice Guidelines    Helena Rivero RD, LD  Clinical Dietitian     3rd floor/ICU: [phone]  All other floors: [phone]  Weekend/holiday: [phone]  Office: [phone]

## 2024-03-30 LAB
ALBUMIN UR-MCNC: NEGATIVE MG/DL
APPEARANCE UR: CLEAR
BACTERIA #/AREA URNS HPF: ABNORMAL /HPF
BILIRUB UR QL STRIP: NEGATIVE
COLOR UR AUTO: ABNORMAL
CRP SERPL-MCNC: 36.31 MG/L
GLUCOSE UR STRIP-MCNC: NEGATIVE MG/DL
HGB UR QL STRIP: NEGATIVE
KETONES UR STRIP-MCNC: NEGATIVE MG/DL
LEUKOCYTE ESTERASE UR QL STRIP: NEGATIVE
MAGNESIUM SERPL-MCNC: 2.5 MG/DL (ref 1.7–2.3)
NITRATE UR QL: POSITIVE
PH UR STRIP: 6 [PH] (ref 5–7)
POTASSIUM SERPL-SCNC: 4.8 MMOL/L (ref 3.4–5.3)
RBC URINE: <1 /HPF
SP GR UR STRIP: 1 (ref 1–1.03)
UROBILINOGEN UR STRIP-MCNC: NORMAL MG/DL
WBC URINE: 1 /HPF

## 2024-03-30 PROCEDURE — 87186 SC STD MICRODIL/AGAR DIL: CPT | Performed by: INTERNAL MEDICINE

## 2024-03-30 PROCEDURE — 86140 C-REACTIVE PROTEIN: CPT | Performed by: INTERNAL MEDICINE

## 2024-03-30 PROCEDURE — 250N000013 HC RX MED GY IP 250 OP 250 PS 637: Performed by: STUDENT IN AN ORGANIZED HEALTH CARE EDUCATION/TRAINING PROGRAM

## 2024-03-30 PROCEDURE — 250N000011 HC RX IP 250 OP 636: Performed by: PHYSICIAN ASSISTANT

## 2024-03-30 PROCEDURE — 81001 URINALYSIS AUTO W/SCOPE: CPT | Performed by: INTERNAL MEDICINE

## 2024-03-30 PROCEDURE — 120N000004 HC R&B MS OVERFLOW

## 2024-03-30 PROCEDURE — 87086 URINE CULTURE/COLONY COUNT: CPT | Performed by: INTERNAL MEDICINE

## 2024-03-30 PROCEDURE — 36415 COLL VENOUS BLD VENIPUNCTURE: CPT | Performed by: INTERNAL MEDICINE

## 2024-03-30 PROCEDURE — 250N000011 HC RX IP 250 OP 636: Performed by: INTERNAL MEDICINE

## 2024-03-30 PROCEDURE — 84132 ASSAY OF SERUM POTASSIUM: CPT | Performed by: INTERNAL MEDICINE

## 2024-03-30 PROCEDURE — 83735 ASSAY OF MAGNESIUM: CPT | Performed by: INTERNAL MEDICINE

## 2024-03-30 PROCEDURE — 99233 SBSQ HOSP IP/OBS HIGH 50: CPT | Performed by: STUDENT IN AN ORGANIZED HEALTH CARE EDUCATION/TRAINING PROGRAM

## 2024-03-30 PROCEDURE — 250N000013 HC RX MED GY IP 250 OP 250 PS 637: Performed by: INTERNAL MEDICINE

## 2024-03-30 PROCEDURE — 250N000011 HC RX IP 250 OP 636: Performed by: STUDENT IN AN ORGANIZED HEALTH CARE EDUCATION/TRAINING PROGRAM

## 2024-03-30 RX ADMIN — HYDROXYZINE HYDROCHLORIDE 50 MG: 25 TABLET, FILM COATED ORAL at 20:29

## 2024-03-30 RX ADMIN — PANTOPRAZOLE SODIUM 40 MG: 40 TABLET, DELAYED RELEASE ORAL at 09:50

## 2024-03-30 RX ADMIN — MONTELUKAST 10 MG: 10 TABLET, FILM COATED ORAL at 23:25

## 2024-03-30 RX ADMIN — FAMOTIDINE 20 MG: 20 TABLET ORAL at 23:25

## 2024-03-30 RX ADMIN — ONDANSETRON 4 MG: 4 TABLET, ORALLY DISINTEGRATING ORAL at 04:18

## 2024-03-30 RX ADMIN — METHYLPREDNISOLONE SODIUM SUCCINATE 20 MG: 40 INJECTION, POWDER, FOR SOLUTION INTRAMUSCULAR; INTRAVENOUS at 14:49

## 2024-03-30 RX ADMIN — METHYLPREDNISOLONE SODIUM SUCCINATE 20 MG: 40 INJECTION, POWDER, FOR SOLUTION INTRAMUSCULAR; INTRAVENOUS at 23:26

## 2024-03-30 RX ADMIN — HYDROMORPHONE HYDROCHLORIDE 0.5 MG: 1 INJECTION, SOLUTION INTRAMUSCULAR; INTRAVENOUS; SUBCUTANEOUS at 14:54

## 2024-03-30 RX ADMIN — ACETAMINOPHEN 650 MG: 325 TABLET, FILM COATED ORAL at 10:16

## 2024-03-30 RX ADMIN — NORTRIPTYLINE HYDROCHLORIDE 25 MG: 25 CAPSULE ORAL at 23:24

## 2024-03-30 RX ADMIN — SIMETHICONE CHEW TAB 80 MG 80 MG: 80 TABLET ORAL at 04:18

## 2024-03-30 RX ADMIN — HYDROXYZINE HYDROCHLORIDE 50 MG: 25 TABLET, FILM COATED ORAL at 13:48

## 2024-03-30 RX ADMIN — ACETAMINOPHEN 650 MG: 325 TABLET, FILM COATED ORAL at 17:19

## 2024-03-30 RX ADMIN — SENNOSIDES AND DOCUSATE SODIUM 2 TABLET: 8.6; 5 TABLET ORAL at 09:51

## 2024-03-30 RX ADMIN — ONDANSETRON 4 MG: 4 TABLET, ORALLY DISINTEGRATING ORAL at 20:29

## 2024-03-30 RX ADMIN — HYDROMORPHONE HYDROCHLORIDE 0.5 MG: 1 INJECTION, SOLUTION INTRAMUSCULAR; INTRAVENOUS; SUBCUTANEOUS at 20:29

## 2024-03-30 RX ADMIN — TRAZODONE HYDROCHLORIDE 200 MG: 50 TABLET ORAL at 23:25

## 2024-03-30 RX ADMIN — METHYLPREDNISOLONE SODIUM SUCCINATE 20 MG: 40 INJECTION, POWDER, FOR SOLUTION INTRAMUSCULAR; INTRAVENOUS at 06:34

## 2024-03-30 RX ADMIN — SIMETHICONE CHEW TAB 80 MG 80 MG: 80 TABLET ORAL at 20:29

## 2024-03-30 RX ADMIN — HYDROMORPHONE HYDROCHLORIDE 2 MG: 2 TABLET ORAL at 13:48

## 2024-03-30 RX ADMIN — SIMETHICONE CHEW TAB 80 MG 80 MG: 80 TABLET ORAL at 13:48

## 2024-03-30 RX ADMIN — HYDROXYZINE HYDROCHLORIDE 50 MG: 25 TABLET, FILM COATED ORAL at 04:18

## 2024-03-30 RX ADMIN — HYDROMORPHONE HYDROCHLORIDE 0.5 MG: 1 INJECTION, SOLUTION INTRAMUSCULAR; INTRAVENOUS; SUBCUTANEOUS at 23:26

## 2024-03-30 ASSESSMENT — ACTIVITIES OF DAILY LIVING (ADL)
ADLS_ACUITY_SCORE: 22

## 2024-03-30 NOTE — PROGRESS NOTES
"  GASTROENTEROLOGY PROGRESS NOTE     IMPRESSION:  Crohn's disease-new short segment of ileitis on  MR enterography with multiple prominent dilated small bowel loops without inflammation or transition point suggestive of ileus.  Colon normal.  CT scan did show mild wall thickening mucosal enhancement of the colon.  No longer has diarrhea.  Symptoms continued on IV steroids.  She was given Remicade 10 mg/kg dose yesterday.  No improvement yet.    RECOMMENDATIONS:  -Continue low fiber diet  - We will follow-up with patient on Monday  - It can take a couple of days after Remicade infusion to see full results  - Continue IV steroids  - Follow-up in [facility] IBD clinic with Dr. Marie who manages the patient's IBD.    Robel Fowler MD  [facility] - [facility]  [phone]      ________________________________________________________________________      SUBJECTIVE: Patient reports she still having abdominal pain, does not feel improvement yet.       OBJECTIVE:  /85 (BP Location: Left arm)   Pulse 68   Temp 97.7  F (36.5  C) (Oral)   Resp 18   Ht 1.6 m (5' 3\")   Wt 67.1 kg (148 lb)   SpO2 95%   BMI 26.22 kg/m    Temp (24hrs), Av.8  F (36.6  C), Min:97.7  F (36.5  C), Max:97.9  F (36.6  C)    Patient Vitals for the past 72 hrs:   Weight   24 1348 67.1 kg (148 lb)        PHYSICAL EXAM  GEN: Alert, NAD.    HRT: reg  LUNGS: CTA  ABD: +BS, moderate right-sided tender, non-distended, no rebound or guarding.    Additional Data:  I have reviewed the patient's new clinical lab results:     Recent Labs   Lab Test 24  0720 24  0701 24  0706   WBC 13.6* 7.8 10.5   HGB 13.5 11.9 12.7   MCV 89 89 90    229 251     Recent Labs   Lab Test 24  0602 24  0743 24  0653 24  0720 24  0745 24  0701 24  1348 24  0706   NA  --   --   --  138  --  142  --  142   POTASSIUM 4.8 4.9 4.9 4.8   < > 4.3   < > 5.4*   CHLORIDE  --   --   --  105  --  107  " --  107   CO2  --   --   --  22  --  26  --  29   BUN  --   --   --  25.2*  --  13.2  --  12.3   CR  --   --   --  0.98*  --  0.95  --  0.91   ANIONGAP  --   --   --  11  --  9  --  6*   GIORGIO  --   --   --  8.5*  --  8.6*  --  9.0   GLC  --   --   --  118*  --  100*  --  100*    < > = values in this interval not displayed.     Recent Labs   Lab Test 03/30/24  0140 03/23/24  1528 03/23/24  1526 01/16/24  0816 01/15/24  1823 01/15/24  1801 10/09/23  1750 10/09/23  1737 07/24/23  1717 07/24/23  1608   ALBUMIN  --  4.7  --  3.9 4.5  --    < > 4.7  --  3.9   BILITOTAL  --  0.3  --  0.6 0.7  --    < > 0.4  --  0.3   ALT  --  13  --  11 15  --    < > 15  --  28   AST  --  17  --  16 15  --    < > 24  --  29   PROTEIN Negative  --  Negative  --   --  Negative   < >  --    < >  --    LIPASE  --   --   --   --   --   --   --  27  --  56    < > = values in this interval not displayed.

## 2024-03-30 NOTE — PROGRESS NOTES
"[facility]    Medicine Progress Note - Hospitalist Service    Date of Admission:  3/23/2024    Assessment & Plan     Summary of Stay: Devi Salas is a 63 year old female with a history of Crohn's disease admitted on 3/23/2024 with abdominal pain.  In the emergency department, the patient found to have a BUN/creatinine 13.0/1.02, glucose 111, WBC 8.9.  Urinalysis was negative for signs of infection.  CT abdomen and pelvis showed mild wall thickening and mucosal hyperenhancement of the colon suggestive of mild infectious colitis.  The patient was started on IV Solu-Medrol with concern for Crohn's flare.  Gastroenterology was consulted to see the patient.     TODAY'S PLAN:     -received dose of her Remicade here in the hospital  -Remain on low fiber diet as tolerated  -Continue with aggressive bowel regimen  -Remain on IV steroids and defer to GI service     Problem List:   Acute Intractable Abdominal Pain  Crohn's Flare  - Appreciate GI recommendations  - On infliximab, last dose was 2/28.  Outpatient GI has been increasing frequency and dose of infliximab as her symptoms improve initially but wears off ~ 4 weeks  - IV solumedrol  - Pain control  - CMV IgM/IgG pending  - CRP <3 upon arrival, increasing, will continue to trend     Acute Kidney Injury  - Baseline Cr = 0.8-0.9  -Stable creatinine, currently at normal levels     Mild Hyperkalemia  -Resolved       Diet: Combination Diet Regular Diet Adult  Snacks/Supplements Adult: Other; any diet appropriate oral nutritional supplements; Between Meals    DVT Prophylaxis: Pneumatic Compression Devices and Ambulate every shift  Monae Catheter: Not present  Lines: None     Cardiac Monitoring: None  Code Status: Full Code      Clinically Significant Risk Factors                         # Overweight: Estimated body mass index is 26.22 kg/m  as calculated from the following:    Height as of this encounter: 1.6 m (5' 3\").    Weight as of this " encounter: 67.1 kg (148 lb).             Disposition Plan           Estelle Rodriguez DO  Hospitalist Service  [facility]  Securely message with [employer] (more info)  Text page via [employer] Paging/Directory   ______________________________________________________________________    Interval History   Initially better this AM, able to ambulate but now worsening of periumbilical pain/rlq pain 9/10.    Physical Exam   Vital Signs: Temp: 97.7  F (36.5  C) Temp src: Oral BP: 103/63 Pulse: 68   Resp: 18 SpO2: 93 % O2 Device: None (Room air)    Weight: 148 lbs 0 oz    General: Very pleasant female , NAD  Respiratory: Normal work of breathing.    GI:  Abdomen soft, significantly tender RLQ/midline  Musculoskeletal: Moving all extremities appropriately.  Skin: No rashes or abrasions on exposed skin.  Neurologic: Alert. No gross focal deficits.   Psychologic: Appropriate mood and affect.    Medical Decision Making       52 MINUTES SPENT BY ME on the date of service doing chart review, history, exam, documentation & further activities per the note.      Data     I have personally reviewed the following data over the past 24 hrs:    N/A  \   N/A   / N/A     N/A N/A N/A /  N/A   4.8 N/A N/A \     Procal: N/A CRP: 36.31 (H) Lactic Acid: N/A

## 2024-03-30 NOTE — PLAN OF CARE
Goal Outcome Evaluation:      Plan of Care Reviewed With: patient    Overall Patient Progress: improvingOverall Patient Progress: improving  Afebrile, VSS, pt c/o feeling bloated, abdomen soft, tender, good bowel sounds. States is passing gas but no stool today. Senna given, pt refusing miralax. States pain, cramping is mid abdomen and over to the R side. Dilaudid po given with atarax and simethicone at 1350 when rating pain to a 8. Up ambulating x2 in halls, and up to shower. Ate a few bites of eggs this am, all banana. Not tolerating more to eat at this time. Will continue with IV solumedrol. Pt updated on plan of care.     Addendum: Hospitalist here to see pt and pain score up to a 9 after po pain meds. IV dilaudid then given per Hospitalist. ( 1450) Pt teary at this time, will continue to assess pain. Pt does not feel constipated. Very frustrated at this point.

## 2024-03-30 NOTE — PLAN OF CARE
Goal Outcome Evaluation:      Plan of Care Reviewed With: patient    Overall Patient Progress: improvingOverall Patient Progress: improving    Outcome Evaluation: BM following PRN tap water enema, remicade infusion    A&Ox4. VSS on RA. Up in room and hallway independently. C/o abdominal pain, given PRN atarax, toradol, zofran, and simethicone with little to no decrease in pain. Voiding via BR, c/o urinary symptoms. UA ordered. BS hypoactive/faint. Given PRN tap water enema with BM following. Regular diet, reports eating low fiber foods. IV remicade infusion started. Requiring second IV to finish infusion as previous IV became edematous despite blood return. GI following.

## 2024-03-30 NOTE — PLAN OF CARE
"Goal Outcome Evaluation:    Vital Signs: A&O. VSS on RA. Afebrile.  Pain/Comfort: abdominal pain 5-8/10. Given oral dilaudid x1, atarax x2, zofran x2, and simethicone x2.  Assessment: LS clear. BS hypoactive. Abdominal discomfort. Nausea intermittently. Remicade dose restarted and completed.  Diet: tolerating fluids  Output: adequate output.  Activity/Ambulation: ambulating to room independently.   Plan: Continue with pain management. Continue with IV solumedrol.        Problem: Adult Inpatient Plan of Care  Goal: Plan of Care Review  Description: The Plan of Care Review/Shift note should be completed every shift.  The Outcome Evaluation is a brief statement about your assessment that the patient is improving, declining, or no change.  This information will be displayed automatically on your shift  note.  Outcome: Progressing  Flowsheets (Taken 3/30/2024 0611)  Plan of Care Reviewed With: patient  Overall Patient Progress: improving  Goal: Patient-Specific Goal (Individualized)  Description: You can add care plan individualizations to a care plan. Examples of Individualization might be:  \"Parent requests to be called daily at 9am for status\", \"I have a hard time hearing out of my right ear\", or \"Do not touch me to wake me up as it startles  me\".  Outcome: Progressing  Goal: Absence of Hospital-Acquired Illness or Injury  Outcome: Progressing  Intervention: Identify and Manage Fall Risk  Recent Flowsheet Documentation  Taken 3/29/2024 2159 by Chantell Guzman RN  Safety Promotion/Fall Prevention:   assistive device/personal items within reach   clutter free environment maintained   nonskid shoes/slippers when out of bed  Intervention: Prevent Skin Injury  Recent Flowsheet Documentation  Taken 3/30/2024 0418 by Chantell Guzman RN  Body Position: position changed independently  Taken 3/30/2024 0140 by Chantell Guzman RN  Body Position: position changed independently  Taken 3/29/2024 2159 by Chantell Guzman, " RN  Body Position: position changed independently  Skin Protection: adhesive use limited  Device Skin Pressure Protection: adhesive use limited  Intervention: Prevent and Manage VTE (Venous Thromboembolism) Risk  Recent Flowsheet Documentation  Taken 3/29/2024 2159 by Chantell Guzman RN  VTE Prevention/Management: SCDs (sequential compression devices) off  Intervention: Prevent Infection  Recent Flowsheet Documentation  Taken 3/29/2024 2159 by Chantell Guzman RN  Infection Prevention:   hand hygiene promoted   rest/sleep promoted  Goal: Optimal Comfort and Wellbeing  Outcome: Progressing  Intervention: Monitor Pain and Promote Comfort  Recent Flowsheet Documentation  Taken 3/30/2024 0418 by Chantell Guzman RN  Pain Management Interventions: medication (see MAR)  Taken 3/30/2024 0140 by Chantell Guzman RN  Pain Management Interventions:   medication offered but refused   rest  Taken 3/29/2024 2159 by Chantell Guzman RN  Pain Management Interventions: medication (see MAR)  Goal: Readiness for Transition of Care  Outcome: Progressing     Problem: Pain Acute  Goal: Optimal Pain Control and Function  Outcome: Progressing  Intervention: Develop Pain Management Plan  Recent Flowsheet Documentation  Taken 3/30/2024 0418 by Chantell Guzman RN  Pain Management Interventions: medication (see MAR)  Taken 3/30/2024 0140 by Chantell Guzman RN  Pain Management Interventions:   medication offered but refused   rest  Taken 3/29/2024 2159 by Chantell Guzman RN  Pain Management Interventions: medication (see MAR)  Intervention: Prevent or Manage Pain  Recent Flowsheet Documentation  Taken 3/29/2024 2159 by Chantell Guzman RN  Sensory Stimulation Regulation: care clustered  Bowel Elimination Promotion: adequate fluid intake promoted  Medication Review/Management: medications reviewed  Intervention: Optimize Psychosocial Wellbeing  Recent Flowsheet Documentation  Taken 3/29/2024 2159 by Chantell Guzman  RN  Supportive Measures: active listening utilized     Problem: Bowel Disease, Inflammatory (Ulcerative Colitis or Crohn's Disease)  Goal: Optimal Adaptation to Chronic Illness  Outcome: Progressing  Intervention: Support Psychosocial Response to Illness  Recent Flowsheet Documentation  Taken 3/29/2024 2159 by Chantell Guzman RN  Supportive Measures: active listening utilized  Goal: Diarrhea Symptom Relief  Outcome: Progressing  Goal: Absence of Infection Signs and Symptoms  Outcome: Progressing  Intervention: Prevent or Manage Infection  Recent Flowsheet Documentation  Taken 3/29/2024 2159 by Chantell Guzman RN  Infection Management: aseptic technique maintained  Goal: Optimal Nutrition Delivery  Outcome: Progressing  Goal: Optimal Pain Control and Function  Outcome: Progressing  Intervention: Prevent or Manage Pain  Recent Flowsheet Documentation  Taken 3/30/2024 0418 by Chantell Guzman RN  Pain Management Interventions: medication (see MAR)  Taken 3/30/2024 0140 by Chantell Guzman RN  Pain Management Interventions:   medication offered but refused   rest  Taken 3/29/2024 2159 by Chantell Guzman RN  Pain Management Interventions: medication (see MAR)

## 2024-03-31 LAB
ANION GAP SERPL CALCULATED.3IONS-SCNC: 8 MMOL/L (ref 7–15)
BACTERIA UR CULT: ABNORMAL
BUN SERPL-MCNC: 20.2 MG/DL (ref 8–23)
CALCIUM SERPL-MCNC: 8.7 MG/DL (ref 8.8–10.2)
CHLORIDE SERPL-SCNC: 100 MMOL/L (ref 98–107)
CREAT SERPL-MCNC: 1.07 MG/DL (ref 0.51–0.95)
CRP SERPL-MCNC: 20.6 MG/L
DEPRECATED HCO3 PLAS-SCNC: 29 MMOL/L (ref 22–29)
EGFRCR SERPLBLD CKD-EPI 2021: 58 ML/MIN/1.73M2
ERYTHROCYTE [DISTWIDTH] IN BLOOD BY AUTOMATED COUNT: 12.4 % (ref 10–15)
GLUCOSE SERPL-MCNC: 121 MG/DL (ref 70–99)
HCT VFR BLD AUTO: 40.3 % (ref 35–47)
HGB BLD-MCNC: 13.3 G/DL (ref 11.7–15.7)
MAGNESIUM SERPL-MCNC: 2.5 MG/DL (ref 1.7–2.3)
MCH RBC QN AUTO: 29.8 PG (ref 26.5–33)
MCHC RBC AUTO-ENTMCNC: 33 G/DL (ref 31.5–36.5)
MCV RBC AUTO: 90 FL (ref 78–100)
PLATELET # BLD AUTO: 253 10E3/UL (ref 150–450)
POTASSIUM SERPL-SCNC: 4.6 MMOL/L (ref 3.4–5.3)
RBC # BLD AUTO: 4.46 10E6/UL (ref 3.8–5.2)
SODIUM SERPL-SCNC: 137 MMOL/L (ref 135–145)
WBC # BLD AUTO: 13.4 10E3/UL (ref 4–11)

## 2024-03-31 PROCEDURE — 250N000011 HC RX IP 250 OP 636: Performed by: INTERNAL MEDICINE

## 2024-03-31 PROCEDURE — 36415 COLL VENOUS BLD VENIPUNCTURE: CPT | Performed by: STUDENT IN AN ORGANIZED HEALTH CARE EDUCATION/TRAINING PROGRAM

## 2024-03-31 PROCEDURE — 80048 BASIC METABOLIC PNL TOTAL CA: CPT | Performed by: STUDENT IN AN ORGANIZED HEALTH CARE EDUCATION/TRAINING PROGRAM

## 2024-03-31 PROCEDURE — 250N000013 HC RX MED GY IP 250 OP 250 PS 637: Performed by: STUDENT IN AN ORGANIZED HEALTH CARE EDUCATION/TRAINING PROGRAM

## 2024-03-31 PROCEDURE — 250N000011 HC RX IP 250 OP 636: Performed by: PHYSICIAN ASSISTANT

## 2024-03-31 PROCEDURE — 258N000003 HC RX IP 258 OP 636: Performed by: STUDENT IN AN ORGANIZED HEALTH CARE EDUCATION/TRAINING PROGRAM

## 2024-03-31 PROCEDURE — 83735 ASSAY OF MAGNESIUM: CPT | Performed by: STUDENT IN AN ORGANIZED HEALTH CARE EDUCATION/TRAINING PROGRAM

## 2024-03-31 PROCEDURE — 120N000004 HC R&B MS OVERFLOW

## 2024-03-31 PROCEDURE — 86140 C-REACTIVE PROTEIN: CPT | Performed by: INTERNAL MEDICINE

## 2024-03-31 PROCEDURE — 250N000013 HC RX MED GY IP 250 OP 250 PS 637: Performed by: INTERNAL MEDICINE

## 2024-03-31 PROCEDURE — 250N000011 HC RX IP 250 OP 636: Performed by: STUDENT IN AN ORGANIZED HEALTH CARE EDUCATION/TRAINING PROGRAM

## 2024-03-31 PROCEDURE — 85027 COMPLETE CBC AUTOMATED: CPT | Performed by: STUDENT IN AN ORGANIZED HEALTH CARE EDUCATION/TRAINING PROGRAM

## 2024-03-31 PROCEDURE — 99233 SBSQ HOSP IP/OBS HIGH 50: CPT | Performed by: STUDENT IN AN ORGANIZED HEALTH CARE EDUCATION/TRAINING PROGRAM

## 2024-03-31 RX ORDER — HYDROMORPHONE HYDROCHLORIDE 2 MG/1
2-4 TABLET ORAL
Status: DISCONTINUED | OUTPATIENT
Start: 2024-03-31 | End: 2024-04-02 | Stop reason: HOSPADM

## 2024-03-31 RX ADMIN — SODIUM CHLORIDE, POTASSIUM CHLORIDE, SODIUM LACTATE AND CALCIUM CHLORIDE 500 ML: 600; 310; 30; 20 INJECTION, SOLUTION INTRAVENOUS at 10:29

## 2024-03-31 RX ADMIN — ACETAMINOPHEN 650 MG: 325 TABLET, FILM COATED ORAL at 14:56

## 2024-03-31 RX ADMIN — MONTELUKAST 10 MG: 10 TABLET, FILM COATED ORAL at 22:10

## 2024-03-31 RX ADMIN — FAMOTIDINE 20 MG: 20 TABLET ORAL at 22:15

## 2024-03-31 RX ADMIN — METHYLPREDNISOLONE SODIUM SUCCINATE 20 MG: 40 INJECTION, POWDER, FOR SOLUTION INTRAMUSCULAR; INTRAVENOUS at 16:26

## 2024-03-31 RX ADMIN — HYDROMORPHONE HYDROCHLORIDE 4 MG: 2 TABLET ORAL at 18:21

## 2024-03-31 RX ADMIN — PANTOPRAZOLE SODIUM 40 MG: 40 TABLET, DELAYED RELEASE ORAL at 08:14

## 2024-03-31 RX ADMIN — ONDANSETRON 4 MG: 2 INJECTION INTRAMUSCULAR; INTRAVENOUS at 16:27

## 2024-03-31 RX ADMIN — TRAZODONE HYDROCHLORIDE 200 MG: 50 TABLET ORAL at 22:09

## 2024-03-31 RX ADMIN — CALCIUM CARBONATE (ANTACID) CHEW TAB 500 MG 1000 MG: 500 CHEW TAB at 14:56

## 2024-03-31 RX ADMIN — ONDANSETRON 4 MG: 4 TABLET, ORALLY DISINTEGRATING ORAL at 03:50

## 2024-03-31 RX ADMIN — SENNOSIDES AND DOCUSATE SODIUM 2 TABLET: 8.6; 5 TABLET ORAL at 08:14

## 2024-03-31 RX ADMIN — SIMETHICONE CHEW TAB 80 MG 80 MG: 80 TABLET ORAL at 10:40

## 2024-03-31 RX ADMIN — HYDROXYZINE HYDROCHLORIDE 25 MG: 25 TABLET, FILM COATED ORAL at 12:11

## 2024-03-31 RX ADMIN — HYDROMORPHONE HYDROCHLORIDE 4 MG: 2 TABLET ORAL at 22:11

## 2024-03-31 RX ADMIN — SIMETHICONE CHEW TAB 80 MG 80 MG: 80 TABLET ORAL at 03:48

## 2024-03-31 RX ADMIN — HYDROMORPHONE HYDROCHLORIDE 4 MG: 2 TABLET ORAL at 14:56

## 2024-03-31 RX ADMIN — ONDANSETRON 4 MG: 2 INJECTION INTRAMUSCULAR; INTRAVENOUS at 10:40

## 2024-03-31 RX ADMIN — HYDROXYZINE HYDROCHLORIDE 25 MG: 25 TABLET, FILM COATED ORAL at 18:22

## 2024-03-31 RX ADMIN — SIMETHICONE CHEW TAB 80 MG 80 MG: 80 TABLET ORAL at 16:27

## 2024-03-31 RX ADMIN — NORTRIPTYLINE HYDROCHLORIDE 25 MG: 25 CAPSULE ORAL at 22:09

## 2024-03-31 RX ADMIN — METHYLPREDNISOLONE SODIUM SUCCINATE 20 MG: 40 INJECTION, POWDER, FOR SOLUTION INTRAMUSCULAR; INTRAVENOUS at 08:49

## 2024-03-31 RX ADMIN — HYDROMORPHONE HYDROCHLORIDE 0.5 MG: 1 INJECTION, SOLUTION INTRAMUSCULAR; INTRAVENOUS; SUBCUTANEOUS at 08:48

## 2024-03-31 RX ADMIN — HYDROXYZINE HYDROCHLORIDE 50 MG: 25 TABLET, FILM COATED ORAL at 03:48

## 2024-03-31 RX ADMIN — HYDROMORPHONE HYDROCHLORIDE 4 MG: 2 TABLET ORAL at 12:10

## 2024-03-31 RX ADMIN — HYDROMORPHONE HYDROCHLORIDE 2 MG: 2 TABLET ORAL at 03:48

## 2024-03-31 RX ADMIN — ACETAMINOPHEN 650 MG: 325 TABLET, FILM COATED ORAL at 08:25

## 2024-03-31 ASSESSMENT — ACTIVITIES OF DAILY LIVING (ADL)
ADLS_ACUITY_SCORE: 22

## 2024-03-31 NOTE — PLAN OF CARE
"Goal Outcome Evaluation:      Plan of Care Reviewed With: patient    Overall Patient Progress: no changeOverall Patient Progress: no change    Outcome Evaluation: BMs, pain control    [id number]: A&Ox4. VSS on RA. Up in room independently. C/o abdominal pain, given PRN Tylenol. Patient would like to have PRNs when available. No BM. Regular diet. LS clear. +BS. IV SL.     Problem: Adult Inpatient Plan of Care  Goal: Plan of Care Review  Description: The Plan of Care Review/Shift note should be completed every shift.  The Outcome Evaluation is a brief statement about your assessment that the patient is improving, declining, or no change.  This information will be displayed automatically on your shift  note.  Outcome: Not Progressing  Flowsheets (Taken 3/30/2024 1901)  Outcome Evaluation: BMs, pain control  Plan of Care Reviewed With: patient  Overall Patient Progress: no change  Goal: Patient-Specific Goal (Individualized)  Description: You can add care plan individualizations to a care plan. Examples of Individualization might be:  \"Parent requests to be called daily at 9am for status\", \"I have a hard time hearing out of my right ear\", or \"Do not touch me to wake me up as it startles  me\".  Outcome: Not Progressing  Goal: Absence of Hospital-Acquired Illness or Injury  Outcome: Not Progressing  Intervention: Identify and Manage Fall Risk  Recent Flowsheet Documentation  Taken 3/30/2024 1800 by Paola Farr RN  Safety Promotion/Fall Prevention:   assistive device/personal items within reach   clutter free environment maintained   increase visualization of patient   nonskid shoes/slippers when out of bed   patient and family education   safety round/check completed  Intervention: Prevent Skin Injury  Recent Flowsheet Documentation  Taken 3/30/2024 1800 by Paola Farr, RN  Body Position: position changed independently  Skin Protection: adhesive use limited  Device Skin Pressure Protection: adhesive use " limited  Intervention: Prevent and Manage VTE (Venous Thromboembolism) Risk  Recent Flowsheet Documentation  Taken 3/30/2024 1800 by Paola Farr RN  VTE Prevention/Management: SCDs (sequential compression devices) off  Goal: Optimal Comfort and Wellbeing  Outcome: Not Progressing  Intervention: Monitor Pain and Promote Comfort  Recent Flowsheet Documentation  Taken 3/30/2024 1719 by Paola Farr RN  Pain Management Interventions: medication (see MAR)  Goal: Readiness for Transition of Care  Outcome: Not Progressing     Problem: Pain Acute  Goal: Optimal Pain Control and Function  Outcome: Not Progressing  Intervention: Develop Pain Management Plan  Recent Flowsheet Documentation  Taken 3/30/2024 1719 by Paola Farr RN  Pain Management Interventions: medication (see MAR)  Intervention: Prevent or Manage Pain  Recent Flowsheet Documentation  Taken 3/30/2024 1800 by Paola Farr RN  Sensory Stimulation Regulation: care clustered  Bowel Elimination Promotion: adequate fluid intake promoted  Medication Review/Management: medications reviewed  Intervention: Optimize Psychosocial Wellbeing  Recent Flowsheet Documentation  Taken 3/30/2024 1800 by Paola Farr RN  Supportive Measures: active listening utilized     Problem: Bowel Disease, Inflammatory (Ulcerative Colitis or Crohn's Disease)  Goal: Optimal Adaptation to Chronic Illness  Outcome: Not Progressing  Intervention: Support Psychosocial Response to Illness  Recent Flowsheet Documentation  Taken 3/30/2024 1800 by Paola Farr RN  Supportive Measures: active listening utilized  Goal: Diarrhea Symptom Relief  Outcome: Not Progressing  Intervention: Manage Diarrhea  Recent Flowsheet Documentation  Taken 3/30/2024 1800 by Paola Farr RN  Fluid/Electrolyte Management: fluids provided  Goal: Absence of Infection Signs and Symptoms  Outcome: Not Progressing  Intervention: Prevent or Manage Infection  Recent Flowsheet Documentation  Taken 3/30/2024 1800 by  Paola Farr, RN  Infection Management: aseptic technique maintained  Goal: Optimal Nutrition Delivery  Outcome: Not Progressing  Goal: Optimal Pain Control and Function  Outcome: Not Progressing  Intervention: Prevent or Manage Pain  Recent Flowsheet Documentation  Taken 3/30/2024 1719 by Paola Farr, RN  Pain Management Interventions: medication (see MAR)

## 2024-03-31 NOTE — PLAN OF CARE
"Goal Outcome Evaluation:      Plan of Care Reviewed With: patient    Overall Patient Progress: improvingOverall Patient Progress: improving    Vital Signs: A&O. VSS on RA. Afebrile.  Pain/Comfort: abdominal pain 5-9/10. Given dilaudid x2, atarax x2, zofran x2, and simethicone x2.  Assessment: LS clear. BS active and audible. Abdominal discomfort. Nausea intermittently.   Diet: tolerating fluids and bites of crackers  Output: adequate output.  Activity/Ambulation: ambulating to room independently.   Plan: Continue with pain management. Continue with IV solumedrol.      Problem: Adult Inpatient Plan of Care  Goal: Plan of Care Review  Description: The Plan of Care Review/Shift note should be completed every shift.  The Outcome Evaluation is a brief statement about your assessment that the patient is improving, declining, or no change.  This information will be displayed automatically on your shift  note.  3/31/2024 0521 by Chantell Guzman RN  Outcome: Progressing  Flowsheets (Taken 3/31/2024 0521)  Plan of Care Reviewed With: patient  Overall Patient Progress: no change    Goal: Patient-Specific Goal (Individualized)  Description: You can add care plan individualizations to a care plan. Examples of Individualization might be:  \"Parent requests to be called daily at 9am for status\", \"I have a hard time hearing out of my right ear\", or \"Do not touch me to wake me up as it startles  me\".  3/31/2024 0521 by Chantell Guzman RN  Outcome: Progressing    Goal: Absence of Hospital-Acquired Illness or Injury  3/31/2024 0521 by Chantell Guzman RN  Outcome: Progressing    Intervention: Identify and Manage Fall Risk  Recent Flowsheet Documentation  Taken 3/31/2024 0000 by Chantell Guzman RN  Safety Promotion/Fall Prevention:   assistive device/personal items within reach   clutter free environment maintained  Intervention: Prevent Skin Injury  Recent Flowsheet Documentation  Taken 3/31/2024 0000 by Chantell Guzman" RN  Body Position: position changed independently  Skin Protection: adhesive use limited  Device Skin Pressure Protection: adhesive use limited  Taken 3/30/2024 2326 by Chantell Guzman RN  Body Position: position changed independently  Intervention: Prevent and Manage VTE (Venous Thromboembolism) Risk  Recent Flowsheet Documentation  Taken 3/31/2024 0000 by Chantell Guzman RN  VTE Prevention/Management: SCDs (sequential compression devices) off  Intervention: Prevent Infection  Recent Flowsheet Documentation  Taken 3/31/2024 0000 by Chantell Guzman RN  Infection Prevention:   single patient room provided   rest/sleep promoted   hand hygiene promoted  Goal: Optimal Comfort and Wellbeing  3/31/2024 0521 by Chantell Guzman RN  Outcome: Progressing    Goal: Readiness for Transition of Care  3/31/2024 0521 by Chantell Guzman RN  Outcome: Progressing       Problem: Pain Acute  Goal: Optimal Pain Control and Function  3/31/2024 0521 by Chantell Guzman RN  Outcome: Progressing    Intervention: Prevent or Manage Pain  Recent Flowsheet Documentation  Taken 3/31/2024 0000 by Chantell Guzman RN  Sensory Stimulation Regulation: care clustered  Bowel Elimination Promotion: adequate fluid intake promoted  Medication Review/Management: medications reviewed  Intervention: Optimize Psychosocial Wellbeing  Recent Flowsheet Documentation  Taken 3/31/2024 0000 by Chantell Guzman RN  Supportive Measures: active listening utilized     Problem: Bowel Disease, Inflammatory (Ulcerative Colitis or Crohn's Disease)  Goal: Optimal Adaptation to Chronic Illness  3/31/2024 0521 by Chantell Guzman RN  Outcome: Progressing    Intervention: Support Psychosocial Response to Illness  Recent Flowsheet Documentation  Taken 3/31/2024 0000 by Chantell Guzman RN  Supportive Measures: active listening utilized  Goal: Diarrhea Symptom Relief  3/31/2024 0521 by Chantell Guzman RN  Outcome: Progressing    Goal: Absence of  Infection Signs and Symptoms  3/31/2024 0521 by Chantell Guzman RN  Outcome: Progressing    Goal: Optimal Nutrition Delivery  3/31/2024 0521 by Chantell Guzman RN  Outcome: Progressing    Goal: Optimal Pain Control and Function  3/31/2024 0521 by Chantell Guzman RN  Outcome: Progressing

## 2024-03-31 NOTE — PROGRESS NOTES
[facility]    Medicine Progress Note - Hospitalist Service    Date of Admission:  3/23/2024    Assessment & Plan     Summary of Stay: Devi Salas is a 63 year old female with a history of Crohn's disease admitted on 3/23/2024 with abdominal pain.  In the emergency department, the patient found to have a BUN/creatinine 13.0/1.02, glucose 111, WBC 8.9.  Urinalysis was negative for signs of infection.  CT abdomen and pelvis showed mild wall thickening and mucosal hyperenhancement of the colon suggestive of mild infectious colitis.  The patient was started on IV Solu-Medrol with concern for Crohn's flare.  Gastroenterology was consulted to see the patient.     TODAY'S PLAN:     -1L IVF bolus   -Remain on low fiber diet as tolerated  -Continue with aggressive bowel regimen  -Remain on IV steroids and defer to GI service  -urine was collected for concerns about appearance, asymptomatic. UA itself was rather bland, culture with >100,000 e coli but she has had this on previous cultures as well and may reflect colonization. Given no symptoms will monitor for now; if developed symptoms would then treat.     Problem List:   Acute Intractable Abdominal Pain  Crohn's Flare  - Appreciate GI recommendations  - On infliximab, last dose was 2/28.  Outpatient GI has been increasing frequency and dose of infliximab as her symptoms improve initially but wears off ~ 4 weeks  - IV solumedrol  - Pain control  - CMV IgM/IgG pending  - CRP <3 upon arrival, increasing, will continue to trend     Acute Kidney Injury  - Baseline Cr = 0.8-0.9  -Stable creatinine, currently at normal levels    Abnormal UA   urine was collected for concerns about appearance, asymptomatic. UA itself was rather bland, culture with >100,000 e coli but she has had this on previous cultures as well and may reflect colonization. Given no symptoms will monitor for now; if developed symptoms would then treat.    Mild Hyperkalemia  -Resolved      "  Diet: Combination Diet Regular Diet Adult  Snacks/Supplements Adult: Other; any diet appropriate oral nutritional supplements; Between Meals    DVT Prophylaxis: Pneumatic Compression Devices and Ambulate every shift  Monae Catheter: Not present  Lines: None     Cardiac Monitoring: None  Code Status: Full Code      Clinically Significant Risk Factors                         # Overweight: Estimated body mass index is 26.22 kg/m  as calculated from the following:    Height as of this encounter: 1.6 m (5' 3\").    Weight as of this encounter: 67.1 kg (148 lb).             Disposition Plan           Estelle Rodriguez DO  Hospitalist Service  [facility]  Securely message with [employer] (more info)  Text page via [employer] Paging/Directory   ______________________________________________________________________    Interval History   Has had several bowel movements, pain better controlled today on oral regimen.  Feels better after IV fluid bolus.  No urinary symptoms    Physical Exam   Vital Signs: Temp: 97.8  F (36.6  C) Temp src: Oral BP: 114/78 Pulse: 75   Resp: 16 SpO2: 96 % O2 Device: None (Room air)    Weight: 148 lbs 0 oz    General: Very pleasant female , NAD  Respiratory: Normal work of breathing.    Musculoskeletal: Moving all extremities appropriately.  Skin: No rashes or abrasions on exposed skin.  Neurologic: Alert. No gross focal deficits.   Psychologic: Appropriate mood and affect.    Medical Decision Making       50 MINUTES SPENT BY ME on the date of service doing chart review, history, exam, documentation & further activities per the note.      Data     I have personally reviewed the following data over the past 24 hrs:    13.4 (H)  \   13.3   / 253     137 100 20.2 /  121 (H)   4.6 29 1.07 (H) \     Procal: N/A CRP: 20.60 (H) Lactic Acid: N/A               "

## 2024-04-01 LAB
CRP SERPL-MCNC: 6.79 MG/L
MAGNESIUM SERPL-MCNC: 2.4 MG/DL (ref 1.7–2.3)
POTASSIUM SERPL-SCNC: 4.7 MMOL/L (ref 3.4–5.3)

## 2024-04-01 PROCEDURE — 99233 SBSQ HOSP IP/OBS HIGH 50: CPT | Performed by: STUDENT IN AN ORGANIZED HEALTH CARE EDUCATION/TRAINING PROGRAM

## 2024-04-01 PROCEDURE — 120N000004 HC R&B MS OVERFLOW

## 2024-04-01 PROCEDURE — 250N000013 HC RX MED GY IP 250 OP 250 PS 637: Performed by: INTERNAL MEDICINE

## 2024-04-01 PROCEDURE — 86140 C-REACTIVE PROTEIN: CPT | Performed by: INTERNAL MEDICINE

## 2024-04-01 PROCEDURE — 250N000011 HC RX IP 250 OP 636: Performed by: PHYSICIAN ASSISTANT

## 2024-04-01 PROCEDURE — 84132 ASSAY OF SERUM POTASSIUM: CPT | Performed by: STUDENT IN AN ORGANIZED HEALTH CARE EDUCATION/TRAINING PROGRAM

## 2024-04-01 PROCEDURE — 250N000013 HC RX MED GY IP 250 OP 250 PS 637: Performed by: STUDENT IN AN ORGANIZED HEALTH CARE EDUCATION/TRAINING PROGRAM

## 2024-04-01 PROCEDURE — 250N000011 HC RX IP 250 OP 636: Performed by: STUDENT IN AN ORGANIZED HEALTH CARE EDUCATION/TRAINING PROGRAM

## 2024-04-01 PROCEDURE — 83735 ASSAY OF MAGNESIUM: CPT | Performed by: STUDENT IN AN ORGANIZED HEALTH CARE EDUCATION/TRAINING PROGRAM

## 2024-04-01 PROCEDURE — 250N000013 HC RX MED GY IP 250 OP 250 PS 637: Performed by: PHYSICIAN ASSISTANT

## 2024-04-01 PROCEDURE — 36415 COLL VENOUS BLD VENIPUNCTURE: CPT | Performed by: INTERNAL MEDICINE

## 2024-04-01 PROCEDURE — 250N000012 HC RX MED GY IP 250 OP 636 PS 637: Performed by: STUDENT IN AN ORGANIZED HEALTH CARE EDUCATION/TRAINING PROGRAM

## 2024-04-01 RX ORDER — PREDNISONE 20 MG/1
20 TABLET ORAL ONCE
Status: COMPLETED | OUTPATIENT
Start: 2024-04-01 | End: 2024-04-01

## 2024-04-01 RX ORDER — CIPROFLOXACIN 500 MG/1
500 TABLET, FILM COATED ORAL EVERY 12 HOURS SCHEDULED
Status: DISCONTINUED | OUTPATIENT
Start: 2024-04-01 | End: 2024-04-02 | Stop reason: HOSPADM

## 2024-04-01 RX ORDER — PREDNISONE 20 MG/1
40 TABLET ORAL DAILY
Status: DISCONTINUED | OUTPATIENT
Start: 2024-04-02 | End: 2024-04-02 | Stop reason: HOSPADM

## 2024-04-01 RX ORDER — PANTOPRAZOLE SODIUM 40 MG/1
40 TABLET, DELAYED RELEASE ORAL
Status: DISCONTINUED | OUTPATIENT
Start: 2024-04-01 | End: 2024-04-02 | Stop reason: HOSPADM

## 2024-04-01 RX ADMIN — METHYLPREDNISOLONE SODIUM SUCCINATE 20 MG: 40 INJECTION, POWDER, FOR SOLUTION INTRAMUSCULAR; INTRAVENOUS at 00:35

## 2024-04-01 RX ADMIN — HYDROMORPHONE HYDROCHLORIDE 4 MG: 2 TABLET ORAL at 04:20

## 2024-04-01 RX ADMIN — PREDNISONE 20 MG: 20 TABLET ORAL at 20:26

## 2024-04-01 RX ADMIN — ONDANSETRON 4 MG: 2 INJECTION INTRAMUSCULAR; INTRAVENOUS at 09:56

## 2024-04-01 RX ADMIN — ACETAMINOPHEN 650 MG: 325 TABLET, FILM COATED ORAL at 20:35

## 2024-04-01 RX ADMIN — HYDROMORPHONE HYDROCHLORIDE 4 MG: 2 TABLET ORAL at 23:30

## 2024-04-01 RX ADMIN — SIMETHICONE CHEW TAB 80 MG 80 MG: 80 TABLET ORAL at 08:17

## 2024-04-01 RX ADMIN — ACETAMINOPHEN 650 MG: 325 TABLET, FILM COATED ORAL at 04:20

## 2024-04-01 RX ADMIN — CIPROFLOXACIN 500 MG: 500 TABLET ORAL at 20:26

## 2024-04-01 RX ADMIN — ACETAMINOPHEN 650 MG: 325 TABLET, FILM COATED ORAL at 08:16

## 2024-04-01 RX ADMIN — PANTOPRAZOLE SODIUM 40 MG: 40 TABLET, DELAYED RELEASE ORAL at 08:14

## 2024-04-01 RX ADMIN — NORTRIPTYLINE HYDROCHLORIDE 25 MG: 25 CAPSULE ORAL at 23:29

## 2024-04-01 RX ADMIN — MONTELUKAST 10 MG: 10 TABLET, FILM COATED ORAL at 23:29

## 2024-04-01 RX ADMIN — ONDANSETRON 4 MG: 2 INJECTION INTRAMUSCULAR; INTRAVENOUS at 16:04

## 2024-04-01 RX ADMIN — HYDROXYZINE HYDROCHLORIDE 25 MG: 25 TABLET, FILM COATED ORAL at 00:35

## 2024-04-01 RX ADMIN — ACETAMINOPHEN 650 MG: 325 TABLET, FILM COATED ORAL at 11:52

## 2024-04-01 RX ADMIN — HYDROMORPHONE HYDROCHLORIDE 4 MG: 2 TABLET ORAL at 16:03

## 2024-04-01 RX ADMIN — ONDANSETRON 4 MG: 2 INJECTION INTRAMUSCULAR; INTRAVENOUS at 23:28

## 2024-04-01 RX ADMIN — HYDROMORPHONE HYDROCHLORIDE 4 MG: 2 TABLET ORAL at 01:17

## 2024-04-01 RX ADMIN — HYDROMORPHONE HYDROCHLORIDE 4 MG: 2 TABLET ORAL at 11:52

## 2024-04-01 RX ADMIN — ACETAMINOPHEN 650 MG: 325 TABLET, FILM COATED ORAL at 16:03

## 2024-04-01 RX ADMIN — ACETAMINOPHEN 650 MG: 325 TABLET, FILM COATED ORAL at 00:35

## 2024-04-01 RX ADMIN — HYDROMORPHONE HYDROCHLORIDE 4 MG: 2 TABLET ORAL at 20:25

## 2024-04-01 RX ADMIN — PANTOPRAZOLE SODIUM 40 MG: 40 TABLET, DELAYED RELEASE ORAL at 16:21

## 2024-04-01 RX ADMIN — TRAZODONE HYDROCHLORIDE 200 MG: 50 TABLET ORAL at 23:28

## 2024-04-01 RX ADMIN — HYDROXYZINE HYDROCHLORIDE 25 MG: 25 TABLET, FILM COATED ORAL at 11:52

## 2024-04-01 RX ADMIN — METHYLPREDNISOLONE SODIUM SUCCINATE 20 MG: 40 INJECTION, POWDER, FOR SOLUTION INTRAMUSCULAR; INTRAVENOUS at 08:15

## 2024-04-01 RX ADMIN — SIMETHICONE CHEW TAB 80 MG 80 MG: 80 TABLET ORAL at 13:42

## 2024-04-01 RX ADMIN — FAMOTIDINE 20 MG: 20 TABLET ORAL at 23:29

## 2024-04-01 RX ADMIN — SIMETHICONE CHEW TAB 80 MG 80 MG: 80 TABLET ORAL at 01:17

## 2024-04-01 RX ADMIN — HYDROXYZINE HYDROCHLORIDE 50 MG: 25 TABLET, FILM COATED ORAL at 23:29

## 2024-04-01 RX ADMIN — HYDROXYZINE HYDROCHLORIDE 50 MG: 25 TABLET, FILM COATED ORAL at 06:00

## 2024-04-01 RX ADMIN — HYDROMORPHONE HYDROCHLORIDE 4 MG: 2 TABLET ORAL at 08:17

## 2024-04-01 RX ADMIN — CALCIUM CARBONATE (ANTACID) CHEW TAB 500 MG 1000 MG: 500 CHEW TAB at 11:59

## 2024-04-01 ASSESSMENT — ACTIVITIES OF DAILY LIVING (ADL)
ADLS_ACUITY_SCORE: 22

## 2024-04-01 NOTE — PROVIDER NOTIFICATION
Provider notified of R shoulder pain, concentrated urine, dizziness and intermittent diaphoresis. Provider here at the bedside to evaluate patient.

## 2024-04-01 NOTE — PLAN OF CARE
"Goal Outcome Evaluation:      Plan of Care Reviewed With: patient    Overall Patient Progress: improvingOverall Patient Progress: improving       Afebrile. Receiving oral pain medication with some relief from abdominal and R shoulder pain. Receiving Zofran for C/O intermittent nausea. C/O reflux; receiving Tums and Protonix changed to BID. Active bowel sounds. Abdomen soft and tender in RUQ and RLQ. Passing flatus. Intermittent dizziness. Poor appetite; encouraging smaller, more frequent meals. Plan: Oral steroids and antibiotics. Encourage ambulation and fluids. GI consulting. Monitor and provide for needs     Problem: Adult Inpatient Plan of Care  Goal: Plan of Care Review  Description: The Plan of Care Review/Shift note should be completed every shift.  The Outcome Evaluation is a brief statement about your assessment that the patient is improving, declining, or no change.  This information will be displayed automatically on your shift  note.  Outcome: Progressing  Flowsheets (Taken 4/1/2024 1806)  Plan of Care Reviewed With: patient  Overall Patient Progress: improving  Goal: Patient-Specific Goal (Individualized)  Description: You can add care plan individualizations to a care plan. Examples of Individualization might be:  \"Parent requests to be called daily at 9am for status\", \"I have a hard time hearing out of my right ear\", or \"Do not touch me to wake me up as it startles  me\".  Outcome: Progressing  Goal: Absence of Hospital-Acquired Illness or Injury  Outcome: Progressing  Intervention: Identify and Manage Fall Risk  Recent Flowsheet Documentation  Taken 4/1/2024 0817 by Nevin Pierre, RN  Safety Promotion/Fall Prevention:   clutter free environment maintained   safety round/check completed  Intervention: Prevent Skin Injury  Recent Flowsheet Documentation  Taken 4/1/2024 0817 by Nevin Pierre, RN  Body Position: position changed independently  Skin Protection: adhesive use limited  Device Skin " Pressure Protection: adhesive use limited  Intervention: Prevent and Manage VTE (Venous Thromboembolism) Risk  Recent Flowsheet Documentation  Taken 4/1/2024 0817 by Nevin Pierre RN  VTE Prevention/Management: SCDs (sequential compression devices) off  Intervention: Prevent Infection  Recent Flowsheet Documentation  Taken 4/1/2024 0817 by Nevin Pierre RN  Infection Prevention:   hand hygiene promoted   rest/sleep promoted   single patient room provided  Goal: Optimal Comfort and Wellbeing  Outcome: Progressing  Intervention: Monitor Pain and Promote Comfort  Recent Flowsheet Documentation  Taken 4/1/2024 1603 by Nevin Pierre RN  Pain Management Interventions: medication (see MAR)  Taken 4/1/2024 0817 by Nevin Pierre RN  Pain Management Interventions:   medication (see MAR)   heat applied  Goal: Readiness for Transition of Care  Outcome: Progressing     Problem: Pain Acute  Goal: Optimal Pain Control and Function  Outcome: Progressing  Intervention: Develop Pain Management Plan  Recent Flowsheet Documentation  Taken 4/1/2024 1603 by Nevin Pierre RN  Pain Management Interventions: medication (see MAR)  Taken 4/1/2024 0817 by Nevin Pierre RN  Pain Management Interventions:   medication (see MAR)   heat applied  Intervention: Prevent or Manage Pain  Recent Flowsheet Documentation  Taken 4/1/2024 0817 by Nevin Pierre RN  Sensory Stimulation Regulation: care clustered  Bowel Elimination Promotion:   adequate fluid intake promoted   ambulation promoted  Medication Review/Management: medications reviewed  Intervention: Optimize Psychosocial Wellbeing  Recent Flowsheet Documentation  Taken 4/1/2024 0817 by Nevin Pierre RN  Supportive Measures:   active listening utilized   decision-making supported   goal-setting facilitated   positive reinforcement provided   problem-solving facilitated   self-care encouraged   self-responsibility promoted   verbalization of  feelings encouraged     Problem: Bowel Disease, Inflammatory (Ulcerative Colitis or Crohn's Disease)  Goal: Optimal Adaptation to Chronic Illness  Outcome: Progressing  Intervention: Support Psychosocial Response to Illness  Recent Flowsheet Documentation  Taken 4/1/2024 0817 by Nevin Pierre RN  Supportive Measures:   active listening utilized   decision-making supported   goal-setting facilitated   positive reinforcement provided   problem-solving facilitated   self-care encouraged   self-responsibility promoted   verbalization of feelings encouraged  Goal: Diarrhea Symptom Relief  Outcome: Progressing  Intervention: Manage Diarrhea  Recent Flowsheet Documentation  Taken 4/1/2024 0817 by Nevin Pierre RN  Diarrhea Management: fluids promoted  Fluid/Electrolyte Management: fluids provided  Goal: Absence of Infection Signs and Symptoms  Outcome: Progressing  Goal: Optimal Nutrition Delivery  Outcome: Progressing  Goal: Optimal Pain Control and Function  Outcome: Progressing  Intervention: Prevent or Manage Pain  Recent Flowsheet Documentation  Taken 4/1/2024 1603 by Nevin Pierre RN  Pain Management Interventions: medication (see MAR)  Taken 4/1/2024 0817 by Nevin Pierre RN  Pain Management Interventions:   medication (see MAR)   heat applied

## 2024-04-01 NOTE — PROGRESS NOTES
"GASTROENTEROLOGY PROGRESS NOTE     SUBJECTIVE:  Today is the first day pain seems to be improving. Scoring 5/10 rather than 8-9/10 on pain scale. Having increased reflux/regurgitation last night and this morning. Tums helping. Tolerated low fiber diet - eating small amt. Had good stool output a couple days ago with enema, now able to stool more regularly - no blood. Passing flatus with mild stool incontinence. Had loose BM today.      OBJECTIVE:  /84   Pulse 72   Temp 97.4  F (36.3  C) (Oral)   Resp 18   Ht 1.6 m (5' 3\")   Wt 67.1 kg (148 lb)   SpO2 95%   BMI 26.22 kg/m    Temp (24hrs), Av.8  F (36.6  C), Min:97.6  F (36.4  C), Max:98  F (36.7  C)    Patient Vitals for the past 72 hrs:   Weight   24 1348 67.1 kg (148 lb)       Intake/Output Summary (Last 24 hours) at 3/28/2024 1600  Last data filed at 3/28/2024 1500  Gross per 24 hour   Intake 2069 ml   Output --   Net 2069 ml        PHYSICAL EXAM  Gen: alert, oriented, NAD  Abd: soft, hypoactive BS, nondistended, moderate tenderness RLQ. No rebound or guarding.     Additional Comments:  NICKY, FH, SH: See initial GI consult for details.     I have reviewed the patient's new clinical lab results:     Recent Labs   Lab Test 24  0650 24  0720 24  0701   WBC 13.4* 13.6* 7.8   HGB 13.3 13.5 11.9   MCV 90 89 89    241 229     Recent Labs   Lab Test 24  0657 24  0650 24  0602 24  0653 24  0720 24  0745 24  0701   POTASSIUM 4.7 4.6 4.8   < > 4.8   < > 4.3   CHLORIDE  --  100  --   --  105  --  107   CO2  --  29  --   --  22  --  26   BUN  --  20.2  --   --  25.2*  --  13.2   ANIONGAP  --  8  --   --  11  --  9    < > = values in this interval not displayed.     Recent Labs   Lab Test 24  0140 24  1528 24  1526 24  0816 01/15/24  1823 01/15/24  1801 10/09/23  1750 10/09/23  1737 23  1717 23  1608   ALBUMIN  --  4.7  --  3.9 4.5  --    < > 4.7  --  3.9 "   BILITOTAL  --  0.3  --  0.6 0.7  --    < > 0.4  --  0.3   ALT  --  13  --  11 15  --    < > 15  --  28   AST  --  17  --  16 15  --    < > 24  --  29   PROTEIN Negative  --  Negative  --   --  Negative   < >  --    < >  --    LIPASE  --   --   --   --   --   --   --  27  --  56    < > = values in this interval not displayed.        Assessment:  63-year-old female diagnosed with Crohn's disease in 2023 who underwent a ileocecectomy for fibrostenotic disease currently on Remicade monotherapy presenting with diarrhea and severe abdominal pain. CT scan showed mild wall thickening and mucosal hyperenhancement of the colon.    1. Crohn's ileitis. CT scan does not show any obstruction. No evidence of stricture or fistula. There was some mild wall thickening and mucosal hyperenhancement of the colon. Now diarrhea has stopped and C diff/enteric panel not able to be collected. On 3/8 C diff was negative and fecal calprotectin was close to normal at 54 (normal <49). Remicade dosing decreased from 10mg/kg every 8 weeks to every 6 weeks with last infusion 2/28 (~4 weeks ago) prior to presentation.     3/27 MRE shows new short segment of ileitis, multiple prominent/dilated small bowel loops without inflammation or transition point suggestive of ileus, normal colon other than large amount stools. Had minimal output with suppository, senna.     3/28 No stools, ongoing pain. Suspect a portion of the pain is related to active Crohn's but ileus/constipation could be contributing. Patient is trying to minimize narcotics. Discussed patient with IBD MD Dr. Marie from our office. He is in agreement that we give Remicade inpatient and see if this helps. He will look into insurance approval for every 4 week dosing and have her follow up with him outpatient before her next scheduled dose.     3/29 Remicade 10mg/kg given. + enema today due to minimal stooling, good results. Ileus seems resolved.     4/1. First day abdominal pain starting  to improve. CRP 36 on 3/30, today 6.79. Stool no longer with mucus.     2. Renal cell carcinoma s/p right nephrectomy in 2009, CKD. Creatinine 0.98 on 3/27.     Plan:  --Stop IV steroids.   --Start oral prednisone 20mg for 1600 dose today.   --Start oral prednisone taper tomorrow - 40mg daily x 7 days and then taper by 5mg every 7 days (reports she has enough pills at home).  --Low fiber diet as tolerated.   --Increase Protonix to 40mg BID.   --Continue Pepcid/Tums prn.   --Our office working with insurance to get Remicade 10mg/kg every 4 weeks approved.   --Has outpatient virtual visit with IBD MD 4/8 at 10:30am.     I will update Dr. Riggins.    Time spent: 30 minutes, greater than 50% of the visit was spent in counseling/coordination of care.     Halima Kearns, PAC  [facility] ([facility])

## 2024-04-01 NOTE — CONSULTS
Care Management Follow Up    Length of Stay (days): 9    Expected Discharge Date: 04/01/2024     Concerns to be Addressed:       Patient plan of care discussed at interdisciplinary rounds: Yes    Anticipated Discharge Disposition:       Anticipated Discharge Services:    Anticipated Discharge DME:      Patient/family educated on Medicare website which has current facility and service quality ratings:    Education Provided on the Discharge Plan:    Patient/Family in Agreement with the Plan:      Referrals Placed by CM/SW:    Private pay costs discussed: Not applicable    Additional Information:  Sw consulted for elevated risk score. Per chart review pt has no sw/cm needs at this time.    Social work will continue to follow and assist with discharge planning as needed.    RAFAEL Mcconnell, [facility]  Inpatient Care Coordination  [facility]  [phone]      RAFAEL Mcconnell

## 2024-04-01 NOTE — PROGRESS NOTES
"[facility]    Medicine Progress Note - Hospitalist Service    Date of Admission:  3/23/2024    Assessment & Plan     Summary of Stay: Devi Salas is a 63 year old female with a history of Crohn's disease admitted on 3/23/2024 with abdominal pain.  In the emergency department, the patient found to have a BUN/creatinine 13.0/1.02, glucose 111, WBC 8.9.  Urinalysis was negative for signs of infection.  CT abdomen and pelvis showed mild wall thickening and mucosal hyperenhancement of the colon suggestive of mild infectious colitis.  The patient was started on IV Solu-Medrol with concern for Crohn's flare.  Gastroenterology was consulted to see the patient.     TODAY'S PLAN:    -Remain on low fiber diet as tolerated  -switching to PO steroids  -start cipro 500 bid for 5 days for possible UTI     Problem List:   Acute Intractable Abdominal Pain  Crohn's Flare  - Appreciate GI recommendations  - On infliximab, last dose was 2/28.  Outpatient GI has been increasing frequency and dose of infliximab as her symptoms improve initially but wears off ~ 4 weeks  -CRP improving  -switch to PO steroids today     Acute Kidney Injury  - Baseline Cr = 0.8-0.9  -Stable creatinine, currently at normal levels    UTI, e coli  Cipro 500 mg bid x 5 days    Mild Hyperkalemia  -Resolved       Diet: Combination Diet Regular Diet Adult  Snacks/Supplements Adult: Other; any diet appropriate oral nutritional supplements; Between Meals    DVT Prophylaxis: Pneumatic Compression Devices and Ambulate every shift  Monae Catheter: Not present  Lines: None     Cardiac Monitoring: None  Code Status: Full Code      Clinically Significant Risk Factors                         # Overweight: Estimated body mass index is 26.22 kg/m  as calculated from the following:    Height as of this encounter: 1.6 m (5' 3\").    Weight as of this encounter: 67.1 kg (148 lb).             Disposition Plan      Expected Discharge Date: 04/01/2024      "           Estelle Rodriguez DO  Hospitalist Service  [facility]  Securely message with [employer] (more info)  Text page via [employer] Paging/Directory   ______________________________________________________________________      Physical Exam   Vital Signs: Temp: 98  F (36.7  C) Temp src: Oral BP: (!) 144/88 Pulse: 70   Resp: 16 SpO2: 95 % O2 Device: None (Room air)    Weight: 148 lbs 0 oz    General: Very pleasant female , NAD  Respiratory: Normal work of breathing.    Musculoskeletal: Moving all extremities appropriately.  Skin: No rashes or abrasions on exposed skin.  Neurologic: Alert. No gross focal deficits.   Psychologic: Appropriate mood and affect.    Medical Decision Making       50 MINUTES SPENT BY ME on the date of service doing chart review, history, exam, documentation & further activities per the note.      Data     I have personally reviewed the following data over the past 24 hrs:    N/A  \   N/A   / N/A     N/A N/A N/A /  N/A   4.7 N/A N/A \     Procal: N/A CRP: 6.79 (H) Lactic Acid: N/A

## 2024-04-01 NOTE — PLAN OF CARE
Goal Outcome Evaluation:  Plan of Care Reviewed With: patient    Overall Patient Progress: no change    Orientation: Alert and oriented x4  VSS. 97% on RA. afebrile.   LS: clear and equal bilaterally.   GI: Passing gas. Hypoactive bowel sounds in bilateral upper quadrants, hyperactive in bilateral lower quadrants. Denies N/V.   : Adequate urine output.   Skin: bruising to bilateral forearms.   Activity: Independent. Pt sleeping intermittently between cares. .   Pain: 5-8/10 abdominal pain. Described as constant stabbing in RUQ. Oral dilaudid x2. Atarax x2. Tylenol x2. Simethicone x1. IV dilaudid offered several times due to being in pain between oral options but patient declining. Ice and heat for comfort.   Updates/Plan: pain control. Encourage low fiber diet. Patient hopeful to discharge home later today. Continue with current cares.

## 2024-04-01 NOTE — PLAN OF CARE
Goal Outcome Evaluation:      Plan of Care Reviewed With: patient    Overall Patient Progress: improvingOverall Patient Progress: improving     Alert and Oriented x3, VSS. Independent in room.   Resp: LS clear and equal   GI/: abdomen tender, no disteneded, BS hpoactive in upper, and hyperactive on lower quandrants. Pt passing flatus, no BM this shift,  Denies N/V. Tolerating reg diet. Voiding.   IV: WDL clean,dry and intact. Saline locked.   Pain/Comfort: rating 6/10 abdominal pain. PRN oral dilaudid x1 given.  Skin: WDL; showered   Plan: VSS, Pain management , IV steroid      Problem: Adult Inpatient Plan of Care  Goal: Plan of Care Review  Description: The Plan of Care Review/Shift note should be completed every shift.  The Outcome Evaluation is a brief statement about your assessment that the patient is improving, declining, or no change.  This information will be displayed automatically on your shift  note.  Outcome: Progressing  Flowsheets (Taken 3/31/2024 2311)  Plan of Care Reviewed With: patient  Overall Patient Progress: improving  Intervention: Identify and Manage Fall Risk  Recent Flowsheet Documentation  Taken 3/31/2024 2044 by Sharifa Hamilton, RN  Safety Promotion/Fall Prevention:   clutter free environment maintained   safety round/check completed   nonskid shoes/slippers when out of bed   patient and family education  Intervention: Prevent Skin Injury  Recent Flowsheet Documentation  Taken 3/31/2024 2044 by Sharifa Hamilton, RN  Body Position: position changed independently  Skin Protection: adhesive use limited  Device Skin Pressure Protection:   adhesive use limited   tubing/devices free from skin contact  Intervention: Prevent and Manage VTE (Venous Thromboembolism) Risk  Recent Flowsheet Documentation  Taken 3/31/2024 2044 by Sharifa Hamilton, RN  VTE Prevention/Management: SCDs (sequential compression devices) off  Intervention: Prevent Infection  Recent Flowsheet Documentation  Taken  3/31/2024 2044 by Sharifa Hamilton RN  Infection Prevention:   hand hygiene promoted   rest/sleep promoted   single patient room provided  Goal: Optimal Comfort and Wellbeing  Outcome: Progressing  Intervention: Monitor Pain and Promote Comfort  Recent Flowsheet Documentation  Taken 3/31/2024 2044 by Sharifa Hamilton RN  Pain Management Interventions:   heat applied   rest   distraction   care clustered  Goal: Readiness for Transition of Care  Outcome: Progressing     Problem: Pain Acute  Goal: Optimal Pain Control and Function  Outcome: Progressing  Intervention: Develop Pain Management Plan  Recent Flowsheet Documentation  Taken 3/31/2024 2044 by Sharifa Hamilton RN  Pain Management Interventions:   heat applied   rest   distraction   care clustered  Intervention: Prevent or Manage Pain  Recent Flowsheet Documentation  Taken 3/31/2024 2044 by Sharifa Hamiltno RN  Sensory Stimulation Regulation: care clustered  Bowel Elimination Promotion: adequate fluid intake promoted  Medication Review/Management: medications reviewed  Intervention: Optimize Psychosocial Wellbeing  Recent Flowsheet Documentation  Taken 3/31/2024 2044 by Sharifa Hamilton RN  Supportive Measures:   active listening utilized   decision-making supported   goal-setting facilitated   problem-solving facilitated   self-care encouraged   self-responsibility promoted   verbalization of feelings encouraged     Problem: Bowel Disease, Inflammatory (Ulcerative Colitis or Crohn's Disease)  Goal: Optimal Adaptation to Chronic Illness  Outcome: Progressing  Intervention: Support Psychosocial Response to Illness  Recent Flowsheet Documentation  Taken 3/31/2024 2044 by Sharifa Hamilton RN  Supportive Measures:   active listening utilized   decision-making supported   goal-setting facilitated   problem-solving facilitated   self-care encouraged   self-responsibility promoted   verbalization of feelings encouraged  Goal: Diarrhea Symptom  Relief  Outcome: Progressing  Intervention: Manage Diarrhea  Recent Flowsheet Documentation  Taken 3/31/2024 2044 by Sharifa Hamilton, RN  Perineal Care: perineum cleansed

## 2024-04-02 VITALS
DIASTOLIC BLOOD PRESSURE: 86 MMHG | HEIGHT: 63 IN | BODY MASS INDEX: 26.22 KG/M2 | HEART RATE: 72 BPM | SYSTOLIC BLOOD PRESSURE: 119 MMHG | TEMPERATURE: 97.4 F | OXYGEN SATURATION: 94 % | WEIGHT: 148 LBS | RESPIRATION RATE: 16 BRPM

## 2024-04-02 LAB
ANION GAP SERPL CALCULATED.3IONS-SCNC: 7 MMOL/L (ref 7–15)
BUN SERPL-MCNC: 15.8 MG/DL (ref 8–23)
CALCIUM SERPL-MCNC: 8.7 MG/DL (ref 8.8–10.2)
CHLORIDE SERPL-SCNC: 101 MMOL/L (ref 98–107)
CREAT SERPL-MCNC: 1.02 MG/DL (ref 0.51–0.95)
CRP SERPL-MCNC: 3.72 MG/L
DEPRECATED HCO3 PLAS-SCNC: 29 MMOL/L (ref 22–29)
EGFRCR SERPLBLD CKD-EPI 2021: 61 ML/MIN/1.73M2
GLUCOSE SERPL-MCNC: 126 MG/DL (ref 70–99)
POTASSIUM SERPL-SCNC: 4.6 MMOL/L (ref 3.4–5.3)
SODIUM SERPL-SCNC: 137 MMOL/L (ref 135–145)

## 2024-04-02 PROCEDURE — 99239 HOSP IP/OBS DSCHRG MGMT >30: CPT | Performed by: STUDENT IN AN ORGANIZED HEALTH CARE EDUCATION/TRAINING PROGRAM

## 2024-04-02 PROCEDURE — 250N000013 HC RX MED GY IP 250 OP 250 PS 637: Performed by: PHYSICIAN ASSISTANT

## 2024-04-02 PROCEDURE — 36415 COLL VENOUS BLD VENIPUNCTURE: CPT | Performed by: STUDENT IN AN ORGANIZED HEALTH CARE EDUCATION/TRAINING PROGRAM

## 2024-04-02 PROCEDURE — 86140 C-REACTIVE PROTEIN: CPT | Performed by: INTERNAL MEDICINE

## 2024-04-02 PROCEDURE — 250N000011 HC RX IP 250 OP 636: Performed by: STUDENT IN AN ORGANIZED HEALTH CARE EDUCATION/TRAINING PROGRAM

## 2024-04-02 PROCEDURE — 250N000013 HC RX MED GY IP 250 OP 250 PS 637: Performed by: STUDENT IN AN ORGANIZED HEALTH CARE EDUCATION/TRAINING PROGRAM

## 2024-04-02 PROCEDURE — 250N000012 HC RX MED GY IP 250 OP 636 PS 637: Performed by: PHYSICIAN ASSISTANT

## 2024-04-02 PROCEDURE — 80048 BASIC METABOLIC PNL TOTAL CA: CPT | Performed by: STUDENT IN AN ORGANIZED HEALTH CARE EDUCATION/TRAINING PROGRAM

## 2024-04-02 RX ORDER — HYDROMORPHONE HYDROCHLORIDE 2 MG/1
2-4 TABLET ORAL
Qty: 20 TABLET | Refills: 0 | Status: SHIPPED | OUTPATIENT
Start: 2024-04-02 | End: 2024-04-07

## 2024-04-02 RX ORDER — HYDROXYZINE HYDROCHLORIDE 25 MG/1
25 TABLET, FILM COATED ORAL EVERY 6 HOURS PRN
Qty: 20 TABLET | Refills: 0 | Status: ON HOLD | OUTPATIENT
Start: 2024-04-02 | End: 2024-06-25

## 2024-04-02 RX ORDER — CIPROFLOXACIN 500 MG/1
500 TABLET, FILM COATED ORAL 2 TIMES DAILY
Qty: 8 TABLET | Refills: 0 | Status: SHIPPED | OUTPATIENT
Start: 2024-04-02 | End: 2024-04-06

## 2024-04-02 RX ORDER — ONDANSETRON 4 MG/1
4 TABLET, ORALLY DISINTEGRATING ORAL EVERY 8 HOURS PRN
Qty: 20 TABLET | Refills: 0 | Status: SHIPPED | OUTPATIENT
Start: 2024-04-02

## 2024-04-02 RX ADMIN — SENNOSIDES AND DOCUSATE SODIUM 1 TABLET: 8.6; 5 TABLET ORAL at 08:25

## 2024-04-02 RX ADMIN — HYDROMORPHONE HYDROCHLORIDE 4 MG: 2 TABLET ORAL at 08:18

## 2024-04-02 RX ADMIN — PANTOPRAZOLE SODIUM 40 MG: 40 TABLET, DELAYED RELEASE ORAL at 16:05

## 2024-04-02 RX ADMIN — CIPROFLOXACIN 500 MG: 500 TABLET ORAL at 08:18

## 2024-04-02 RX ADMIN — HYDROMORPHONE HYDROCHLORIDE 2 MG: 2 TABLET ORAL at 11:43

## 2024-04-02 RX ADMIN — SIMETHICONE CHEW TAB 80 MG 80 MG: 80 TABLET ORAL at 08:24

## 2024-04-02 RX ADMIN — ONDANSETRON 4 MG: 2 INJECTION INTRAMUSCULAR; INTRAVENOUS at 08:25

## 2024-04-02 RX ADMIN — HYDROMORPHONE HYDROCHLORIDE 4 MG: 2 TABLET ORAL at 16:05

## 2024-04-02 RX ADMIN — PREDNISONE 40 MG: 20 TABLET ORAL at 08:18

## 2024-04-02 RX ADMIN — ACETAMINOPHEN 650 MG: 325 TABLET, FILM COATED ORAL at 11:43

## 2024-04-02 RX ADMIN — HYDROMORPHONE HYDROCHLORIDE 4 MG: 2 TABLET ORAL at 04:18

## 2024-04-02 RX ADMIN — PANTOPRAZOLE SODIUM 40 MG: 40 TABLET, DELAYED RELEASE ORAL at 08:18

## 2024-04-02 ASSESSMENT — ACTIVITIES OF DAILY LIVING (ADL)
ADLS_ACUITY_SCORE: 22

## 2024-04-02 NOTE — PLAN OF CARE
Goal Outcome Evaluation:                      VSS. Afebrile.  Pain well managed with tylenol and oral dilaudid.   Bowel sounds active.   Tolerating regular diet. Drinking well.   Voiding well, no stool.  Ambulating dietz several times.   IV with some mild redness and cording.  IV discontinued and warm pack applied.   Discharge instructions given. Home meds given and explained. Discharge home with .      Problem: Adult Inpatient Plan of Care  Goal: Absence of Hospital-Acquired Illness or Injury  Intervention: Identify and Manage Fall Risk  Recent Flowsheet Documentation  Taken 4/2/2024 0800 by Bia Hale RN  Safety Promotion/Fall Prevention:   clutter free environment maintained   nonskid shoes/slippers when out of bed  Intervention: Prevent Skin Injury  Recent Flowsheet Documentation  Taken 4/2/2024 0800 by Bia Hale RN  Body Position: position changed independently  Goal: Optimal Comfort and Wellbeing  Intervention: Monitor Pain and Promote Comfort  Recent Flowsheet Documentation  Taken 4/2/2024 0818 by Bia Hale RN  Pain Management Interventions: medication (see MAR)     Problem: Pain Acute  Goal: Optimal Pain Control and Function  Intervention: Develop Pain Management Plan  Recent Flowsheet Documentation  Taken 4/2/2024 0818 by Bia Hale RN  Pain Management Interventions: medication (see MAR)     Problem: Bowel Disease, Inflammatory (Ulcerative Colitis or Crohn's Disease)  Goal: Optimal Pain Control and Function  Intervention: Prevent or Manage Pain  Recent Flowsheet Documentation  Taken 4/2/2024 0818 by Bia Hale RN  Pain Management Interventions: medication (see MAR)

## 2024-04-02 NOTE — DISCHARGE SUMMARY
"[facility]  Hospitalist Discharge Summary      Date of Admission:  3/23/2024  Date of Discharge:  4/2/2024  Discharging Provider: Estelle Rodriguez DO  Discharge Service: Hospitalist Service    Discharge Diagnoses   Acute Intractable Abdominal Pain  Crohn's Flare  Acute Kidney Injury  UTI, e coli    Clinically Significant Risk Factors     # Overweight: Estimated body mass index is 26.22 kg/m  as calculated from the following:    Height as of this encounter: 1.6 m (5' 3\").    Weight as of this encounter: 67.1 kg (148 lb).       Follow-ups Needed After Discharge   Follow-up Appointments     Follow-up and recommended labs and tests       Follow up with primary care provider, Jeanette Betancourt, within 7 days for   hospital follow- up.    Follow up with GI as scheduled            Discharge Disposition   Discharged to home  Condition at discharge: Stable    Hospital Course   Summary of Stay: Devi Salas is a 63 year old female with a history of Crohn's disease admitted on 3/23/2024 with abdominal pain.  In the emergency department, the patient found to have a BUN/creatinine 13.0/1.02, glucose 111, WBC 8.9.  Urinalysis was negative for signs of infection.  CT abdomen and pelvis showed mild wall thickening and mucosal hyperenhancement of the colon suggestive of mild infectious colitis.  The patient was started on IV Solu-Medrol with concern for Crohn's flare.  Gastroenterology was consulted to see the patient; received dose of infliximab while inpatient, plan to increase frequency of infusions as she does have relief of symptoms but wears off too quickly between doses. Switched to PO steroids and discharged with pain control and cipro course for possible UTI.       Consultations This Hospital Stay   GASTROENTEROLOGY IP CONSULT  SPIRITUAL HEALTH SERVICES IP CONSULT  CARE MANAGEMENT / SOCIAL WORK IP CONSULT    Code Status   Full Code    Time Spent on this Encounter   IEstelle DO, personally saw " the patient today and spent greater than 30 minutes discharging this patient.       Estelle Rodriguez DO  [facility]  [address]  [facility] [address]  Phone: [phone]  Fax: [phone]  ______________________________________________________________________    Physical Exam   Vital Signs: Temp: 97.4  F (36.3  C) Temp src: Oral BP: 119/86 Pulse: 72   Resp: 16 SpO2: 94 % O2 Device: None (Room air)    Weight: 148 lbs 0 oz         Primary Care Physician   Jeanette Betancourt    Discharge Orders      Reason for your hospital stay    You were hospitalized for Crohn's flare.     Follow-up and recommended labs and tests     Follow up with primary care provider, Jeanette Betancourt, within 7 days for hospital follow- up.    Follow up with GI as scheduled     Activity    Your activity upon discharge: activity as tolerated     Diet    Follow this diet upon discharge: Orders Placed This Encounter      Snacks/Supplements Adult: Other; any diet appropriate oral nutritional supplements; Between Meals      Combination Diet Regular Diet Adult       Significant Results and Procedures   Most Recent 3 CBC's:  Recent Labs   Lab Test 03/31/24  0650 03/27/24  0720 03/25/24  0701   WBC 13.4* 13.6* 7.8   HGB 13.3 13.5 11.9   MCV 90 89 89    241 229     Most Recent 3 BMP's:  Recent Labs   Lab Test 04/02/24  0749 04/01/24  0657 03/31/24  0650 03/28/24  0653 03/27/24  0720     --  137  --  138   POTASSIUM 4.6 4.7 4.6   < > 4.8   CHLORIDE 101  --  100  --  105   CO2 29  --  29  --  22   BUN 15.8  --  20.2  --  25.2*   CR 1.02*  --  1.07*  --  0.98*   ANIONGAP 7  --  8  --  11   GIORGIO 8.7*  --  8.7*  --  8.5*   *  --  121*  --  118*    < > = values in this interval not displayed.     Most Recent ESR & CRP:  Recent Labs   Lab Test 04/02/24  0749 03/25/24  0701 03/23/24  1528   SED  --   --  3   CRPI 3.72   < > <3.00    < > = values in this interval not displayed.   ,   Results for orders placed or  performed during the hospital encounter of 03/23/24   CT Abdomen Pelvis w Contrast    Narrative    EXAM: CT ABDOMEN PELVIS W CONTRAST  LOCATION: [facility]  DATE: 3/23/2024    INDICATION: Severe abd pain and diarrhea  COMPARISON: CT abdomen pelvis, 01/15/2024  TECHNIQUE: CT scan of the abdomen and pelvis was performed following injection of IV contrast. Multiplanar reformats were obtained. Dose reduction techniques were used.  CONTRAST: 78mL Isovue 370    FINDINGS:   LOWER CHEST: Normal.    HEPATOBILIARY: Cholecystectomy.    PANCREAS: Normal.    SPLEEN: 5 mm low dense lesion within the spleen is unchanged. This is nonspecific though likely benign.    ADRENAL GLANDS: Normal.    KIDNEYS/BLADDER: Right nephrectomy.    BOWEL: Surgical changes of a right hemicolectomy. There is mild wall thickening and mucosal hyperenhancement throughout the colon. No bowel obstruction.    LYMPH NODES: Normal.    VASCULATURE: Normal.    PELVIC ORGANS: Hysterectomy.    MUSCULOSKELETAL: Normal.      Impression    IMPRESSION:   1.  Mild wall thickening and mucosal hyperenhancement of the colon. This is suggestive of a mild infectious colitis.   MR Enterography w/o & wContrast    Narrative    MR ENTEROGRAPHY WITHOUT AND WITH CONTRAST 3/27/2024 9:11 AM    INDICATION: Worsening abdominal pain. History of Crohn's disease.    COMPARISON: MR enterography 1/16/2024.    TECHNIQUE: Routine enterography protocol including imaging of abdomen  and pelvis with axial T1 in/out phase, axial T2, coronal T2. Post  contrast abdomen and pelvis axial and coronal thin-section T1 with fat  sat. 1 mg Glucagon. Oral VoLumen.    CONTRAST: 7 mL Gadavist    FINDINGS:     ENTEROGRAPHY: Postoperative changes of ileocecal resection. There is a  short segment of mild bowel wall thickening in the distal ileum  measuring approximately 3.5 cm, new since the previous exam (series 12  image 23). There are numerous loops of mildly prominent fluid  and  stool-filled loops of mid and distal small bowel noted. Findings  suggest ileus, although a small bowel obstruction is difficult to  exclude entirely from this appearance. No evidence for abscess or  fistula. Moderate to large amount of stool throughout the colon  suggests constipation. The appendix is not seen, and is surgically  absent by history. Stomach is grossly unremarkable.    ADDITIONAL FINDINGS: Cholecystectomy. No intrahepatic biliary  dilatation. The common duct is mildly prominent at 0.9 cm, possibly  related to the patient's age and postcholecystectomy state. Right  nephrectomy. No lymphadenopathy. There are multiple mildly prominent  mesenteric lymph nodes in the right lower quadrant, new since the  previous exam, and likely reactive. The visualized portions of the  liver and spleen are unremarkable. The pancreas, adrenal glands, and  left kidney are unremarkable. No hydronephrosis. There is a trace  amount of nonspecific free fluid in the right lower quadrant.      Impression    IMPRESSION:  1.  There is a short segment of bowel wall thickening and enhancement  in the distal ileum, new since the previous exam and suspicious for  active Crohn's inflammation.  2.  Trace amount of free fluid in the right lower quadrant and several  mildly prominent mesenteric lymph nodes in the right lower quadrant  are nonspecific, but likely also related to distal small bowel  inflammation.  3.  Moderate to large amount of stool throughout the colon suggests  constipation.  4.  Multiple mildly prominent loops of mid and distal small bowel with  no definite transition point suggests ileus, although an early small  bowel obstruction is difficult to exclude from this appearance, and  close follow-up is recommended.    RYAN ROSAS MD         SYSTEM ID:  [id number]       Discharge Medications   Current Discharge Medication List        START taking these medications    Details   ciprofloxacin (CIPRO) 500 MG tablet  Take 1 tablet (500 mg) by mouth 2 times daily for 8 doses  Qty: 8 tablet, Refills: 0    Associated Diagnoses: Crohn's disease with complication, unspecified gastrointestinal tract location (H)           CONTINUE these medications which have CHANGED    Details   HYDROmorphone (DILAUDID) 2 MG tablet Take 1-2 tablets (2-4 mg) by mouth every 3 hours as needed for severe pain  Qty: 20 tablet, Refills: 0    Associated Diagnoses: Crohn's disease with complication, unspecified gastrointestinal tract location (H)      hydrOXYzine HCl (ATARAX) 25 MG tablet Take 1 tablet (25 mg) by mouth every 6 hours as needed for other (adjuvant pain)  Qty: 20 tablet, Refills: 0    Associated Diagnoses: Crohn's disease with complication, unspecified gastrointestinal tract location (H)      ondansetron (ZOFRAN ODT) 4 MG ODT tab Take 1 tablet (4 mg) by mouth every 8 hours as needed for nausea  Qty: 20 tablet, Refills: 0    Associated Diagnoses: Crohn's disease with complication, unspecified gastrointestinal tract location (H)           CONTINUE these medications which have NOT CHANGED    Details   acetaminophen (TYLENOL) 500 MG tablet Take 1-2 tablets (500-1,000 mg) by mouth every 6 hours as needed for mild pain    Associated Diagnoses: RLQ abdominal pain      albuterol (PROAIR HFA/PROVENTIL HFA/VENTOLIN HFA) 108 (90 Base) MCG/ACT inhaler Inhale 2 puffs into the lungs every 6 hours as needed for shortness of breath, wheezing or cough      bisacodyl (DULCOLAX) 10 MG suppository Place 1 suppository (10 mg) rectally 2 times daily as needed for constipation  Qty: 10 suppository, Refills: 1    Associated Diagnoses: RLQ abdominal pain; Partial small bowel obstruction (H)      cyanocobalamin (CYANOCOBALAMIN) 1000 MCG/ML injection Inject 1,000 mcg as directed every 30 days      dicyclomine (BENTYL) 20 MG tablet Take 1 tablet (20 mg) by mouth 4 times daily (before meals and nightly)  Qty: 30 tablet, Refills: 0    Associated Diagnoses: Intractable  abdominal pain      esomeprazole (NEXIUM) 40 MG DR capsule Take 40 mg by mouth 2 times daily (before meals) Take 30-60 minutes before eating.      famotidine (PEPCID) 40 MG tablet Take 40 mg by mouth At Bedtime      inFLIXimab (REMICADE IV) Inject 10 mg/kg into the vein once every eight weeks      medical cannabis (Patient's own supply) Take 1 Dose by mouth See Admin Instructions (The purpose of this order is to document that the patient reports taking medical cannabis.  This is not a prescription, and is not used to certify that the patient has a qualifying medical condition.)  25 MG EDIBLE      methocarbamol (ROBAXIN) 500 MG tablet Take 1 tablet (500 mg) by mouth 4 times daily as needed for muscle spasms  Qty: 15 tablet, Refills: 1    Associated Diagnoses: RLQ abdominal pain; Partial small bowel obstruction (H); IBD (inflammatory bowel disease); Nausea      montelukast (SINGULAIR) 10 MG tablet Take 10 mg by mouth At Bedtime      nortriptyline (PAMELOR) 25 MG capsule Take 25 mg by mouth At Bedtime      predniSONE (DELTASONE) 5 MG tablet Take by mouth daily Starting 3/20/24   80 mg dailly for 1 week, then  70 mg dailly for 1 week  60 mg dailly for 1 week  50 mg dailly for 1 week  40 mg dailly for 1 week  30 mg dailly for 1 week  20 mg dailly for 1 week  10 mg dailly for 1 week      simethicone (MYLICON) 80 MG chewable tablet Take 1 tablet (80 mg) by mouth every 6 hours as needed for cramping  Qty: 30 tablet, Refills: 0    Associated Diagnoses: Intractable abdominal pain      traZODone (DESYREL) 100 MG tablet Take 200 mg by mouth At Bedtime      vitamin C (ASCORBIC ACID) 1000 MG TABS Take 1,000 mg by mouth daily      Vitamin D3 (CHOLECALCIFEROL) 25 mcg (1000 units) tablet Take 25 mcg by mouth daily           Allergies   Allergies   Allergen Reactions    Latex Hives    Oxycodone Anaphylaxis, Hives and Swelling     Throat swelling, per pt    Pt tolerates [place]/Vicodin (10/15/2023)    Penicillin G Anaphylaxis     Sumatriptan Palpitations    Aspirin Nausea and Vomiting    Demerol Hcl [Meperidine] Nausea and Vomiting     N/V    Percocet [Oxycodone-Acetaminophen] Nausea and Vomiting     N/V    Acetaminophen Nausea and Vomiting    Codeine Nausea and Vomiting    Mold

## 2024-04-02 NOTE — PROGRESS NOTES
"GASTROENTEROLOGY PROGRESS NOTE     SUBJECTIVE:  Switched to oral steroids last evening. Pain continuing to improve. She is using oral Dilaudid as needed. Tolerated low fat diet this morning and for lunch. No stools or flatus today -feels like she needs to stool. Took senna earlier. Reflux better today. She feels ready to discharge.     OBJECTIVE:  /86   Pulse 72   Temp 97.4  F (36.3  C) (Oral)   Resp 16   Ht 1.6 m (5' 3\")   Wt 67.1 kg (148 lb)   SpO2 94%   BMI 26.22 kg/m    Temp (24hrs), Av.8  F (36.6  C), Min:97.6  F (36.4  C), Max:98  F (36.7  C)    No data found.      Intake/Output Summary (Last 24 hours) at 3/28/2024 1600  Last data filed at 3/28/2024 1500  Gross per 24 hour   Intake 2069 ml   Output --   Net 2069 ml        PHYSICAL EXAM  Gen: alert, oriented, NAD  Abd: soft, hypoactive BS, nondistended, moderate tenderness RLQ/epigastrium. No rebound or guarding.     Additional Comments:  ROS, FH, SH: See initial GI consult for details.     I have reviewed the patient's new clinical lab results:     Recent Labs   Lab Test 24  0650 24  0720 24  0701   WBC 13.4* 13.6* 7.8   HGB 13.3 13.5 11.9   MCV 90 89 89    241 229     Recent Labs   Lab Test 24  0749 24  0657 24  0650 24  0653 24  0720   POTASSIUM 4.6 4.7 4.6   < > 4.8   CHLORIDE 101  --  100  --  105   CO2 29  --  29  --  22   BUN 15.8  --  20.2  --  25.2*   ANIONGAP 7  --  8  --  11    < > = values in this interval not displayed.     Recent Labs   Lab Test 24  0140 24  1528 24  1526 24  0816 01/15/24  1823 01/15/24  1801 10/09/23  1750 10/09/23  1737 23  1717 23  1608   ALBUMIN  --  4.7  --  3.9 4.5  --    < > 4.7  --  3.9   BILITOTAL  --  0.3  --  0.6 0.7  --    < > 0.4  --  0.3   ALT  --  13  --  11 15  --    < > 15  --  28   AST  --  17  --  16 15  --    < > 24  --  29   PROTEIN Negative  --  Negative  --   --  Negative   < >  --    < >  --  "   LIPASE  --   --   --   --   --   --   --  27  --  56    < > = values in this interval not displayed.        Assessment:  63-year-old female diagnosed with Crohn's disease in 2023 who underwent a ileocecectomy for fibrostenotic disease currently on Remicade monotherapy presenting with diarrhea and severe abdominal pain. CT scan showed mild wall thickening and mucosal hyperenhancement of the colon.    1. Crohn's ileitis. CT scan does not show any obstruction. No evidence of stricture or fistula. There was some mild wall thickening and mucosal hyperenhancement of the colon. Now diarrhea has stopped and C diff/enteric panel not able to be collected. On 3/8 C diff was negative and fecal calprotectin was close to normal at 54 (normal <49). Remicade dosing decreased from 10mg/kg every 8 weeks to every 6 weeks with last infusion 2/28 (~4 weeks ago) prior to presentation.     3/27 MRE shows new short segment of ileitis, multiple prominent/dilated small bowel loops without inflammation or transition point suggestive of ileus, normal colon other than large amount stools. Had minimal output with suppository, senna.     3/28 No stools, ongoing pain. Suspect a portion of the pain is related to active Crohn's but ileus/constipation could be contributing. Patient is trying to minimize narcotics. Discussed patient with IBD MD Dr. Marie from our office. He is in agreement that we give Remicade inpatient and see if this helps. He will look into insurance approval for every 4 week dosing and have her follow up with him outpatient before her next scheduled dose.     3/29 Remicade 10mg/kg given. + enema today due to minimal stooling, good results. Ileus seems resolved.     4/1. First day abdominal pain starting to improve. CRP 36 on 3/30, today 6.79. Stool no longer with mucus.     2. Renal cell carcinoma s/p right nephrectomy in 2009, CKD. Creatinine 0.98 on 3/27.     3. UTI. Started on cipro yesterday.     Plan:  --Continue oral  prednisone taper - 40mg daily x 7 days and then taper by 5mg every 7 days (reports she has enough pills at home).  --Low fiber diet as tolerated.   --Protonix to 40mg BID.   --Continue Pepcid/Tums prn.   --Our office working with insurance to get Remicade 10mg/kg every 4 weeks approved.   --Has outpatient virtual visit with IBD MD 4/8 at 10:30am.   --Ok to discharge from GI standpoint.     I will update Dr. Riggins. Reviewed with Dr. Rodriguez.    Time spent: 30 minutes, greater than 50% of the visit was spent in counseling/coordination of care.     Halima Kearns, PAC  [facility] ([facility])

## 2024-04-02 NOTE — PLAN OF CARE
Vitals:  VSS. Afebrile. Independent in room. Pt rested comfortably between cares.   Resp: WDL.  LS clear and equal bilaterally    GI/: non distended, soft, RUQ & RLQ tenderness. Passing flatus, BS active and audible. N/V controled with IV Zofran x1. Tolerating regular diet. Voiding WDL.  IV: WDL dressing c/d/i, saline locked  Pain/Comfort: 6-8/10 while awake.  Able to sleep comfortably between cares. Tylenol x1. Oral dilaudid x3.  Atarax x1.   Skin: scattered bruises on arms from previous IV pokes  Social: WDL coping effectively  Plan: pain and nausea management, oral abx       Problem: Adult Inpatient Plan of Care  Goal: Plan of Care Review  Description: The Plan of Care Review/Shift note should be completed every shift.  The Outcome Evaluation is a brief statement about your assessment that the patient is improving, declining, or no change.  This information will be displayed automatically on your shift  note.  4/2/2024 0618 by Jannet Graf RN  Outcome: Progressing  Flowsheets (Taken 4/2/2024 0618)  Plan of Care Reviewed With: patient  Overall Patient Progress: improving  4/2/2024 0617 by Jannet Graf RN  Outcome: Progressing  Flowsheets (Taken 4/2/2024 0617)  Plan of Care Reviewed With: patient  4/2/2024 0617 by Jannet Graf, RN  Outcome: Progressing  Flowsheets (Taken 4/2/2024 0617)  Plan of Care Reviewed With: patient  Overall Patient Progress: improving  Goal: Absence of Hospital-Acquired Illness or Injury  Intervention: Identify and Manage Fall Risk  Recent Flowsheet Documentation  Taken 4/1/2024 2017 by Jannet Graf, RN  Safety Promotion/Fall Prevention:   patient and family education   room organization consistent   safety round/check completed   clutter free environment maintained  Intervention: Prevent Skin Injury  Recent Flowsheet Documentation  Taken 4/2/2024 0412 by Jannet Graf, RN  Body Position: position changed independently  Taken 4/1/2024 2017 by Jannet Graf, RN  Body  Position: position changed independently  Skin Protection: adhesive use limited  Device Skin Pressure Protection:   adhesive use limited   tubing/devices free from skin contact  Intervention: Prevent and Manage VTE (Venous Thromboembolism) Risk  Recent Flowsheet Documentation  Taken 4/1/2024 2017 by Jannet Graf RN  VTE Prevention/Management: (AMBULATES) SCDs (sequential compression devices) off  Intervention: Prevent Infection  Recent Flowsheet Documentation  Taken 4/1/2024 2017 by Jannet Graf RN  Infection Prevention:   equipment surfaces disinfected   hand hygiene promoted   single patient room provided   rest/sleep promoted  Goal: Optimal Comfort and Wellbeing  Intervention: Monitor Pain and Promote Comfort  Recent Flowsheet Documentation  Taken 4/2/2024 0412 by Jannet Graf RN  Pain Management Interventions: medication (see MAR)  Taken 4/1/2024 2340 by Jannet Graf RN  Pain Management Interventions: medication (see MAR)  Taken 4/1/2024 2017 by Jannet Graf RN  Pain Management Interventions: medication (see MAR)     Problem: Pain Acute  Goal: Optimal Pain Control and Function  Intervention: Develop Pain Management Plan  Recent Flowsheet Documentation  Taken 4/2/2024 0412 by Jannet Graf RN  Pain Management Interventions: medication (see MAR)  Taken 4/1/2024 2340 by Jannet Graf RN  Pain Management Interventions: medication (see MAR)  Taken 4/1/2024 2017 by Jannet Graf RN  Pain Management Interventions: medication (see MAR)  Intervention: Prevent or Manage Pain  Recent Flowsheet Documentation  Taken 4/1/2024 2017 by Jannet Graf RN  Sensory Stimulation Regulation: care clustered  Sleep/Rest Enhancement:   awakenings minimized   consistent schedule promoted   noise level reduced   regular sleep/rest pattern promoted   relaxation techniques promoted  Bowel Elimination Promotion:   adequate fluid intake promoted   ambulation promoted  Complementary Therapy: aromatherapy  utilized  Medication Review/Management:   medications reviewed   high-risk medications identified  Intervention: Optimize Psychosocial Wellbeing  Recent Flowsheet Documentation  Taken 4/1/2024 2017 by Jannet Graf RN  Supportive Measures:   active listening utilized   decision-making supported   goal-setting facilitated   problem-solving facilitated   relaxation techniques promoted   verbalization of feelings encouraged     Problem: Bowel Disease, Inflammatory (Ulcerative Colitis or Crohn's Disease)  Goal: Optimal Adaptation to Chronic Illness  Intervention: Support Psychosocial Response to Illness  Recent Flowsheet Documentation  Taken 4/1/2024 2017 by Jannet Graf RN  Supportive Measures:   active listening utilized   decision-making supported   goal-setting facilitated   problem-solving facilitated   relaxation techniques promoted   verbalization of feelings encouraged  Goal: Diarrhea Symptom Relief  Intervention: Manage Diarrhea  Recent Flowsheet Documentation  Taken 4/1/2024 2017 by Jannet Graf RN  Diarrhea Management: fluids promoted  Fluid/Electrolyte Management: fluids provided  Goal: Optimal Pain Control and Function  Intervention: Prevent or Manage Pain  Recent Flowsheet Documentation  Taken 4/2/2024 0412 by Jannet Graf RN  Pain Management Interventions: medication (see MAR)  Taken 4/1/2024 2340 by Jannet Graf RN  Pain Management Interventions: medication (see MAR)  Taken 4/1/2024 2017 by Jannet Graf RN  Pain Management Interventions: medication (see MAR)  Sleep/Rest Enhancement:   awakenings minimized   consistent schedule promoted   noise level reduced   regular sleep/rest pattern promoted   relaxation techniques promoted  Complementary Therapy: aromatherapy utilized   Goal Outcome Evaluation:      Plan of Care Reviewed With: patient    Overall Patient Progress: improvingOverall Patient Progress: improving

## 2024-04-04 ENCOUNTER — PATIENT OUTREACH (OUTPATIENT)
Dept: CARE COORDINATION | Facility: CLINIC | Age: 64
End: 2024-04-04
Payer: COMMERCIAL

## 2024-04-04 NOTE — PROGRESS NOTES
"  [facility]: [facility]: Post-Discharge Note  SITUATION                                                      Admission:    Admission Date: 03/23/24   Reason for Admission: You were hospitalized for Crohn's flare.  Discharge:   Discharge Date: 04/02/24  Discharge Diagnosis: You were hospitalized for Crohn's flare.    BACKGROUND                                                      Per hospital discharge summary and inpatient provider notes:    Devi Salas is a 63 year old female with past medical history significant for Chron's disease who presented to [facility] on 3/23/2024 with 1 week history of profuse diarrhea.     Hx of Chron's disease s/p ileocolic reseaction. She is being maintained on Remicade infusions, but had frequent flares when her infusions were every 8 weeks. She reports they were recently increased in frequency to every 6 weeks, but notices that her symptoms start to get worse at the 4 week hunter. Patient stated her last infusion was 2/28/2024, and for the past 1 week she has had profuse diarrhea. She stated there is no blood in her stool, but that it is very \"mucousy.\" She has 10-15 bowel movements daily when it is at its worst. Patient had been started on a course of oral steroids by [facility], but stated that it has not helped much. She also reports a Cdiff test that was performed and is negative.     In the emergency department, she was noted to be afebrile and vitally stable. Her laboratory studies were only notable for creatinine of 1.02; the remainder of BMP and CBC were WNL. Urinalysis was bland. CT abdomen/pelvis with mild wall thickening and mucosal hyper-enhancement of the colon. She received 1L LR + 20mg solumedrol, and was admitted for further cares.    ASSESSMENT           Discharge Assessment  How are you doing now that you are home?: I am a little sore. I am getting better each day.  How are your symptoms? (Red Flag symptoms escalate to triage " hotline per guidelines): Improved  Do you feel your condition is stable enough to be safe at home until your provider visit?: Yes  Does the patient have their discharge instructions? : Yes  Does the patient have questions regarding their discharge instructions? : No  Were you started on any new medications or were there changes to any of your previous medications? : Yes  Does the patient have all of their medications?: Yes  Do you have questions regarding any of your medications? : No  Discharge follow-up appointment scheduled within 14 calendar days? : Yes  Discharge Follow Up Appointment Date: 04/08/24  Discharge Follow Up Appointment Scheduled with?: Specialty Care Provider                  PLAN                                                      Outpatient Plan: Follow up with primary care provider, Jeanette Betancourt, within 7 days for hospital follow- up.  Follow up with GI as scheduled    No future appointments.      For any urgent concerns, please contact our 24 hour nurse triage line: [phone] ([phone])         ABEL Delgadillo  [phone]  [facility]

## 2024-06-24 ENCOUNTER — APPOINTMENT (OUTPATIENT)
Dept: CT IMAGING | Facility: CLINIC | Age: 64
End: 2024-06-24
Attending: EMERGENCY MEDICINE
Payer: COMMERCIAL

## 2024-06-24 ENCOUNTER — HOSPITAL ENCOUNTER (INPATIENT)
Facility: CLINIC | Age: 64
LOS: 2 days | Discharge: HOME | End: 2024-06-26
Attending: EMERGENCY MEDICINE | Admitting: HOSPITALIST
Payer: COMMERCIAL

## 2024-06-24 DIAGNOSIS — K50.919 EXACERBATION OF CROHN'S DISEASE WITH COMPLICATION (H): ICD-10-CM

## 2024-06-24 LAB
ALBUMIN UR-MCNC: NEGATIVE MG/DL
ANION GAP SERPL CALCULATED.3IONS-SCNC: 14 MMOL/L (ref 7–15)
APPEARANCE UR: CLEAR
ATRIAL RATE - MUSE: 86 BPM
BACTERIA #/AREA URNS HPF: ABNORMAL /HPF
BASOPHILS # BLD AUTO: 0.1 10E3/UL (ref 0–0.2)
BASOPHILS NFR BLD AUTO: 1 %
BILIRUB UR QL STRIP: NEGATIVE
BUN SERPL-MCNC: 9 MG/DL (ref 8–23)
CALCIUM SERPL-MCNC: 9.6 MG/DL (ref 8.8–10.2)
CHLORIDE SERPL-SCNC: 105 MMOL/L (ref 98–107)
COLOR UR AUTO: ABNORMAL
CREAT SERPL-MCNC: 1.09 MG/DL (ref 0.51–0.95)
DEPRECATED HCO3 PLAS-SCNC: 22 MMOL/L (ref 22–29)
DIASTOLIC BLOOD PRESSURE - MUSE: NORMAL MMHG
EGFRCR SERPLBLD CKD-EPI 2021: 56 ML/MIN/1.73M2
EOSINOPHIL # BLD AUTO: 0.1 10E3/UL (ref 0–0.7)
EOSINOPHIL NFR BLD AUTO: 2 %
ERYTHROCYTE [DISTWIDTH] IN BLOOD BY AUTOMATED COUNT: 13.2 % (ref 10–15)
GLUCOSE SERPL-MCNC: 119 MG/DL (ref 70–99)
GLUCOSE UR STRIP-MCNC: NEGATIVE MG/DL
HCT VFR BLD AUTO: 40.8 % (ref 35–47)
HGB BLD-MCNC: 13.3 G/DL (ref 11.7–15.7)
HGB UR QL STRIP: NEGATIVE
HOLD SPECIMEN: NORMAL
HOLD SPECIMEN: NORMAL
IMM GRANULOCYTES # BLD: 0.1 10E3/UL
IMM GRANULOCYTES NFR BLD: 1 %
INTERPRETATION ECG - MUSE: NORMAL
KETONES UR STRIP-MCNC: NEGATIVE MG/DL
LEUKOCYTE ESTERASE UR QL STRIP: NEGATIVE
LYMPHOCYTES # BLD AUTO: 2.5 10E3/UL (ref 0.8–5.3)
LYMPHOCYTES NFR BLD AUTO: 32 %
MAGNESIUM SERPL-MCNC: 2.1 MG/DL (ref 1.7–2.3)
MCH RBC QN AUTO: 29.5 PG (ref 26.5–33)
MCHC RBC AUTO-ENTMCNC: 32.6 G/DL (ref 31.5–36.5)
MCV RBC AUTO: 91 FL (ref 78–100)
MONOCYTES # BLD AUTO: 0.6 10E3/UL (ref 0–1.3)
MONOCYTES NFR BLD AUTO: 7 %
NEUTROPHILS # BLD AUTO: 4.7 10E3/UL (ref 1.6–8.3)
NEUTROPHILS NFR BLD AUTO: 59 %
NITRATE UR QL: NEGATIVE
NRBC # BLD AUTO: 0 10E3/UL
NRBC BLD AUTO-RTO: 0 /100
P AXIS - MUSE: 40 DEGREES
PH UR STRIP: 5.5 [PH] (ref 5–7)
PLATELET # BLD AUTO: 273 10E3/UL (ref 150–450)
POTASSIUM SERPL-SCNC: 4.3 MMOL/L (ref 3.4–5.3)
PR INTERVAL - MUSE: 138 MS
QRS DURATION - MUSE: 84 MS
QT - MUSE: 358 MS
QTC - MUSE: 428 MS
R AXIS - MUSE: -11 DEGREES
RBC # BLD AUTO: 4.51 10E6/UL (ref 3.8–5.2)
RBC URINE: <1 /HPF
SODIUM SERPL-SCNC: 141 MMOL/L (ref 135–145)
SP GR UR STRIP: 1 (ref 1–1.03)
SYSTOLIC BLOOD PRESSURE - MUSE: NORMAL MMHG
T AXIS - MUSE: 50 DEGREES
UROBILINOGEN UR STRIP-MCNC: NORMAL MG/DL
VENTRICULAR RATE- MUSE: 86 BPM
WBC # BLD AUTO: 8 10E3/UL (ref 4–11)
WBC URINE: <1 /HPF

## 2024-06-24 PROCEDURE — 80048 BASIC METABOLIC PNL TOTAL CA: CPT | Performed by: EMERGENCY MEDICINE

## 2024-06-24 PROCEDURE — 81001 URINALYSIS AUTO W/SCOPE: CPT | Performed by: EMERGENCY MEDICINE

## 2024-06-24 PROCEDURE — 85025 COMPLETE CBC W/AUTO DIFF WBC: CPT | Performed by: EMERGENCY MEDICINE

## 2024-06-24 PROCEDURE — 96375 TX/PRO/DX INJ NEW DRUG ADDON: CPT

## 2024-06-24 PROCEDURE — 85004 AUTOMATED DIFF WBC COUNT: CPT | Performed by: EMERGENCY MEDICINE

## 2024-06-24 PROCEDURE — 250N000011 HC RX IP 250 OP 636: Performed by: EMERGENCY MEDICINE

## 2024-06-24 PROCEDURE — 93005 ELECTROCARDIOGRAM TRACING: CPT

## 2024-06-24 PROCEDURE — 86140 C-REACTIVE PROTEIN: CPT | Performed by: PHYSICIAN ASSISTANT

## 2024-06-24 PROCEDURE — 120N000001 HC R&B MED SURG/OB

## 2024-06-24 PROCEDURE — 83735 ASSAY OF MAGNESIUM: CPT | Performed by: EMERGENCY MEDICINE

## 2024-06-24 PROCEDURE — 36415 COLL VENOUS BLD VENIPUNCTURE: CPT | Performed by: EMERGENCY MEDICINE

## 2024-06-24 PROCEDURE — 99285 EMERGENCY DEPT VISIT HI MDM: CPT | Mod: 25

## 2024-06-24 PROCEDURE — 96374 THER/PROPH/DIAG INJ IV PUSH: CPT | Mod: 59

## 2024-06-24 PROCEDURE — 74177 CT ABD & PELVIS W/CONTRAST: CPT

## 2024-06-24 PROCEDURE — 258N000003 HC RX IP 258 OP 636: Mod: JZ | Performed by: EMERGENCY MEDICINE

## 2024-06-24 PROCEDURE — 96361 HYDRATE IV INFUSION ADD-ON: CPT

## 2024-06-24 PROCEDURE — 99222 1ST HOSP IP/OBS MODERATE 55: CPT | Performed by: PHYSICIAN ASSISTANT

## 2024-06-24 PROCEDURE — 250N000009 HC RX 250: Performed by: EMERGENCY MEDICINE

## 2024-06-24 PROCEDURE — 250N000011 HC RX IP 250 OP 636: Mod: JZ | Performed by: EMERGENCY MEDICINE

## 2024-06-24 RX ORDER — METHOCARBAMOL 500 MG/1
1000 TABLET, FILM COATED ORAL 2 TIMES DAILY
Status: DISCONTINUED | OUTPATIENT
Start: 2024-06-24 | End: 2024-06-26 | Stop reason: HOSPADM

## 2024-06-24 RX ORDER — NORTRIPTYLINE HCL 25 MG
25 CAPSULE ORAL AT BEDTIME
Status: DISCONTINUED | OUTPATIENT
Start: 2024-06-24 | End: 2024-06-26 | Stop reason: HOSPADM

## 2024-06-24 RX ORDER — HYDROMORPHONE HYDROCHLORIDE 2 MG/1
2 TABLET ORAL EVERY 4 HOURS PRN
Status: DISCONTINUED | OUTPATIENT
Start: 2024-06-24 | End: 2024-06-26 | Stop reason: HOSPADM

## 2024-06-24 RX ORDER — METHYLPREDNISOLONE SODIUM SUCCINATE 40 MG/ML
20 INJECTION, POWDER, LYOPHILIZED, FOR SOLUTION INTRAMUSCULAR; INTRAVENOUS ONCE
Status: COMPLETED | OUTPATIENT
Start: 2024-06-24 | End: 2024-06-24

## 2024-06-24 RX ORDER — HYDROMORPHONE HCL IN WATER/PF 6 MG/30 ML
0.2 PATIENT CONTROLLED ANALGESIA SYRINGE INTRAVENOUS
Status: DISCONTINUED | OUTPATIENT
Start: 2024-06-24 | End: 2024-06-26 | Stop reason: HOSPADM

## 2024-06-24 RX ORDER — PANTOPRAZOLE SODIUM 40 MG/1
40 TABLET, DELAYED RELEASE ORAL
Status: DISCONTINUED | OUTPATIENT
Start: 2024-06-25 | End: 2024-06-26 | Stop reason: HOSPADM

## 2024-06-24 RX ORDER — NALOXONE HYDROCHLORIDE 0.4 MG/ML
0.4 INJECTION, SOLUTION INTRAMUSCULAR; INTRAVENOUS; SUBCUTANEOUS
Status: DISCONTINUED | OUTPATIENT
Start: 2024-06-24 | End: 2024-06-26 | Stop reason: HOSPADM

## 2024-06-24 RX ORDER — AMOXICILLIN 250 MG
1 CAPSULE ORAL 2 TIMES DAILY PRN
Status: DISCONTINUED | OUTPATIENT
Start: 2024-06-24 | End: 2024-06-26 | Stop reason: HOSPADM

## 2024-06-24 RX ORDER — POLYETHYLENE GLYCOL 3350 17 G/17G
17 POWDER, FOR SOLUTION ORAL 2 TIMES DAILY PRN
Status: DISCONTINUED | OUTPATIENT
Start: 2024-06-24 | End: 2024-06-26 | Stop reason: HOSPADM

## 2024-06-24 RX ORDER — MORPHINE SULFATE 4 MG/ML
4 INJECTION, SOLUTION INTRAMUSCULAR; INTRAVENOUS ONCE
Status: COMPLETED | OUTPATIENT
Start: 2024-06-24 | End: 2024-06-24

## 2024-06-24 RX ORDER — FAMOTIDINE 20 MG/1
40 TABLET, FILM COATED ORAL AT BEDTIME
Status: DISCONTINUED | OUTPATIENT
Start: 2024-06-24 | End: 2024-06-26

## 2024-06-24 RX ORDER — NALOXONE HYDROCHLORIDE 0.4 MG/ML
0.2 INJECTION, SOLUTION INTRAMUSCULAR; INTRAVENOUS; SUBCUTANEOUS
Status: DISCONTINUED | OUTPATIENT
Start: 2024-06-24 | End: 2024-06-26 | Stop reason: HOSPADM

## 2024-06-24 RX ORDER — ONDANSETRON 2 MG/ML
4 INJECTION INTRAMUSCULAR; INTRAVENOUS EVERY 6 HOURS PRN
Status: DISCONTINUED | OUTPATIENT
Start: 2024-06-24 | End: 2024-06-26 | Stop reason: HOSPADM

## 2024-06-24 RX ORDER — HYDROMORPHONE HCL IN WATER/PF 6 MG/30 ML
0.4 PATIENT CONTROLLED ANALGESIA SYRINGE INTRAVENOUS
Status: DISCONTINUED | OUTPATIENT
Start: 2024-06-24 | End: 2024-06-26 | Stop reason: HOSPADM

## 2024-06-24 RX ORDER — SIMETHICONE 80 MG
80 TABLET,CHEWABLE ORAL EVERY 6 HOURS PRN
Status: DISCONTINUED | OUTPATIENT
Start: 2024-06-24 | End: 2024-06-26 | Stop reason: HOSPADM

## 2024-06-24 RX ORDER — AMOXICILLIN 250 MG
2 CAPSULE ORAL 2 TIMES DAILY PRN
Status: DISCONTINUED | OUTPATIENT
Start: 2024-06-24 | End: 2024-06-26 | Stop reason: HOSPADM

## 2024-06-24 RX ORDER — IOPAMIDOL 755 MG/ML
500 INJECTION, SOLUTION INTRAVASCULAR ONCE
Status: COMPLETED | OUTPATIENT
Start: 2024-06-24 | End: 2024-06-24

## 2024-06-24 RX ORDER — LIDOCAINE 40 MG/G
CREAM TOPICAL
Status: DISCONTINUED | OUTPATIENT
Start: 2024-06-24 | End: 2024-06-24

## 2024-06-24 RX ORDER — PROCHLORPERAZINE 25 MG
25 SUPPOSITORY, RECTAL RECTAL EVERY 12 HOURS PRN
Status: DISCONTINUED | OUTPATIENT
Start: 2024-06-24 | End: 2024-06-26 | Stop reason: HOSPADM

## 2024-06-24 RX ORDER — TRAZODONE HYDROCHLORIDE 100 MG/1
200 TABLET ORAL AT BEDTIME
Status: DISCONTINUED | OUTPATIENT
Start: 2024-06-24 | End: 2024-06-26 | Stop reason: HOSPADM

## 2024-06-24 RX ORDER — BISACODYL 10 MG
10 SUPPOSITORY, RECTAL RECTAL 2 TIMES DAILY PRN
Status: DISCONTINUED | OUTPATIENT
Start: 2024-06-24 | End: 2024-06-26 | Stop reason: HOSPADM

## 2024-06-24 RX ORDER — MONTELUKAST SODIUM 10 MG/1
10 TABLET ORAL AT BEDTIME
Status: DISCONTINUED | OUTPATIENT
Start: 2024-06-24 | End: 2024-06-26 | Stop reason: HOSPADM

## 2024-06-24 RX ORDER — ONDANSETRON 2 MG/ML
4 INJECTION INTRAMUSCULAR; INTRAVENOUS ONCE
Status: COMPLETED | OUTPATIENT
Start: 2024-06-24 | End: 2024-06-24

## 2024-06-24 RX ORDER — ONDANSETRON 4 MG/1
4 TABLET, ORALLY DISINTEGRATING ORAL EVERY 6 HOURS PRN
Status: DISCONTINUED | OUTPATIENT
Start: 2024-06-24 | End: 2024-06-26 | Stop reason: HOSPADM

## 2024-06-24 RX ORDER — METHYLPREDNISOLONE SODIUM SUCCINATE 40 MG/ML
20 INJECTION, POWDER, LYOPHILIZED, FOR SOLUTION INTRAMUSCULAR; INTRAVENOUS EVERY 8 HOURS
Status: DISCONTINUED | OUTPATIENT
Start: 2024-06-25 | End: 2024-06-26 | Stop reason: HOSPADM

## 2024-06-24 RX ORDER — ONDANSETRON 4 MG/1
4 TABLET, ORALLY DISINTEGRATING ORAL ONCE
Status: COMPLETED | OUTPATIENT
Start: 2024-06-24 | End: 2024-06-24

## 2024-06-24 RX ORDER — ACETAMINOPHEN 500 MG
500-1000 TABLET ORAL EVERY 6 HOURS PRN
Status: DISCONTINUED | OUTPATIENT
Start: 2024-06-24 | End: 2024-06-26

## 2024-06-24 RX ORDER — HYDROXYZINE HYDROCHLORIDE 25 MG/1
25 TABLET, FILM COATED ORAL EVERY 6 HOURS PRN
Status: DISCONTINUED | OUTPATIENT
Start: 2024-06-24 | End: 2024-06-26 | Stop reason: HOSPADM

## 2024-06-24 RX ORDER — DICYCLOMINE HCL 20 MG
20 TABLET ORAL
Status: DISCONTINUED | OUTPATIENT
Start: 2024-06-24 | End: 2024-06-26 | Stop reason: HOSPADM

## 2024-06-24 RX ORDER — SODIUM CHLORIDE, SODIUM LACTATE, POTASSIUM CHLORIDE, CALCIUM CHLORIDE 600; 310; 30; 20 MG/100ML; MG/100ML; MG/100ML; MG/100ML
INJECTION, SOLUTION INTRAVENOUS CONTINUOUS
Status: ACTIVE | OUTPATIENT
Start: 2024-06-24 | End: 2024-06-25

## 2024-06-24 RX ORDER — PROCHLORPERAZINE MALEATE 5 MG
10 TABLET ORAL EVERY 6 HOURS PRN
Status: DISCONTINUED | OUTPATIENT
Start: 2024-06-24 | End: 2024-06-26 | Stop reason: HOSPADM

## 2024-06-24 RX ORDER — ONDANSETRON 2 MG/ML
4 INJECTION INTRAMUSCULAR; INTRAVENOUS EVERY 30 MIN PRN
Status: DISCONTINUED | OUTPATIENT
Start: 2024-06-24 | End: 2024-06-24

## 2024-06-24 RX ADMIN — ONDANSETRON 4 MG: 4 TABLET, ORALLY DISINTEGRATING ORAL at 14:20

## 2024-06-24 RX ADMIN — SODIUM CHLORIDE 1000 ML: 9 INJECTION, SOLUTION INTRAVENOUS at 16:56

## 2024-06-24 RX ADMIN — ONDANSETRON 4 MG: 2 INJECTION INTRAMUSCULAR; INTRAVENOUS at 16:55

## 2024-06-24 RX ADMIN — SODIUM CHLORIDE 59 ML: 9 INJECTION, SOLUTION INTRAVENOUS at 18:16

## 2024-06-24 RX ADMIN — HYDROMORPHONE HYDROCHLORIDE 1 MG: 1 INJECTION, SOLUTION INTRAMUSCULAR; INTRAVENOUS; SUBCUTANEOUS at 20:02

## 2024-06-24 RX ADMIN — MORPHINE SULFATE 4 MG: 4 INJECTION INTRAVENOUS at 16:56

## 2024-06-24 RX ADMIN — IOPAMIDOL 76 ML: 755 INJECTION, SOLUTION INTRAVENOUS at 18:16

## 2024-06-24 RX ADMIN — METHYLPREDNISOLONE SODIUM SUCCINATE 20 MG: 40 INJECTION, POWDER, FOR SOLUTION INTRAMUSCULAR; INTRAVENOUS at 20:02

## 2024-06-24 RX ADMIN — ONDANSETRON 4 MG: 2 INJECTION INTRAMUSCULAR; INTRAVENOUS at 22:13

## 2024-06-24 ASSESSMENT — COLUMBIA-SUICIDE SEVERITY RATING SCALE - C-SSRS
2. HAVE YOU ACTUALLY HAD ANY THOUGHTS OF KILLING YOURSELF IN THE PAST MONTH?: NO
1. IN THE PAST MONTH, HAVE YOU WISHED YOU WERE DEAD OR WISHED YOU COULD GO TO SLEEP AND NOT WAKE UP?: NO
6. HAVE YOU EVER DONE ANYTHING, STARTED TO DO ANYTHING, OR PREPARED TO DO ANYTHING TO END YOUR LIFE?: NO

## 2024-06-24 ASSESSMENT — ACTIVITIES OF DAILY LIVING (ADL)
CHANGE_IN_FUNCTIONAL_STATUS_SINCE_ONSET_OF_CURRENT_ILLNESS/INJURY: NO
WALKING_OR_CLIMBING_STAIRS_DIFFICULTY: NO
ADLS_ACUITY_SCORE: 35
DIFFICULTY_COMMUNICATING: NO
DRESSING/BATHING_DIFFICULTY: NO
WEAR_GLASSES_OR_BLIND: YES
DOING_ERRANDS_INDEPENDENTLY_DIFFICULTY: NO
VISION_MANAGEMENT: WEARS GLASSES
TOILETING_ISSUES: NO
DIFFICULTY_EATING/SWALLOWING: NO
ADLS_ACUITY_SCORE: 37
FALL_HISTORY_WITHIN_LAST_SIX_MONTHS: NO
ADLS_ACUITY_SCORE: 28
HEARING_DIFFICULTY_OR_DEAF: NO
CONCENTRATING,_REMEMBERING_OR_MAKING_DECISIONS_DIFFICULTY: NO

## 2024-06-24 NOTE — ED PROVIDER NOTES
Emergency Department Note      History of Present Illness     Chief Complaint:  Abdominal Pain       HPI   Devi Salas is a 63 year old female who presents with abdominal pain. Patient states that she had 20 inches of her small intestine and small portion of her large intestine removed 11 months ago. She notes that her frequency of Chron's flare-ups has  not reduced. Patient deduces her current episode of abdominal pain to a Chron's flare-up by relating its similarity two an episode she had two months ago that caused her to get admitted. Patient describes that her abdominal pain is localized in her lower abdomen and gradually radiates to her right side. Patient notes the onset of her symptoms as four days ago, noting that she went to the bathroom 18 times. Patient notes that she had a Remicade injection three days ago and that her symptoms did not improve as she also excessively had bowel movements the same day. She states that she had minimal bowel movements two days ago during the afternoon, but had the onset of a stabbing pain and increased bowel movements that night. She states that she went to [facility] today but that her provider was not present, and was subsequently advised to come to the ED. She states that she usually has morphine and dilaudid used for pain management. Of note, patient states that she had her right kidney removed in 2009 due to cancer. She denies chest pain and shortness of breath.    Independent Historian:    None    Review of External Notes  None    Past Medical History   Medical History, Surgical History, Problem List, and Medications  Reviewed in Epic    Physical Exam   Patient Vitals for the past 24 hrs:   BP Temp Temp src Pulse Resp SpO2 Height Weight   06/24/24 2205 131/86 -- -- -- -- 95 % -- --   06/24/24 2115 124/86 -- -- -- -- 94 % -- --   06/24/24 2000 (!) 137/106 -- -- 80 -- -- -- --   06/24/24 1800 (!) 150/89 -- -- 79 -- 98 % -- --   06/24/24 1745 (!) 150/93 -- -- -- -- 98 %  Reviewed with Dr Lexi Morales and message sent to patient via results   Has follow up apt scheduled 9/15 with Dr Summer Grimm "-- --   06/24/24 1715 (!) 155/98 -- -- -- -- 99 % -- --   06/24/24 1700 -- -- -- -- -- 100 % -- --   06/24/24 1645 (!) 130/92 -- -- -- -- -- -- --   06/24/24 1416 (!) 141/102 97.7  F (36.5  C) Temporal 110 16 100 % 1.6 m (5' 3\") 69.1 kg (152 lb 5.4 oz)       Physical Exam  Constitutional: Vital signs reviewed as above.   Eyes: PEERL, EOMI B/L  Neck: No JVD noted. FROM   Cardiovascular: tachycardic rate, Regular rhythm and normal heart sounds.  No murmur heard. Equal B/L peripheral pulses.  Pulmonary/Chest: Effort normal and breath sounds normal. No respiratory distress. Patient has no wheezes. Patient has no rales.   Gastrointestinal: Soft. There is periumbilical and RLQ tenderness. There is RLQ rebound.  Musculoskeletal/Extremities: No pitting edema noted. Normal tone.  Neurological: Alert  Skin: Skin is warm and dry. There is no diaphoresis noted.   Psychiatric: The patient appears calm.     Diagnostics     Laboratory: Imaging:   Labs Ordered and Resulted from Time of ED Arrival to Time of ED Departure   BASIC METABOLIC PANEL - Abnormal       Result Value    Sodium 141      Potassium 4.3      Chloride 105      Carbon Dioxide (CO2) 22      Anion Gap 14      Urea Nitrogen 9.0      Creatinine 1.09 (*)     GFR Estimate 56 (*)     Calcium 9.6      Glucose 119 (*)    ROUTINE UA WITH MICROSCOPIC REFLEX TO CULTURE - Abnormal    Color Urine Straw      Appearance Urine Clear      Glucose Urine Negative      Bilirubin Urine Negative      Ketones Urine Negative      Specific Gravity Urine 1.005      Blood Urine Negative      pH Urine 5.5      Protein Albumin Urine Negative      Urobilinogen Urine Normal      Nitrite Urine Negative      Leukocyte Esterase Urine Negative      Bacteria Urine Few (*)     RBC Urine <1      WBC Urine <1     MAGNESIUM - Normal    Magnesium 2.1     CBC WITH PLATELETS AND DIFFERENTIAL    WBC Count 8.0      RBC Count 4.51      Hemoglobin 13.3      Hematocrit 40.8      MCV 91      MCH 29.5      MCHC " 32.6      RDW 13.2      Platelet Count 273      % Neutrophils 59      % Lymphocytes 32      % Monocytes 7      % Eosinophils 2      % Basophils 1      % Immature Granulocytes 1      NRBCs per 100 WBC 0      Absolute Neutrophils 4.7      Absolute Lymphocytes 2.5      Absolute Monocytes 0.6      Absolute Eosinophils 0.1      Absolute Basophils 0.1      Absolute Immature Granulocytes 0.1      Absolute NRBCs 0.0       CT Abdomen Pelvis w Contrast   Final Result   IMPRESSION:    1.  Likely transverse colitis, new/worsened since prior exam, most likely related to known inflammatory bowel disease.   2.  No evidence of bowel obstruction or penetrating disease.            ECG results from 06/24/24   EKG 12 lead     Value    Systolic Blood Pressure     Diastolic Blood Pressure     Ventricular Rate 86    Atrial Rate 86    CT Interval 138    QRS Duration 84        QTc 428    P Axis 40    R AXIS -11    T Axis 50    Interpretation ECG      Sinus rhythm  Minimal voltage criteria for LVH, may be normal variant ( R in aVL )  Borderline ECG  When compared with ECG of 24-JUL-2023 20:17,  No significant change was found  Interpreted by me at 1457         Independent Interpretation  See ED course    ED Course    Medications Administered  Medications   ondansetron (ZOFRAN) injection 4 mg (4 mg Intravenous $Given 6/24/24 2213)   ondansetron (ZOFRAN ODT) ODT tab 4 mg (4 mg Oral $Given 6/24/24 1420)   morphine (PF) injection 4 mg (4 mg Intravenous $Given 6/24/24 1656)   ondansetron (ZOFRAN) injection 4 mg (4 mg Intravenous $Given 6/24/24 1655)   sodium chloride 0.9% BOLUS 1,000 mL (0 mLs Intravenous Stopped 6/24/24 1807)   iopamidol (ISOVUE-370) solution 500 mL (76 mLs Intravenous $Given 6/24/24 1816)   CT scan flush (59 mLs Intravenous $Given 6/24/24 1816)   HYDROmorphone (DILAUDID) injection 1 mg (1 mg Intravenous $Given 6/24/24 2002)   methylPREDNISolone sodium succinate (SOLU-MEDROL) injection 20 mg (20 mg Intravenous $Given  6/24/24 2002)       Procedures  Procedures     Discussion of Management  See ED Course    Social Determinants of Health adding to complexity of care  None    ED Course  ED Course as of 06/24/24 2222   Mon Jun 24, 2024   1607 I obtained history and examined the patient as noted above     1940 Rechecked and updated.   1955 D/W Dr. Goyal ([facility]). Would recommend 20mg IV solu-medrol now. If she wants to go home she can do 20 mg prednisone/day x 2 weeks (this was prescribed by her GI clinic this AM).     If she stays inpt, can do 20mg solu-medrol q8.   2015 Rechecked. Patient feels that oral prednisone is not typically effective for her. I will pursue inpatient treatment.   2058 D/W Aubree Bartlett PA-C, accepting for Dr. Garcia.       Medical Decision Making / Diagnosis   CMS Diagnoses: None    Code Status: Prior    MIPS     None    Medical Decision Making:  This 63-year-old female patient presents to the ED due to abdominal pain.  Please see the HPI and exam for specifics.  A broad differential was considered leading to the workup above.  I was fairly certain the patient would have an exacerbation of her Crohn's but due to the initial severity of her abdominal exam I was prepared for bowel obstruction, abscess, perforation, etc.  Fortunately, none of those were found and the patient likely simply has an exacerbation of her Crohn's disease.  I discussed the case with GI and we will plan on administering IV Solu-Medrol.  The patient stated that oral prednisone does not seem to help her flareups and after that discussion we will plan on admitting the patient to the hospital.  I discussed the case with the admitting hospitalist who accepts patient for further monitoring and care.    Critical Care:  None.    Disposition:  See ED Course and MDM    ICD-10 Codes:    ICD-10-CM    1. Exacerbation of Crohn's disease with complication (H)  K50.919            Discharge Medications:  New Prescriptions    No medications on file         6/24/2024   Familia Grigsby DO     [employer]                    Familia Grigsby DO  06/24/24 [id number]

## 2024-06-24 NOTE — ED TRIAGE NOTES
Abdominal pain, nausea, vomiting, diarrhea since Wednesday last week. Took zofran at 8am, helped with nausea a little bit. Pain with urination.

## 2024-06-25 LAB
ALBUMIN SERPL BCG-MCNC: 4 G/DL (ref 3.5–5.2)
ALP SERPL-CCNC: 80 U/L (ref 40–150)
ALT SERPL W P-5'-P-CCNC: 19 U/L (ref 0–50)
ANION GAP SERPL CALCULATED.3IONS-SCNC: 11 MMOL/L (ref 7–15)
AST SERPL W P-5'-P-CCNC: 19 U/L (ref 0–45)
BILIRUB DIRECT SERPL-MCNC: <0.2 MG/DL (ref 0–0.3)
BILIRUB SERPL-MCNC: 0.6 MG/DL
BUN SERPL-MCNC: 7.4 MG/DL (ref 8–23)
CALCIUM SERPL-MCNC: 8.9 MG/DL (ref 8.8–10.2)
CHLORIDE SERPL-SCNC: 105 MMOL/L (ref 98–107)
CREAT SERPL-MCNC: 0.82 MG/DL (ref 0.51–0.95)
CRP SERPL-MCNC: <3 MG/L
DEPRECATED HCO3 PLAS-SCNC: 22 MMOL/L (ref 22–29)
EGFRCR SERPLBLD CKD-EPI 2021: 79 ML/MIN/1.73M2
ERYTHROCYTE [DISTWIDTH] IN BLOOD BY AUTOMATED COUNT: 12.8 % (ref 10–15)
GLUCOSE SERPL-MCNC: 122 MG/DL (ref 70–99)
HCT VFR BLD AUTO: 38.9 % (ref 35–47)
HGB BLD-MCNC: 12.8 G/DL (ref 11.7–15.7)
MCH RBC QN AUTO: 29 PG (ref 26.5–33)
MCHC RBC AUTO-ENTMCNC: 32.9 G/DL (ref 31.5–36.5)
MCV RBC AUTO: 88 FL (ref 78–100)
PLATELET # BLD AUTO: 243 10E3/UL (ref 150–450)
POTASSIUM SERPL-SCNC: 4.7 MMOL/L (ref 3.4–5.3)
PROT SERPL-MCNC: 6.2 G/DL (ref 6.4–8.3)
RBC # BLD AUTO: 4.42 10E6/UL (ref 3.8–5.2)
SODIUM SERPL-SCNC: 138 MMOL/L (ref 135–145)
WBC # BLD AUTO: 8.2 10E3/UL (ref 4–11)

## 2024-06-25 PROCEDURE — G0378 HOSPITAL OBSERVATION PER HR: HCPCS

## 2024-06-25 PROCEDURE — 80053 COMPREHEN METABOLIC PANEL: CPT | Performed by: PHYSICIAN ASSISTANT

## 2024-06-25 PROCEDURE — 36415 COLL VENOUS BLD VENIPUNCTURE: CPT | Performed by: PHYSICIAN ASSISTANT

## 2024-06-25 PROCEDURE — 250N000011 HC RX IP 250 OP 636: Mod: JZ | Performed by: PHYSICIAN ASSISTANT

## 2024-06-25 PROCEDURE — 120N000001 HC R&B MED SURG/OB

## 2024-06-25 PROCEDURE — 99232 SBSQ HOSP IP/OBS MODERATE 35: CPT | Performed by: HOSPITALIST

## 2024-06-25 PROCEDURE — 250N000013 HC RX MED GY IP 250 OP 250 PS 637: Performed by: PHYSICIAN ASSISTANT

## 2024-06-25 PROCEDURE — 85027 COMPLETE CBC AUTOMATED: CPT | Performed by: PHYSICIAN ASSISTANT

## 2024-06-25 PROCEDURE — 258N000003 HC RX IP 258 OP 636: Mod: JZ | Performed by: PHYSICIAN ASSISTANT

## 2024-06-25 RX ORDER — LYSINE HCL 500 MG
1000 TABLET ORAL DAILY
COMMUNITY

## 2024-06-25 RX ORDER — ZOLPIDEM TARTRATE 5 MG/1
5 TABLET ORAL
COMMUNITY
Start: 2024-06-24

## 2024-06-25 RX ADMIN — METHOCARBAMOL 1000 MG: 500 TABLET ORAL at 00:33

## 2024-06-25 RX ADMIN — HYDROMORPHONE HYDROCHLORIDE 0.2 MG: 0.2 INJECTION, SOLUTION INTRAMUSCULAR; INTRAVENOUS; SUBCUTANEOUS at 23:12

## 2024-06-25 RX ADMIN — HYDROMORPHONE HYDROCHLORIDE 1 MG: 2 TABLET ORAL at 16:28

## 2024-06-25 RX ADMIN — FAMOTIDINE 40 MG: 20 TABLET ORAL at 21:32

## 2024-06-25 RX ADMIN — FAMOTIDINE 40 MG: 20 TABLET ORAL at 00:33

## 2024-06-25 RX ADMIN — METHOCARBAMOL 1000 MG: 500 TABLET ORAL at 20:13

## 2024-06-25 RX ADMIN — DICYCLOMINE HYDROCHLORIDE 20 MG: 20 TABLET ORAL at 00:34

## 2024-06-25 RX ADMIN — MONTELUKAST 10 MG: 10 TABLET, FILM COATED ORAL at 00:33

## 2024-06-25 RX ADMIN — TRAZODONE HYDROCHLORIDE 200 MG: 100 TABLET ORAL at 00:33

## 2024-06-25 RX ADMIN — PANTOPRAZOLE SODIUM 40 MG: 40 TABLET, DELAYED RELEASE ORAL at 07:56

## 2024-06-25 RX ADMIN — TRAZODONE HYDROCHLORIDE 200 MG: 100 TABLET ORAL at 21:32

## 2024-06-25 RX ADMIN — DICYCLOMINE HYDROCHLORIDE 20 MG: 20 TABLET ORAL at 21:32

## 2024-06-25 RX ADMIN — DICYCLOMINE HYDROCHLORIDE 20 MG: 20 TABLET ORAL at 12:14

## 2024-06-25 RX ADMIN — MONTELUKAST 10 MG: 10 TABLET, FILM COATED ORAL at 21:31

## 2024-06-25 RX ADMIN — HYDROXYZINE HYDROCHLORIDE 25 MG: 25 TABLET, FILM COATED ORAL at 20:13

## 2024-06-25 RX ADMIN — HYDROMORPHONE HYDROCHLORIDE 1 MG: 2 TABLET ORAL at 09:45

## 2024-06-25 RX ADMIN — SODIUM CHLORIDE, POTASSIUM CHLORIDE, SODIUM LACTATE AND CALCIUM CHLORIDE: 600; 310; 30; 20 INJECTION, SOLUTION INTRAVENOUS at 00:32

## 2024-06-25 RX ADMIN — DICYCLOMINE HYDROCHLORIDE 20 MG: 20 TABLET ORAL at 16:28

## 2024-06-25 RX ADMIN — METHYLPREDNISOLONE SODIUM SUCCINATE 20 MG: 40 INJECTION, POWDER, FOR SOLUTION INTRAMUSCULAR; INTRAVENOUS at 12:14

## 2024-06-25 RX ADMIN — HYDROMORPHONE HYDROCHLORIDE 2 MG: 2 TABLET ORAL at 03:56

## 2024-06-25 RX ADMIN — NORTRIPTYLINE HYDROCHLORIDE 25 MG: 25 CAPSULE ORAL at 00:34

## 2024-06-25 RX ADMIN — METHYLPREDNISOLONE SODIUM SUCCINATE 20 MG: 40 INJECTION, POWDER, FOR SOLUTION INTRAMUSCULAR; INTRAVENOUS at 21:32

## 2024-06-25 RX ADMIN — METHYLPREDNISOLONE SODIUM SUCCINATE 20 MG: 40 INJECTION, POWDER, FOR SOLUTION INTRAMUSCULAR; INTRAVENOUS at 03:57

## 2024-06-25 RX ADMIN — ONDANSETRON 4 MG: 2 INJECTION INTRAMUSCULAR; INTRAVENOUS at 09:50

## 2024-06-25 RX ADMIN — ONDANSETRON 4 MG: 4 TABLET, ORALLY DISINTEGRATING ORAL at 16:30

## 2024-06-25 RX ADMIN — NORTRIPTYLINE HYDROCHLORIDE 25 MG: 25 CAPSULE ORAL at 21:32

## 2024-06-25 RX ADMIN — ACETAMINOPHEN 500 MG: 500 TABLET, FILM COATED ORAL at 07:56

## 2024-06-25 RX ADMIN — DICYCLOMINE HYDROCHLORIDE 20 MG: 20 TABLET ORAL at 07:56

## 2024-06-25 RX ADMIN — METHOCARBAMOL 1000 MG: 500 TABLET ORAL at 09:44

## 2024-06-25 ASSESSMENT — ACTIVITIES OF DAILY LIVING (ADL)
ADLS_ACUITY_SCORE: 28
ADLS_ACUITY_SCORE: 25
ADLS_ACUITY_SCORE: 28
ADLS_ACUITY_SCORE: 25
ADLS_ACUITY_SCORE: 25
ADLS_ACUITY_SCORE: 28
ADLS_ACUITY_SCORE: 25
ADLS_ACUITY_SCORE: 24
ADLS_ACUITY_SCORE: 28
ADLS_ACUITY_SCORE: 25
ADLS_ACUITY_SCORE: 25
ADLS_ACUITY_SCORE: 24
ADLS_ACUITY_SCORE: 28
DEPENDENT_IADLS:: INDEPENDENT
ADLS_ACUITY_SCORE: 25
ADLS_ACUITY_SCORE: 28
ADLS_ACUITY_SCORE: 25
ADLS_ACUITY_SCORE: 25

## 2024-06-25 NOTE — PHARMACY-ADMISSION MEDICATION HISTORY
Pharmacist Admission Medication History    Admission medication history is complete. The information provided in this note is only as accurate as the sources available at the time of the update.    Information Source(s): Patient via in-person    Pertinent Information: -    Changes made to PTA medication list:  Added: L-lysine, zolpidem  Deleted: bisacodyl (suppository), hydroxyzine, prednisone  Changed: infliximab q8wk to q4wk    Allergies reviewed with patient and updates made in EHR: No, reviewed on admission.    Medication History Completed By: Kira Blakely RPH 6/25/2024 9:57 AM    PTA Med List   Medication Sig Last Dose    acetaminophen (TYLENOL) 500 MG tablet Take 1-2 tablets (500-1,000 mg) by mouth every 6 hours as needed for mild pain Unknown at -    albuterol (PROAIR HFA/PROVENTIL HFA/VENTOLIN HFA) 108 (90 Base) MCG/ACT inhaler Inhale 2 puffs into the lungs every 6 hours as needed for shortness of breath, wheezing or cough Unknown at -    cyanocobalamin (CYANOCOBALAMIN) 1000 MCG/ML injection Inject 1,000 mcg as directed every 30 days 6/18/2024    dicyclomine (BENTYL) 20 MG tablet Take 1 tablet (20 mg) by mouth 4 times daily (before meals and nightly) 6/24/2024 at -    esomeprazole (NEXIUM) 40 MG DR capsule Take 40 mg by mouth 2 times daily (before meals) Take 30-60 minutes before eating. 6/24/2024 at am    famotidine (PEPCID) 40 MG tablet Take 40 mg by mouth At Bedtime 6/23/2024 at HS    inFLIXimab (REMICADE IV) Inject 10 mg/kg into the vein every 4 weeks 6/21/2024    l-lysine HCl 500 MG TABS tablet Take 1,000 mg by mouth daily 6/24/2024    medical cannabis (Patient's own supply) Take 1 Dose by mouth See Admin Instructions (The purpose of this order is to document that the patient reports taking medical cannabis.  This is not a prescription, and is not used to certify that the patient has a qualifying medical condition.)  25 MG EDIBLE Unknown    methocarbamol (ROBAXIN) 500 MG tablet Take 1 tablet (500  mg) by mouth 4 times daily as needed for muscle spasms (Patient taking differently: Take 1,000 mg by mouth 2 times daily) 6/24/2024 at AM, 1000 mg    montelukast (SINGULAIR) 10 MG tablet Take 10 mg by mouth At Bedtime 6/23/2024 at HS    nortriptyline (PAMELOR) 25 MG capsule Take 25 mg by mouth At Bedtime 6/23/2024 at HS    simethicone (MYLICON) 80 MG chewable tablet Take 1 tablet (80 mg) by mouth every 6 hours as needed for cramping Unknown    traZODone (DESYREL) 100 MG tablet Take 200 mg by mouth At Bedtime 6/23/2024    vitamin C (ASCORBIC ACID) 1000 MG TABS Take 1,000 mg by mouth daily Past Week    Vitamin D3 (CHOLECALCIFEROL) 25 mcg (1000 units) tablet Take 25 mcg by mouth daily 6/24/2024    zolpidem (AMBIEN) 5 MG tablet Take 5 mg by mouth nightly as needed Unknown at -

## 2024-06-25 NOTE — H&P
[facility]  Admission History and Physical Examination    NAME: Devi Salas   : 1960   MRN: [id number]     Date of Admission:  2024    Assessment & Plan   Devi Salas is a 63 year old female with a PMH significant for Crohn's disease follows with [facility] and has started Remicade for the last year who presents with waxing waning abdominal pain over the last 4 to 5 days.  She attempted to call her GI clinic but was unable to reach them.  She was having increasing stooling poor p.o. intake and abdominal pain therefore she came into the emergency room for further evaluation.    Here in the ER she was initially hypertensive but otherwise vital signs are stable.  Laboratory results today was fairly unremarkable.  White count normal mild renal insufficiency with creatinine 1.09 and CRP is currently pending.  CT scan abdomen pelvis shows likely transverse colitis with new or worsened wall thickening and surrounding fat stranding no abscess or penetrating disease.  Apparently GI was going to start her on p.o. steroid taper but since she is admitted to the hospital and not able to tolerate adequate p.o. hospital admission was recommended for initiation of IV steroids.      # Acute Crohn's flare  Has been a patient of [facility], has been on Remicade since 2023 with her last infusion just 3 days ago.  She describes abdominal pain in the lower region and cramping along with nonbloody diarrhea and poor p.o. intake  -On exam her belly is soft there is no guarding, she appears to be stable and nontoxic-appearing  -Admit as inpatient for IV fluid hydration  -Antiemetics, pain control  -Continue IV Solu-Medrol 40 mg every 8hr as per GI recommendation  -will obtain formal GI consult    # GERD   - resume PPI    # Insomnia  -Resume trazodone       # Overweight: Estimated body mass index is 26.99 kg/m  as calculated from the following:    Height as of this  "encounter: 1.6 m (5' 3\").    Weight as of this encounter: 69.1 kg (152 lb 5.4 oz).              Awaiting formal pharmacy reconciliation to resume home medications.     DVT Prophylaxis: Low Risk/Ambulatory with no VTE prophylaxis indicated and Ambulate every shift  Code Status: Full Code  Medically Ready for Discharge: Anticipated in 2-4 Days       Expected Discharge Date: 06/26/2024               Aubree Bartlett PA-C    Primary Care Physician   Jeanette Betancourt    Chief Complaint   Abd pain     History is obtained from the patient    Discussed with Dr. Grigsby in the ED, full chart review including lab work, imaging, and vital signs were reviewed.     History of Present Illness   Devi Salas is a 63 year old female with a PMH significant for Crohn's disease follows with [facility] and has started Remicade for the last year who presents with waxing waning abdominal pain over the last 4 to 5 days.  She attempted to call her GI clinic but was unable to reach them.  She was having increasing stooling poor p.o. intake and abdominal pain therefore she came into the emergency room for further evaluation.    Here in the ER she was initially hypertensive but otherwise vital signs are stable.  Laboratory results today was fairly unremarkable.  White count normal mild renal insufficiency with creatinine 1.09 and CRP is currently pending.  CT scan abdomen pelvis shows likely transverse colitis with new or worsened wall thickening and surrounding fat stranding no abscess or penetrating disease.  Apparently GI was going to start her on p.o. steroid taper but since she is admitted to the hospital and not able to tolerate adequate p.o. hospital admission was recommended for initiation of IV steroids.    Past Medical History    I have reviewed this patient's medical history and updated it with pertinent information if needed.   Past Medical History:   Diagnosis Date    Crohn's disease (H)     PONV (postoperative " nausea and vomiting)        IBD History:Crohns ileitis, fibrostenotic phenotype diagnosed May 2023, s/p ileocecal resection July 2023. CURRENT TREATMENT:-Infliximab 10 mg/kg every 6 weeks (increased from 5 mg/kg to 10 mg/kg on 12/7/23; then increased to every 6 weeks starting 1/17/24 from every 8 weeks after low trough levels in 12/7/2023). Increased to every 4 weeks late March 2024 after hospitalization - increased inflammation on MRE and CRP 36 down to 6 after steroids/dose of inpatient infliximab 10mg/kg.  IMAGING:CT abdomen and pelvis 04/04/2023 revealing thickened distal ileum with mucosal hyperemia. Prior cholecystectomy with borderline dilated CBD..     Past Surgical History   I have reviewed this patient's surgical history and updated it with pertinent information if needed.  Past Surgical History:   Procedure Laterality Date    APPENDECTOMY      CHOLECYSTECTOMY      COLONOSCOPY N/A 10/13/2023    Procedure: Colonoscopy with biopsies;  Surgeon: Isaiah Mathur MD;  Location: [facility]    LAPAROSCOPIC RESECTION ILEOCECAL N/A 07/14/2023    Procedure: Laparoscopic assisted ileocolic resection;  Surgeon: Marleny Mendez MD;  Location: [facility]    NEPHRECTOMY Right 2009       Prior to Admission Medications   Prior to Admission Medications   Prescriptions Last Dose Informant Patient Reported? Taking?   Vitamin D3 (CHOLECALCIFEROL) 25 mcg (1000 units) tablet   Yes No   Sig: Take 25 mcg by mouth daily   acetaminophen (TYLENOL) 500 MG tablet   No No   Sig: Take 1-2 tablets (500-1,000 mg) by mouth every 6 hours as needed for mild pain   albuterol (PROAIR HFA/PROVENTIL HFA/VENTOLIN HFA) 108 (90 Base) MCG/ACT inhaler   Yes No   Sig: Inhale 2 puffs into the lungs every 6 hours as needed for shortness of breath, wheezing or cough   bisacodyl (DULCOLAX) 10 MG suppository   No No   Sig: Place 1 suppository (10 mg) rectally 2 times daily as needed for constipation   cyanocobalamin (CYANOCOBALAMIN) 1000 MCG/ML injection   Yes No    Sig: Inject 1,000 mcg as directed every 30 days   dicyclomine (BENTYL) 20 MG tablet   No No   Sig: Take 1 tablet (20 mg) by mouth 4 times daily (before meals and nightly)   esomeprazole (NEXIUM) 40 MG DR capsule   Yes No   Sig: Take 40 mg by mouth 2 times daily (before meals) Take 30-60 minutes before eating.   famotidine (PEPCID) 40 MG tablet   Yes No   Sig: Take 40 mg by mouth At Bedtime   hydrOXYzine HCl (ATARAX) 25 MG tablet   No No   Sig: Take 1 tablet (25 mg) by mouth every 6 hours as needed for other (adjuvant pain)   inFLIXimab (REMICADE IV)   Yes No   Sig: Inject 10 mg/kg into the vein once every eight weeks   medical cannabis (Patient's own supply)   Yes No   Sig: Take 1 Dose by mouth See Admin Instructions (The purpose of this order is to document that the patient reports taking medical cannabis.  This is not a prescription, and is not used to certify that the patient has a qualifying medical condition.)  25 MG EDIBLE   methocarbamol (ROBAXIN) 500 MG tablet   No No   Sig: Take 1 tablet (500 mg) by mouth 4 times daily as needed for muscle spasms   Patient taking differently: Take 1,000 mg by mouth 2 times daily   montelukast (SINGULAIR) 10 MG tablet   Yes No   Sig: Take 10 mg by mouth At Bedtime   nortriptyline (PAMELOR) 25 MG capsule   Yes No   Sig: Take 25 mg by mouth At Bedtime   ondansetron (ZOFRAN ODT) 4 MG ODT tab   No No   Sig: Take 1 tablet (4 mg) by mouth every 8 hours as needed for nausea   predniSONE (DELTASONE) 5 MG tablet   Yes No   Sig: Take by mouth daily Starting 3/20/24   80 mg dailly for 1 week, then  70 mg dailly for 1 week  60 mg dailly for 1 week  50 mg dailly for 1 week  40 mg dailly for 1 week  30 mg dailly for 1 week  20 mg dailly for 1 week  10 mg dailly for 1 week   simethicone (MYLICON) 80 MG chewable tablet   No No   Sig: Take 1 tablet (80 mg) by mouth every 6 hours as needed for cramping   traZODone (DESYREL) 100 MG tablet   Yes No   Sig: Take 200 mg by mouth At Bedtime    vitamin C (ASCORBIC ACID) 1000 MG TABS   Yes No   Sig: Take 1,000 mg by mouth daily      Facility-Administered Medications: None     Allergies   Allergies   Allergen Reactions    Latex Hives    Oxycodone Anaphylaxis, Hives and Swelling     Throat swelling, per pt    Pt tolerates [place]/Vicodin (10/15/2023)    Penicillin G Anaphylaxis    Sumatriptan Palpitations    Aspirin Nausea and Vomiting    Demerol Hcl [Meperidine] Nausea and Vomiting     N/V    Percocet [Oxycodone-Acetaminophen] Nausea and Vomiting     N/V    Acetaminophen Nausea and Vomiting    Codeine Nausea and Vomiting    Mold        Social History   I have reviewed this patient's social history and updated it with pertinent information if needed. Devi Salas      Family History   I have reviewed this patient's family history and updated it with pertinent information if needed.   Family History   Problem Relation Age of Onset    Colon Cancer Niece        Review of Systems   The 10 point Review of Systems is negative other than noted in the HPI or here.     Physical Exam   Temp: 97.8  F (36.6  C) Temp src: Oral BP: 136/87 Pulse: 78   Resp: 20 SpO2: 98 % O2 Device: None (Room air)    Vital Signs with Ranges  Temp:  [97.7  F (36.5  C)-97.8  F (36.6  C)] 97.8  F (36.6  C)  Pulse:  [] 78  Resp:  [16-20] 20  BP: (124-155)/() 136/87  SpO2:  [94 %-100 %] 98 %  152 lbs 5.41 oz    Constitutional: Awake, alert,  no apparent distress.  Eyes: Conjunctiva and pupils examined and normal.  HEENT: Moist mucous membranes, normal dentition.  Respiratory: Clear to auscultation bilaterally, no crackles or wheezing.  Cardiovascular: Regular rate and rhythm, normal S1 and S2, and no murmur noted.  GI: Soft, non-distended,mild mid abd tenderness with palpation , bowel sounds present. No rebound tenderness or guarding.  Lymph/Hematologic: No anterior cervical or supraclavicular adenopathy.  Skin: No rashes, no cyanosis, no edema.  Musculoskeletal: No deformities  noted.  No erythema or tenderness. Moving all extremities.  Neurologic: No focal deficits noted. Speech is clear. Coordination and strength grossly normal.   Psychiatric: Appropriate affect.    Data   Data reviewed today:      EKG: NSR  Imaging:   Recent Results (from the past 24 hour(s))   CT Abdomen Pelvis w Contrast    Narrative    EXAM: CT ABDOMEN PELVIS W CONTRAST  LOCATION: [facility]  DATE: 6/24/2024    INDICATION: RLQ and right sided abd pain; Hx Crohn's; likely flare  COMPARISON: CT 3/23/2024, MRI 3/27/2024  TECHNIQUE: CT scan of the abdomen and pelvis was performed following injection of IV contrast. Multiplanar reformats were obtained. Dose reduction techniques were used.  CONTRAST: 76mL Isovue 370    FINDINGS:   LOWER CHEST: Unremarkable.    HEPATOBILIARY: Cholecystectomy. Mild extrahepatic biliary ductal dilation is stable from prior exam, likely related to reservoir effect.    PANCREAS: Normal.    SPLEEN: Normal.    ADRENAL GLANDS: Normal.    KIDNEYS/BLADDER: Prior right nephrectomy. No left hydronephrosis. Urinary bladder is unremarkable.    BOWEL: Prior ileocecectomy. The transverse colon is relatively featureless with subtle wall thickening, mucosal hyperenhancement and surrounding fat stranding, new/increased in comparison to prior exam. No evidence of bowel obstruction, gross perforation   or evidence of penetrating disease.    LYMPH NODES: No suspicious lymphadenopathy.    VASCULATURE: Scattered calcified atherosclerosis.    PELVIC ORGANS: Hysterectomy.    MUSCULOSKELETAL: No acute bony abnormality.      Impression    IMPRESSION:   1.  Likely transverse colitis, new/worsened since prior exam, most likely related to known inflammatory bowel disease.  2.  No evidence of bowel obstruction or penetrating disease.       Recent Labs   Lab 06/24/24  1424   WBC 8.0   HGB 13.3   MCV 91         POTASSIUM 4.3   CHLORIDE 105   CO2 22   BUN 9.0   CR 1.09*   ANIONGAP 14    GIORGIO 9.6   *           Aubree Bartlett PA-C  [facility]  June 24, 2024  Securely message with the [employer] Console (learn more here)  Text page via [employer] Paging/Directory

## 2024-06-25 NOTE — PLAN OF CARE
"To Do:  End of Shift Summary  For vital signs and complete assessments, please see documentation flowsheets.     Pertinent assessments: Pt admitted to unit at 2230. A&Ox4, VSS on RA. Afebrile. Rates pain in abdomen at 3, received pain medication in ED. Endorses nausea, zofran given in ED. Denies SOB. SBA in the room. Regular diet. LPIV SL. Bed alarm on for safety.     Major Shift Events Admitted to unit.     Treatment Plan: Pain and nausea management.     Bedside Nurse: Sola Carlos RN   Problem: Adult Inpatient Plan of Care  Goal: Plan of Care Review  Description: The Plan of Care Review/Shift note should be completed every shift.  The Outcome Evaluation is a brief statement about your assessment that the patient is improving, declining, or no change.  This information will be displayed automatically on your shift  note.  Outcome: Progressing  Flowsheets (Taken 6/24/2024 5947)  Outcome Evaluation: Pain and nausea improving with medication intervention.  Plan of Care Reviewed With: patient  Overall Patient Progress: improving  Goal: Patient-Specific Goal (Individualized)  Description: You can add care plan individualizations to a care plan. Examples of Individualization might be:  \"Parent requests to be called daily at 9am for status\", \"I have a hard time hearing out of my right ear\", or \"Do not touch me to wake me up as it startles  me\".  Outcome: Progressing  Goal: Absence of Hospital-Acquired Illness or Injury  Outcome: Progressing  Intervention: Identify and Manage Fall Risk  Recent Flowsheet Documentation  Taken 6/24/2024 2229 by Sola Carlos RN  Safety Promotion/Fall Prevention:   clutter free environment maintained   nonskid shoes/slippers when out of bed  Intervention: Prevent and Manage VTE (Venous Thromboembolism) Risk  Recent Flowsheet Documentation  Taken 6/24/2024 2229 by Sola Carlos RN  VTE Prevention/Management: SCDs (sequential compression devices) off  Intervention: Prevent " Infection  Recent Flowsheet Documentation  Taken 6/24/2024 2229 by Sola Carlos RN  Infection Prevention:   equipment surfaces disinfected   hand hygiene promoted   single patient room provided   rest/sleep promoted  Goal: Optimal Comfort and Wellbeing  Outcome: Progressing  Intervention: Monitor Pain and Promote Comfort  Recent Flowsheet Documentation  Taken 6/24/2024 2229 by Sola Carlos RN  Pain Management Interventions: medication (see MAR)  Goal: Readiness for Transition of Care  Outcome: Progressing  Intervention: Mutually Develop Transition Plan  Recent Flowsheet Documentation  Taken 6/24/2024 2233 by Sola Carlos RN  Equipment Currently Used at Home: none     Problem: Pain Acute  Goal: Optimal Pain Control and Function  Outcome: Progressing  Intervention: Develop Pain Management Plan  Recent Flowsheet Documentation  Taken 6/24/2024 2229 by Sola Carlos RN  Pain Management Interventions: medication (see MAR)  Intervention: Prevent or Manage Pain  Recent Flowsheet Documentation  Taken 6/24/2024 2229 by Sola Carlos RN  Medication Review/Management:   medications reviewed   high-risk medications identified     Problem: Fall Injury Risk  Goal: Absence of Fall and Fall-Related Injury  Outcome: Progressing  Intervention: Identify and Manage Contributors  Recent Flowsheet Documentation  Taken 6/24/2024 2229 by Sola Carlos RN  Medication Review/Management:   medications reviewed   high-risk medications identified  Intervention: Promote Injury-Free Environment  Recent Flowsheet Documentation  Taken 6/24/2024 2229 by Sola Carlos RN  Safety Promotion/Fall Prevention:   clutter free environment maintained   nonskid shoes/slippers when out of bed     Problem: Skin Injury Risk Increased  Goal: Skin Health and Integrity  Outcome: Progressing  Intervention: Plan: Nurse Driven Intervention: Moisture Management  Recent Flowsheet Documentation  Taken 6/24/2024 2229 by Sola Carlos RN  Moisture  Interventions:   Encourage regular toileting   No brief in bed   Incontinence pad   Goal Outcome Evaluation:      Plan of Care Reviewed With: patient    Overall Patient Progress: improvingOverall Patient Progress: improving    Outcome Evaluation: Pain and nausea improving with medication intervention.

## 2024-06-25 NOTE — CONSULTS
"Care Management Discharge Note    Discharge Date: 06/26/2024       Discharge Disposition: Home    Discharge Services: None    Discharge DME:      Discharge Transportation: family or friend will provide    Private pay costs discussed: Not applicable    Does the patient's insurance plan have a 3 day qualifying hospital stay waiver?  No    Education Provided on the Discharge Plan:  yes  Persons Notified of Discharge Plans: Patient  Patient/Family in Agreement with the Plan:  yes    Handoff Referral Completed: Yes    Additional Information:  Patient admitted with Chrons flare. Has elevated URR of 25%. Met with patient at the bedside. She confirms she lives at home with her spouse and is independent with all her ADLs and IADLs at baseline. She verbalizes frustration about needing to be admitted again due to her inflammation. \"I would just like to feel like my old self again, even for a month.\" She understands that her treatment plan is new with Remicade starting in May and will take time.   She has a very supportive spouse and does not feel at this point she has any discharge needs. She is looking forward to a 2 week vacation coming up and is hoping to feel better by then.  CM will sign off but if any needs do arise please re-consult our team.    Halle Lopez RN BSN OCN  Care Coordinator  [facility]  [phone]          "

## 2024-06-25 NOTE — CONSULTS
GASTROENTEROLOGY CONSULTATION      Devi Salas  [address] [facility] [id number]  63 year old female     Admission Date/Time: 6/24/2024  Primary Care Provider: Jeanette Betancourt     We were asked to see the patient in consultation by Dr. Garcia for evaluation of Crohn's flare.    CC: Abdominal pain     HPI:  Devi Salas is a 63 year old female with a history of fibrostenotic crohn's with s/p resection, she was on steroids for about 4 months, currently on Remicade 10 mg/kg  every 4 weeks.     She was symptoms free for three weeks after her last infusion. She started having several non bloody bowel movements last Thursday. She received her scheduled infusion on Friday. Felt very tired on Saturday. She had multiple stools on Sunday as well. Yesterday she experienced severe mid abdominal pain that radiated to right mid back this was associated with nausea as well. She call [facility] she was give oral steroids for 10 day. She hasn't picked up the prescription yet. She presented to ED due to worsening pain. Pain is better today, had one BM today. No bloody stools. She did take a small dose of dilaudid this morning.     CT on this admission showed transverse colitis, new/worsened since prior exam, most likely related to known inflammatory bowel disease. No evidence of bowel obstruction or penetrating disease.     CRP within  normal limits. CBC and Blood counts within normal limits.     Her las colonoscopy from 10/2023 showed normal ileum, healthy appearing anastomosis.     MRCP from March 2024 showed hort segment of bowel wall thickening and enhancement in the distal ileum, new since the previous exam and suspicious for  active Crohn's inflammation.Trace amount of free fluid in the right lower quadrant and several mildly prominent mesenteric lymph nodes in the right lower quadrant  are nonspecific, but likely also related to distal small bowel inflammation. Moderate to large amount of stool throughout the colon suggests  constipation. Multiple mildly prominent loops of mid and distal small bowel with no definite transition point suggests ileus, although an early small bowel obstruction is difficult to exclude from this appearance, and close follow-up is recommended.          PAST MEDICAL HISTORY:  Patient Active Problem List    Diagnosis Date Noted    Generalized abdominal pain 03/23/2024     Priority: Medium    Exacerbation of Crohn's disease with complication (H) 03/23/2024     Priority: Medium    Crohn disease (H) 03/23/2024     Priority: Medium    History of Crohn's disease 01/15/2024     Priority: Medium    Intractable lower abdominal pain 01/15/2024     Priority: Medium    Diarrhea, unspecified type 01/15/2024     Priority: Medium    Acute colitis 10/09/2023     Priority: Medium    Dizziness 07/24/2023     Priority: Medium    Abnormal stools 07/24/2023     Priority: Medium    History of colon surgery 07/24/2023     Priority: Medium    Abdominal pain, unspecified abdominal location 07/24/2023     Priority: Medium    Intractable abdominal pain 07/12/2023     Priority: Medium    Crohn's disease of colon with complication (H) 07/12/2023     Priority: Medium    RLQ abdominal pain 06/05/2023     Priority: Medium          ROS: A comprehensive ten point review of systems was negative aside from those in mentioned in the HPI.       MEDICATIONS:   Prior to Admission medications    Medication Sig Start Date End Date Taking? Authorizing Provider   acetaminophen (TYLENOL) 500 MG tablet Take 1-2 tablets (500-1,000 mg) by mouth every 6 hours as needed for mild pain 7/26/23  Yes Li Ferris PA-C   albuterol (PROAIR HFA/PROVENTIL HFA/VENTOLIN HFA) 108 (90 Base) MCG/ACT inhaler Inhale 2 puffs into the lungs every 6 hours as needed for shortness of breath, wheezing or cough   Yes Unknown, Entered By History   cyanocobalamin (CYANOCOBALAMIN) 1000 MCG/ML injection Inject 1,000 mcg as directed every 30 days   Yes Unknown, Entered By History    dicyclomine (BENTYL) 20 MG tablet Take 1 tablet (20 mg) by mouth 4 times daily (before meals and nightly) 1/18/24  Yes Josh Bruce MD   esomeprazole (NEXIUM) 40 MG DR capsule Take 40 mg by mouth 2 times daily (before meals) Take 30-60 minutes before eating.   Yes Unknown, Entered By History   famotidine (PEPCID) 40 MG tablet Take 40 mg by mouth At Bedtime   Yes Unknown, Entered By History   inFLIXimab (REMICADE IV) Inject 10 mg/kg into the vein every 4 weeks   Yes Unknown, Entered By History   l-lysine HCl 500 MG TABS tablet Take 1,000 mg by mouth daily   Yes Unknown, Entered By History   medical cannabis (Patient's own supply) Take 1 Dose by mouth See Admin Instructions (The purpose of this order is to document that the patient reports taking medical cannabis.  This is not a prescription, and is not used to certify that the patient has a qualifying medical condition.)  25 MG EDIBLE   Yes Unknown, Entered By History   methocarbamol (ROBAXIN) 500 MG tablet Take 1 tablet (500 mg) by mouth 4 times daily as needed for muscle spasms  Patient taking differently: Take 1,000 mg by mouth 2 times daily 6/18/23  Yes Vikki Duran DO   montelukast (SINGULAIR) 10 MG tablet Take 10 mg by mouth At Bedtime   Yes Unknown, Entered By History   nortriptyline (PAMELOR) 25 MG capsule Take 25 mg by mouth At Bedtime   Yes Unknown, Entered By History   simethicone (MYLICON) 80 MG chewable tablet Take 1 tablet (80 mg) by mouth every 6 hours as needed for cramping 1/18/24  Yes Josh Bruce MD   traZODone (DESYREL) 100 MG tablet Take 200 mg by mouth At Bedtime   Yes Unknown, Entered By History   vitamin C (ASCORBIC ACID) 1000 MG TABS Take 1,000 mg by mouth daily   Yes Unknown, Entered By History   Vitamin D3 (CHOLECALCIFEROL) 25 mcg (1000 units) tablet Take 25 mcg by mouth daily   Yes Unknown, Entered By History   zolpidem (AMBIEN) 5 MG tablet Take 5 mg by mouth nightly as needed 6/24/24  Yes Unknown, Entered By History  "  ondansetron (ZOFRAN ODT) 4 MG ODT tab Take 1 tablet (4 mg) by mouth every 8 hours as needed for nausea 4/2/24   Estelle Rodriguez DO        ALLERGIES:   Allergies   Allergen Reactions    Latex Hives    Oxycodone Anaphylaxis, Hives and Swelling     Throat swelling, per pt    Pt tolerates [place]/Vicodin (10/15/2023)    Penicillin G Anaphylaxis    Sumatriptan Palpitations    Aspirin Nausea and Vomiting    Demerol Hcl [Meperidine] Nausea and Vomiting     N/V    Percocet [Oxycodone-Acetaminophen] Nausea and Vomiting     N/V    Acetaminophen Nausea and Vomiting    Codeine Nausea and Vomiting    Mold         SOCIAL HISTORY:        FAMILY HISTORY:  Family History   Problem Relation Age of Onset    Colon Cancer Niece         PHYSICAL EXAM:   /80 (BP Location: Right arm)   Pulse 73   Temp 98  F (36.7  C) (Oral)   Resp 20   Ht 1.6 m (5' 3\")   Wt 69.1 kg (152 lb 5.4 oz)   SpO2 97%   BMI 26.99 kg/m       General: alert, oriented, NAD  SKIN: no suspicious lesions, rashes, jaundice, or spider angiomas  EYES: No scleral icterus  RESPIRATORY: Non labored breathing, Lungs clear  CARDIOVASCULAR:  RRR. No murmurs, clicks gallops or rub  GASTROINTESTINAL: Active bowel sounds,  abdomen soft and mid abdominal tenderness on palpation.  JOINT/EXTREMITIES: extremities normal- no gross deformities noted. No edema.   NEURO: Grossly WNL.  PSYCH: no abnormal anxiety/depression       LABS:  I reviewed the patient's new clinical lab test results.   Recent Labs   Lab Test 06/25/24  0621 06/24/24  1424 03/31/24  0650   WBC 8.2 8.0 13.4*   HGB 12.8 13.3 13.3   MCV 88 91 90    273 253     Recent Labs   Lab Test 06/25/24  0621 06/24/24  1424 04/02/24  0749    141 137   POTASSIUM 4.7 4.3 4.6   CHLORIDE 105 105 101   CO2 22 22 29   BUN 7.4* 9.0 15.8   ANIONGAP 11 14 7   GIORGIO 8.9 9.6 8.7*     Recent Labs   Lab Test 06/25/24  0621 06/24/24  1502 03/30/24  0140 03/23/24  1528 03/23/24  1526 01/16/24  0816 10/09/23  1750 " 10/09/23  1737 07/24/23  1717 07/24/23  1608   ALBUMIN 4.0  --   --  4.7  --  3.9   < > 4.7  --  3.9   BILITOTAL 0.6  --   --  0.3  --  0.6   < > 0.4  --  0.3   ALT 19  --   --  13  --  11   < > 15  --  28   AST 19  --   --  17  --  16   < > 24  --  29   ALKPHOS 80  --   --  71  --  64   < > 87  --  72   PROTEIN  --  Negative Negative  --  Negative  --    < >  --    < >  --    LIPASE  --   --   --   --   --   --   --  27  --  56    < > = values in this interval not displayed.        IMAGING/PROCEDURES:  CT 6/24   IMPRESSION:   1.  Likely transverse colitis, new/worsened since prior exam, most likely related to known inflammatory bowel disease.  2.  No evidence of bowel obstruction or penetrating disease.    MRE March 2024  IMPRESSION:  1.  There is a short segment of bowel wall thickening and enhancement  in the distal ileum, new since the previous exam and suspicious for  active Crohn's inflammation.  2.  Trace amount of free fluid in the right lower quadrant and several  mildly prominent mesenteric lymph nodes in the right lower quadrant  are nonspecific, but likely also related to distal small bowel  inflammation.  3.  Moderate to large amount of stool throughout the colon suggests  constipation.  4.  Multiple mildly prominent loops of mid and distal small bowel with  no definite transition point suggests ileus, although an early small  bowel obstruction is difficult to exclude from this appearance, and  close follow-up is recommended.    Colonoscopy 10/9/2023  Impression:        - The examined portion of the ileum was normal.                             - Patent end-to-side ileo-colonic anastomosis,                             characterized by healthy appearing mucosa.                             - Normal mucosa in the entire examined colon.                             Biopsied.                             - Internal hemorrhoids.       I personally reviewed the patient's new imaging results.    Problem list  pertaining to GI:  Crohn's flare  Abdominal pain   Diarrhea     Assessment: This is a 63 y-o female with a history of fibrostenotic crohn's. S/P resection, who is currently on Remicade 10 mg/kg every 4 weeks. She was admitted for worsening abdominal pain and diarrhea since Thursday last week. Her last infusion was on Friday. CT showed transverse colitis, new/worsened since prior exam, most likely related to known inflammatory bowel disease. CRP within normal limits. She was started on IV steroids. She is feeling better today.     Plan:  - Continue IV steroids  - Stool test to rule out infection  - She might be able to go home tomorrow on oral steroids. Rx already at her pharmacy.   --Follow up with Dr. Marie as scheduled in July  - GI will follow    I will discuss with Dr. Mathur    Thank you for allowing me to participate in the care of this patient.  Please contact me with any questions or concerns.    Total time spent:  Approximately, 45 minutes was spent providing patient care, including patient evaluation, reviewing documentation/test results, and . Thank you for asking us to participate in the care of this patient.      Jaleesa Hoyt CNP   [facility] ([facility])  Mobile # [phone]

## 2024-06-25 NOTE — PROGRESS NOTES
[facility]    Medicine Progress Note - Hospitalist Service    Date of Admission:  6/24/2024    Assessment & Plan   Devi Salas is a 63-year-old female with a past medical history significant for Crohn's disease, GERD, and insomnia. She follows with [facility] and has been on Remicade since December 2023. She presents with waxing and waning abdominal pain over the last 4 to 5 days. She attempted to contact her GI clinic but was unsuccessful. Due to increasing stooling, poor oral intake, and abdominal pain, she presented to the emergency room for further evaluation.     1. Acute Crohn's Flare:      - Patient has been on Remicade since December 2023, with the last infusion 3 days ago.     - Symptoms: Lower abdominal pain, cramping, non-bloody diarrhea, and poor oral intake.   2. GERD:    3.  Insomnia:     4. Overweight:     - BMI: 26.99 kg/m  (Height: 1.6 m, Weight: 69.1 kg).     Plan:     1 Acute Crohn's Flare:      - Admit to inpatient unit for IV hydration and close monitoring.     - Administer IV Solu-Medrol 40 mg every 8 hours per GI recommendation.     - Provide antiemetics and pain control as needed.     - Consult with GI for further management and recommendations.    2.  GERD:      - Resume PPI therapy.    3. Insomnia:      - Resume trazodone for sleep.    4. Overweight:     - Monitor weight and BMI.     - Discuss weight management strategies and dietary recommendations with the patient.     Follow-Up:   - Monitor vital signs, fluid intake/output, and response to IV steroids.  -  CRP < 3.0 , GI to adjust treatment plan accordingly.  - Follow up with GI consult recommendations.     Diet: Combination Diet Regular Diet Adult    DVT Prophylaxis: Pneumatic Compression Devices  Monae Catheter: Not present  Lines: None     Cardiac Monitoring: None  Code Status: Full Code      Clinically Significant Risk Factors Present on Admission                              # Overweight:  "Estimated body mass index is 26.99 kg/m  as calculated from the following:    Height as of this encounter: 1.6 m (5' 3\").    Weight as of this encounter: 69.1 kg (152 lb 5.4 oz).              Disposition Plan     Medically Ready for Discharge: Anticipated in 2-4 Days             Jose Crook MD  Hospitalist Service  [facility]  Securely message with [employer] (more info)  Text page via [facility] Paging/Directory   ______________________________________________________________________    Interval History   She feels better today, no nausea, vomiting, diarrhea.  Only 1 bowel movement since admission  Abdominal pain is still present recurring, improved with Tylenol and Dilaudid..  No other concerns.      Physical Exam   Vital Signs: Temp: 98  F (36.7  C) Temp src: Oral BP: 117/80 Pulse: 73   Resp: 20 SpO2: 97 % O2 Device: None (Room air)    Weight: 152 lbs 5.41 oz    Constitutional: awake, alert, cooperative, no apparent distress, and appears stated age  Respiratory: No increased work of breathing, good air exchange, clear to auscultation bilaterally, no crackles or wheezing  Cardiovascular: Normal apical impulse, regular rate and rhythm, normal S1 and S2, no S3 or S4, and no murmur noted  GI: Bowel sounds present, abdomen soft but very sensitive to light palpation at the level of the epigastrium and in the left painful in the lower abdomen.    Medical Decision Making       40 MINUTES SPENT BY ME on the date of service doing chart review, history, exam, documentation & further activities per the note.      Data     I have personally reviewed the following data over the past 24 hrs:    8.2  \   12.8   / 243     138 105 7.4 (L) /  122 (H)   4.7 22 0.82 \     ALT: 19 AST: 19 AP: 80 TBILI: 0.6   ALB: 4.0 TOT PROTEIN: 6.2 (L) LIPASE: N/A     Procal: N/A CRP: <3.00 Lactic Acid: N/A         Imaging results reviewed over the past 24 hrs:   Recent Results (from the past 24 hour(s))   CT Abdomen Pelvis w " Contrast    Narrative    EXAM: CT ABDOMEN PELVIS W CONTRAST  LOCATION: [facility]  DATE: 6/24/2024    INDICATION: RLQ and right sided abd pain; Hx Crohn's; likely flare  COMPARISON: CT 3/23/2024, MRI 3/27/2024  TECHNIQUE: CT scan of the abdomen and pelvis was performed following injection of IV contrast. Multiplanar reformats were obtained. Dose reduction techniques were used.  CONTRAST: 76mL Isovue 370    FINDINGS:   LOWER CHEST: Unremarkable.    HEPATOBILIARY: Cholecystectomy. Mild extrahepatic biliary ductal dilation is stable from prior exam, likely related to reservoir effect.    PANCREAS: Normal.    SPLEEN: Normal.    ADRENAL GLANDS: Normal.    KIDNEYS/BLADDER: Prior right nephrectomy. No left hydronephrosis. Urinary bladder is unremarkable.    BOWEL: Prior ileocecectomy. The transverse colon is relatively featureless with subtle wall thickening, mucosal hyperenhancement and surrounding fat stranding, new/increased in comparison to prior exam. No evidence of bowel obstruction, gross perforation   or evidence of penetrating disease.    LYMPH NODES: No suspicious lymphadenopathy.    VASCULATURE: Scattered calcified atherosclerosis.    PELVIC ORGANS: Hysterectomy.    MUSCULOSKELETAL: No acute bony abnormality.      Impression    IMPRESSION:   1.  Likely transverse colitis, new/worsened since prior exam, most likely related to known inflammatory bowel disease.  2.  No evidence of bowel obstruction or penetrating disease.

## 2024-06-25 NOTE — ED NOTES
[facility]  ED Nurse Handoff Report    ED Chief complaint: Abdominal Pain  . ED Diagnosis:   Final diagnoses:   Exacerbation of Crohn's disease with complication (H)       Allergies:   Allergies   Allergen Reactions    Latex Hives    Oxycodone Anaphylaxis, Hives and Swelling     Throat swelling, per pt    Pt tolerates [place]/Vicodin (10/15/2023)    Penicillin G Anaphylaxis    Sumatriptan Palpitations    Aspirin Nausea and Vomiting    Demerol Hcl [Meperidine] Nausea and Vomiting     N/V    Percocet [Oxycodone-Acetaminophen] Nausea and Vomiting     N/V    Acetaminophen Nausea and Vomiting    Codeine Nausea and Vomiting    Mold        Code Status: Full Code    Activity level - Baseline/Home:  independent.  Activity Level - Current:   assist of 2.   Lift room needed: No.   Bariatric: No   Needed: No   Isolation: No.   Infection: Not Applicable.     Respiratory status: Room air    Vital Signs (within 30 minutes):   Vitals:    06/24/24 1700 06/24/24 1715 06/24/24 1745 06/24/24 1800   BP:  (!) 155/98 (!) 150/93 (!) 150/89   Pulse:    79   Resp:       Temp:       TempSrc:       SpO2: 100% 99% 98% 98%   Weight:       Height:           Cardiac Rhythm:  ,      Pain level:    Patient confused: No.   Patient Falls Risk: nonskid shoes/slippers when out of bed.   Elimination Status: Has voided     Patient Report - Initial Complaint: Devi Salas is a 63 year old female who presents with abdominal pain. Patient states that she had 20 inches of her small intestine and small portion of her large intestine removed 11 months ago. She notes that her frequency of Chron's flare-ups has  not reduced. Patient deduces her current episode of abdominal pain to a Chron's flare-up by relating its similarity two an episode she had two months ago that caused her to get admitted. Patient describes that her abdominal pain is localized in her lower abdomen and gradually radiates to her right side. Patient notes the  onset of her symptoms as four days ago, noting that she went to the bathroom 18 times. Patient notes that she had a Remicade injection three days ago and that her symptoms did not improve as she also excessively had bowel movements the same day. She states that she had minimal bowel movements two days ago during the afternoon, but had the onset of a stabbing pain and increased bowel movements that night. She states that she went to [facility] today but that her provider was not present, and was subsequently advised to come to the ED. She states that she usually has morphine and dilaudid used for pain management. Of note, patient states that she had her right kidney removed in 2009 due to cancer. She denies chest pain and shortness of breath. .   Focused Assessment: Abdominal pain, nausea, vomiting, diarrhea since Wednesday last week. Took zofran at 8am, helped with nausea a little bit. Pain with urination.         Abnormal Results:   Labs Ordered and Resulted from Time of ED Arrival to Time of ED Departure   BASIC METABOLIC PANEL - Abnormal       Result Value    Sodium 141      Potassium 4.3      Chloride 105      Carbon Dioxide (CO2) 22      Anion Gap 14      Urea Nitrogen 9.0      Creatinine 1.09 (*)     GFR Estimate 56 (*)     Calcium 9.6      Glucose 119 (*)    ROUTINE UA WITH MICROSCOPIC REFLEX TO CULTURE - Abnormal    Color Urine Straw      Appearance Urine Clear      Glucose Urine Negative      Bilirubin Urine Negative      Ketones Urine Negative      Specific Gravity Urine 1.005      Blood Urine Negative      pH Urine 5.5      Protein Albumin Urine Negative      Urobilinogen Urine Normal      Nitrite Urine Negative      Leukocyte Esterase Urine Negative      Bacteria Urine Few (*)     RBC Urine <1      WBC Urine <1     MAGNESIUM - Normal    Magnesium 2.1     CBC WITH PLATELETS AND DIFFERENTIAL    WBC Count 8.0      RBC Count 4.51      Hemoglobin 13.3      Hematocrit 40.8      MCV 91      MCH 29.5      MCHC 32.6       RDW 13.2      Platelet Count 273      % Neutrophils 59      % Lymphocytes 32      % Monocytes 7      % Eosinophils 2      % Basophils 1      % Immature Granulocytes 1      NRBCs per 100 WBC 0      Absolute Neutrophils 4.7      Absolute Lymphocytes 2.5      Absolute Monocytes 0.6      Absolute Eosinophils 0.1      Absolute Basophils 0.1      Absolute Immature Granulocytes 0.1      Absolute NRBCs 0.0          CT Abdomen Pelvis w Contrast   Final Result   IMPRESSION:    1.  Likely transverse colitis, new/worsened since prior exam, most likely related to known inflammatory bowel disease.   2.  No evidence of bowel obstruction or penetrating disease.          Treatments provided: Pain meds  Family Comments:   OBS brochure/video discussed/provided to patient:  N/A  ED Medications:   Medications   ondansetron (ZOFRAN ODT) ODT tab 4 mg (4 mg Oral $Given 6/24/24 1420)   morphine (PF) injection 4 mg (4 mg Intravenous $Given 6/24/24 1656)   ondansetron (ZOFRAN) injection 4 mg (4 mg Intravenous $Given 6/24/24 1655)   sodium chloride 0.9% BOLUS 1,000 mL (0 mLs Intravenous Stopped 6/24/24 1807)   iopamidol (ISOVUE-370) solution 500 mL (76 mLs Intravenous $Given 6/24/24 1816)   CT scan flush (59 mLs Intravenous $Given 6/24/24 1816)   HYDROmorphone (DILAUDID) injection 1 mg (1 mg Intravenous $Given 6/24/24 2002)   methylPREDNISolone sodium succinate (SOLU-MEDROL) injection 20 mg (20 mg Intravenous $Given 6/24/24 2002)       Drips infusing:  No  For the majority of the shift this patient was Green.   Interventions performed were Pain meds, see MAR.    Sepsis treatment initiated: No    Cares/treatment/interventions/medications to be completed following ED care: See Orders    ED Nurse Name: Millicent Caldwell RN  9:04 PM     RECEIVING UNIT ED HANDOFF REVIEW    Above ED Nurse Handoff Report was reviewed: Yes  Reviewed by: Sola Carlos RN on June 24, 2024 at 9:53 PM   SINDY Pisano called the ED to inform them the note was read:  Yes

## 2024-06-25 NOTE — UTILIZATION REVIEW
"  Admission Status; Secondary Review Determination         Under the authority of the Utilization Management Committee, the utilization review process indicated a secondary review on the above patient.  The review outcome is based on review of the medical records, discussions with staff, and applying clinical experience noted on the date of the review.          (x) Observation Status Appropriate - This patient does not meet hospital inpatient criteria and is placed in observation status. If this patient's primary payer is Medicare and was admitted as an inpatient, Condition Code 44 should be used and patient status changed to \"observation\".     RATIONALE FOR DETERMINATION   63-year-old female with a history of fibrostenotic Crohn's disease, s/p resection, currently on Remicade 10 mg/kg every 4 weeks, presented with worsening abdominal pain and diarrhea since Thursday last week. Her last infusion was on Friday. She experienced severe mid-abdominal pain radiating to her right mid-back, nausea, and multiple non-bloody bowel movements. A CT scan showed new/worsened transverse colitis. She was initially hypertensive in the ER, with otherwise stable vitals and normal lab results, except for mild renal insufficiency (creatinine 1.09). Due to her inability to tolerate oral intake, she was admitted for IV steroid treatment. She is feeling better today with pain improvement and one non-bloody BM. Stool tests to rule out infection were ordered, and she might be discharged on oral steroids.    The severity of illness, intensity of service provided, expected LOS and risk for adverse outcome make the care appropriate for further observation; however, doesn't meet criteria for hospital inpatient admission. Dr Crook notified of this determination.    This document was produced using voice recognition software.      The information on this document is developed by the utilization review team in order for the business office to " ensure compliance.  This only denotes the appropriateness of proper admission status and does not reflect the quality of care rendered.         The definitions of Inpatient Status and Observation Status used in making the determination above are those provided in the CMS Coverage Manual, Chapter 1 and Chapter 6, section 70.4.      Sincerely,     SONAL LOMBARDO MD    System Medical Director  Utilization Management  [facility].

## 2024-06-25 NOTE — PLAN OF CARE
"Goal Outcome Evaluation:      Plan of Care Reviewed With: patient    Overall Patient Progress: improvingOverall Patient Progress: improving    Outcome Evaluation: Alert. denies nausea,sob, or chest pain. C/o of abdomina pain prn med given. PIV infusing LR@100. Ind in the room refuses bed alarm.  Problem: Adult Inpatient Plan of Care  Goal: Plan of Care Review  Description: The Plan of Care Review/Shift note should be completed every shift.  The Outcome Evaluation is a brief statement about your assessment that the patient is improving, declining, or no change.  This information will be displayed automatically on your shift  note.  6/25/2024 0458 by Stella De Leon, RN  Outcome: Progressing  Flowsheets (Taken 6/25/2024 0458)  Outcome Evaluation: Alert. denies nausea,sob, or chest pain. C/o of abdomina pain prn med given. PIV infusing LR@100. Ind in the room refuses bed alarm.  Plan of Care Reviewed With: patient  Overall Patient Progress: improving  6/25/2024 0457 by Stella De Leon RN  Outcome: Progressing  Goal: Patient-Specific Goal (Individualized)  Description: You can add care plan individualizations to a care plan. Examples of Individualization might be:  \"Parent requests to be called daily at 9am for status\", \"I have a hard time hearing out of my right ear\", or \"Do not touch me to wake me up as it startles  me\".  6/25/2024 0458 by Stella De Leon, RN  Outcome: Progressing  6/25/2024 0457 by Stella De Leon, RN  Outcome: Progressing  Goal: Absence of Hospital-Acquired Illness or Injury  6/25/2024 0458 by Stella De Leon, RN  Outcome: Progressing  6/25/2024 0457 by Stella De Leon, RN  Outcome: Progressing  Intervention: Identify and Manage Fall Risk  Recent Flowsheet Documentation  Taken 6/25/2024 0027 by Stella De Leon, RN  Safety Promotion/Fall Prevention:   clutter free environment maintained   safety round/check completed  Intervention: Prevent Skin Injury  Recent Flowsheet Documentation  Taken 6/25/2024 " 0027 by Stella De Leon RN  Body Position: position changed independently  Intervention: Prevent Infection  Recent Flowsheet Documentation  Taken 6/25/2024 0027 by Stella De Leon RN  Infection Prevention:   single patient room provided   hand hygiene promoted  Goal: Optimal Comfort and Wellbeing  6/25/2024 0458 by Stella De Leon RN  Outcome: Progressing  6/25/2024 0457 by Stella De Leon RN  Outcome: Progressing  Intervention: Monitor Pain and Promote Comfort  Recent Flowsheet Documentation  Taken 6/25/2024 0355 by Stella De Leon RN  Pain Management Interventions: medication (see MAR)  Goal: Readiness for Transition of Care  6/25/2024 0458 by Stella De Leon RN  Outcome: Progressing  6/25/2024 0457 by Stella De Leon RN  Outcome: Progressing     Problem: Pain Acute  Goal: Optimal Pain Control and Function  6/25/2024 0458 by Stella De Leon RN  Outcome: Progressing  6/25/2024 0457 by Stella De Leon RN  Outcome: Progressing  Intervention: Develop Pain Management Plan  Recent Flowsheet Documentation  Taken 6/25/2024 0355 by Stella De Leon RN  Pain Management Interventions: medication (see MAR)     Problem: Skin Injury Risk Increased  Goal: Skin Health and Integrity  6/25/2024 0458 by Stella De Leon RN  Outcome: Progressing  6/25/2024 0457 by Stella De Leon RN  Outcome: Progressing  Intervention: Plan: Nurse Driven Intervention: Moisture Management  Recent Flowsheet Documentation  Taken 6/25/2024 0027 by Stella De Leon RN  Moisture Interventions: Encourage regular toileting

## 2024-06-25 NOTE — PLAN OF CARE
"PRIMARY DIAGNOSIS: \"GENERIC\" NURSING  OUTPATIENT/OBSERVATION GOALS TO BE MET BEFORE DISCHARGE:  ADLs back to baseline: Yes    Activity and level of assistance: Ambulating independently.    Pain status: Improved-controlled with oral pain medications.    Return to near baseline physical activity: Yes     Discharge Planner Nurse   Safe discharge environment identified: Yes  Barriers to discharge: Yes       Entered by: KENNY CRUZ RN 06/25/2024 5:51 PM     Please review provider order for any additional goals.   Nurse to notify provider when observation goals have been met and patient is ready for discharge.          Goal Outcome Evaluation:      Plan of Care Reviewed With: patient    Overall Patient Progress: no changeOverall Patient Progress: no change    Outcome Evaluation: No BM since stool sample labs ordered, pain managed with PO Medications      Problem: Adult Inpatient Plan of Care  Goal: Plan of Care Review  Description: The Plan of Care Review/Shift note should be completed every shift.  The Outcome Evaluation is a brief statement about your assessment that the patient is improving, declining, or no change.  This information will be displayed automatically on your shift  note.  Outcome: Not Progressing  Flowsheets (Taken 6/25/2024 1750)  Outcome Evaluation: No BM since stool sample labs ordered, pain managed with PO Medications  Plan of Care Reviewed With: patient  Overall Patient Progress: no change  Goal: Patient-Specific Goal (Individualized)  Description: You can add care plan individualizations to a care plan. Examples of Individualization might be:  \"Parent requests to be called daily at 9am for status\", \"I have a hard time hearing out of my right ear\", or \"Do not touch me to wake me up as it startles  me\".  Outcome: Not Progressing  Goal: Absence of Hospital-Acquired Illness or Injury  Outcome: Not Progressing  Intervention: Identify and Manage Fall Risk  Recent Flowsheet Documentation  Taken " 6/25/2024 1620 by Carmen Brown RN  Safety Promotion/Fall Prevention:   assistive device/personal items within reach   clutter free environment maintained   increased rounding and observation   lighting adjusted   nonskid shoes/slippers when out of bed   safety round/check completed   room organization consistent   treat reversible contributory factors  Taken 6/25/2024 0800 by Carmen Brown RN  Safety Promotion/Fall Prevention:   assistive device/personal items within reach   clutter free environment maintained   increased rounding and observation   lighting adjusted   nonskid shoes/slippers when out of bed   safety round/check completed   room organization consistent   treat reversible contributory factors  Intervention: Prevent Skin Injury  Recent Flowsheet Documentation  Taken 6/25/2024 1620 by Carmen Brown RN  Skin Protection: adhesive use limited  Device Skin Pressure Protection:   adhesive use limited   tubing/devices free from skin contact  Taken 6/25/2024 0800 by Carmen Brown RN  Body Position: position changed independently  Skin Protection: adhesive use limited  Device Skin Pressure Protection:   adhesive use limited   tubing/devices free from skin contact  Intervention: Prevent and Manage VTE (Venous Thromboembolism) Risk  Recent Flowsheet Documentation  Taken 6/25/2024 1620 by Carmen Brown RN  VTE Prevention/Management: SCDs (sequential compression devices) off  Taken 6/25/2024 0800 by Carmen Brown RN  VTE Prevention/Management: SCDs (sequential compression devices) off  Intervention: Prevent Infection  Recent Flowsheet Documentation  Taken 6/25/2024 1620 by Carmen Brown RN  Infection Prevention:   rest/sleep promoted   hand hygiene promoted   single patient room provided   equipment surfaces disinfected  Taken 6/25/2024 0700 by Carmen Brown RN  Infection Prevention:   rest/sleep promoted   hand hygiene promoted   single patient room provided   equipment surfaces  disinfected  Goal: Optimal Comfort and Wellbeing  Outcome: Not Progressing  Intervention: Monitor Pain and Promote Comfort  Recent Flowsheet Documentation  Taken 6/25/2024 1628 by Carmen Brown, RN  Pain Management Interventions: medication (see MAR)  Taken 6/25/2024 0756 by Carmen Brown, RN  Pain Management Interventions: medication (see MAR)  Goal: Readiness for Transition of Care  Outcome: Not Progressing

## 2024-06-26 VITALS
SYSTOLIC BLOOD PRESSURE: 131 MMHG | OXYGEN SATURATION: 93 % | HEIGHT: 63 IN | DIASTOLIC BLOOD PRESSURE: 75 MMHG | HEART RATE: 78 BPM | RESPIRATION RATE: 12 BRPM | WEIGHT: 152.34 LBS | TEMPERATURE: 98 F | BODY MASS INDEX: 26.99 KG/M2

## 2024-06-26 LAB
ANION GAP SERPL CALCULATED.3IONS-SCNC: 11 MMOL/L (ref 7–15)
BASOPHILS # BLD AUTO: 0 10E3/UL (ref 0–0.2)
BASOPHILS NFR BLD AUTO: 0 %
BUN SERPL-MCNC: 12.2 MG/DL (ref 8–23)
CALCIUM SERPL-MCNC: 9.2 MG/DL (ref 8.8–10.2)
CHLORIDE SERPL-SCNC: 104 MMOL/L (ref 98–107)
CREAT SERPL-MCNC: 0.96 MG/DL (ref 0.51–0.95)
CRP SERPL-MCNC: <3 MG/L
DEPRECATED HCO3 PLAS-SCNC: 25 MMOL/L (ref 22–29)
EGFRCR SERPLBLD CKD-EPI 2021: 66 ML/MIN/1.73M2
EOSINOPHIL # BLD AUTO: 0 10E3/UL (ref 0–0.7)
EOSINOPHIL NFR BLD AUTO: 0 %
ERYTHROCYTE [DISTWIDTH] IN BLOOD BY AUTOMATED COUNT: 13.2 % (ref 10–15)
GLUCOSE SERPL-MCNC: 144 MG/DL (ref 70–99)
HCT VFR BLD AUTO: 38.3 % (ref 35–47)
HGB BLD-MCNC: 12.5 G/DL (ref 11.7–15.7)
IMM GRANULOCYTES # BLD: 0.1 10E3/UL
IMM GRANULOCYTES NFR BLD: 1 %
LYMPHOCYTES # BLD AUTO: 1.2 10E3/UL (ref 0.8–5.3)
LYMPHOCYTES NFR BLD AUTO: 12 %
MCH RBC QN AUTO: 28.9 PG (ref 26.5–33)
MCHC RBC AUTO-ENTMCNC: 32.6 G/DL (ref 31.5–36.5)
MCV RBC AUTO: 89 FL (ref 78–100)
MONOCYTES # BLD AUTO: 0.5 10E3/UL (ref 0–1.3)
MONOCYTES NFR BLD AUTO: 4 %
NEUTROPHILS # BLD AUTO: 8.6 10E3/UL (ref 1.6–8.3)
NEUTROPHILS NFR BLD AUTO: 83 %
NRBC # BLD AUTO: 0 10E3/UL
NRBC BLD AUTO-RTO: 0 /100
PLATELET # BLD AUTO: 277 10E3/UL (ref 150–450)
POTASSIUM SERPL-SCNC: 4.5 MMOL/L (ref 3.4–5.3)
RBC # BLD AUTO: 4.32 10E6/UL (ref 3.8–5.2)
SODIUM SERPL-SCNC: 140 MMOL/L (ref 135–145)
WBC # BLD AUTO: 10.4 10E3/UL (ref 4–11)

## 2024-06-26 PROCEDURE — 250N000011 HC RX IP 250 OP 636: Performed by: PHYSICIAN ASSISTANT

## 2024-06-26 PROCEDURE — 99239 HOSP IP/OBS DSCHRG MGMT >30: CPT | Performed by: HOSPITALIST

## 2024-06-26 PROCEDURE — 85025 COMPLETE CBC W/AUTO DIFF WBC: CPT | Performed by: HOSPITALIST

## 2024-06-26 PROCEDURE — 86140 C-REACTIVE PROTEIN: CPT | Performed by: HOSPITALIST

## 2024-06-26 PROCEDURE — G0378 HOSPITAL OBSERVATION PER HR: HCPCS

## 2024-06-26 PROCEDURE — 80048 BASIC METABOLIC PNL TOTAL CA: CPT | Performed by: HOSPITALIST

## 2024-06-26 PROCEDURE — 250N000013 HC RX MED GY IP 250 OP 250 PS 637: Performed by: HOSPITALIST

## 2024-06-26 PROCEDURE — 36415 COLL VENOUS BLD VENIPUNCTURE: CPT | Performed by: HOSPITALIST

## 2024-06-26 PROCEDURE — 250N000013 HC RX MED GY IP 250 OP 250 PS 637: Performed by: PHYSICIAN ASSISTANT

## 2024-06-26 RX ORDER — ACETAMINOPHEN 500 MG
500 TABLET ORAL EVERY 6 HOURS PRN
Status: DISCONTINUED | OUTPATIENT
Start: 2024-06-26 | End: 2024-06-26

## 2024-06-26 RX ORDER — ACETAMINOPHEN 500 MG
1000 TABLET ORAL EVERY 6 HOURS PRN
Status: DISCONTINUED | OUTPATIENT
Start: 2024-06-26 | End: 2024-06-26 | Stop reason: HOSPADM

## 2024-06-26 RX ORDER — FAMOTIDINE 20 MG/1
20 TABLET, FILM COATED ORAL AT BEDTIME
Status: DISCONTINUED | OUTPATIENT
Start: 2024-06-26 | End: 2024-06-26 | Stop reason: HOSPADM

## 2024-06-26 RX ORDER — MAGNESIUM HYDROXIDE/ALUMINUM HYDROXICE/SIMETHICONE 120; 1200; 1200 MG/30ML; MG/30ML; MG/30ML
30 SUSPENSION ORAL ONCE
Status: COMPLETED | OUTPATIENT
Start: 2024-06-26 | End: 2024-06-26

## 2024-06-26 RX ORDER — ACETAMINOPHEN 500 MG
500 TABLET ORAL EVERY 6 HOURS PRN
Status: DISCONTINUED | OUTPATIENT
Start: 2024-06-26 | End: 2024-06-26 | Stop reason: HOSPADM

## 2024-06-26 RX ADMIN — ONDANSETRON 4 MG: 2 INJECTION INTRAMUSCULAR; INTRAVENOUS at 13:07

## 2024-06-26 RX ADMIN — HYDROMORPHONE HYDROCHLORIDE 2 MG: 2 TABLET ORAL at 16:16

## 2024-06-26 RX ADMIN — ACETAMINOPHEN 1000 MG: 500 TABLET, FILM COATED ORAL at 16:16

## 2024-06-26 RX ADMIN — PANTOPRAZOLE SODIUM 40 MG: 40 TABLET, DELAYED RELEASE ORAL at 10:05

## 2024-06-26 RX ADMIN — HYDROXYZINE HYDROCHLORIDE 25 MG: 25 TABLET, FILM COATED ORAL at 16:16

## 2024-06-26 RX ADMIN — METHYLPREDNISOLONE SODIUM SUCCINATE 20 MG: 40 INJECTION, POWDER, FOR SOLUTION INTRAMUSCULAR; INTRAVENOUS at 13:07

## 2024-06-26 RX ADMIN — HYDROXYZINE HYDROCHLORIDE 25 MG: 25 TABLET, FILM COATED ORAL at 10:05

## 2024-06-26 RX ADMIN — DICYCLOMINE HYDROCHLORIDE 20 MG: 20 TABLET ORAL at 11:14

## 2024-06-26 RX ADMIN — HYDROMORPHONE HYDROCHLORIDE 0.4 MG: 0.2 INJECTION, SOLUTION INTRAMUSCULAR; INTRAVENOUS; SUBCUTANEOUS at 13:11

## 2024-06-26 RX ADMIN — SIMETHICONE 80 MG: 80 TABLET, CHEWABLE ORAL at 13:12

## 2024-06-26 RX ADMIN — DICYCLOMINE HYDROCHLORIDE 20 MG: 20 TABLET ORAL at 16:16

## 2024-06-26 RX ADMIN — ALUMINUM HYDROXIDE, MAGNESIUM HYDROXIDE, AND DIMETHICONE 30 ML: 200; 20; 200 SUSPENSION ORAL at 13:53

## 2024-06-26 RX ADMIN — METHOCARBAMOL 1000 MG: 500 TABLET ORAL at 10:05

## 2024-06-26 RX ADMIN — HYDROMORPHONE HYDROCHLORIDE 2 MG: 2 TABLET ORAL at 05:55

## 2024-06-26 RX ADMIN — METHYLPREDNISOLONE SODIUM SUCCINATE 20 MG: 40 INJECTION, POWDER, FOR SOLUTION INTRAMUSCULAR; INTRAVENOUS at 05:45

## 2024-06-26 RX ADMIN — ONDANSETRON 4 MG: 2 INJECTION INTRAMUSCULAR; INTRAVENOUS at 08:49

## 2024-06-26 ASSESSMENT — ACTIVITIES OF DAILY LIVING (ADL)
ADLS_ACUITY_SCORE: 24

## 2024-06-26 NOTE — PROGRESS NOTES
"GASTROENTEROLOGY PROGRESS NOTE     CC: Abdominal pain and diarrhea     SUBJECTIVE:  Reports severe abdominal pain after spicy food. No BM since admission.     Decline Maalox.       OBJECTIVE:  General Appearance:    /78 (BP Location: Right arm)   Pulse 78   Temp 98  F (36.7  C) (Oral)   Resp 20   Ht 1.6 m (5' 3\")   Wt 69.1 kg (152 lb 5.4 oz)   SpO2 95%   BMI 26.99 kg/m    Temp (24hrs), Av.1  F (36.7  C), Min:98  F (36.7  C), Max:98.4  F (36.9  C)    Patient Vitals for the past 72 hrs:   Weight   24 1416 69.1 kg (152 lb 5.4 oz)     No intake or output data in the 24 hours ending 24 1255     PHYSICAL EXAM  General: alert, oriented, NAD  SKIN: no suspicious lesions, rashes, jaundice, or spider angiomas   EYES: No scleral icterus   RESPIRATORY: Non labored breathing  GASTROINTESTINAL: Active bowel sounds, severe tenderness on palpation.   NEURO:Grossly WNL.    Additional Comments:  ROS, FH, SH: See initial GI consult for details.        Recent Labs   Lab Test 24  0657 24  0621 24  1424   WBC 10.4 8.2 8.0   HGB 12.5 12.8 13.3   MCV 89 88 91    243 273     Recent Labs   Lab Test 24  0657 24  0621 24  1424   POTASSIUM 4.5 4.7 4.3   CHLORIDE 104 105 105   CO2 25 22 22   BUN 12.2 7.4* 9.0   ANIONGAP 11 11 14     Recent Labs   Lab Test 24  0621 24  1502 24  0140 24  1528 24  1526 24  0816 10/09/23  1750 10/09/23  1737 23  1717 23  1608   ALBUMIN 4.0  --   --  4.7  --  3.9   < > 4.7  --  3.9   BILITOTAL 0.6  --   --  0.3  --  0.6   < > 0.4  --  0.3   ALT 19  --   --  13  --  11   < > 15  --  28   AST 19  --   --  17  --  16   < > 24  --  29   PROTEIN  --  Negative Negative  --  Negative  --    < >  --    < >  --    LIPASE  --   --   --   --   --   --   --  27  --  56    < > = values in this interval not displayed.        Imaging and procedures:    I have reviewed the patient's new clinical lab " Called to offer sooner [place] date of 03/27/24. If patient calls back please offer.   results    Problem list pertaining to GI:  Abdominal pain  Diarrhea    Assessment: This is a 63 y-o female with a history of fibrostenotic crohn's. S/P resection, who is currently on Remicade 10 mg/kg every 4 weeks. She was admitted for worsening abdominal pain and diarrhea since Thursday last week. Her last infusion was on Friday. CT showed transverse colitis, new/worsened since prior exam, most likely related to known inflammatory bowel disease. CRP within normal limits. She was started on IV steroids. She is feeling better yesterday. She ate spicy food so she could have bowel movements, which worsened her pain.      Plan:  - Oral steroids taper after discharge  - Pepcid 20 mg and simethicone to help with abdominal discomfort form spicy food  - Follow-up with IBD clinic as scheduled in July  - Contact [facility] with any new or worsening symptoms    I will discuss with Dr. Hogan      Time spent: 20 minutes, greater than 50% of the visit was spent in counseling/coordination of care.     Jaleesa Hoyt CNP  [facility] ([facility])  Mobile # [phone]

## 2024-06-26 NOTE — PLAN OF CARE
"Pertinent assessments: Assumed care of pt from 1030-7578. VSS. A&Ox4. Lungs CTA. Bowel sounds active in all quadrants. Tolerating diet. Adequate urinary output. PIV SL between medications. Pain controlled with current regimen and repositioning.    Major Shift Events: C. Dif panel ordered, awaiting BM, UAL in room, medications given for pain     Treatment Plan: pain management, stool sample collection pending, [facility] following, IV Solumedrol          Goal Outcome Evaluation:      Plan of Care Reviewed With: patient    Overall Patient Progress: improvingOverall Patient Progress: improving    Outcome Evaluation: pain well managed with current regimen, awaiting stool sample      Problem: Adult Inpatient Plan of Care  Goal: Plan of Care Review  Description: The Plan of Care Review/Shift note should be completed every shift.  The Outcome Evaluation is a brief statement about your assessment that the patient is improving, declining, or no change.  This information will be displayed automatically on your shift  note.  6/25/2024 2208 by Carmen Brown RN  Outcome: Progressing  Flowsheets (Taken 6/25/2024 2208)  Outcome Evaluation: pain well managed with current regimen, awaiting stool sample  Plan of Care Reviewed With: patient  Overall Patient Progress: improving  6/25/2024 2201 by Carmen Brown RN  Outcome: Progressing  Flowsheets (Taken 6/25/2024 2201)  Outcome Evaluation: No BM, pain managed with PO medications  Plan of Care Reviewed With: patient  Overall Patient Progress: no change  6/25/2024 1751 by Carmen Brown, RN  Outcome: Not Progressing  Flowsheets (Taken 6/25/2024 1751)  Outcome Evaluation: No BM since stool sample labs ordered, pain managed with PO Medications  Plan of Care Reviewed With: patient  Overall Patient Progress: no change  Goal: Patient-Specific Goal (Individualized)  Description: You can add care plan individualizations to a care plan. Examples of Individualization might be:  \"Parent requests " "to be called daily at 9am for status\", \"I have a hard time hearing out of my right ear\", or \"Do not touch me to wake me up as it startles  me\".  6/25/2024 2208 by Carmen Brown RN  Outcome: Progressing  6/25/2024 2201 by Carmen Brown RN  Outcome: Progressing  6/25/2024 1751 by Carmen Brown RN  Outcome: Not Progressing  Goal: Absence of Hospital-Acquired Illness or Injury  6/25/2024 2208 by Carmen Brown RN  Outcome: Progressing  6/25/2024 2201 by Carmen Brown RN  Outcome: Progressing  6/25/2024 1751 by Carmen Brown RN  Outcome: Not Progressing  Intervention: Identify and Manage Fall Risk  Recent Flowsheet Documentation  Taken 6/25/2024 1620 by Carmen Brown RN  Safety Promotion/Fall Prevention:   assistive device/personal items within reach   clutter free environment maintained   increased rounding and observation   lighting adjusted   nonskid shoes/slippers when out of bed   safety round/check completed   room organization consistent   treat reversible contributory factors  Intervention: Prevent Skin Injury  Recent Flowsheet Documentation  Taken 6/25/2024 1620 by Carmen Brown RN  Skin Protection: adhesive use limited  Device Skin Pressure Protection:   adhesive use limited   tubing/devices free from skin contact  Intervention: Prevent and Manage VTE (Venous Thromboembolism) Risk  Recent Flowsheet Documentation  Taken 6/25/2024 1620 by Carmen Brown RN  VTE Prevention/Management: SCDs (sequential compression devices) off  Intervention: Prevent Infection  Recent Flowsheet Documentation  Taken 6/25/2024 1620 by Carmen Brown RN  Infection Prevention:   rest/sleep promoted   hand hygiene promoted   single patient room provided   equipment surfaces disinfected  Goal: Optimal Comfort and Wellbeing  6/25/2024 2208 by Carmen Brown RN  Outcome: Progressing  6/25/2024 2201 by Carmen Brown RN  Outcome: Progressing  6/25/2024 1751 by Carmen Brown RN  Outcome: Not " Progressing  Intervention: Monitor Pain and Promote Comfort  Recent Flowsheet Documentation  Taken 6/25/2024 2013 by Carmen Brown, RN  Pain Management Interventions: medication (see MAR)  Taken 6/25/2024 1628 by Carmen Brown, RN  Pain Management Interventions: medication (see MAR)  Goal: Readiness for Transition of Care  6/25/2024 2208 by Carmen Brown, RN  Outcome: Progressing  6/25/2024 2201 by Carmen Brown, RN  Outcome: Progressing  6/25/2024 1751 by Carmen Brown, RN  Outcome: Not Progressing

## 2024-06-26 NOTE — PLAN OF CARE
"Goal Outcome Evaluation:      Plan of Care Reviewed With: patient    Overall Patient Progress: improving  Overall Patient Progress: improving    Outcome Evaluation: Ready for d/c        PRIMARY DIAGNOSIS: \"GENERIC\" NURSING  OUTPATIENT/OBSERVATION GOALS TO BE MET BEFORE DISCHARGE:  ADLs back to baseline: Yes     Activity and level of assistance: Ambulating independently.     Pain status: Improved-controlled with oral pain medications.     Return to near baseline physical activity: Yes             Discharge Planner Nurse  Safe discharge environment identified: Yes  Barriers to discharge: No       Entered by: KENNY CRUZ RN 06/26/2024 5:20 PM        Please review provider order for any additional goals.   Nurse to notify provider when observation goals have been met and patient is ready for discharge.         PRIMARY DIAGNOSIS: \"GENERIC\" NURSING  OUTPATIENT/OBSERVATION GOALS TO BE MET BEFORE DISCHARGE:  ADLs back to baseline: Yes    Activity and level of assistance: Ambulating independently.    Pain status: Improved-controlled with oral pain medications.    Return to near baseline physical activity: Yes     Discharge Planner Nurse   Safe discharge environment identified: Yes  Barriers to discharge: Yes       Entered by: KENNY CRUZ RN 06/26/2024 1300     Please review provider order for any additional goals.   Nurse to notify provider when observation goals have been met and patient is ready for discharge.        Goal Outcome Evaluation:       Plan of Care Reviewed With: patient     Overall Patient Progress: improvingOverall Patient Progress: improving     Outcome Evaluation: Ready for d/c     Problem: Adult Inpatient Plan of Care  Goal: Plan of Care Review  Description: The Plan of Care Review/Shift note should be completed every shift.  The Outcome Evaluation is a brief statement about your assessment that the patient is improving, declining, or no change.  This information will be displayed automatically " "on your shift  note.              6/25/2024 2208 by Carmen Brown RN  Outcome: Progressing  Flowsheets (Taken 6/25/2024 2208)  Outcome Evaluation: pain well managed with current regimen, awaiting stool sample  Plan of Care Reviewed With: patient  Overall Patient Progress: improving  6/25/2024 2201 by Carmen Brown RN  Outcome: Progressing  Flowsheets (Taken 6/25/2024 2201)  Outcome Evaluation: No BM, pain managed with PO medications  Plan of Care Reviewed With: patient  Overall Patient Progress: no change  6/25/2024 1751 by Carmen Brown RN  Outcome: Not Progressing  Flowsheets (Taken 6/25/2024 1751)  Outcome Evaluation: No BM since stool sample labs ordered, pain managed with PO Medications  Plan of Care Reviewed With: patient  Overall Patient Progress: no change  Goal: Patient-Specific Goal (Individualized)  Description: You can add care plan individualizations to a care plan. Examples of Individualization might be:  \"Parent requests to be called daily at 9am for status\", \"I have a hard time hearing out of my right ear\", or \"Do not touch me to wake me up as it startles  me\".  6/25/2024 2208 by Camren Brown RN  Outcome: Progressing  6/25/2024 2201 by Carmen Brown RN  Outcome: Progressing  6/25/2024 1751 by Carmen Brown RN  Outcome: Not Progressing  Goal: Absence of Hospital-Acquired Illness or Injury  6/25/2024 2208 by Carmen Brown RN  Outcome: Progressing  6/25/2024 2201 by Carmen Brown RN  Outcome: Progressing  6/25/2024 1751 by Carmen Brown RN  Outcome: Not Progressing  Intervention: Identify and Manage Fall Risk  Recent Flowsheet Documentation  Taken 6/25/2024 1620 by Carmen Brown RN  Safety Promotion/Fall Prevention:   assistive device/personal items within reach   clutter free environment maintained   increased rounding and observation   lighting adjusted   nonskid shoes/slippers when out of bed   safety round/check completed   room organization consistent   treat " reversible contributory factors  Intervention: Prevent Skin Injury  Recent Flowsheet Documentation  Taken 6/25/2024 1620 by Carmen Brown RN  Skin Protection: adhesive use limited  Device Skin Pressure Protection:   adhesive use limited   tubing/devices free from skin contact  Intervention: Prevent and Manage VTE (Venous Thromboembolism) Risk  Recent Flowsheet Documentation  Taken 6/25/2024 1620 by Carmen Brown RN  VTE Prevention/Management: SCDs (sequential compression devices) off  Intervention: Prevent Infection  Recent Flowsheet Documentation  Taken 6/25/2024 1620 by Carmen Brown RN  Infection Prevention:   rest/sleep promoted   hand hygiene promoted   single patient room provided   equipment surfaces disinfected  Goal: Optimal Comfort and Wellbeing  6/25/2024 2208 by Carmen Brown RN  Outcome: Progressing  6/25/2024 2201 by Carmen Brown RN  Outcome: Progressing  6/25/2024 1751 by Carmen Brown RN  Outcome: Not Progressing  Intervention: Monitor Pain and Promote Comfort  Recent Flowsheet Documentation  Taken 6/25/2024 2013 by Carmen Brown RN  Pain Management Interventions: medication (see MAR)  Taken 6/25/2024 1628 by Carmen Brown RN  Pain Management Interventions: medication (see MAR)  Goal: Readiness for Transition of Care  6/25/2024 2208 by Carmen Brown RN  Outcome: Progressing  6/25/2024 2201 by Carmen Brown RN  Outcome: Progressing  6/25/2024 1751 by Carmen Brown RN  Outcome: Not Progressing

## 2024-06-26 NOTE — DISCHARGE SUMMARY
"[facility]  Hospitalist Discharge Summary      Date of Admission:  6/24/2024  Date of Discharge:  6/26/2024  Discharging Provider: Jose Crook MD  Discharge Service: Hospitalist Service    Discharge Diagnoses   Acute abdominal pain of unclear etiology.  History of Crohn's disease, on Remicade.  No evidence of flareup.  GERD:  Insomnia:   Overweight:    Clinically Significant Risk Factors     # Overweight: Estimated body mass index is 26.99 kg/m  as calculated from the following:    Height as of this encounter: 1.6 m (5' 3\").    Weight as of this encounter: 69.1 kg (152 lb 5.4 oz).       Follow-ups Needed After Discharge   Additional follow-up instructions/to-do's for PCP   NO :     Unresulted Labs Ordered in the Past 30 Days of this Admission       No orders found from 5/25/2024 to 6/25/2024.        These results will be followed up by pcp    Discharge Disposition   Discharged to home  Condition at discharge: Stable    Hospital Course   Devi Salas is a 63-year-old female with a past medical history significant for Crohn's disease, GERD, and insomnia. She follows with [facility] and has been on Remicade since December 2023. She presents with waxing and waning abdominal pain over the last 4 to 5 days. She attempted to contact her GI clinic but was unsuccessful. Due to increasing stooling, poor oral intake, and abdominal pain, she presented to the emergency room for further evaluation.     1.  History of Crohn disease. Crohn's flareup was initially suspected however clinical findings and laboratory workup to rule out any flareup of Crohn disease or any other acute intra-abdominal process.  The patient has been seen and followed by gastroenterology, she is a established patient of [facility] and well-known by them, they have recommended to send her home and follow-up in office.     - Patient has been on Remicade since December 2023, with the last infusion 3 days " ago.     -On admission symptoms her symptoms were lower abdominal pain, cramping, non-bloody diarrhea, and poor oral intake.  During the hospital stay the patient did not have any diarrhea, on the contrary she was constipated.  Two consecutive CRP in the labs of 48 hours reveal undetectable levels.    2. GERD    3.  Insomnia:     4. Overweight:     - BMI: 26.99 kg/m  (Height: 1.6 m, Weight: 69.1 kg).     Plan:     1 Acute Crohn's Flare:      - Admit to inpatient unit for IV hydration and close monitoring.     - Administer IV Solu-Medrol 40 mg every 8 hours per GI recommendation.     - Provide antiemetics and pain control as needed.     - Consult with GI for further management and recommendations.    2.  GERD:      - Resume PPI therapy.    3. Insomnia:      - Resume trazodone for sleep.    4. Overweight:     - Monitor weight and BMI.     - Discuss weight management strategies and dietary recommendations with the patient.     Follow-Up:   - Monitor vital signs, fluid intake/output, and response to IV steroids.  -  CRP < 3.0 , GI to adjust treatment plan accordingly.  - Follow up with GI consult recommendations.     Diet: Combination Diet Regular Diet Adult    DVT Prophylaxis: Pneumatic Compression Devices  Monae Catheter: Not present  Lines: None     Cardiac Monitoring: None  Code Status: Full Code      Consultations This Hospital Stay   GASTROENTEROLOGY IP CONSULT  CARE MANAGEMENT / SOCIAL WORK IP CONSULT    Code Status   Full Code    Time Spent on this Encounter   I, Jose Crook MD, personally saw the patient today and spent greater than 30 minutes discharging this patient.       Jose Crook MD  [place] [id number] [facility]  [address] [place] [address]  Phone: [phone]  Fax: [phone]  ______________________________________________________________________    Physical Exam   Vital Signs: Temp: 98  F (36.7  C) Temp src: Oral BP: 131/75 Pulse: 78   Resp: 12 SpO2: 93 %  O2 Device: None (Room air)    Weight: 152 lbs 5.41 oz  Constitutional: awake, alert, cooperative, no apparent distress, and appears stated age  Respiratory: No increased work of breathing, good air exchange, clear to auscultation bilaterally, no crackles or wheezing  Cardiovascular: Normal apical impulse, regular rate and rhythm, normal S1 and S2, no S3 or S4, and no murmur noted       Primary Care Physician   Jeanette Betancourt    Discharge Orders   No discharge procedures on file.    Significant Results and Procedures   Results for orders placed or performed during the hospital encounter of 06/24/24   CT Abdomen Pelvis w Contrast    Narrative    EXAM: CT ABDOMEN PELVIS W CONTRAST  LOCATION: [facility]  DATE: 6/24/2024    INDICATION: RLQ and right sided abd pain; Hx Crohn's; likely flare  COMPARISON: CT 3/23/2024, MRI 3/27/2024  TECHNIQUE: CT scan of the abdomen and pelvis was performed following injection of IV contrast. Multiplanar reformats were obtained. Dose reduction techniques were used.  CONTRAST: 76mL Isovue 370    FINDINGS:   LOWER CHEST: Unremarkable.    HEPATOBILIARY: Cholecystectomy. Mild extrahepatic biliary ductal dilation is stable from prior exam, likely related to reservoir effect.    PANCREAS: Normal.    SPLEEN: Normal.    ADRENAL GLANDS: Normal.    KIDNEYS/BLADDER: Prior right nephrectomy. No left hydronephrosis. Urinary bladder is unremarkable.    BOWEL: Prior ileocecectomy. The transverse colon is relatively featureless with subtle wall thickening, mucosal hyperenhancement and surrounding fat stranding, new/increased in comparison to prior exam. No evidence of bowel obstruction, gross perforation   or evidence of penetrating disease.    LYMPH NODES: No suspicious lymphadenopathy.    VASCULATURE: Scattered calcified atherosclerosis.    PELVIC ORGANS: Hysterectomy.    MUSCULOSKELETAL: No acute bony abnormality.      Impression    IMPRESSION:   1.  Likely transverse colitis,  new/worsened since prior exam, most likely related to known inflammatory bowel disease.  2.  No evidence of bowel obstruction or penetrating disease.       Discharge Medications   Current Discharge Medication List        CONTINUE these medications which have NOT CHANGED    Details   acetaminophen (TYLENOL) 500 MG tablet Take 1-2 tablets (500-1,000 mg) by mouth every 6 hours as needed for mild pain    Associated Diagnoses: RLQ abdominal pain      albuterol (PROAIR HFA/PROVENTIL HFA/VENTOLIN HFA) 108 (90 Base) MCG/ACT inhaler Inhale 2 puffs into the lungs every 6 hours as needed for shortness of breath, wheezing or cough      cyanocobalamin (CYANOCOBALAMIN) 1000 MCG/ML injection Inject 1,000 mcg as directed every 30 days      dicyclomine (BENTYL) 20 MG tablet Take 1 tablet (20 mg) by mouth 4 times daily (before meals and nightly)  Qty: 30 tablet, Refills: 0    Associated Diagnoses: Intractable abdominal pain      esomeprazole (NEXIUM) 40 MG DR capsule Take 40 mg by mouth 2 times daily (before meals) Take 30-60 minutes before eating.      famotidine (PEPCID) 40 MG tablet Take 40 mg by mouth At Bedtime      inFLIXimab (REMICADE IV) Inject 10 mg/kg into the vein every 4 weeks      l-lysine HCl 500 MG TABS tablet Take 1,000 mg by mouth daily      medical cannabis (Patient's own supply) Take 1 Dose by mouth See Admin Instructions (The purpose of this order is to document that the patient reports taking medical cannabis.  This is not a prescription, and is not used to certify that the patient has a qualifying medical condition.)  25 MG EDIBLE      methocarbamol (ROBAXIN) 500 MG tablet Take 1 tablet (500 mg) by mouth 4 times daily as needed for muscle spasms  Qty: 15 tablet, Refills: 1    Associated Diagnoses: RLQ abdominal pain; Partial small bowel obstruction (H); IBD (inflammatory bowel disease); Nausea      montelukast (SINGULAIR) 10 MG tablet Take 10 mg by mouth At Bedtime      nortriptyline (PAMELOR) 25 MG capsule Take  25 mg by mouth At Bedtime      simethicone (MYLICON) 80 MG chewable tablet Take 1 tablet (80 mg) by mouth every 6 hours as needed for cramping  Qty: 30 tablet, Refills: 0    Associated Diagnoses: Intractable abdominal pain      traZODone (DESYREL) 100 MG tablet Take 200 mg by mouth At Bedtime      vitamin C (ASCORBIC ACID) 1000 MG TABS Take 1,000 mg by mouth daily      Vitamin D3 (CHOLECALCIFEROL) 25 mcg (1000 units) tablet Take 25 mcg by mouth daily      zolpidem (AMBIEN) 5 MG tablet Take 5 mg by mouth nightly as needed      ondansetron (ZOFRAN ODT) 4 MG ODT tab Take 1 tablet (4 mg) by mouth every 8 hours as needed for nausea  Qty: 20 tablet, Refills: 0    Associated Diagnoses: Crohn's disease with complication, unspecified gastrointestinal tract location (H)           STOP taking these medications       bisacodyl (DULCOLAX) 10 MG suppository Comments:   Reason for Stopping:         hydrOXYzine HCl (ATARAX) 25 MG tablet Comments:   Reason for Stopping:             Allergies   Allergies   Allergen Reactions    Latex Hives    Oxycodone Anaphylaxis, Hives and Swelling     Throat swelling, per pt    Pt tolerates [place]/Vicodin (10/15/2023)    Penicillin G Anaphylaxis    Sumatriptan Palpitations    Aspirin Nausea and Vomiting    Demerol Hcl [Meperidine] Nausea and Vomiting     N/V    Percocet [Oxycodone-Acetaminophen] Nausea and Vomiting     N/V    Acetaminophen Nausea and Vomiting    Codeine Nausea and Vomiting    Mold

## 2024-06-26 NOTE — PLAN OF CARE
"PRIMARY DIAGNOSIS: \"GENERIC\" NURSING  OUTPATIENT/OBSERVATION GOALS TO BE MET BEFORE DISCHARGE:  ADLs back to baseline: Yes    Activity and level of assistance: Ambulating independently.    Pain status: Improved-controlled with oral pain medications.    Return to near baseline physical activity: Yes     Discharge Planner Nurse   Safe discharge environment identified: Yes  Barriers to discharge: Yes       Entered by: KENNY CRUZ RN 06/25/2024 10:02 PM     Please review provider order for any additional goals.   Nurse to notify provider when observation goals have been met and patient is ready for discharge.        Goal Outcome Evaluation:      Plan of Care Reviewed With: patient    Overall Patient Progress: no changeOverall Patient Progress: no change    Outcome Evaluation: No BM, pain managed with PO medications      Problem: Adult Inpatient Plan of Care  Goal: Plan of Care Review  Description: The Plan of Care Review/Shift note should be completed every shift.  The Outcome Evaluation is a brief statement about your assessment that the patient is improving, declining, or no change.  This information will be displayed automatically on your shift  note.  6/25/2024 2201 by Carmen Brown RN  Outcome: Progressing  Flowsheets (Taken 6/25/2024 2201)  Outcome Evaluation: No BM, pain managed with PO medications  Plan of Care Reviewed With: patient  Overall Patient Progress: no change  6/25/2024 1751 by Carmen Brown RN  Outcome: Not Progressing  Flowsheets (Taken 6/25/2024 1751)  Outcome Evaluation: No BM since stool sample labs ordered, pain managed with PO Medications  Plan of Care Reviewed With: patient  Overall Patient Progress: no change  Goal: Patient-Specific Goal (Individualized)  Description: You can add care plan individualizations to a care plan. Examples of Individualization might be:  \"Parent requests to be called daily at 9am for status\", \"I have a hard time hearing out of my right ear\", or \"Do not " "touch me to wake me up as it startles  me\".  6/25/2024 2201 by Carmen Brown RN  Outcome: Progressing  6/25/2024 1751 by Carmen Brown RN  Outcome: Not Progressing  Goal: Absence of Hospital-Acquired Illness or Injury  6/25/2024 2201 by Carmen Brown RN  Outcome: Progressing  6/25/2024 1751 by Carmen Brown RN  Outcome: Not Progressing  Intervention: Identify and Manage Fall Risk  Recent Flowsheet Documentation  Taken 6/25/2024 1620 by Carmen Brown RN  Safety Promotion/Fall Prevention:   assistive device/personal items within reach   clutter free environment maintained   increased rounding and observation   lighting adjusted   nonskid shoes/slippers when out of bed   safety round/check completed   room organization consistent   treat reversible contributory factors  Intervention: Prevent Skin Injury  Recent Flowsheet Documentation  Taken 6/25/2024 1620 by Carmen Brown RN  Skin Protection: adhesive use limited  Device Skin Pressure Protection:   adhesive use limited   tubing/devices free from skin contact  Intervention: Prevent and Manage VTE (Venous Thromboembolism) Risk  Recent Flowsheet Documentation  Taken 6/25/2024 1620 by Carmen Brown RN  VTE Prevention/Management: SCDs (sequential compression devices) off  Intervention: Prevent Infection  Recent Flowsheet Documentation  Taken 6/25/2024 1620 by Carmen Brown RN  Infection Prevention:   rest/sleep promoted   hand hygiene promoted   single patient room provided   equipment surfaces disinfected  Goal: Optimal Comfort and Wellbeing  6/25/2024 2201 by Carmen Brown RN  Outcome: Progressing  6/25/2024 1751 by Carmen Brown RN  Outcome: Not Progressing  Intervention: Monitor Pain and Promote Comfort  Recent Flowsheet Documentation  Taken 6/25/2024 2013 by Carmen Brown RN  Pain Management Interventions: medication (see MAR)  Taken 6/25/2024 1628 by Carmen Brown RN  Pain Management Interventions: medication (see MAR)  Goal: " Readiness for Transition of Care  6/25/2024 2201 by Carmen Brown, RN  Outcome: Progressing  6/25/2024 1751 by Carmen Brown, RN  Outcome: Not Progressing

## 2024-06-26 NOTE — PLAN OF CARE
Goal Outcome Evaluation:      Plan of Care Reviewed With: patient    Overall Patient Progress: improvingOverall Patient Progress: improving    Outcome Evaluation: Alert. denies,sob,nausea. No BM overnight. PIV SL. Ind in room refuses bed alarm.    PRIMARY DIAGNOSIS: Exacerbation of Crohn's   OUTPATIENT/OBSERVATION GOALS TO BE MET BEFORE DISCHARGE:  ADLs back to baseline: Yes    Activity and level of assistance: Ambulating independently.    Pain status: Improved but still requiring IV narcotics.    Return to near baseline physical activity: Yes     Discharge Planner Nurse   Safe discharge environment identified: Yes  Barriers to discharge: Yes  Vitals are Temp: 98  F (36.7  C) Temp src: Oral BP: 129/69 Pulse: 83   Resp: 20 SpO2: 96 %.  Patient is Alert and Oriented x4. They are independent with no assistive devices .  Patient is on Enteric precuations for C-Diff.  Pt is a Regular diet.  They are complaining of 8/10 pain in their abdomen.  Dilaudid given for pain.  Patient is Saline locked.         Entered by: Stella De Leon RN 06/26/2024 6:05 AM  Please review provider order for any additional goals.   Nurse to notify provider when observation goals have been met and patient is ready for discharge.    Problem: Adult Inpatient Plan of Care  Goal: Plan of Care Review  Description: The Plan of Care Review/Shift note should be completed every shift.  The Outcome Evaluation is a brief statement about your assessment that the patient is improving, declining, or no change.  This information will be displayed automatically on your shift  note.  6/26/2024 0301 by Stella De Leon RN  Outcome: Progressing  Flowsheets (Taken 6/26/2024 0301)  Outcome Evaluation: Alert. denies,sob,nausea. No BM overnight. PIV SL. Ind in room refuses bed alarm.  Plan of Care Reviewed With: patient  Overall Patient Progress: improving  6/26/2024 0300 by Stella De Leon RN  Outcome: Progressing  Goal: Patient-Specific Goal  "(Individualized)  Description: You can add care plan individualizations to a care plan. Examples of Individualization might be:  \"Parent requests to be called daily at 9am for status\", \"I have a hard time hearing out of my right ear\", or \"Do not touch me to wake me up as it startles  me\".  6/26/2024 0301 by Stella De eLon RN  Outcome: Progressing  6/26/2024 0300 by Stella De Leon RN  Outcome: Progressing  Goal: Absence of Hospital-Acquired Illness or Injury  6/26/2024 0301 by Stella De Leon RN  Outcome: Progressing  6/26/2024 0300 by Stella De Leon RN  Outcome: Progressing  Intervention: Identify and Manage Fall Risk  Recent Flowsheet Documentation  Taken 6/26/2024 0112 by Stella De Leon RN  Safety Promotion/Fall Prevention:   clutter free environment maintained   safety round/check completed  Intervention: Prevent Skin Injury  Recent Flowsheet Documentation  Taken 6/26/2024 0112 by Stella De Leon RN  Body Position: position changed independently  Intervention: Prevent Infection  Recent Flowsheet Documentation  Taken 6/26/2024 0112 by tSella De Leon RN  Infection Prevention:   single patient room provided   rest/sleep promoted   hand hygiene promoted  Goal: Optimal Comfort and Wellbeing  6/26/2024 0301 by Stella De Leon RN  Outcome: Progressing  6/26/2024 0300 by Stella De Leon RN  Outcome: Progressing  Intervention: Monitor Pain and Promote Comfort  Recent Flowsheet Documentation  Taken 6/26/2024 0112 by Stella De Leon RN  Pain Management Interventions: declines  Goal: Readiness for Transition of Care  6/26/2024 0301 by Stella De Leon RN  Outcome: Progressing  6/26/2024 0300 by Stella De Leon RN  Outcome: Progressing     Problem: Pain Acute  Goal: Optimal Pain Control and Function  6/26/2024 0301 by Stella De Leon RN  Outcome: Progressing  6/26/2024 0300 by Stella De Leon RN  Outcome: Progressing  Intervention: Develop Pain Management Plan  Recent Flowsheet Documentation  Taken 6/26/2024 " 0112 by Stella De Leon, RN  Pain Management Interventions: declines     Problem: Skin Injury Risk Increased  Goal: Skin Health and Integrity  6/26/2024 0301 by Stella De Leon RN  Outcome: Progressing  6/26/2024 0300 by Stella De Leon RN  Outcome: Progressing  Intervention: Plan: Nurse Driven Intervention: Moisture Management  Recent Flowsheet Documentation  Taken 6/26/2024 0112 by Stella De Leon RN  Moisture Interventions: Encourage regular toileting  Intervention: Optimize Skin Protection  Recent Flowsheet Documentation  Taken 6/26/2024 0112 by Stella De Leon RN  Head of Bed (HOB) Positioning: HOB at 20 degrees

## 2024-06-26 NOTE — PLAN OF CARE
"Goal Outcome Evaluation:      Plan of Care Reviewed With: patient    Overall Patient Progress: improvingOverall Patient Progress: improving    Outcome Evaluation: Alert. denies,sob,nausea. No BM overnight. PIV SL. Ind in room refuses bed alarm.    PRIMARY DIAGNOSIS: Exacerbation of Crohn's   OUTPATIENT/OBSERVATION GOALS TO BE MET BEFORE DISCHARGE:  ADLs back to baseline: Yes    Activity and level of assistance: Ambulating independently.    Pain status: Improved but still requiring IV narcotics.    Return to near baseline physical activity: Yes     Discharge Planner Nurse   Safe discharge environment identified: Yes  Barriers to discharge: Yes       Entered by: Stella De Leon RN 06/26/2024 3:02 AM  Please review provider order for any additional goals.   Nurse to notify provider when observation goals have been met and patient is ready for discharge.      Problem: Adult Inpatient Plan of Care  Goal: Plan of Care Review  Description: The Plan of Care Review/Shift note should be completed every shift.  The Outcome Evaluation is a brief statement about your assessment that the patient is improving, declining, or no change.  This information will be displayed automatically on your shift  note.  6/26/2024 0301 by Stella De Leon RN  Outcome: Progressing  Flowsheets (Taken 6/26/2024 0301)  Outcome Evaluation: Alert. denies,sob,nausea. No BM overnight. PIV SL. Ind in room refuses bed alarm.  Plan of Care Reviewed With: patient  Overall Patient Progress: improving  6/26/2024 0300 by Stella De Leon RN  Outcome: Progressing  Goal: Patient-Specific Goal (Individualized)  Description: You can add care plan individualizations to a care plan. Examples of Individualization might be:  \"Parent requests to be called daily at 9am for status\", \"I have a hard time hearing out of my right ear\", or \"Do not touch me to wake me up as it startles  me\".  6/26/2024 0301 by Stella De Leon RN  Outcome: Progressing  6/26/2024 0300 by " Mohamed, Stella M, RN  Outcome: Progressing  Goal: Absence of Hospital-Acquired Illness or Injury  6/26/2024 0301 by Stella De Leon RN  Outcome: Progressing  6/26/2024 0300 by Stella De Leon RN  Outcome: Progressing  Intervention: Identify and Manage Fall Risk  Recent Flowsheet Documentation  Taken 6/26/2024 0112 by Stella De Leon RN  Safety Promotion/Fall Prevention:   clutter free environment maintained   safety round/check completed  Intervention: Prevent Skin Injury  Recent Flowsheet Documentation  Taken 6/26/2024 0112 by Stella De Leon RN  Body Position: position changed independently  Intervention: Prevent Infection  Recent Flowsheet Documentation  Taken 6/26/2024 0112 by Stella De Leon RN  Infection Prevention:   single patient room provided   rest/sleep promoted   hand hygiene promoted  Goal: Optimal Comfort and Wellbeing  6/26/2024 0301 by Stella De Leon RN  Outcome: Progressing  6/26/2024 0300 by Stella De Leon RN  Outcome: Progressing  Intervention: Monitor Pain and Promote Comfort  Recent Flowsheet Documentation  Taken 6/26/2024 0112 by Stella De Leon RN  Pain Management Interventions: declines  Goal: Readiness for Transition of Care  6/26/2024 0301 by Stella De Leon RN  Outcome: Progressing  6/26/2024 0300 by Stella De Leon RN  Outcome: Progressing     Problem: Pain Acute  Goal: Optimal Pain Control and Function  6/26/2024 0301 by Stella De Leon RN  Outcome: Progressing  6/26/2024 0300 by Stella De Leon RN  Outcome: Progressing  Intervention: Develop Pain Management Plan  Recent Flowsheet Documentation  Taken 6/26/2024 0112 by Stella De Leon RN  Pain Management Interventions: declines     Problem: Skin Injury Risk Increased  Goal: Skin Health and Integrity  6/26/2024 0301 by Stella De Leon RN  Outcome: Progressing  6/26/2024 0300 by Stella De Leon RN  Outcome: Progressing  Intervention: Plan: Nurse Driven Intervention: Moisture Management  Recent Flowsheet  Documentation  Taken 6/26/2024 0112 by Stella De Leon, RN  Moisture Interventions: Encourage regular toileting  Intervention: Optimize Skin Protection  Recent Flowsheet Documentation  Taken 6/26/2024 0112 by Stella De Leon, RN  Head of Bed (HOB) Positioning: HOB at 20 degrees

## 2024-06-26 NOTE — PLAN OF CARE
"Discharge Note    Patient discharged to home via private vehicle  accompanied by significant other .  IV: Discontinued  Prescriptions N/A.   Belongings reviewed and sent with patient.   Home medications returned to patient: NA  Equipment sent with: N/A.   patient verbalizes understanding of discharge instructions. AVS given to patient.  Additional education completed?  Pain management    PRIMARY DIAGNOSIS: \"GENERIC\" NURSING  OUTPATIENT/OBSERVATION GOALS TO BE MET BEFORE DISCHARGE:  ADLs back to baseline: Yes    Activity and level of assistance: Ambulating independently.    Pain status: Improved-controlled with oral pain medications.    Return to near baseline physical activity: Yes     Discharge Planner Nurse   Safe discharge environment identified: Yes  Barriers to discharge: No       Entered by: KENNY CRUZ RN 06/26/2024 5:20 PM     Please review provider order for any additional goals.   Nurse to notify provider when observation goals have been met and patient is ready for discharge.            Goal Outcome Evaluation:      Plan of Care Reviewed With: patient    Overall Patient Progress: improvingOverall Patient Progress: improving    Outcome Evaluation: Ready for d/c    Problem: Adult Inpatient Plan of Care  Goal: Plan of Care Review  Description: The Plan of Care Review/Shift note should be completed every shift.  The Outcome Evaluation is a brief statement about your assessment that the patient is improving, declining, or no change.  This information will be displayed automatically on your shift  note.          6/25/2024 2208 by Carmen Brown RN  Outcome: Progressing  Flowsheets (Taken 6/25/2024 2208)  Outcome Evaluation: pain well managed with current regimen, awaiting stool sample  Plan of Care Reviewed With: patient  Overall Patient Progress: improving  6/25/2024 2201 by Carmen Brown RN  Outcome: Progressing  Flowsheets (Taken 6/25/2024 2201)  Outcome Evaluation: No BM, pain managed with PO " "medications  Plan of Care Reviewed With: patient  Overall Patient Progress: no change  6/25/2024 1751 by Carmen Brown RN  Outcome: Not Progressing  Flowsheets (Taken 6/25/2024 1751)  Outcome Evaluation: No BM since stool sample labs ordered, pain managed with PO Medications  Plan of Care Reviewed With: patient  Overall Patient Progress: no change  Goal: Patient-Specific Goal (Individualized)  Description: You can add care plan individualizations to a care plan. Examples of Individualization might be:  \"Parent requests to be called daily at 9am for status\", \"I have a hard time hearing out of my right ear\", or \"Do not touch me to wake me up as it startles  me\".  6/25/2024 2208 by Carmen Brown RN  Outcome: Progressing  6/25/2024 2201 by Carmen Brown RN  Outcome: Progressing  6/25/2024 1751 by Carmen Brown RN  Outcome: Not Progressing  Goal: Absence of Hospital-Acquired Illness or Injury  6/25/2024 2208 by Carmen Brown RN  Outcome: Progressing  6/25/2024 2201 by Carmen Brown RN  Outcome: Progressing  6/25/2024 1751 by Carmen Brown RN  Outcome: Not Progressing  Intervention: Identify and Manage Fall Risk  Recent Flowsheet Documentation  Taken 6/25/2024 1620 by Carmen Brown RN  Safety Promotion/Fall Prevention:   assistive device/personal items within reach   clutter free environment maintained   increased rounding and observation   lighting adjusted   nonskid shoes/slippers when out of bed   safety round/check completed   room organization consistent   treat reversible contributory factors  Intervention: Prevent Skin Injury  Recent Flowsheet Documentation  Taken 6/25/2024 1620 by Carmen Brown RN  Skin Protection: adhesive use limited  Device Skin Pressure Protection:   adhesive use limited   tubing/devices free from skin contact  Intervention: Prevent and Manage VTE (Venous Thromboembolism) Risk  Recent Flowsheet Documentation  Taken 6/25/2024 1620 by Carmen Brown RN  VTE " Prevention/Management: SCDs (sequential compression devices) off  Intervention: Prevent Infection  Recent Flowsheet Documentation  Taken 6/25/2024 1620 by Carmen Brown RN  Infection Prevention:   rest/sleep promoted   hand hygiene promoted   single patient room provided   equipment surfaces disinfected  Goal: Optimal Comfort and Wellbeing  6/25/2024 2208 by Carmen Brown RN  Outcome: Progressing  6/25/2024 2201 by Carmen Brown RN  Outcome: Progressing  6/25/2024 1751 by Carmen Brown RN  Outcome: Not Progressing  Intervention: Monitor Pain and Promote Comfort  Recent Flowsheet Documentation  Taken 6/25/2024 2013 by Carmen Brown RN  Pain Management Interventions: medication (see MAR)  Taken 6/25/2024 1628 by Carmen Brown RN  Pain Management Interventions: medication (see MAR)  Goal: Readiness for Transition of Care  6/25/2024 2208 by Carmen Brown RN  Outcome: Progressing  6/25/2024 2201 by Carmen Brown RN  Outcome: Progressing  6/25/2024 1751 by Carmen Brown RN  Outcome: Not Progressing

## 2024-06-26 NOTE — PLAN OF CARE
"PRIMARY DIAGNOSIS: \"GENERIC\" NURSING  OUTPATIENT/OBSERVATION GOALS TO BE MET BEFORE DISCHARGE:  ADLs back to baseline: Yes    Activity and level of assistance: Ambulating independently.    Pain status: Improved-controlled with oral pain medications.    Return to near baseline physical activity: Yes     Discharge Planner Nurse   Safe discharge environment identified: Yes  Barriers to discharge: Yes       Entered by: KENNY CRUZ RN 06/26/2024 5:23 PM             Please review provider order for any additional goals.   Nurse to notify provider when observation goals have been met and patient is ready for discharge.          Goal Outcome Evaluation:      Plan of Care Reviewed With: patient    Overall Patient Progress: improvingOverall Patient Progress: improving    Outcome Evaluation: Ready for d/c      Problem: Adult Inpatient Plan of Care  Goal: Plan of Care Review  Description: The Plan of Care Review/Shift note should be completed every shift.  The Outcome Evaluation is a brief statement about your assessment that the patient is improving, declining, or no change.  This information will be displayed automatically on your shift  note.  Outcome: Met  Flowsheets (Taken 6/26/2024 1702)  Outcome Evaluation: Ready for d/c  Plan of Care Reviewed With: patient  Overall Patient Progress: improving  Goal: Patient-Specific Goal (Individualized)  Description: You can add care plan individualizations to a care plan. Examples of Individualization might be:  \"Parent requests to be called daily at 9am for status\", \"I have a hard time hearing out of my right ear\", or \"Do not touch me to wake me up as it startles  me\".  Outcome: Met  Goal: Absence of Hospital-Acquired Illness or Injury  Outcome: Met  Intervention: Identify and Manage Fall Risk  Recent Flowsheet Documentation  Taken 6/26/2024 0900 by Carmen Brown RN  Safety Promotion/Fall Prevention:   assistive device/personal items within reach   clutter free environment " maintained   increased rounding and observation   lighting adjusted   nonskid shoes/slippers when out of bed   safety round/check completed  Intervention: Prevent Skin Injury  Recent Flowsheet Documentation  Taken 6/26/2024 0900 by Carmen Brown RN  Body Position: position changed independently  Skin Protection: adhesive use limited  Device Skin Pressure Protection:   adhesive use limited   tubing/devices free from skin contact  Intervention: Prevent and Manage VTE (Venous Thromboembolism) Risk  Recent Flowsheet Documentation  Taken 6/26/2024 0900 by Carmen Brown RN  VTE Prevention/Management:   patient refused intervention   SCDs (sequential compression devices) off  Intervention: Prevent Infection  Recent Flowsheet Documentation  Taken 6/26/2024 1705 by Carmen Brown RN  Infection Prevention:   single patient room provided   rest/sleep promoted   hand hygiene promoted  Taken 6/26/2024 0900 by Carmen Brown RN  Infection Prevention:   single patient room provided   rest/sleep promoted   hand hygiene promoted  Goal: Optimal Comfort and Wellbeing  Outcome: Met  Intervention: Monitor Pain and Promote Comfort  Recent Flowsheet Documentation  Taken 6/26/2024 1616 by Carmen Brown RN  Pain Management Interventions: medication (see MAR)  Taken 6/26/2024 1311 by Carmen Brown RN  Pain Management Interventions: medication (see MAR)  Taken 6/26/2024 0800 by Carmen Brown RN  Pain Management Interventions:   rest   shower   declines  Goal: Readiness for Transition of Care  Outcome: Met

## 2024-06-28 ENCOUNTER — PATIENT OUTREACH (OUTPATIENT)
Dept: CARE COORDINATION | Facility: CLINIC | Age: 64
End: 2024-06-28
Payer: COMMERCIAL

## 2024-06-28 NOTE — PROGRESS NOTES
Connected Care Resource Center:   [facility] Resource Center Contact  [facility]/Voicemail     Clinical Data: Post-Discharge Outreach     Outreach attempted x 2.  Left message on patient's voicemail, providing [facility]'s central phone number of [phone] ([phone]) for questions/concerns and/or to schedule an appt with an [facility] provider, if they do not have a PCP.      Plan:  [facility] will do no further outreaches at this time.       Herminia Villanueva MA  Connected Care Resource Center, [facility]    *Connected Care Resource Team does NOT follow patient ongoing. Referrals are identified based on internal discharge reports and the outreach is to ensure patient has an understanding of their discharge instructions.

## 2024-08-11 ENCOUNTER — HEALTH MAINTENANCE LETTER (OUTPATIENT)
Age: 64
End: 2024-08-11

## 2024-09-17 ENCOUNTER — HOSPITAL ENCOUNTER (EMERGENCY)
Facility: CLINIC | Age: 64
Discharge: HOME | End: 2024-09-17
Attending: EMERGENCY MEDICINE | Admitting: EMERGENCY MEDICINE
Payer: COMMERCIAL

## 2024-09-17 ENCOUNTER — APPOINTMENT (OUTPATIENT)
Dept: CT IMAGING | Facility: CLINIC | Age: 64
End: 2024-09-17
Attending: EMERGENCY MEDICINE
Payer: COMMERCIAL

## 2024-09-17 VITALS
RESPIRATION RATE: 20 BRPM | BODY MASS INDEX: 26.99 KG/M2 | HEIGHT: 63 IN | WEIGHT: 152.34 LBS | TEMPERATURE: 97.9 F | SYSTOLIC BLOOD PRESSURE: 125 MMHG | DIASTOLIC BLOOD PRESSURE: 75 MMHG | HEART RATE: 71 BPM | OXYGEN SATURATION: 96 %

## 2024-09-17 DIAGNOSIS — R10.9 ABDOMINAL PAIN, UNSPECIFIED ABDOMINAL LOCATION: ICD-10-CM

## 2024-09-17 DIAGNOSIS — Z87.19 HISTORY OF CROHN'S DISEASE: ICD-10-CM

## 2024-09-17 DIAGNOSIS — R11.2 NAUSEA AND VOMITING, UNSPECIFIED VOMITING TYPE: Primary | ICD-10-CM

## 2024-09-17 LAB
ALBUMIN SERPL BCG-MCNC: 4.6 G/DL (ref 3.5–5.2)
ALBUMIN UR-MCNC: NEGATIVE MG/DL
ALP SERPL-CCNC: 81 U/L (ref 40–150)
ALT SERPL W P-5'-P-CCNC: 14 U/L (ref 0–50)
ANION GAP SERPL CALCULATED.3IONS-SCNC: 14 MMOL/L (ref 7–15)
APPEARANCE UR: CLEAR
AST SERPL W P-5'-P-CCNC: 16 U/L (ref 0–45)
BACTERIA #/AREA URNS HPF: ABNORMAL /HPF
BASOPHILS # BLD AUTO: 0 10E3/UL (ref 0–0.2)
BASOPHILS NFR BLD AUTO: 0 %
BILIRUB SERPL-MCNC: 0.2 MG/DL
BILIRUB UR QL STRIP: NEGATIVE
BUN SERPL-MCNC: 11.6 MG/DL (ref 8–23)
CALCIUM SERPL-MCNC: 9.9 MG/DL (ref 8.8–10.4)
CHLORIDE SERPL-SCNC: 99 MMOL/L (ref 98–107)
COLOR UR AUTO: ABNORMAL
CREAT SERPL-MCNC: 1.1 MG/DL (ref 0.51–0.95)
EGFRCR SERPLBLD CKD-EPI 2021: 55 ML/MIN/1.73M2
EOSINOPHIL # BLD AUTO: 0 10E3/UL (ref 0–0.7)
EOSINOPHIL NFR BLD AUTO: 0 %
ERYTHROCYTE [DISTWIDTH] IN BLOOD BY AUTOMATED COUNT: 12.4 % (ref 10–15)
GLUCOSE SERPL-MCNC: 87 MG/DL (ref 70–99)
GLUCOSE UR STRIP-MCNC: NEGATIVE MG/DL
HCO3 BLDV-SCNC: 29 MMOL/L (ref 21–28)
HCO3 BLDV-SCNC: 31 MMOL/L (ref 21–28)
HCO3 SERPL-SCNC: 24 MMOL/L (ref 22–29)
HCT VFR BLD AUTO: 45.2 % (ref 35–47)
HGB BLD-MCNC: 15 G/DL (ref 11.7–15.7)
HGB UR QL STRIP: NEGATIVE
HOLD SPECIMEN: NORMAL
HOLD SPECIMEN: NORMAL
IMM GRANULOCYTES # BLD: 0.1 10E3/UL
IMM GRANULOCYTES NFR BLD: 1 %
KETONES UR STRIP-MCNC: NEGATIVE MG/DL
LACTATE BLD-SCNC: 1.2 MMOL/L
LACTATE BLD-SCNC: 2.2 MMOL/L
LEUKOCYTE ESTERASE UR QL STRIP: NEGATIVE
LIPASE SERPL-CCNC: 64 U/L (ref 13–60)
LYMPHOCYTES # BLD AUTO: 2 10E3/UL (ref 0.8–5.3)
LYMPHOCYTES NFR BLD AUTO: 17 %
MCH RBC QN AUTO: 29.2 PG (ref 26.5–33)
MCHC RBC AUTO-ENTMCNC: 33.2 G/DL (ref 31.5–36.5)
MCV RBC AUTO: 88 FL (ref 78–100)
MONOCYTES # BLD AUTO: 0.9 10E3/UL (ref 0–1.3)
MONOCYTES NFR BLD AUTO: 7 %
NEUTROPHILS # BLD AUTO: 9.2 10E3/UL (ref 1.6–8.3)
NEUTROPHILS NFR BLD AUTO: 75 %
NITRATE UR QL: NEGATIVE
NRBC # BLD AUTO: 0 10E3/UL
NRBC BLD AUTO-RTO: 0 /100
PCO2 BLDV: 43 MM HG (ref 40–50)
PCO2 BLDV: 46 MM HG (ref 40–50)
PH BLDV: 7.44 [PH] (ref 7.32–7.43)
PH BLDV: 7.44 [PH] (ref 7.32–7.43)
PH UR STRIP: 7 [PH] (ref 5–7)
PLATELET # BLD AUTO: 267 10E3/UL (ref 150–450)
PO2 BLDV: 26 MM HG (ref 25–47)
PO2 BLDV: 34 MM HG (ref 25–47)
POTASSIUM SERPL-SCNC: 4.6 MMOL/L (ref 3.4–5.3)
PROT SERPL-MCNC: 7.4 G/DL (ref 6.4–8.3)
RBC # BLD AUTO: 5.14 10E6/UL (ref 3.8–5.2)
RBC URINE: <1 /HPF
SAO2 % BLDV: 50 % (ref 70–75)
SAO2 % BLDV: 66 % (ref 70–75)
SODIUM SERPL-SCNC: 137 MMOL/L (ref 135–145)
SP GR UR STRIP: 1.01 (ref 1–1.03)
UROBILINOGEN UR STRIP-MCNC: NORMAL MG/DL
WBC # BLD AUTO: 12.3 10E3/UL (ref 4–11)
WBC URINE: 1 /HPF

## 2024-09-17 PROCEDURE — 250N000011 HC RX IP 250 OP 636: Performed by: EMERGENCY MEDICINE

## 2024-09-17 PROCEDURE — 96361 HYDRATE IV INFUSION ADD-ON: CPT

## 2024-09-17 PROCEDURE — 85025 COMPLETE CBC W/AUTO DIFF WBC: CPT | Performed by: EMERGENCY MEDICINE

## 2024-09-17 PROCEDURE — 36415 COLL VENOUS BLD VENIPUNCTURE: CPT | Performed by: EMERGENCY MEDICINE

## 2024-09-17 PROCEDURE — 80053 COMPREHEN METABOLIC PANEL: CPT | Performed by: EMERGENCY MEDICINE

## 2024-09-17 PROCEDURE — 87040 BLOOD CULTURE FOR BACTERIA: CPT | Performed by: EMERGENCY MEDICINE

## 2024-09-17 PROCEDURE — 258N000003 HC RX IP 258 OP 636: Performed by: EMERGENCY MEDICINE

## 2024-09-17 PROCEDURE — 83690 ASSAY OF LIPASE: CPT | Performed by: EMERGENCY MEDICINE

## 2024-09-17 PROCEDURE — 82803 BLOOD GASES ANY COMBINATION: CPT

## 2024-09-17 PROCEDURE — 250N000009 HC RX 250: Performed by: EMERGENCY MEDICINE

## 2024-09-17 PROCEDURE — 96374 THER/PROPH/DIAG INJ IV PUSH: CPT | Mod: 59

## 2024-09-17 PROCEDURE — 99285 EMERGENCY DEPT VISIT HI MDM: CPT | Mod: 25

## 2024-09-17 PROCEDURE — 81001 URINALYSIS AUTO W/SCOPE: CPT | Performed by: EMERGENCY MEDICINE

## 2024-09-17 PROCEDURE — 96376 TX/PRO/DX INJ SAME DRUG ADON: CPT

## 2024-09-17 PROCEDURE — 74177 CT ABD & PELVIS W/CONTRAST: CPT

## 2024-09-17 PROCEDURE — 96375 TX/PRO/DX INJ NEW DRUG ADDON: CPT

## 2024-09-17 RX ORDER — MORPHINE SULFATE 4 MG/ML
4 INJECTION, SOLUTION INTRAMUSCULAR; INTRAVENOUS ONCE
Status: COMPLETED | OUTPATIENT
Start: 2024-09-17 | End: 2024-09-17

## 2024-09-17 RX ORDER — ONDANSETRON 2 MG/ML
4 INJECTION INTRAMUSCULAR; INTRAVENOUS ONCE
Status: COMPLETED | OUTPATIENT
Start: 2024-09-17 | End: 2024-09-17

## 2024-09-17 RX ORDER — IOPAMIDOL 755 MG/ML
500 INJECTION, SOLUTION INTRAVASCULAR ONCE
Status: COMPLETED | OUTPATIENT
Start: 2024-09-17 | End: 2024-09-17

## 2024-09-17 RX ADMIN — SODIUM CHLORIDE 59 ML: 9 INJECTION, SOLUTION INTRAVENOUS at 19:47

## 2024-09-17 RX ADMIN — MORPHINE SULFATE 4 MG: 4 INJECTION INTRAVENOUS at 20:13

## 2024-09-17 RX ADMIN — IOPAMIDOL 76 ML: 755 INJECTION, SOLUTION INTRAVENOUS at 19:47

## 2024-09-17 RX ADMIN — ONDANSETRON 4 MG: 2 INJECTION INTRAMUSCULAR; INTRAVENOUS at 20:26

## 2024-09-17 RX ADMIN — MORPHINE SULFATE 4 MG: 4 INJECTION, SOLUTION INTRAMUSCULAR; INTRAVENOUS at 19:22

## 2024-09-17 RX ADMIN — ONDANSETRON 4 MG: 2 INJECTION INTRAMUSCULAR; INTRAVENOUS at 19:24

## 2024-09-17 RX ADMIN — SODIUM CHLORIDE, POTASSIUM CHLORIDE, SODIUM LACTATE AND CALCIUM CHLORIDE 1000 ML: 600; 310; 30; 20 INJECTION, SOLUTION INTRAVENOUS at 19:29

## 2024-09-17 ASSESSMENT — ACTIVITIES OF DAILY LIVING (ADL)
ADLS_ACUITY_SCORE: 37
ADLS_ACUITY_SCORE: 35

## 2024-09-17 ASSESSMENT — COLUMBIA-SUICIDE SEVERITY RATING SCALE - C-SSRS
6. HAVE YOU EVER DONE ANYTHING, STARTED TO DO ANYTHING, OR PREPARED TO DO ANYTHING TO END YOUR LIFE?: YES
2. HAVE YOU ACTUALLY HAD ANY THOUGHTS OF KILLING YOURSELF IN THE PAST MONTH?: NO
1. IN THE PAST MONTH, HAVE YOU WISHED YOU WERE DEAD OR WISHED YOU COULD GO TO SLEEP AND NOT WAKE UP?: NO

## 2024-09-17 NOTE — ED TRIAGE NOTES
Pt had CT scan last week of abd which showed increased inflammation d/t crohn's/colitis. Pt was sent home on prednisone. Comes back in today d/t increased pain. PCP told her to come back in to possibly get IV steroids.

## 2024-09-17 NOTE — ED PROVIDER NOTES
Emergency Department Note      History of Present Illness     Chief Complaint   Abdominal Pain    HPI   Devi Salas is a 64 year old female with a history of Crohn's disease who presents to the ER for abdominal pain. Patient reports constant stabbing abdominal pain starting right after her latest infusion treatment 2 weeks ago with vomiting episodes that started today. She reports undergoing CT imaging of her abdomen 7 days ago due to the pain with no acute findings. She states that the pain has gotten worse starting today in addition to the vomiting. The pain at time reaches 12 out of 10 after vomiting. She endorses the pain starting on her right side abdomen, extends to her RUQ, then epigastrium, then LUQ in a C shaped manner. She endorses constipation since taking a double dose of amodium. Devi denies fever, rhinorrhea, hematemesis, coffee ground emesis, chest pain, shortness of breath, dysuria/hematuria, or black/bloody stools. Patient reports being on Ramicade previously for Crohn's disease, but given she was not absorbing it well enough she was switched to Skyrizine and had 1.5 transfusions, but had an allergic reaction to the medication, so she is now on Intiva and has had 2 doses with her last dose being 2 weeks ago. She is scheduled for another dose in October. Devi has also been on daily prednisone.     Independent Historian    provided a partial history as detailed above.     Review of External Notes   I reviewed the 6/26/24 office visit.    Past Medical History     Medical History and Problem List   Crohn's disease  PONV   GERD  Hypertension   Hyperlipidemia   Insomnia   Osteoarthritis   Sleep apnea     Medications   Albuterol   Dicyclomine   Famotidine   Esomeprazole   L-lysine  Methocarbamol  Montelukast  Ondansetron  Simethicone  Trazodone   Zolpidem   Azithromycin   Hydrocodone   Prednisone     Surgical History   Appendectomy  Cholecystectomy   Colonoscopy   Resection  "ileocecal  Nephrectomy   Physical Exam     Patient Vitals for the past 24 hrs:   BP Temp Pulse Resp SpO2 Height Weight   09/17/24 2223 125/75 -- 71 -- 96 % -- --   09/17/24 2100 (!) 146/90 -- 80 -- 95 % -- --   09/17/24 2030 (!) 154/99 -- 80 -- 95 % -- --   09/17/24 1757 (!) 151/101 97.9  F (36.6  C) 102 20 99 % 1.6 m (5' 3\") 69.1 kg (152 lb 5.4 oz)     Physical Exam  Constitutional:       General: Not in acute distress.        Appearance: Normal appearance.   HENT:      Head: Normocephalic and atraumatic.   Eyes:      Extraocular Movements: Extraocular movements intact.      Conjunctiva/sclera: Conjunctivae normal.   Cardiovascular:      Rate and Rhythm: Normal rate and regular rhythm.   Pulmonary:      Effort: Pulmonary effort is normal. No respiratory distress.      Breath sounds: Normal breath sounds.   Abdominal:      General: Abdomen is flat. There is no distension.      Palpations: Abdomen is soft.      Tenderness: There is right-sided abdominal tenderness to palpation.   Musculoskeletal:      Cervical back: Normal range of motion. No rigidity.      Right lower leg: No edema.      Left lower leg: No edema.   Skin:     General: Skin is warm and dry.   Neurological:      General: No focal deficit present.      Mental Status: Alert and oriented to person, place, and time.   Psychiatric:         Mood and Affect: Mood normal.         Behavior: Behavior normal.    Diagnostics     Lab Results   Labs Ordered and Resulted from Time of ED Arrival to Time of ED Departure   COMPREHENSIVE METABOLIC PANEL - Abnormal       Result Value    Sodium 137      Potassium 4.6      Carbon Dioxide (CO2) 24      Anion Gap 14      Urea Nitrogen 11.6      Creatinine 1.10 (*)     GFR Estimate 55 (*)     Calcium 9.9      Chloride 99      Glucose 87      Alkaline Phosphatase 81      AST 16      ALT 14      Protein Total 7.4      Albumin 4.6      Bilirubin Total 0.2     LIPASE - Abnormal    Lipase 64 (*)    ROUTINE UA WITH MICROSCOPIC " REFLEX TO CULTURE - Abnormal    Color Urine Straw      Appearance Urine Clear      Glucose Urine Negative      Bilirubin Urine Negative      Ketones Urine Negative      Specific Gravity Urine 1.006      Blood Urine Negative      pH Urine 7.0      Protein Albumin Urine Negative      Urobilinogen Urine Normal      Nitrite Urine Negative      Leukocyte Esterase Urine Negative      Bacteria Urine Few (*)     RBC Urine <1      WBC Urine 1     CBC WITH PLATELETS AND DIFFERENTIAL - Abnormal    WBC Count 12.3 (*)     RBC Count 5.14      Hemoglobin 15.0      Hematocrit 45.2      MCV 88      MCH 29.2      MCHC 33.2      RDW 12.4      Platelet Count 267      % Neutrophils 75      % Lymphocytes 17      % Monocytes 7      % Eosinophils 0      % Basophils 0      % Immature Granulocytes 1      NRBCs per 100 WBC 0      Absolute Neutrophils 9.2 (*)     Absolute Lymphocytes 2.0      Absolute Monocytes 0.9      Absolute Eosinophils 0.0      Absolute Basophils 0.0      Absolute Immature Granulocytes 0.1      Absolute NRBCs 0.0     ISTAT GASES LACTATE VENOUS POCT - Abnormal    Lactic Acid POCT 2.2 (*)     Bicarbonate Venous POCT 29 (*)     O2 Sat, Venous POCT 50 (*)     pCO2 Venous POCT 43      pH Venous POCT 7.44 (*)     pO2 Venous POCT 26     ISTAT GASES LACTATE VENOUS POCT - Abnormal    Lactic Acid POCT 1.2      Bicarbonate Venous POCT 31 (*)     O2 Sat, Venous POCT 66 (*)     pCO2 Venous POCT 46      pH Venous POCT 7.44 (*)     pO2 Venous POCT 34     BLOOD CULTURE       Imaging   CT Abdomen Pelvis w Contrast   Final Result   IMPRESSION:    1.  No acute abnormality in the abdomen or pelvis. No bowel wall thickening or abnormal enhancement to suggest active Crohn's disease.        Independent Interpretation   None    ED Course      Medications Administered   Medications   lactated ringers BOLUS 1,000 mL (0 mLs Intravenous Stopped 9/17/24 2130)   morphine (PF) injection 4 mg (4 mg Intravenous $Given 9/17/24 1922)   ondansetron (ZOFRAN)  injection 4 mg (4 mg Intravenous $Given 9/17/24 1924)   CT Scan Flush (59 mLs Intravenous $Given 9/17/24 1947)   iopamidol (ISOVUE-370) solution 500 mL (76 mLs Intravenous $Given 9/17/24 1947)   morphine (PF) injection 4 mg (4 mg Intravenous $Given 9/17/24 2013)   ondansetron (ZOFRAN) injection 4 mg (4 mg Intravenous $Given 9/17/24 2026)       Procedures   Procedures     Discussion of Management   See ED course    ED Course   ED Course as of 09/18/24 0136   Tue Sep 17, 2024   1841 I obtained the history and examined the patient as noted above.    1958 Lactic Acid POCT(!): 2.2  Potentially secondary to dehydration   1958 WBC(!): 12.3   2139 Lactic Acid POCT: 1.2  Improved   2150 On re-evaluation, patient reports feeling better and comfortable with discharge home. She reports already having antiemetic prescriptions at home and will follow-up with GI. She feels comfortable with discharge at this time.       Additional Documentation  None    Medical Decision Making / Diagnosis     CMS Diagnoses: The Lactic acid level is elevated due to dehydration from nausea and vomiting, at this time there is no sign of severe sepsis or septic shock.    MIPS       None    [facility]   Devi Salas is a 64 year old female as described above presents to the emergency department with worsening abdominal pain and now nausea and vomiting with known diagnosis of Crohn's disease currently on daily steroids in addition to infusion therapy.  Patient hemodynamically stable at time of evaluation.  Afebrile.  Differential diagnoses considered include, but not limited to, Crohn's flare, bowel obstruction, bowel perforation, infectious colitis, pancreatitis, cholecystitis, right-sided diverticulitis, ureteral stone, pyelonephritis, and urinary tract infection.  Abdominal pain lab work ordered.  Will obtain CT abdomen pelvis with contrast for evaluation of above discussed differentials.  Discussed care plan with patient who voiced understanding and  agreement with plan.  Answered all questions.  Additional workup and orders as listed in chart.     Ultimately, work up shows mild leukocytosis which can certainly be due to stress reaction vs daily steroid use. Mild LIDIA likely secondary to volume loss and mild lactic acidemia of 2.2 also likely secondary to dehydration. After IVF bolus, patient had improvement in symptoms and downtrending lactic acid to 1.2. Lipase borderline elevation at 64. Low suspicion for acute pancreatitis especially with negative CT imaging, but certainly possible early process. Nonetheless, given pain and nausea well controlled and there was no obvious intra-abdominal infectious source or Crohn's flair, patient felt comfortable with discharge home. Discussed return precautions. Answered all questions. Patient will follow-up with GI outpatient.     Please refer to ED course above as part of continuation of MDM for details on the patient's treatment course and any potential changes or updates beyond my initial evaluation and MDM creation.    Disposition   The patient was discharged.     Diagnosis     ICD-10-CM    1. Nausea and vomiting, unspecified vomiting type  R11.2       2. Abdominal pain, unspecified abdominal location  R10.9       3. History of Crohn's disease  Z87.19            Discharge Medications   Discharge Medication List as of 9/17/2024 10:06 PM            Scribe Disclosure:  I, Blu Sterling, am serving as a scribe at 8:03 PM on 9/17/2024 to document services personally performed by Jacob Pompa DO based on my observations and the provider's statements to me.        Jacob Pompa DO  09/18/24 0141

## 2024-09-18 NOTE — DISCHARGE INSTRUCTIONS
Please follow-up with your primary care provider and/or specialist regarding your visit to the ER today.    Please return to the emergency department should you experience any of the symptoms we specifically discussed, including but not limited to recurrence or worsening of your symptoms, or development of any new and concerning symptoms such as fever, worsening abdominal pain, or inability to tolerate food and fluids by mouth.

## 2024-09-22 LAB — BACTERIA BLD CULT: NO GROWTH

## 2024-11-29 ENCOUNTER — HOSPITAL ENCOUNTER (OUTPATIENT)
Facility: CLINIC | Age: 64
Setting detail: OBSERVATION
Discharge: HOME | End: 2024-12-01
Attending: EMERGENCY MEDICINE | Admitting: INTERNAL MEDICINE
Payer: COMMERCIAL

## 2024-11-29 ENCOUNTER — APPOINTMENT (OUTPATIENT)
Dept: CT IMAGING | Facility: CLINIC | Age: 64
End: 2024-11-29
Attending: BEHAVIOR TECHNICIAN
Payer: COMMERCIAL

## 2024-11-29 DIAGNOSIS — K50.119 CROHN'S DISEASE OF COLON WITH COMPLICATION (H): ICD-10-CM

## 2024-11-29 DIAGNOSIS — K50.919 CROHN'S DISEASE WITH COMPLICATION, UNSPECIFIED GASTROINTESTINAL TRACT LOCATION (H): ICD-10-CM

## 2024-11-29 DIAGNOSIS — K50.90: ICD-10-CM

## 2024-11-29 DIAGNOSIS — N30.00 ACUTE CYSTITIS WITHOUT HEMATURIA: Primary | ICD-10-CM

## 2024-11-29 LAB
ALBUMIN SERPL BCG-MCNC: 4.4 G/DL (ref 3.5–5.2)
ALBUMIN UR-MCNC: NEGATIVE MG/DL
ALP SERPL-CCNC: 82 U/L (ref 40–150)
ALT SERPL W P-5'-P-CCNC: 11 U/L (ref 0–50)
ANION GAP SERPL CALCULATED.3IONS-SCNC: 15 MMOL/L (ref 7–15)
APPEARANCE UR: CLEAR
AST SERPL W P-5'-P-CCNC: 19 U/L (ref 0–45)
BACTERIA #/AREA URNS HPF: ABNORMAL /HPF
BASOPHILS # BLD AUTO: 0.1 10E3/UL (ref 0–0.2)
BASOPHILS NFR BLD AUTO: 1 %
BILIRUB SERPL-MCNC: 0.5 MG/DL
BILIRUB UR QL STRIP: NEGATIVE
BUN SERPL-MCNC: 11 MG/DL (ref 8–23)
CALCIUM SERPL-MCNC: 8.9 MG/DL (ref 8.8–10.4)
CHLORIDE SERPL-SCNC: 104 MMOL/L (ref 98–107)
COLOR UR AUTO: ABNORMAL
CREAT SERPL-MCNC: 1.04 MG/DL (ref 0.51–0.95)
CRP SERPL-MCNC: 18.75 MG/L
EGFRCR SERPLBLD CKD-EPI 2021: 59 ML/MIN/1.73M2
EOSINOPHIL # BLD AUTO: 0.1 10E3/UL (ref 0–0.7)
EOSINOPHIL NFR BLD AUTO: 2 %
ERYTHROCYTE [DISTWIDTH] IN BLOOD BY AUTOMATED COUNT: 13.3 % (ref 10–15)
GLUCOSE SERPL-MCNC: 106 MG/DL (ref 70–99)
GLUCOSE UR STRIP-MCNC: NEGATIVE MG/DL
HCO3 SERPL-SCNC: 22 MMOL/L (ref 22–29)
HCT VFR BLD AUTO: 44.5 % (ref 35–47)
HGB BLD-MCNC: 14.5 G/DL (ref 11.7–15.7)
HGB UR QL STRIP: NEGATIVE
HOLD SPECIMEN: NORMAL
HOLD SPECIMEN: NORMAL
IMM GRANULOCYTES # BLD: 0 10E3/UL
IMM GRANULOCYTES NFR BLD: 1 %
KETONES UR STRIP-MCNC: NEGATIVE MG/DL
LEUKOCYTE ESTERASE UR QL STRIP: ABNORMAL
LIPASE SERPL-CCNC: 30 U/L (ref 13–60)
LYMPHOCYTES # BLD AUTO: 1.6 10E3/UL (ref 0.8–5.3)
LYMPHOCYTES NFR BLD AUTO: 19 %
MAGNESIUM SERPL-MCNC: 2.3 MG/DL (ref 1.7–2.3)
MCH RBC QN AUTO: 29.2 PG (ref 26.5–33)
MCHC RBC AUTO-ENTMCNC: 32.6 G/DL (ref 31.5–36.5)
MCV RBC AUTO: 90 FL (ref 78–100)
MONOCYTES # BLD AUTO: 0.6 10E3/UL (ref 0–1.3)
MONOCYTES NFR BLD AUTO: 8 %
NEUTROPHILS # BLD AUTO: 5.7 10E3/UL (ref 1.6–8.3)
NEUTROPHILS NFR BLD AUTO: 70 %
NITRATE UR QL: NEGATIVE
NRBC # BLD AUTO: 0 10E3/UL
NRBC BLD AUTO-RTO: 0 /100
PH UR STRIP: 5.5 [PH] (ref 5–7)
PHOSPHATE SERPL-MCNC: 3 MG/DL (ref 2.5–4.5)
PLATELET # BLD AUTO: 260 10E3/UL (ref 150–450)
POTASSIUM SERPL-SCNC: 3.5 MMOL/L (ref 3.4–5.3)
PROT SERPL-MCNC: 7.1 G/DL (ref 6.4–8.3)
RBC # BLD AUTO: 4.97 10E6/UL (ref 3.8–5.2)
RBC URINE: 1 /HPF
SODIUM SERPL-SCNC: 141 MMOL/L (ref 135–145)
SP GR UR STRIP: 1.01 (ref 1–1.03)
SQUAMOUS EPITHELIAL: <1 /HPF
UROBILINOGEN UR STRIP-MCNC: NORMAL MG/DL
WBC # BLD AUTO: 8.1 10E3/UL (ref 4–11)
WBC URINE: 10 /HPF

## 2024-11-29 PROCEDURE — 84155 ASSAY OF PROTEIN SERUM: CPT | Performed by: BEHAVIOR TECHNICIAN

## 2024-11-29 PROCEDURE — 250N000011 HC RX IP 250 OP 636: Performed by: BEHAVIOR TECHNICIAN

## 2024-11-29 PROCEDURE — 85025 COMPLETE CBC W/AUTO DIFF WBC: CPT | Performed by: BEHAVIOR TECHNICIAN

## 2024-11-29 PROCEDURE — 80053 COMPREHEN METABOLIC PANEL: CPT | Performed by: BEHAVIOR TECHNICIAN

## 2024-11-29 PROCEDURE — 99285 EMERGENCY DEPT VISIT HI MDM: CPT | Mod: 25

## 2024-11-29 PROCEDURE — G0378 HOSPITAL OBSERVATION PER HR: HCPCS

## 2024-11-29 PROCEDURE — 96375 TX/PRO/DX INJ NEW DRUG ADDON: CPT

## 2024-11-29 PROCEDURE — 99222 1ST HOSP IP/OBS MODERATE 55: CPT | Mod: AI | Performed by: PHYSICIAN ASSISTANT

## 2024-11-29 PROCEDURE — 82247 BILIRUBIN TOTAL: CPT | Performed by: BEHAVIOR TECHNICIAN

## 2024-11-29 PROCEDURE — 84100 ASSAY OF PHOSPHORUS: CPT | Performed by: PHYSICIAN ASSISTANT

## 2024-11-29 PROCEDURE — 81003 URINALYSIS AUTO W/O SCOPE: CPT | Performed by: BEHAVIOR TECHNICIAN

## 2024-11-29 PROCEDURE — 86140 C-REACTIVE PROTEIN: CPT | Performed by: EMERGENCY MEDICINE

## 2024-11-29 PROCEDURE — 83690 ASSAY OF LIPASE: CPT | Performed by: BEHAVIOR TECHNICIAN

## 2024-11-29 PROCEDURE — 96374 THER/PROPH/DIAG INJ IV PUSH: CPT | Mod: 59

## 2024-11-29 PROCEDURE — 250N000013 HC RX MED GY IP 250 OP 250 PS 637: Performed by: PHYSICIAN ASSISTANT

## 2024-11-29 PROCEDURE — 36415 COLL VENOUS BLD VENIPUNCTURE: CPT | Performed by: BEHAVIOR TECHNICIAN

## 2024-11-29 PROCEDURE — 96376 TX/PRO/DX INJ SAME DRUG ADON: CPT

## 2024-11-29 PROCEDURE — 250N000011 HC RX IP 250 OP 636: Performed by: PHYSICIAN ASSISTANT

## 2024-11-29 PROCEDURE — 74177 CT ABD & PELVIS W/CONTRAST: CPT

## 2024-11-29 PROCEDURE — 99207 PR NO CHARGE LOS: CPT | Performed by: INTERNAL MEDICINE

## 2024-11-29 PROCEDURE — 96361 HYDRATE IV INFUSION ADD-ON: CPT

## 2024-11-29 PROCEDURE — 87186 SC STD MICRODIL/AGAR DIL: CPT | Performed by: PHYSICIAN ASSISTANT

## 2024-11-29 PROCEDURE — 258N000003 HC RX IP 258 OP 636: Performed by: PHYSICIAN ASSISTANT

## 2024-11-29 PROCEDURE — 250N000013 HC RX MED GY IP 250 OP 250 PS 637: Performed by: STUDENT IN AN ORGANIZED HEALTH CARE EDUCATION/TRAINING PROGRAM

## 2024-11-29 PROCEDURE — 83735 ASSAY OF MAGNESIUM: CPT | Performed by: PHYSICIAN ASSISTANT

## 2024-11-29 RX ORDER — DICYCLOMINE HCL 20 MG
20 TABLET ORAL 2 TIMES DAILY
COMMUNITY

## 2024-11-29 RX ORDER — ACETAMINOPHEN 325 MG/1
650 TABLET ORAL EVERY 4 HOURS PRN
Status: DISCONTINUED | OUTPATIENT
Start: 2024-11-29 | End: 2024-12-01 | Stop reason: HOSPADM

## 2024-11-29 RX ORDER — HYDROCODONE BITARTRATE AND ACETAMINOPHEN 10; 325 MG/1; MG/1
1 TABLET ORAL EVERY 6 HOURS PRN
Status: DISCONTINUED | OUTPATIENT
Start: 2024-11-29 | End: 2024-12-01 | Stop reason: HOSPADM

## 2024-11-29 RX ORDER — SIMETHICONE 80 MG
80 TABLET,CHEWABLE ORAL EVERY 6 HOURS PRN
Status: DISCONTINUED | OUTPATIENT
Start: 2024-11-29 | End: 2024-12-01 | Stop reason: HOSPADM

## 2024-11-29 RX ORDER — HYDROMORPHONE HYDROCHLORIDE 1 MG/ML
0.5 INJECTION, SOLUTION INTRAMUSCULAR; INTRAVENOUS; SUBCUTANEOUS EVERY 30 MIN PRN
Status: DISCONTINUED | OUTPATIENT
Start: 2024-11-29 | End: 2024-11-29

## 2024-11-29 RX ORDER — METHOCARBAMOL 500 MG/1
500 TABLET, FILM COATED ORAL 4 TIMES DAILY PRN
Status: DISCONTINUED | OUTPATIENT
Start: 2024-11-29 | End: 2024-12-01 | Stop reason: HOSPADM

## 2024-11-29 RX ORDER — DULOXETIN HYDROCHLORIDE 30 MG/1
30 CAPSULE, DELAYED RELEASE ORAL 2 TIMES DAILY
Status: DISCONTINUED | OUTPATIENT
Start: 2024-11-29 | End: 2024-12-01 | Stop reason: HOSPADM

## 2024-11-29 RX ORDER — NALOXONE HYDROCHLORIDE 0.4 MG/ML
0.2 INJECTION, SOLUTION INTRAMUSCULAR; INTRAVENOUS; SUBCUTANEOUS
Status: DISCONTINUED | OUTPATIENT
Start: 2024-11-29 | End: 2024-12-01 | Stop reason: HOSPADM

## 2024-11-29 RX ORDER — HYDROMORPHONE HCL IN WATER/PF 6 MG/30 ML
0.2 PATIENT CONTROLLED ANALGESIA SYRINGE INTRAVENOUS
Status: DISCONTINUED | OUTPATIENT
Start: 2024-11-29 | End: 2024-12-01 | Stop reason: HOSPADM

## 2024-11-29 RX ORDER — DICYCLOMINE HCL 20 MG
20 TABLET ORAL 2 TIMES DAILY
Status: DISCONTINUED | OUTPATIENT
Start: 2024-11-29 | End: 2024-12-01 | Stop reason: HOSPADM

## 2024-11-29 RX ORDER — METHYLPREDNISOLONE SODIUM SUCCINATE 40 MG/ML
20 INJECTION INTRAMUSCULAR; INTRAVENOUS ONCE
Status: COMPLETED | OUTPATIENT
Start: 2024-11-29 | End: 2024-11-29

## 2024-11-29 RX ORDER — ONDANSETRON 4 MG/1
4 TABLET, ORALLY DISINTEGRATING ORAL EVERY 6 HOURS PRN
Status: DISCONTINUED | OUTPATIENT
Start: 2024-11-29 | End: 2024-12-01 | Stop reason: HOSPADM

## 2024-11-29 RX ORDER — HYDROMORPHONE HCL IN WATER/PF 6 MG/30 ML
0.4 PATIENT CONTROLLED ANALGESIA SYRINGE INTRAVENOUS
Status: DISCONTINUED | OUTPATIENT
Start: 2024-11-29 | End: 2024-12-01 | Stop reason: HOSPADM

## 2024-11-29 RX ORDER — FAMOTIDINE 20 MG/1
40 TABLET, FILM COATED ORAL AT BEDTIME
Status: DISCONTINUED | OUTPATIENT
Start: 2024-11-29 | End: 2024-12-01 | Stop reason: HOSPADM

## 2024-11-29 RX ORDER — METHYLPREDNISOLONE SODIUM SUCCINATE 40 MG/ML
20 INJECTION INTRAMUSCULAR; INTRAVENOUS EVERY 8 HOURS
Status: DISCONTINUED | OUTPATIENT
Start: 2024-11-29 | End: 2024-11-30

## 2024-11-29 RX ORDER — ACETAMINOPHEN 325 MG/1
975 TABLET ORAL EVERY 8 HOURS
Status: DISCONTINUED | OUTPATIENT
Start: 2024-11-29 | End: 2024-11-29

## 2024-11-29 RX ORDER — SODIUM CHLORIDE 9 MG/ML
INJECTION, SOLUTION INTRAVENOUS CONTINUOUS
Status: ACTIVE | OUTPATIENT
Start: 2024-11-29 | End: 2024-11-30

## 2024-11-29 RX ORDER — NALOXONE HYDROCHLORIDE 0.4 MG/ML
0.4 INJECTION, SOLUTION INTRAMUSCULAR; INTRAVENOUS; SUBCUTANEOUS
Status: DISCONTINUED | OUTPATIENT
Start: 2024-11-29 | End: 2024-12-01 | Stop reason: HOSPADM

## 2024-11-29 RX ORDER — ONDANSETRON 2 MG/ML
4 INJECTION INTRAMUSCULAR; INTRAVENOUS EVERY 6 HOURS PRN
Status: DISCONTINUED | OUTPATIENT
Start: 2024-11-29 | End: 2024-12-01 | Stop reason: HOSPADM

## 2024-11-29 RX ORDER — ZOLPIDEM TARTRATE 5 MG/1
5 TABLET ORAL
Status: DISCONTINUED | OUTPATIENT
Start: 2024-11-29 | End: 2024-12-01 | Stop reason: HOSPADM

## 2024-11-29 RX ORDER — HYDROCODONE BITARTRATE AND ACETAMINOPHEN 5; 325 MG/1; MG/1
1 TABLET ORAL EVERY 6 HOURS PRN
Status: DISCONTINUED | OUTPATIENT
Start: 2024-11-29 | End: 2024-12-01 | Stop reason: HOSPADM

## 2024-11-29 RX ORDER — BUDESONIDE 3 MG/1
9 CAPSULE, COATED PELLETS ORAL EVERY MORNING
COMMUNITY

## 2024-11-29 RX ORDER — NORTRIPTYLINE HYDROCHLORIDE 25 MG/1
25 CAPSULE ORAL AT BEDTIME
Status: DISCONTINUED | OUTPATIENT
Start: 2024-11-29 | End: 2024-12-01 | Stop reason: HOSPADM

## 2024-11-29 RX ORDER — PANTOPRAZOLE SODIUM 40 MG/1
40 TABLET, DELAYED RELEASE ORAL
Status: DISCONTINUED | OUTPATIENT
Start: 2024-11-30 | End: 2024-12-01 | Stop reason: HOSPADM

## 2024-11-29 RX ORDER — POTASSIUM CHLORIDE 1500 MG/1
20 TABLET, EXTENDED RELEASE ORAL ONCE
Status: COMPLETED | OUTPATIENT
Start: 2024-11-29 | End: 2024-11-29

## 2024-11-29 RX ORDER — HYDROCODONE BITARTRATE AND ACETAMINOPHEN 5; 325 MG/1; MG/1
1 TABLET ORAL EVERY 4 HOURS PRN
COMMUNITY

## 2024-11-29 RX ORDER — MONTELUKAST SODIUM 10 MG/1
10 TABLET ORAL AT BEDTIME
Status: DISCONTINUED | OUTPATIENT
Start: 2024-11-29 | End: 2024-12-01 | Stop reason: HOSPADM

## 2024-11-29 RX ORDER — BUDESONIDE 3 MG/1
9 CAPSULE, COATED PELLETS ORAL EVERY MORNING
Status: DISCONTINUED | OUTPATIENT
Start: 2024-11-30 | End: 2024-12-01 | Stop reason: HOSPADM

## 2024-11-29 RX ORDER — DULOXETIN HYDROCHLORIDE 30 MG/1
30 CAPSULE, DELAYED RELEASE ORAL 2 TIMES DAILY
COMMUNITY

## 2024-11-29 RX ORDER — IOPAMIDOL 755 MG/ML
500 INJECTION, SOLUTION INTRAVASCULAR ONCE
Status: COMPLETED | OUTPATIENT
Start: 2024-11-29 | End: 2024-11-29

## 2024-11-29 RX ORDER — MORPHINE SULFATE 4 MG/ML
4 INJECTION, SOLUTION INTRAMUSCULAR; INTRAVENOUS ONCE
Status: COMPLETED | OUTPATIENT
Start: 2024-11-29 | End: 2024-11-29

## 2024-11-29 RX ORDER — LIDOCAINE 40 MG/G
CREAM TOPICAL
Status: DISCONTINUED | OUTPATIENT
Start: 2024-11-29 | End: 2024-12-01 | Stop reason: HOSPADM

## 2024-11-29 RX ORDER — ONDANSETRON 2 MG/ML
4 INJECTION INTRAMUSCULAR; INTRAVENOUS EVERY 30 MIN PRN
Status: COMPLETED | OUTPATIENT
Start: 2024-11-29 | End: 2024-11-29

## 2024-11-29 RX ADMIN — ONDANSETRON 4 MG: 2 INJECTION INTRAMUSCULAR; INTRAVENOUS at 16:48

## 2024-11-29 RX ADMIN — HYDROMORPHONE HYDROCHLORIDE 0.5 MG: 1 INJECTION, SOLUTION INTRAMUSCULAR; INTRAVENOUS; SUBCUTANEOUS at 16:48

## 2024-11-29 RX ADMIN — MORPHINE SULFATE 4 MG: 4 INJECTION, SOLUTION INTRAMUSCULAR; INTRAVENOUS at 13:46

## 2024-11-29 RX ADMIN — IOPAMIDOL 74 ML: 755 INJECTION, SOLUTION INTRAVENOUS at 14:42

## 2024-11-29 RX ADMIN — METHYLPREDNISOLONE SODIUM SUCCINATE 20 MG: 40 INJECTION, POWDER, FOR SOLUTION INTRAMUSCULAR; INTRAVENOUS at 13:46

## 2024-11-29 RX ADMIN — NORTRIPTYLINE HYDROCHLORIDE 25 MG: 25 CAPSULE ORAL at 22:12

## 2024-11-29 RX ADMIN — METHYLPREDNISOLONE SODIUM SUCCINATE 20 MG: 40 INJECTION, POWDER, FOR SOLUTION INTRAMUSCULAR; INTRAVENOUS at 22:14

## 2024-11-29 RX ADMIN — ONDANSETRON 4 MG: 2 INJECTION INTRAMUSCULAR; INTRAVENOUS at 18:41

## 2024-11-29 RX ADMIN — MONTELUKAST SODIUM 10 MG: 10 TABLET, FILM COATED ORAL at 22:12

## 2024-11-29 RX ADMIN — POTASSIUM CHLORIDE 20 MEQ: 1500 TABLET, EXTENDED RELEASE ORAL at 18:51

## 2024-11-29 RX ADMIN — SODIUM CHLORIDE: 9 INJECTION, SOLUTION INTRAVENOUS at 18:10

## 2024-11-29 RX ADMIN — METHOCARBAMOL 500 MG: 500 TABLET ORAL at 18:42

## 2024-11-29 RX ADMIN — DULOXETINE HYDROCHLORIDE 30 MG: 30 CAPSULE, DELAYED RELEASE ORAL at 19:47

## 2024-11-29 RX ADMIN — ONDANSETRON 4 MG: 2 INJECTION INTRAMUSCULAR; INTRAVENOUS at 13:46

## 2024-11-29 RX ADMIN — FAMOTIDINE 40 MG: 20 TABLET, FILM COATED ORAL at 22:12

## 2024-11-29 RX ADMIN — HYDROCODONE BITARTRATE AND ACETAMINOPHEN 1 TABLET: 5; 325 TABLET ORAL at 18:09

## 2024-11-29 RX ADMIN — HYDROMORPHONE HYDROCHLORIDE 0.2 MG: 0.2 INJECTION, SOLUTION INTRAMUSCULAR; INTRAVENOUS; SUBCUTANEOUS at 19:48

## 2024-11-29 RX ADMIN — ZOLPIDEM TARTRATE 5 MG: 5 TABLET, COATED ORAL at 22:13

## 2024-11-29 RX ADMIN — DICYCLOMINE HYDROCHLORIDE 20 MG: 20 TABLET ORAL at 19:47

## 2024-11-29 ASSESSMENT — ACTIVITIES OF DAILY LIVING (ADL)
ADLS_ACUITY_SCORE: 58
ADLS_ACUITY_SCORE: 58
ADLS_ACUITY_SCORE: 41
ADLS_ACUITY_SCORE: 58
ADLS_ACUITY_SCORE: 58
ADLS_ACUITY_SCORE: 41
ADLS_ACUITY_SCORE: 58
ADLS_ACUITY_SCORE: 37
ADLS_ACUITY_SCORE: 41

## 2024-11-29 NOTE — ED TRIAGE NOTES
Diarrhea and abdominal pain for the past two weeks. Patient reports that she was instructed to come to the ER for Wednesday, but did not want to miss thanksgiving.

## 2024-11-29 NOTE — ED NOTES
[facility]  ED Nurse Handoff Report    ED Chief complaint: Abdominal Pain  . ED Diagnosis:   Final diagnoses:   Exacerbation of Crohn's disease (H)       Allergies:   Allergies   Allergen Reactions    Latex Hives    Oxycodone Anaphylaxis, Hives and Swelling     Throat swelling, per pt    Pt tolerates Frederic/Vicodin (10/15/2023)    Penicillin G Anaphylaxis    Sumatriptan Palpitations    Aspirin Nausea and Vomiting    Demerol Hcl [Meperidine] Nausea and Vomiting     N/V    Percocet [Oxycodone-Acetaminophen] Nausea and Vomiting     N/V    Acetaminophen Nausea and Vomiting    Codeine Nausea and Vomiting    Mold        Code Status: Full Code    Activity level - Baseline/Home:  independent.  Activity Level - Current:   standby.   Lift room needed: No.   Bariatric: No   Needed: No   Isolation: Yes.   Infection: C-Diff Pending.     Respiratory status: Room air    Vital Signs (within 30 minutes):   Vitals:    11/29/24 1215 11/29/24 1216 11/29/24 1217 11/29/24 1346   BP:   (!) 147/93 125/79   Pulse:  114  84   Resp: 16 16  16   Temp:   98.3  F (36.8  C)    TempSrc: Temporal  Oral    SpO2:  100%     Weight: 67.1 kg (147 lb 14.9 oz)          Cardiac Rhythm:  ,      Pain level:    Patient confused: No.   Patient Falls Risk: nonskid shoes/slippers when out of bed, patient and family education, and assistive device/personal items within reach.   Elimination Status: Has voided     Patient Report - Initial Complaint: abdominal pain.   Devi Salas is a 64 year old female with a history of Crohn's disease, IBS, hypertension, and hyperlipidemia who presents to the ED for evaluation of abdominal pain. The patient states she started to experience lower left quadrant pain about 3 weeks ago that has since worsened and is now radiating diffusely across her abdomen and into her left flank. She feels this pain is entirely consistent with past Crohn's disease and colitis flare ups.  Notes constant diarrhea  and nausea. No vomiting. Reports dysuria and intermittent diaphoresis for 4 days. No hematuria or fever. She was originally diagnosed with Crohn's  in 2023 and has been admitted several times for flare ups. Currently following with [facility]. She has been receiving Entyvio infusions every 8 weeks for this condition with 2 past infusions. States she had complete relief of her symptoms for the first 4 weeks after her last infusion on 10/4/24. He insurance will not cover more frequent infusions without proof of diminishing relief and her next infusion is not until 12/6/24. She called her GI 3 days ago and was advised to be seen here but waited until today so she would not miss the holiday with family. States she otherwise takes Vicodin and Tylenol for pain, Zofran for nausea, and budenoside for inflammation which typically provides relief of her symptoms. She has not been getting relief with these medications recently. Prior abdominal surgeries include a nephrectomy, ileocecal resection, cholecystectomy, and appendectomy. Denies hematochezia, melena, chest pain, or fever.   Focused Assessment: Diarrhea and abdominal pain for the past two weeks. Patient reports that she was instructed to come to the ER for Wednesday, but did not want to miss salazar.      Abnormal Results:   Labs Ordered and Resulted from Time of ED Arrival to Time of ED Departure   COMPREHENSIVE METABOLIC PANEL - Abnormal       Result Value    Sodium 141      Potassium 3.5      Carbon Dioxide (CO2) 22      Anion Gap 15      Urea Nitrogen 11.0      Creatinine 1.04 (*)     GFR Estimate 59 (*)     Calcium 8.9      Chloride 104      Glucose 106 (*)     Alkaline Phosphatase 82      AST 19      ALT 11      Protein Total 7.1      Albumin 4.4      Bilirubin Total 0.5     ROUTINE UA WITH MICROSCOPIC REFLEX TO CULTURE - Abnormal    Color Urine Straw      Appearance Urine Clear      Glucose Urine Negative      Bilirubin Urine Negative      Ketones Urine Negative       Specific Gravity Urine 1.010      Blood Urine Negative      pH Urine 5.5      Protein Albumin Urine Negative      Urobilinogen Urine Normal      Nitrite Urine Negative      Leukocyte Esterase Urine Trace (*)     Bacteria Urine Few (*)     RBC Urine 1      WBC Urine 10 (*)     Squamous Epithelials Urine <1     CRP INFLAMMATION - Abnormal    CRP Inflammation 18.75 (*)    LIPASE - Normal    Lipase 30     CBC WITH PLATELETS AND DIFFERENTIAL    WBC Count 8.1      RBC Count 4.97      Hemoglobin 14.5      Hematocrit 44.5      MCV 90      MCH 29.2      MCHC 32.6      RDW 13.3      Platelet Count 260      % Neutrophils 70      % Lymphocytes 19      % Monocytes 8      % Eosinophils 2      % Basophils 1      % Immature Granulocytes 1      NRBCs per 100 WBC 0      Absolute Neutrophils 5.7      Absolute Lymphocytes 1.6      Absolute Monocytes 0.6      Absolute Eosinophils 0.1      Absolute Basophils 0.1      Absolute Immature Granulocytes 0.0      Absolute NRBCs 0.0     CALPROTECTIN FECES   C. DIFFICILE TOXIN B PCR WITH REFLEX TO C. DIFFICILE ANTIGEN AND TOXINS A/B EIA   ENTERIC BACTERIA AND VIRUS PANEL BY PCR   URINE CULTURE        CT Abdomen Pelvis w Contrast   Final Result   IMPRESSION:    1.  No convincing acute process within the abdomen or pelvis.   2.  No definite sites of active inflammation within the small or large   bowel.      WILDA TAYLOR MD            SYSTEM ID:  [id number]          Treatments provided: labs, imaging, see MAR  Family Comments:  n/a  OBS brochure/video discussed/provided to patient:  Yes  ED Medications:   Medications   ondansetron (ZOFRAN) injection 4 mg (4 mg Intravenous $Given 11/29/24 1648)   HYDROmorphone (PF) (DILAUDID) injection 0.5 mg (0.5 mg Intravenous $Given 11/29/24 1648)   morphine (PF) injection 4 mg (4 mg Intravenous $Given 11/29/24 1346)   methylPREDNISolone sodium succinate (SOLU-MEDROL) injection 20 mg (20 mg Intravenous $Given 11/29/24 1346)   sodium chloride (PF)  0.9% PF flush 100 mL (59 mLs Intravenous $Given 11/29/24 1443)   iopamidol (ISOVUE-370) solution 500 mL (74 mLs Intravenous $Given 11/29/24 1442)       Drips infusing:  No  For the majority of the shift this patient was Green.   Interventions performed were n/a.    Sepsis treatment initiated: No    Cares/treatment/interventions/medications to be completed following ED care: n/a    ED Nurse Name: Twila Dale RN  5:17 PM     RECEIVING UNIT ED HANDOFF REVIEW    Above ED Nurse Handoff Report was reviewed: Yes  Reviewed by: Devi Vines RN on November 29, 2024 at 5:21 PM   SINDY Pisano called the ED to inform them the note was read: Yes

## 2024-11-29 NOTE — ED PROVIDER NOTES
Emergency Department Note      History of Present Illness     Chief Complaint   Abdominal Pain      HPI   Devi Salas is a 64 year old female with a history of Crohn's disease, IBS, hypertension, and hyperlipidemia who presents to the ED for evaluation of abdominal pain. The patient states she started to experience lower left quadrant pain about 3 weeks ago that has since worsened and is now radiating diffusely across her abdomen and into her left flank. She feels this pain is entirely consistent with past Crohn's disease and colitis flare ups.  Notes constant diarrhea and nausea. No vomiting. Reports dysuria and intermittent diaphoresis for 4 days. No hematuria or fever. She was originally diagnosed with Crohn's  in 2023 and has been admitted several times for flare ups. Currently following with [facility]. She has been receiving Entyvio infusions every 8 weeks for this condition with 2 past infusions. States she had complete relief of her symptoms for the first 4 weeks after her last infusion on 10/4/24. He insurance will not cover more frequent infusions without proof of diminishing relief and her next infusion is not until 12/6/24. She called her GI 3 days ago and was advised to be seen here but waited until today so she would not miss the holiday with family. States she otherwise takes Vicodin and Tylenol for pain, Zofran for nausea, and budenoside for inflammation which typically provides relief of her symptoms. She has not been getting relief with these medications recently. Prior abdominal surgeries include a nephrectomy, ileocecal resection, cholecystectomy, and appendectomy. Denies hematochezia, melena, chest pain, or fever.    Independent Historian   None    Review of External Notes   Reviewed hospital admission note from 6/24/2024.  Patient was seen for acute Crohn's flareup.  She was seen by GI.  She was started on IV Solu-Medrol inpatient.  She has been on oral steroids at discharge.      Past Medical  History     Medical History and Problem List   GERD  HLD  HTN  Insomnia  Anemia  IBS  OA  Crohn's disease    Medications   Cymbalta  Arnuity ellipta  Norco  Albuterol  Bentyl  Trazodone  Nexium  Famotidine  Entyvio   Robaxin  Singulair  Pamelor  Ambien     Surgical History   Appendectomy  Cholecystectomy   Ileocecal resection  Nephrectomy     Physical Exam     Patient Vitals for the past 24 hrs:   BP Temp Temp src Pulse Resp SpO2 Weight   11/29/24 1346 125/79 -- -- 84 16 -- --   11/29/24 1217 (!) 147/93 98.3  F (36.8  C) Oral -- -- -- --   11/29/24 1216 -- -- -- 114 16 100 % --   11/29/24 1215 -- -- Temporal -- 16 -- 67.1 kg (147 lb 14.9 oz)     Physical Exam  Physical Exam:  General: lying comfortably on hospital bed  Head: normocephalic, atraumatic  Eyes: PERRLA, EOMI  Ears: External ears appear normal.   Nose: no signs of bleeding   Throat: moist mucous membranes  Neck: No JVD  CV: regular rate and rhythm  Pulm: lungs clear to ausculation bilaterally, normal respiratory effort, normal chest expansion with breathing   Abdomen: soft, left-sided abdominal tenderness with guarding. No rigidity.   MSK: No midline tenderness  Ext: normal range of motion of all extremities. No gross deformities  Skin: warm, dry, no rashes  Neuro: alert and oriented  Psych: Appropriate mood. Cooperative      Diagnostics     Lab Results   Labs Ordered and Resulted from Time of ED Arrival to Time of ED Departure   COMPREHENSIVE METABOLIC PANEL - Abnormal       Result Value    Sodium 141      Potassium 3.5      Carbon Dioxide (CO2) 22      Anion Gap 15      Urea Nitrogen 11.0      Creatinine 1.04 (*)     GFR Estimate 59 (*)     Calcium 8.9      Chloride 104      Glucose 106 (*)     Alkaline Phosphatase 82      AST 19      ALT 11      Protein Total 7.1      Albumin 4.4      Bilirubin Total 0.5     CRP INFLAMMATION - Abnormal    CRP Inflammation 18.75 (*)    LIPASE - Normal    Lipase 30     CBC WITH PLATELETS AND DIFFERENTIAL    WBC Count 8.1       RBC Count 4.97      Hemoglobin 14.5      Hematocrit 44.5      MCV 90      MCH 29.2      MCHC 32.6      RDW 13.3      Platelet Count 260      % Neutrophils 70      % Lymphocytes 19      % Monocytes 8      % Eosinophils 2      % Basophils 1      % Immature Granulocytes 1      NRBCs per 100 WBC 0      Absolute Neutrophils 5.7      Absolute Lymphocytes 1.6      Absolute Monocytes 0.6      Absolute Eosinophils 0.1      Absolute Basophils 0.1      Absolute Immature Granulocytes 0.0      Absolute NRBCs 0.0     ROUTINE UA WITH MICROSCOPIC REFLEX TO CULTURE   CALPROTECTIN FECES   C. DIFFICILE TOXIN B PCR WITH REFLEX TO C. DIFFICILE ANTIGEN AND TOXINS A/B EIA   ENTERIC BACTERIA AND VIRUS PANEL BY PCR       Imaging   CT Abdomen Pelvis w Contrast   Final Result   IMPRESSION:    1.  No convincing acute process within the abdomen or pelvis.   2.  No definite sites of active inflammation within the small or large   bowel.      WILDA TAYLOR MD            SYSTEM ID:  [id number]        Independent Interpretation   None    ED Course      Medications Administered   Medications   ondansetron (ZOFRAN) injection 4 mg (4 mg Intravenous $Given 11/29/24 1346)   HYDROmorphone (PF) (DILAUDID) injection 0.5 mg (has no administration in time range)   morphine (PF) injection 4 mg (4 mg Intravenous $Given 11/29/24 1346)   methylPREDNISolone sodium succinate (SOLU-MEDROL) injection 20 mg (20 mg Intravenous $Given 11/29/24 1346)   sodium chloride (PF) 0.9% PF flush 100 mL (59 mLs Intravenous $Given 11/29/24 1443)   iopamidol (ISOVUE-370) solution 500 mL (74 mLs Intravenous $Given 11/29/24 1442)       Procedures   Procedures     Discussion of Management   Admitting Hospitalist, Dr. Monteiro  Gastroenterology, Dr. Orantes.    ED Course   ED Course as of 11/29/24 1629 Fri Nov 29, 2024   1258 I evaluated patient and obtained history.   1556 Discussed with Dr. Orantes, gastroenterologist with [facility].   1622 Discussed with Akanksha Clarke  hospitalist PA. Accepted patient for Dr. Monteiro.        Additional Documentation  None    Medical Decision Making / Diagnosis     CMS Diagnoses: None    MIPS       None    [facility]   Devi Salas is a 64 year old female with history of Crohn's disease who presents to the ED for evaluation of abdominal pain.  Patient reports gradually worsening generalized abdominal pain that started 3 weeks ago.  She believes that this is a flareup of her Crohn's disease states that symptoms feel similar to previous flares.  She has been receiving Entyvio infusions over the past 8 weeks.  She has received 2 infusions so far.  She is scheduled to have another one next week.  She states that the pain is severe and cannot wait until next week for the infusion.  She also endorses nausea and diarrhea.  No vomiting.  No fevers.  See further HPI details above.  The above workup was undertaken.  Broad differential was considered including Crohn's flare, infectious colitis, choledocholithiasis, diverticulitis, pancreatitis, SBO.  On exam, patient is well-appearing.  Her vitals are reassuring.  Exam is notable for significant left sided abdominal tenderness with guarding.  No evidence of sepsis or septic shock.  CT does not show evidence of active inflammation or other acute intra-abdominal process.  Although there is no active inflammation on CT, CRP is elevated which could indicate mild flare.  Other labs are reassuring.  No leukocytosis.  No significant electrolyte derangements.  Kidney function appears to be at baseline.  Patient was given IV analgesics and Solu-Medrol.  On reassessment, patient states that the pain has not improved.  She states that the only medication that helps with her pain is receiving the Entyvio infusions.  Discussed with GI for further recommendations.  GI states that it would be unlikely that patient would receive Entyvio infusions in the hospital but instead would recommend IV Solu-Medrol 3 times daily and  further pain control.  Also recommended adding on stool studies and fecal calprotectin.  Discussed with hospitalist who accepted patient for admission.  All questions were answered.        Disposition   The patient was admitted to the hospital.     Diagnosis     ICD-10-CM    1. Exacerbation of Crohn's disease (H)  K50.90            Discharge Medications   New Prescriptions    No medications on file         Scribe Disclosure:  Vicki CALLAHAN, am serving as a scribe at 12:31 PM on 11/29/2024 to document services personally performed by Santo Terrazas PA-C based on my observations and the provider's statements to me.        Santo Terrazas PA-C  11/29/24 [id number]

## 2024-11-29 NOTE — PHARMACY-ADMISSION MEDICATION HISTORY
Pharmacy Intern Admission Medication History    Admission medication history is complete. The information provided in this note is only as accurate as the sources available at the time of the update.    Information Source(s): Patient via in-person    Pertinent Information: Pt reports recently starting Entyvio infusion but does not know the dose.    Changes made to PTA medication list:  Added: budesonide, duloxetine, entyvio, hydrocodone-acetaminophen   Deleted: inFLIXimab, trazodone, vitamin c  Changed: dicyclomine 20 mg QID > 20 mg BID    Allergies reviewed with patient and updates made in EHR: yes    Medication History Completed By: Marleny Penn 11/29/2024 5:33 PM    PTA Med List   Medication Sig Last Dose/Taking    acetaminophen (TYLENOL) 500 MG tablet Take 1-2 tablets (500-1,000 mg) by mouth every 6 hours as needed for mild pain 11/28/2024 Evening    albuterol (PROAIR HFA/PROVENTIL HFA/VENTOLIN HFA) 108 (90 Base) MCG/ACT inhaler Inhale 2 puffs into the lungs every 6 hours as needed for shortness of breath, wheezing or cough Past Month    budesonide (ENTOCORT EC) 3 MG EC capsule Take 9 mg by mouth every morning. 11/29/2024 Morning    cyanocobalamin (CYANOCOBALAMIN) 1000 MCG/ML injection Inject 1,000 mcg as directed every 30 days 11/22/2024    dicyclomine (BENTYL) 20 MG tablet Take 20 mg by mouth 2 times daily. 11/29/2024 Morning    DULoxetine (CYMBALTA) 30 MG capsule Take 30 mg by mouth 2 times daily. 11/29/2024 Morning    esomeprazole (NEXIUM) 40 MG DR capsule Take 40 mg by mouth 2 times daily (before meals) Take 30-60 minutes before eating. 11/29/2024 Morning    famotidine (PEPCID) 40 MG tablet Take 40 mg by mouth At Bedtime 11/28/2024 Evening    HYDROcodone-acetaminophen (NORCO) 5-325 MG tablet Take 1 tablet by mouth every 4 hours as needed for severe pain. 11/28/2024 Evening    l-lysine HCl 500 MG TABS tablet Take 1,000 mg by mouth daily 11/29/2024 Morning    methocarbamol (ROBAXIN) 500 MG tablet Take 1  tablet (500 mg) by mouth 4 times daily as needed for muscle spasms 11/29/2024 Morning    montelukast (SINGULAIR) 10 MG tablet Take 10 mg by mouth At Bedtime 11/28/2024 Bedtime    nortriptyline (PAMELOR) 25 MG capsule Take 25 mg by mouth At Bedtime 11/28/2024 Bedtime    ondansetron (ZOFRAN ODT) 4 MG ODT tab Take 1 tablet (4 mg) by mouth every 8 hours as needed for nausea 11/28/2024 Evening    simethicone (MYLICON) 80 MG chewable tablet Take 1 tablet (80 mg) by mouth every 6 hours as needed for cramping Unknown    Vedolizumab (ENTYVIO IV) Inject into the vein once every eight weeks. 10/4/2024    Vitamin D3 (CHOLECALCIFEROL) 25 mcg (1000 units) tablet Take 25 mcg by mouth daily 11/29/2024 Morning    zolpidem (AMBIEN) 5 MG tablet Take 5 mg by mouth nightly as needed 11/28/2024 Bedtime     Marleny Penn on 11/29/2024 at 5:36 PM

## 2024-11-29 NOTE — H&P
[facility]  Internal Medicine  History and Physical      Patient Name: Devi Salas MRN# [id number]   Age: 64 year old YOB: 1960     Date of Admission:11/29/2024    Primary care provider: Jeanette Betancourt  Date of Service: 11/29/2024         Assessment and Plan:   Devi Salas is a 64 year old female with a history of Allergic Rhinitis, Grave's Disease, RCC s/p Right Nephrectomy, Crohn's Disease s/p ileocolic reseaction 2023 who presents to the ED today due to abdominal pain.      Abdominal Pain  Suspected Crohn's Flare  Pt presents with 3 weeks of abdominal pain with non bloody diarrhea of 8-12 stools per day.  Hx of Crohn's disease diagnosed in 2023 s/p ileocolic resection who is followed by [facility] and has been receiving Entyvio infusions every 8 weeks.  Last infusion was on 10/4/24 with next scheduled on 12/6/24.    Pt is afebrile, wbc normal, crp 18.  CT abd/pelvis with no convincing acute process within the abdomen or pelvis.  No definite sites of active inflammation within the small or large bowel. UA with trace LE, 10 wbc and few bacteria, nitrite negative.  - check stool studies, fecal calprotectin and urine culture  - ED provider discussed the case with GI on call who recommended to start IV Solumedrol q8 hours.  - GI consult  - analgesics prn (pt with allergy to oxycodone; continue pta Norco)  - continue pta Budesonide, Bentyl, Protonix, Pepcid, Norco, Simethicone, Robaxin       CKD  Hx RCC s/p Right Nephrectomy 2009  Creatinine 1.04 is at baseline  - monitor    Depression  - continue Duloxetine and Nortriptyline      CODE: full  Diet/IVF: regular  GI ppx:  PPI  DVT ppx: scd  Disposition Plan:  anticipate 2 days of hospitalization      Akanksha Clarke MS PA-C  Physician Assistant   Hospitalist Service    Awaiting formal pharmacy med rec to confirm home medications.           Chief Complaint:   Abdominal Pain         HPI:   64 year old female with a history of Allergic Rhinitis,  Grave's Disease, RCC s/p Right Nephrectomy, Crohn's Disease s/p ileocolic reseaction 2023 who presents to the ED today due to abdominal pain.    History obtained from patient, chart review and verbal discussion with the ED provider.    Patient reports she has a history of Crohn's disease diagnosed in 2023 s/p ileocolic resection who is followed by [facility] and has been receiving Entyvio infusions every 8 weeks.  Last infusion was on 10/4/24 with next scheduled on 12/6/24.  Patient reports she had relief of her Crohn's symptoms for 4 weeks after her last infusion.  She has had progressively worse LLQ abdominal pain and loose mucous stools of 8-12 per day over the past 3 weeks.  She denies any fever or new dysuria.  Denies any recent antibiotic use.  She called [facility] who instructed her to present to the ED.           Past Medical History:     Past Medical History:   Diagnosis Date    Crohn's disease (H)     PONV (postoperative nausea and vomiting)           Past Surgical History:     Past Surgical History:   Procedure Laterality Date    APPENDECTOMY      CHOLECYSTECTOMY      COLONOSCOPY N/A 10/13/2023    Procedure: Colonoscopy with biopsies;  Surgeon: Isaiah Mathur MD;  Location: [facility]    LAPAROSCOPIC RESECTION ILEOCECAL N/A 07/14/2023    Procedure: Laparoscopic assisted ileocolic resection;  Surgeon: Marleny Mendez MD;  Location: [facility] OR    NEPHRECTOMY Right 2009          Social History:     Social History     Socioeconomic History    Marital status:      Spouse name: Not on file    Number of children: Not on file    Years of education: Not on file    Highest education level: Not on file   Occupational History    Not on file   Tobacco Use    Smoking status: Not on file    Smokeless tobacco: Not on file   Substance and Sexual Activity    Alcohol use: Not on file    Drug use: Not on file    Sexual activity: Not on file   Other Topics Concern    Not on file   Social History Narrative    Not on file     Social  Drivers of Health     Financial Resource Strain: Not on file   Food Insecurity: Not on file   Transportation Needs: Not on file   Physical Activity: Not on file   Stress: Not on file   Social Connections: Not on file   Interpersonal Safety: Not on file   Housing Stability: Not on file          Family History:     Family History   Problem Relation Age of Onset    Colon Cancer Niece           Allergies:      Allergies   Allergen Reactions    Latex Hives    Oxycodone Anaphylaxis, Hives and Swelling     Throat swelling, per pt    Pt tolerates [place]/Vicodin (10/15/2023)    Penicillin G Anaphylaxis    Sumatriptan Palpitations    Aspirin Nausea and Vomiting    Demerol Hcl [Meperidine] Nausea and Vomiting     N/V    Percocet [Oxycodone-Acetaminophen] Nausea and Vomiting     N/V    Acetaminophen Nausea and Vomiting    Codeine Nausea and Vomiting    Mold           Medications:     Prior to Admission medications    Medication Sig Last Dose Taking? Auth Provider Long Term End Date   acetaminophen (TYLENOL) 500 MG tablet Take 1-2 tablets (500-1,000 mg) by mouth every 6 hours as needed for mild pain   Li Ferris PA-C     albuterol (PROAIR HFA/PROVENTIL HFA/VENTOLIN HFA) 108 (90 Base) MCG/ACT inhaler Inhale 2 puffs into the lungs every 6 hours as needed for shortness of breath, wheezing or cough   Unknown, Entered By History Yes    cyanocobalamin (CYANOCOBALAMIN) 1000 MCG/ML injection Inject 1,000 mcg as directed every 30 days   Unknown, Entered By History     dicyclomine (BENTYL) 20 MG tablet Take 1 tablet (20 mg) by mouth 4 times daily (before meals and nightly)   Josh Bruce MD No    esomeprazole (NEXIUM) 40 MG DR capsule Take 40 mg by mouth 2 times daily (before meals) Take 30-60 minutes before eating.   Unknown, Entered By History     famotidine (PEPCID) 40 MG tablet Take 40 mg by mouth At Bedtime   Unknown, Entered By History     inFLIXimab (REMICADE IV) Inject 10 mg/kg into the vein every 4 weeks   Unknown,  Entered By History     l-lysine HCl 500 MG TABS tablet Take 1,000 mg by mouth daily   Unknown, Entered By History     medical cannabis (Patient's own supply) Take 1 Dose by mouth See Admin Instructions (The purpose of this order is to document that the patient reports taking medical cannabis.  This is not a prescription, and is not used to certify that the patient has a qualifying medical condition.)  25 MG EDIBLE   Unknown, Entered By History     methocarbamol (ROBAXIN) 500 MG tablet Take 1 tablet (500 mg) by mouth 4 times daily as needed for muscle spasms   Vikki Duran,      montelukast (SINGULAIR) 10 MG tablet Take 10 mg by mouth At Bedtime   Unknown, Entered By History Yes    nortriptyline (PAMELOR) 25 MG capsule Take 25 mg by mouth At Bedtime   Unknown, Entered By History Yes    ondansetron (ZOFRAN ODT) 4 MG ODT tab Take 1 tablet (4 mg) by mouth every 8 hours as needed for nausea   Estelle Rodriguez,  No    simethicone (MYLICON) 80 MG chewable tablet Take 1 tablet (80 mg) by mouth every 6 hours as needed for cramping   Josh Bruce MD No    traZODone (DESYREL) 100 MG tablet Take 200 mg by mouth At Bedtime   Unknown, Entered By History Yes    vitamin C (ASCORBIC ACID) 1000 MG TABS Take 1,000 mg by mouth daily   Unknown, Entered By History     Vitamin D3 (CHOLECALCIFEROL) 25 mcg (1000 units) tablet Take 25 mcg by mouth daily   Unknown, Entered By History     zolpidem (AMBIEN) 5 MG tablet Take 5 mg by mouth nightly as needed   Unknown, Entered By History            Review of Systems:   A complete ROS was performed and is negative other than what is stated in the HPI.       Physical Exam:   Blood pressure 125/79, pulse 84, temperature 98.3  F (36.8  C), temperature source Oral, resp. rate 16, weight 67.1 kg (147 lb 14.9 oz), SpO2 100%.  General: Alert, interactive, NAD, sitting up in bed, pleasant and cooperative.  HEENT: AT/NC  Chest/Resp: clear to auscultation bilaterally, no crackles or  wheezes  Heart/CV: regular rate and rhythm, no murmur  Abdomen/GI: Soft, LLQ tenderness, nondistended. +BS.   Extremities/MSK: No LE edema  Skin: Warm and dry  Neuro: Alert & oriented x 3, moves all extremities equally         Labs:   ROUTINE ICU LABS (Last four results)  CMP  Recent Labs   Lab 11/29/24  1333      POTASSIUM 3.5   CHLORIDE 104   CO2 22   ANIONGAP 15   *   BUN 11.0   CR 1.04*   GFRESTIMATED 59*   GIORGIO 8.9   PROTTOTAL 7.1   ALBUMIN 4.4   BILITOTAL 0.5   ALKPHOS 82   AST 19   ALT 11     CBC  Recent Labs   Lab 11/29/24  1333   WBC 8.1   RBC 4.97   HGB 14.5   HCT 44.5   MCV 90   MCH 29.2   MCHC 32.6   RDW 13.3        INRNo lab results found in last 7 days.  Arterial Blood GasNo lab results found in last 7 days.       Imaging/Procedures:     Results for orders placed or performed during the hospital encounter of 11/29/24   CT Abdomen Pelvis w Contrast    Narrative    CT ABDOMEN PELVIS WITH CONTRAST 11/29/2024 2:48 PM    CLINICAL HISTORY: Worsening left-sided abdominal pain x3 weeks,  history of Crohn's.    TECHNIQUE: CT scan of the abdomen and pelvis was performed following  injection of IV contrast. Multiplanar reformats were obtained. Dose  reduction techniques were used.  CONTRAST: 74 mL Isovue-[id number]    COMPARISON: CT abdomen and pelvis 9/17/2024.    FINDINGS:   LOWER CHEST: Unremarkable.    HEPATOBILIARY: No suspicious liver lesion. Similar cholecystectomy and  probable mild postcholecystectomy reservoir effect.    PANCREAS: No significant mass, duct dilatation, or inflammatory  change.    SPLEEN: Similar subcentimeter splenic likely benign hypodensity.    ADRENAL GLANDS: No significant nodules.    KIDNEYS: Right nephrectomy.    BOWEL: Ileocolonic anastomosis. Decreased stool burden compared to  prior. No bowel obstruction or evidence of active bowel inflammation.    VASCULATURE: Mild atherosclerosis.    LYMPH NODE AND PERITONEUM: No enlarged lymph node. No free fluid.  Similar  probable left retroperitoneal lipoma (3 cm; 5/66).    PELVIS: No pelvic mass.    MUSCULOSKELETAL: No aggressive osseous lesion. Degenerative changes of  the spine.    OTHER: None.      Impression    IMPRESSION:   1.  No convincing acute process within the abdomen or pelvis.  2.  No definite sites of active inflammation within the small or large  bowel.    WILDA TAYLOR MD         SYSTEM ID:  [id number]

## 2024-11-30 LAB
BACTERIA UR CULT: ABNORMAL
CRP SERPL-MCNC: 7.71 MG/L
MAGNESIUM SERPL-MCNC: 2.1 MG/DL (ref 1.7–2.3)
PHOSPHATE SERPL-MCNC: 2.8 MG/DL (ref 2.5–4.5)
POTASSIUM SERPL-SCNC: 4.6 MMOL/L (ref 3.4–5.3)

## 2024-11-30 PROCEDURE — 96361 HYDRATE IV INFUSION ADD-ON: CPT

## 2024-11-30 PROCEDURE — G0378 HOSPITAL OBSERVATION PER HR: HCPCS

## 2024-11-30 PROCEDURE — 250N000013 HC RX MED GY IP 250 OP 250 PS 637: Performed by: STUDENT IN AN ORGANIZED HEALTH CARE EDUCATION/TRAINING PROGRAM

## 2024-11-30 PROCEDURE — 84100 ASSAY OF PHOSPHORUS: CPT | Performed by: PHYSICIAN ASSISTANT

## 2024-11-30 PROCEDURE — 36415 COLL VENOUS BLD VENIPUNCTURE: CPT | Performed by: PHYSICIAN ASSISTANT

## 2024-11-30 PROCEDURE — 250N000011 HC RX IP 250 OP 636: Performed by: PHYSICIAN ASSISTANT

## 2024-11-30 PROCEDURE — 96376 TX/PRO/DX INJ SAME DRUG ADON: CPT

## 2024-11-30 PROCEDURE — 99232 SBSQ HOSP IP/OBS MODERATE 35: CPT | Performed by: STUDENT IN AN ORGANIZED HEALTH CARE EDUCATION/TRAINING PROGRAM

## 2024-11-30 PROCEDURE — 83735 ASSAY OF MAGNESIUM: CPT | Performed by: PHYSICIAN ASSISTANT

## 2024-11-30 PROCEDURE — 84132 ASSAY OF SERUM POTASSIUM: CPT | Performed by: PHYSICIAN ASSISTANT

## 2024-11-30 PROCEDURE — 86140 C-REACTIVE PROTEIN: CPT | Performed by: PHYSICIAN ASSISTANT

## 2024-11-30 PROCEDURE — 250N000011 HC RX IP 250 OP 636: Performed by: NURSE PRACTITIONER

## 2024-11-30 PROCEDURE — 250N000013 HC RX MED GY IP 250 OP 250 PS 637: Performed by: PHYSICIAN ASSISTANT

## 2024-11-30 RX ORDER — METHYLPREDNISOLONE SODIUM SUCCINATE 40 MG/ML
20 INJECTION INTRAMUSCULAR; INTRAVENOUS EVERY 8 HOURS
Status: COMPLETED | OUTPATIENT
Start: 2024-11-30 | End: 2024-11-30

## 2024-11-30 RX ORDER — PREDNISONE 10 MG/1
10 TABLET ORAL DAILY
Status: DISCONTINUED | OUTPATIENT
Start: 2024-12-09 | End: 2024-12-01

## 2024-11-30 RX ORDER — PREDNISONE 5 MG/1
5 TABLET ORAL DAILY
Status: DISCONTINUED | OUTPATIENT
Start: 2024-12-11 | End: 2024-12-01

## 2024-11-30 RX ORDER — PREDNISONE 20 MG/1
40 TABLET ORAL DAILY
Status: DISCONTINUED | OUTPATIENT
Start: 2024-12-01 | End: 2024-12-01

## 2024-11-30 RX ORDER — CIPROFLOXACIN 250 MG/1
250 TABLET, FILM COATED ORAL EVERY 12 HOURS SCHEDULED
Status: DISCONTINUED | OUTPATIENT
Start: 2024-11-30 | End: 2024-12-01 | Stop reason: HOSPADM

## 2024-11-30 RX ORDER — PREDNISONE 20 MG/1
20 TABLET ORAL DAILY
Status: DISCONTINUED | OUTPATIENT
Start: 2024-12-07 | End: 2024-12-01

## 2024-11-30 RX ADMIN — NORTRIPTYLINE HYDROCHLORIDE 25 MG: 25 CAPSULE ORAL at 22:27

## 2024-11-30 RX ADMIN — HYDROMORPHONE HYDROCHLORIDE 0.2 MG: 0.2 INJECTION, SOLUTION INTRAMUSCULAR; INTRAVENOUS; SUBCUTANEOUS at 11:01

## 2024-11-30 RX ADMIN — FAMOTIDINE 40 MG: 20 TABLET, FILM COATED ORAL at 22:27

## 2024-11-30 RX ADMIN — PANTOPRAZOLE SODIUM 40 MG: 40 TABLET, DELAYED RELEASE ORAL at 06:35

## 2024-11-30 RX ADMIN — DULOXETINE HYDROCHLORIDE 30 MG: 30 CAPSULE, DELAYED RELEASE ORAL at 08:37

## 2024-11-30 RX ADMIN — ACETAMINOPHEN 650 MG: 325 TABLET, FILM COATED ORAL at 17:30

## 2024-11-30 RX ADMIN — CIPROFLOXACIN HYDROCHLORIDE 250 MG: 250 TABLET, FILM COATED ORAL at 12:48

## 2024-11-30 RX ADMIN — ONDANSETRON 4 MG: 4 TABLET, ORALLY DISINTEGRATING ORAL at 17:29

## 2024-11-30 RX ADMIN — HYDROCODONE BITARTRATE AND ACETAMINOPHEN 1 TABLET: 5; 325 TABLET ORAL at 20:01

## 2024-11-30 RX ADMIN — DULOXETINE HYDROCHLORIDE 30 MG: 30 CAPSULE, DELAYED RELEASE ORAL at 19:49

## 2024-11-30 RX ADMIN — METHYLPREDNISOLONE SODIUM SUCCINATE 20 MG: 40 INJECTION, POWDER, FOR SOLUTION INTRAMUSCULAR; INTRAVENOUS at 06:35

## 2024-11-30 RX ADMIN — ONDANSETRON 4 MG: 4 TABLET, ORALLY DISINTEGRATING ORAL at 08:50

## 2024-11-30 RX ADMIN — HYDROMORPHONE HYDROCHLORIDE 0.4 MG: 0.2 INJECTION, SOLUTION INTRAMUSCULAR; INTRAVENOUS; SUBCUTANEOUS at 15:04

## 2024-11-30 RX ADMIN — MONTELUKAST SODIUM 10 MG: 10 TABLET, FILM COATED ORAL at 22:27

## 2024-11-30 RX ADMIN — METHYLPREDNISOLONE SODIUM SUCCINATE 20 MG: 40 INJECTION, POWDER, FOR SOLUTION INTRAMUSCULAR; INTRAVENOUS at 22:27

## 2024-11-30 RX ADMIN — DICYCLOMINE HYDROCHLORIDE 20 MG: 20 TABLET ORAL at 08:37

## 2024-11-30 RX ADMIN — BUDESONIDE 9 MG: 3 CAPSULE ORAL at 08:37

## 2024-11-30 RX ADMIN — HYDROCODONE BITARTRATE AND ACETAMINOPHEN 1 TABLET: 10; 325 TABLET ORAL at 12:49

## 2024-11-30 RX ADMIN — HYDROCODONE BITARTRATE AND ACETAMINOPHEN 1 TABLET: 10; 325 TABLET ORAL at 00:18

## 2024-11-30 RX ADMIN — DICYCLOMINE HYDROCHLORIDE 20 MG: 20 TABLET ORAL at 19:49

## 2024-11-30 RX ADMIN — SIMETHICONE 80 MG: 80 TABLET, CHEWABLE ORAL at 05:20

## 2024-11-30 RX ADMIN — SIMETHICONE 80 MG: 80 TABLET, CHEWABLE ORAL at 17:30

## 2024-11-30 RX ADMIN — ACETAMINOPHEN 650 MG: 325 TABLET, FILM COATED ORAL at 11:01

## 2024-11-30 RX ADMIN — HYDROCODONE BITARTRATE AND ACETAMINOPHEN 1 TABLET: 5; 325 TABLET ORAL at 06:35

## 2024-11-30 RX ADMIN — METHYLPREDNISOLONE SODIUM SUCCINATE 20 MG: 40 INJECTION, POWDER, FOR SOLUTION INTRAMUSCULAR; INTRAVENOUS at 14:59

## 2024-11-30 RX ADMIN — ZOLPIDEM TARTRATE 5 MG: 5 TABLET, COATED ORAL at 22:27

## 2024-11-30 ASSESSMENT — ACTIVITIES OF DAILY LIVING (ADL)
ADLS_ACUITY_SCORE: 39
ADLS_ACUITY_SCORE: 43
ADLS_ACUITY_SCORE: 43
ADLS_ACUITY_SCORE: 41
ADLS_ACUITY_SCORE: 43
ADLS_ACUITY_SCORE: 41
ADLS_ACUITY_SCORE: 43
ADLS_ACUITY_SCORE: 41
ADLS_ACUITY_SCORE: 39
ADLS_ACUITY_SCORE: 41
ADLS_ACUITY_SCORE: 43
ADLS_ACUITY_SCORE: 41
ADLS_ACUITY_SCORE: 41
ADLS_ACUITY_SCORE: 39
ADLS_ACUITY_SCORE: 41
ADLS_ACUITY_SCORE: 39
ADLS_ACUITY_SCORE: 43
ADLS_ACUITY_SCORE: 43
ADLS_ACUITY_SCORE: 41
ADLS_ACUITY_SCORE: 43
ADLS_ACUITY_SCORE: 43
ADLS_ACUITY_SCORE: 41
ADLS_ACUITY_SCORE: 41

## 2024-11-30 NOTE — PLAN OF CARE
VSS. A/Ox4. LS clear, on RA. Pt reporting mod to severe abdominal pain. Gave pt PRN Norco, IV dilaudid, and tylenol with minimal relief per pt. Also gave pt PRN simethicone. Pt reports increased abdominal pain and nausea after eating. Prn zofran given x2. Abdomen tender to the touch. Writer instructed pt to eat a bland diet for dinner. On IV solumedrol, plan to switch to oral steroid taper tomorrow. GI canceled c diff rule out. GI following.      Goal Outcome Evaluation:      Plan of Care Reviewed With: patient    Overall Patient Progress: no changeOverall Patient Progress: no change    Outcome Evaluation: Gave pt PRN Norco, IV dilaudid, and tylenol for abdominal pain. Pt reports increased abdominal pain and nausea after eating. Abdomen tender to the touch.      PRIMARY DIAGNOSIS: ACUTE PAIN  OUTPATIENT/OBSERVATION GOALS TO BE MET BEFORE DISCHARGE:  1. Pain Status: Improved but still requiring IV narcotics.    2. Return to near baseline physical activity: Yes    3. Cleared for discharge by consultants (if involved): No    Discharge Planner Nurse   Safe discharge environment identified: Yes  Barriers to discharge: Yes pt still requiring IV pain meds       Entered by: Danielle Ching RN 11/30/2024 6:00 PM     Please review provider order for any additional goals.   Nurse to notify provider when observation goals have been met and patient is ready for discharge.    Problem: Adult Inpatient Plan of Care  Goal: Plan of Care Review  Description: The Plan of Care Review/Shift note should be completed every shift.  The Outcome Evaluation is a brief statement about your assessment that the patient is improving, declining, or no change.  This information will be displayed automatically on your shift  note.  Outcome: Progressing  Flowsheets (Taken 11/30/2024 0528)  Outcome Evaluation: Gave pt PRN Norco, IV dilaudid, and tylenol for abdominal pain. Pt reports increased abdominal pain and nausea after eating. Abdomen tender to  "the touch.  Plan of Care Reviewed With: patient  Overall Patient Progress: no change  Goal: Patient-Specific Goal (Individualized)  Description: You can add care plan individualizations to a care plan. Examples of Individualization might be:  \"Parent requests to be called daily at 9am for status\", \"I have a hard time hearing out of my right ear\", or \"Do not touch me to wake me up as it startles  me\".  Outcome: Progressing  Goal: Absence of Hospital-Acquired Illness or Injury  Outcome: Progressing  Intervention: Identify and Manage Fall Risk  Recent Flowsheet Documentation  Taken 11/30/2024 1528 by Danielle Ching RN  Safety Promotion/Fall Prevention:   safety round/check completed   room organization consistent   patient and family education   nonskid shoes/slippers when out of bed   clutter free environment maintained   assistive device/personal items within reach   activity supervised  Taken 11/30/2024 0835 by Danielle Ching RN  Safety Promotion/Fall Prevention:   safety round/check completed   room organization consistent   patient and family education   nonskid shoes/slippers when out of bed   clutter free environment maintained   assistive device/personal items within reach   activity supervised  Intervention: Prevent Skin Injury  Recent Flowsheet Documentation  Taken 11/30/2024 1122 by Danielle Ching RN  Body Position: position changed independently  Taken 11/30/2024 0835 by Danielle Ching RN  Body Position: position changed independently  Intervention: Prevent and Manage VTE (Venous Thromboembolism) Risk  Recent Flowsheet Documentation  Taken 11/30/2024 0835 by Danielle Ching RN  VTE Prevention/Management: SCDs off (sequential compression devices)  Goal: Optimal Comfort and Wellbeing  Outcome: Progressing  Intervention: Monitor Pain and Promote Comfort  Recent Flowsheet Documentation  Taken 11/30/2024 0834 by Danielle Ching RN  Pain Management Interventions: heat applied  Goal: Readiness for " Transition of Care  Outcome: Progressing     Problem: Pain Acute  Goal: Optimal Pain Control and Function  Outcome: Progressing  Intervention: Develop Pain Management Plan  Recent Flowsheet Documentation  Taken 11/30/2024 0834 by Danielle Ching RN  Pain Management Interventions: heat applied  Intervention: Prevent or Manage Pain  Recent Flowsheet Documentation  Taken 11/30/2024 0835 by Danielle Ching, RN  Medication Review/Management: medications reviewed

## 2024-11-30 NOTE — CONSULTS
GASTROENTEROLOGY CONSULTATION      Devi Salas  [address] [place] [id number]  64 year old female     Admission Date/Time: 11/29/2024  Primary Care Provider: Jeanette Betancourt     We were asked to see the patient in consultation by Akanksha Clarke MS, PA-C  for evaluation of abdominal pain.    CC: Crohn's and abdominal pain     HPI:  Devi Salas is a 64 year old female with a PMH of allergic Rhinitis, Grave's Disease, RCC s/p right Nephrectomy, Crohn's Disease s/p ileocolic reseaction 2023 who presents to the ED today due to abdominal pain.     Patient reports that her last infusion was on October 4. She felt great for 4-1/2 weeks without any symptoms.  Reportedly, her symptoms started getting worse progressively to the point that she was unable to take it anymore.  Hence, she presented to ED for further evaluation.  Pointing to the left lower and mid abdomen dislocation with discomfort.  He also had 8 slimy to watery stools yesterday prior to ED visit.  No bowel movement since ED visit.  Pain is better compared to admission.    Patient endorses use of antibiotic about 2-1/2 months ago.  No recent antibiotic use.    Admission labs were unremarkable except for CRP of 18.75.  Cross-sectional imaging was negative for any acute inflammation.       PAST MEDICAL HISTORY:  Patient Active Problem List    Diagnosis Date Noted    Exacerbation of Crohn's disease (H) 11/29/2024     Priority: Medium    Generalized abdominal pain 03/23/2024     Priority: Medium    Exacerbation of Crohn's disease with complication (H) 03/23/2024     Priority: Medium    Crohn disease (H) 03/23/2024     Priority: Medium    History of Crohn's disease 01/15/2024     Priority: Medium    Intractable lower abdominal pain 01/15/2024     Priority: Medium    Diarrhea, unspecified type 01/15/2024     Priority: Medium    Acute colitis 10/09/2023     Priority: Medium    Dizziness 07/24/2023     Priority: Medium    Abnormal stools 07/24/2023     Priority:  Medium    History of colon surgery 07/24/2023     Priority: Medium    Abdominal pain, unspecified abdominal location 07/24/2023     Priority: Medium    Intractable abdominal pain 07/12/2023     Priority: Medium    Crohn's disease of colon with complication (H) 07/12/2023     Priority: Medium    RLQ abdominal pain 06/05/2023     Priority: Medium          ROS: A comprehensive ten point review of systems was negative aside from those in mentioned in the HPI.       MEDICATIONS:   Prior to Admission medications    Medication Sig Start Date End Date Taking? Authorizing Provider   acetaminophen (TYLENOL) 500 MG tablet Take 1-2 tablets (500-1,000 mg) by mouth every 6 hours as needed for mild pain 7/26/23  Yes Li Ferris PA-C   albuterol (PROAIR HFA/PROVENTIL HFA/VENTOLIN HFA) 108 (90 Base) MCG/ACT inhaler Inhale 2 puffs into the lungs every 6 hours as needed for shortness of breath, wheezing or cough   Yes Unknown, Entered By History   budesonide (ENTOCORT EC) 3 MG EC capsule Take 9 mg by mouth every morning.   Yes Unknown, Entered By History   cyanocobalamin (CYANOCOBALAMIN) 1000 MCG/ML injection Inject 1,000 mcg as directed every 30 days   Yes Unknown, Entered By History   dicyclomine (BENTYL) 20 MG tablet Take 20 mg by mouth 2 times daily.   Yes Unknown, Entered By History   DULoxetine (CYMBALTA) 30 MG capsule Take 30 mg by mouth 2 times daily.   Yes Unknown, Entered By History   esomeprazole (NEXIUM) 40 MG DR capsule Take 40 mg by mouth 2 times daily (before meals) Take 30-60 minutes before eating.   Yes Unknown, Entered By History   famotidine (PEPCID) 40 MG tablet Take 40 mg by mouth At Bedtime   Yes Unknown, Entered By History   HYDROcodone-acetaminophen (NORCO) 5-325 MG tablet Take 1 tablet by mouth every 4 hours as needed for severe pain.   Yes Unknown, Entered By History   l-lysine HCl 500 MG TABS tablet Take 1,000 mg by mouth daily   Yes Unknown, Entered By History   methocarbamol (ROBAXIN) 500 MG tablet  Take 1 tablet (500 mg) by mouth 4 times daily as needed for muscle spasms 6/18/23  Yes Vikki Duran,    montelukast (SINGULAIR) 10 MG tablet Take 10 mg by mouth At Bedtime   Yes Unknown, Entered By History   nortriptyline (PAMELOR) 25 MG capsule Take 25 mg by mouth At Bedtime   Yes Unknown, Entered By History   ondansetron (ZOFRAN ODT) 4 MG ODT tab Take 1 tablet (4 mg) by mouth every 8 hours as needed for nausea 4/2/24  Yes Estelle Rodriguez,    simethicone (MYLICON) 80 MG chewable tablet Take 1 tablet (80 mg) by mouth every 6 hours as needed for cramping 1/18/24  Yes Josh Bruce MD   Vedolizumab (ENTYVIO IV) Inject into the vein once every eight weeks.   Yes Unknown, Entered By History   Vitamin D3 (CHOLECALCIFEROL) 25 mcg (1000 units) tablet Take 25 mcg by mouth daily   Yes Unknown, Entered By History   zolpidem (AMBIEN) 5 MG tablet Take 5 mg by mouth nightly as needed 6/24/24  Yes Unknown, Entered By History   medical cannabis (Patient's own supply) Take 1 Dose by mouth See Admin Instructions (The purpose of this order is to document that the patient reports taking medical cannabis.  This is not a prescription, and is not used to certify that the patient has a qualifying medical condition.)  25 MG EDIBLE    Unknown, Entered By History        ALLERGIES:   Allergies   Allergen Reactions    Latex Hives    Oxycodone Anaphylaxis, Hives and Swelling     Throat swelling, per pt    Pt tolerates [place]/Vicodin (10/15/2023)    Penicillin G Anaphylaxis    Sumatriptan Palpitations    Aspirin Nausea and Vomiting    Demerol Hcl [Meperidine] Nausea and Vomiting     N/V    Percocet [Oxycodone-Acetaminophen] Nausea and Vomiting     N/V    Acetaminophen Nausea and Vomiting    Codeine Nausea and Vomiting    Mold         SOCIAL HISTORY:        FAMILY HISTORY:  Family History   Problem Relation Age of Onset    Colon Cancer Niece         PHYSICAL EXAM:   /78 (BP Location: Right arm)   Pulse 78   Temp 97.6  F (36.4  " C) (Oral)   Resp 18   Ht 1.6 m (5' 3\")   Wt 67.1 kg (147 lb 14.9 oz)   SpO2 97%   BMI 26.20 kg/m       General: alert, oriented, NAD  SKIN: no suspicious lesions, rashes, jaundice, or spider angiomas  EYES: No scleral icterus  RESPIRATORY: Non labored breathing, Lungs clear  CARDIOVASCULAR:  RRR. No murmurs, clicks gallops or rub  GASTROINTESTINAL: Active bowel sounds,  abdomen soft and left sided tenderness on palpation.  JOINT/EXTREMITIES: extremities normal- no gross deformities noted. no edema .   NEURO: Grossly WNL.  PSYCH: no abnormal anxiety/depression       LABS:  I reviewed the patient's new clinical lab test results.   Recent Labs   Lab Test 11/29/24 1333 09/17/24 1930 06/26/24  0657   WBC 8.1 12.3* 10.4   HGB 14.5 15.0 12.5   MCV 90 88 89    267 277     Recent Labs   Lab Test 11/30/24  0717 11/29/24 1333 09/17/24 1930 06/26/24  0657   NA  --  141 137 140   POTASSIUM 4.6 3.5 4.6 4.5   CHLORIDE  --  104 99 104   CO2  --  22 24 25   BUN  --  11.0 11.6 12.2   ANIONGAP  --  15 14 11   GIORGIO  --  8.9 9.9 9.2     Recent Labs   Lab Test 11/29/24  1552 11/29/24  1333 09/1960 09/17/24 1930 06/25/24  0621 06/24/24  1502 10/09/23  1750 10/09/23  1737   ALBUMIN  --  4.4  --  4.6 4.0  --    < > 4.7   BILITOTAL  --  0.5  --  0.2 0.6  --    < > 0.4   ALT  --  11  --  14 19  --    < > 15   AST  --  19  --  16 19  --    < > 24   ALKPHOS  --  82  --  81 80  --    < > 87   PROTEIN Negative  --  Negative  --   --  Negative   < >  --    LIPASE  --  30  --  64*  --   --   --  27    < > = values in this interval not displayed.        IMAGING/PROCEDURES:  CT  IMPRESSION:   1.  No convincing acute process within the abdomen or pelvis.  2.  No definite sites of active inflammation within the small or large bowel.  I personally reviewed the patient's new imaging results.    Problem list pertaining to GI:  - Crohn's flare  - Abdominal pain    Assessment: 64 year old female with a PMH of allergic Rhinitis, " Grave's Disease, RCC s/p right Nephrectomy, Crohn's Disease s/p ileocolic reseaction 2023 who presents to the ED today due to abdominal pain and diarrhea. CRP is elevated, improving today.  CT negative for any inflammation. No BM since ED visit. Pain is improving.     Plan:  - Continue IV steroids today  - Transition to oral steroids tomorrow  - Less likely to have C diff  - Follow-up infusion as scheduled on Dec 6, every 4 week infusion after that  - Follow-up in IBD clinic   - Urine culture is positive for E coli. Defer treatment to primary team     I will discuss with Dr. Stokes    Thank you for allowing me to participate in the care of this patient.  Please contact me with any questions or concerns.    Total time spent:  40 minutes was spent providing patient care, including patient evaluation, reviewing documentation/test results, and . Thank you for asking us to participate in the care of this patient.      Jaleesa Hoyt, CNP   [facility] ([facility])  [phone]

## 2024-11-30 NOTE — PLAN OF CARE
PRIMARY DIAGNOSIS: ACUTE PAIN  OUTPATIENT/OBSERVATION GOALS TO BE MET BEFORE DISCHARGE:  1. Pain Status: Improved-controlled with oral pain medications.    2. Return to near baseline physical activity: Yes    3. Cleared for discharge by consultants (if involved): No    Discharge Planner Nurse   Safe discharge environment identified: Yes  Barriers to discharge: Yes       Entered by: Estelle Huang RN 11/30/2024 1:34 AM     Please review provider order for any additional goals.   Nurse to notify provider when observation goals have been met and patient is ready for discharge.

## 2024-11-30 NOTE — PROGRESS NOTES
PRIMARY DIAGNOSIS: ACUTE PAIN  OUTPATIENT/OBSERVATION GOALS TO BE MET BEFORE DISCHARGE:  1. Pain Status: Improved-controlled with oral pain medications.    2. Return to near baseline physical activity: Yes    3. Cleared for discharge by consultants (if involved): No    Discharge Planner Nurse   Safe discharge environment identified: Yes  Barriers to discharge: Yes       Entered by: Danielle Ching RN 11/30/2024 9:59 AM     Please review provider order for any additional goals.   Nurse to notify provider when observation goals have been met and patient is ready for discharge.

## 2024-11-30 NOTE — PLAN OF CARE
"A&O. VSS. C/o abdominal pain- IV Dilaudid and Norco given x1. Heat applied. Hypoactive bowel sounds. No BM this shfit- still need stool sample. Ambien given per pt request. LS clear. Solumedrol. GI consult. Independent.    Goal Outcome Evaluation:      Plan of Care Reviewed With: patient    Overall Patient Progress: no changeOverall Patient Progress: no change    Outcome Evaluation: Pain managed with PRN pain meds    PRIMARY DIAGNOSIS: ACUTE PAIN  OUTPATIENT/OBSERVATION GOALS TO BE MET BEFORE DISCHARGE:  1. Pain Status: Improved-controlled with oral pain medications.    2. Return to near baseline physical activity: Yes    3. Cleared for discharge by consultants (if involved): No    Discharge Planner Nurse   Safe discharge environment identified: Yes  Barriers to discharge: Yes       Entered by: Estelle Huang RN 11/30/2024 4:58 AM     Please review provider order for any additional goals.   Nurse to notify provider when observation goals have been met and patient is ready for discharge.    Problem: Adult Inpatient Plan of Care  Goal: Plan of Care Review  Description: The Plan of Care Review/Shift note should be completed every shift.  The Outcome Evaluation is a brief statement about your assessment that the patient is improving, declining, or no change.  This information will be displayed automatically on your shift  note.  Outcome: Progressing  Flowsheets (Taken 11/30/2024 0458)  Outcome Evaluation: Pain managed with PRN pain meds  Plan of Care Reviewed With: patient  Overall Patient Progress: no change  Goal: Patient-Specific Goal (Individualized)  Description: You can add care plan individualizations to a care plan. Examples of Individualization might be:  \"Parent requests to be called daily at 9am for status\", \"I have a hard time hearing out of my right ear\", or \"Do not touch me to wake me up as it startles  me\".  Outcome: Progressing  Goal: Absence of Hospital-Acquired Illness or Injury  Outcome: " Progressing  Intervention: Identify and Manage Fall Risk  Recent Flowsheet Documentation  Taken 11/30/2024 0407 by Estelle Huang RN  Safety Promotion/Fall Prevention: safety round/check completed  Taken 11/30/2024 0118 by Estelle Huang RN  Safety Promotion/Fall Prevention: safety round/check completed  Taken 11/30/2024 0020 by Estelle Huang RN  Safety Promotion/Fall Prevention: safety round/check completed  Taken 11/29/2024 2212 by Estelle Huang RN  Safety Promotion/Fall Prevention: safety round/check completed  Taken 11/29/2024 1951 by Estelle Huang RN  Safety Promotion/Fall Prevention: safety round/check completed  Intervention: Prevent Skin Injury  Recent Flowsheet Documentation  Taken 11/29/2024 1948 by Estelle Huang RN  Body Position: position changed independently  Intervention: Prevent and Manage VTE (Venous Thromboembolism) Risk  Recent Flowsheet Documentation  Taken 11/29/2024 1951 by Estelle Huang RN  VTE Prevention/Management: SCDs off (sequential compression devices)  Intervention: Prevent Infection  Recent Flowsheet Documentation  Taken 11/29/2024 1951 by Estelle Huang RN  Infection Prevention:   rest/sleep promoted   single patient room provided   hand hygiene promoted  Goal: Optimal Comfort and Wellbeing  Outcome: Progressing  Intervention: Monitor Pain and Promote Comfort  Recent Flowsheet Documentation  Taken 11/30/2024 0018 by Estelle Huang RN  Pain Management Interventions:   medication (see MAR)   heat applied  Taken 11/29/2024 2018 by Estelle Huang RN  Pain Management Interventions: (ambulating, Tpump ordered) other (see comments)  Taken 11/29/2024 1948 by Estelle Huang RN  Pain Management Interventions: medication (see MAR)  Goal: Readiness for Transition of Care  Outcome: Progressing

## 2024-11-30 NOTE — PLAN OF CARE
"RN Nursing Summary: Pt A/O X4, VSS on room air. Pt up IND in room. Regular diet, decreased appetite noted. Pt here for exacerbation of Crohn's, dx in 2023. LLQ pain 7/10, PRN Norco given at 1800. Pt will receive IV Solumedrol 20 mg Q8 hrs. K/Mag/Phos high replacement protocol ordered, 20 mEq of potassium given at 1900, redraw in AM. NS running at 100 mL/hr. Stool sample ordered, pt not able to have BM yet, will pass onto NOC nurse. GI following. Discharge plan TBD.       Goal Outcome Evaluation:      Plan of Care Reviewed With: patient    Overall Patient Progress: improvingOverall Patient Progress: improving    Outcome Evaluation: Pt admitted to floor this afternoon. Pain/nausea being controlled with PRN medications, see MAR.      Problem: Adult Inpatient Plan of Care  Goal: Plan of Care Review  Description: The Plan of Care Review/Shift note should be completed every shift.  The Outcome Evaluation is a brief statement about your assessment that the patient is improving, declining, or no change.  This information will be displayed automatically on your shift  note.  Outcome: Progressing  Flowsheets (Taken 11/29/2024 1446)  Outcome Evaluation: Pt admitted to floor this afternoon. Pain/nausea being controlled with PRN medications, see MAR.  Plan of Care Reviewed With: patient  Overall Patient Progress: improving  Goal: Patient-Specific Goal (Individualized)  Description: You can add care plan individualizations to a care plan. Examples of Individualization might be:  \"Parent requests to be called daily at 9am for status\", \"I have a hard time hearing out of my right ear\", or \"Do not touch me to wake me up as it startles  me\".  Outcome: Progressing  Goal: Absence of Hospital-Acquired Illness or Injury  Outcome: Progressing  Intervention: Prevent Infection  Recent Flowsheet Documentation  Taken 11/29/2024 1814 by Devi Vines RN  Infection Prevention: hand hygiene promoted  Goal: Optimal Comfort and " Wellbeing  Outcome: Progressing  Intervention: Monitor Pain and Promote Comfort  Recent Flowsheet Documentation  Taken 11/29/2024 1803 by Devi Vines, RN  Pain Management Interventions: medication (see MAR)  Goal: Readiness for Transition of Care  Outcome: Progressing  Intervention: Mutually Develop Transition Plan  Recent Flowsheet Documentation  Taken 11/29/2024 1700 by Devi Vines, RN  Equipment Currently Used at Home: none

## 2024-11-30 NOTE — PLAN OF CARE
PRIMARY DIAGNOSIS: ACUTE PAIN  OUTPATIENT/OBSERVATION GOALS TO BE MET BEFORE DISCHARGE:  1. Pain Status: Improved but still requiring IV narcotics.    2. Return to near baseline physical activity: Yes    3. Cleared for discharge by consultants (if involved): No    Discharge Planner Nurse   Safe discharge environment identified: Yes  Barriers to discharge: Yes       Entered by: Estelle Huang RN 11/30/2024 1:35 AM     Please review provider order for any additional goals.   Nurse to notify provider when observation goals have been met and patient is ready for discharge.

## 2024-12-01 ENCOUNTER — HOSPITAL ENCOUNTER (OUTPATIENT)
Facility: CLINIC | Age: 64
Setting detail: OBSERVATION
End: 2024-12-01
Admitting: INTERNAL MEDICINE
Payer: COMMERCIAL

## 2024-12-01 VITALS
OXYGEN SATURATION: 94 % | DIASTOLIC BLOOD PRESSURE: 76 MMHG | HEART RATE: 74 BPM | RESPIRATION RATE: 16 BRPM | BODY MASS INDEX: 26.21 KG/M2 | HEIGHT: 63 IN | WEIGHT: 147.93 LBS | SYSTOLIC BLOOD PRESSURE: 118 MMHG | TEMPERATURE: 97.4 F

## 2024-12-01 LAB
CRP SERPL-MCNC: <3 MG/L
MAGNESIUM SERPL-MCNC: 2.3 MG/DL (ref 1.7–2.3)
PHOSPHATE SERPL-MCNC: 3.2 MG/DL (ref 2.5–4.5)
POTASSIUM SERPL-SCNC: 4.9 MMOL/L (ref 3.4–5.3)

## 2024-12-01 PROCEDURE — 84132 ASSAY OF SERUM POTASSIUM: CPT | Performed by: STUDENT IN AN ORGANIZED HEALTH CARE EDUCATION/TRAINING PROGRAM

## 2024-12-01 PROCEDURE — 250N000013 HC RX MED GY IP 250 OP 250 PS 637: Performed by: STUDENT IN AN ORGANIZED HEALTH CARE EDUCATION/TRAINING PROGRAM

## 2024-12-01 PROCEDURE — G0378 HOSPITAL OBSERVATION PER HR: HCPCS

## 2024-12-01 PROCEDURE — 250N000012 HC RX MED GY IP 250 OP 636 PS 637: Performed by: NURSE PRACTITIONER

## 2024-12-01 PROCEDURE — 99239 HOSP IP/OBS DSCHRG MGMT >30: CPT | Performed by: STUDENT IN AN ORGANIZED HEALTH CARE EDUCATION/TRAINING PROGRAM

## 2024-12-01 PROCEDURE — 250N000011 HC RX IP 250 OP 636: Performed by: PHYSICIAN ASSISTANT

## 2024-12-01 PROCEDURE — 36415 COLL VENOUS BLD VENIPUNCTURE: CPT | Performed by: STUDENT IN AN ORGANIZED HEALTH CARE EDUCATION/TRAINING PROGRAM

## 2024-12-01 PROCEDURE — 83735 ASSAY OF MAGNESIUM: CPT | Performed by: STUDENT IN AN ORGANIZED HEALTH CARE EDUCATION/TRAINING PROGRAM

## 2024-12-01 PROCEDURE — 84100 ASSAY OF PHOSPHORUS: CPT | Performed by: STUDENT IN AN ORGANIZED HEALTH CARE EDUCATION/TRAINING PROGRAM

## 2024-12-01 PROCEDURE — 250N000013 HC RX MED GY IP 250 OP 250 PS 637: Performed by: PHYSICIAN ASSISTANT

## 2024-12-01 PROCEDURE — 86140 C-REACTIVE PROTEIN: CPT | Performed by: STUDENT IN AN ORGANIZED HEALTH CARE EDUCATION/TRAINING PROGRAM

## 2024-12-01 RX ORDER — PREDNISONE 20 MG/1
40 TABLET ORAL DAILY
Qty: 8 TABLET | Refills: 0 | Status: SHIPPED | OUTPATIENT
Start: 2024-12-02 | End: 2024-12-06

## 2024-12-01 RX ORDER — PREDNISONE 20 MG/1
40 TABLET ORAL DAILY
Status: DISCONTINUED | OUTPATIENT
Start: 2024-12-02 | End: 2024-12-01 | Stop reason: HOSPADM

## 2024-12-01 RX ORDER — HYDROCODONE BITARTRATE AND ACETAMINOPHEN 5; 325 MG/1; MG/1
1 TABLET ORAL EVERY 6 HOURS PRN
Qty: 10 TABLET | Refills: 0 | Status: SHIPPED | OUTPATIENT
Start: 2024-12-01 | End: 2024-12-04

## 2024-12-01 RX ORDER — ONDANSETRON 4 MG/1
4 TABLET, ORALLY DISINTEGRATING ORAL EVERY 8 HOURS PRN
Qty: 20 TABLET | Refills: 0 | Status: SHIPPED | OUTPATIENT
Start: 2024-12-01

## 2024-12-01 RX ORDER — CIPROFLOXACIN 250 MG/1
250 TABLET, FILM COATED ORAL EVERY 12 HOURS
Qty: 4 TABLET | Refills: 0 | Status: SHIPPED | OUTPATIENT
Start: 2024-12-01 | End: 2024-12-03

## 2024-12-01 RX ORDER — PREDNISONE 20 MG/1
40 TABLET ORAL DAILY
Status: DISCONTINUED | OUTPATIENT
Start: 2024-12-01 | End: 2024-12-01

## 2024-12-01 RX ADMIN — PANTOPRAZOLE SODIUM 40 MG: 40 TABLET, DELAYED RELEASE ORAL at 08:48

## 2024-12-01 RX ADMIN — CIPROFLOXACIN HYDROCHLORIDE 250 MG: 250 TABLET, FILM COATED ORAL at 00:10

## 2024-12-01 RX ADMIN — PREDNISONE 40 MG: 20 TABLET ORAL at 08:48

## 2024-12-01 RX ADMIN — HYDROCODONE BITARTRATE AND ACETAMINOPHEN 1 TABLET: 5; 325 TABLET ORAL at 08:48

## 2024-12-01 RX ADMIN — SIMETHICONE 80 MG: 80 TABLET, CHEWABLE ORAL at 08:48

## 2024-12-01 RX ADMIN — DICYCLOMINE HYDROCHLORIDE 20 MG: 20 TABLET ORAL at 08:49

## 2024-12-01 RX ADMIN — ACETAMINOPHEN 650 MG: 325 TABLET, FILM COATED ORAL at 08:48

## 2024-12-01 RX ADMIN — CIPROFLOXACIN HYDROCHLORIDE 250 MG: 250 TABLET, FILM COATED ORAL at 13:17

## 2024-12-01 RX ADMIN — DULOXETINE HYDROCHLORIDE 30 MG: 30 CAPSULE, DELAYED RELEASE ORAL at 08:49

## 2024-12-01 RX ADMIN — ONDANSETRON 4 MG: 4 TABLET, ORALLY DISINTEGRATING ORAL at 09:01

## 2024-12-01 RX ADMIN — ACETAMINOPHEN 650 MG: 325 TABLET, FILM COATED ORAL at 00:10

## 2024-12-01 RX ADMIN — BUDESONIDE 9 MG: 3 CAPSULE ORAL at 08:49

## 2024-12-01 ASSESSMENT — ACTIVITIES OF DAILY LIVING (ADL)
ADLS_ACUITY_SCORE: 39

## 2024-12-01 NOTE — DISCHARGE SUMMARY
[facility]  Hospitalist Discharge Summary      Date of Admission:  11/29/2024  Date of Discharge:  12/1/2024  1:30 PM  Discharging Provider: Ruel Jose DO  Discharge Service: Hospitalist Service    Discharge Diagnoses   Devi Salas is a 64 year old female with a history of Allergic Rhinitis, Grave's Disease, RCC s/p Right Nephrectomy, Crohn's Disease s/p ileocolic reseaction 2023 who was admitted on 11/29/2024 with abdominal pain.    Patient was treated for suspected Crohn's flare with steroids, PTA budesonide, bowel rest.  Her symptoms improved day by day.  GI was consulted who recommended discharge with prednisone 40 mg daily for 5-day course.  Patient was discharged home in stable condition with close follow-up with GI.        Abdominal Pain  Suspected Crohn's Flare:  Pt presents with 3 weeks of abdominal pain with non bloody diarrhea of 8-12 stools per day.  Hx of Crohn's disease diagnosed in 2023 s/p ileocolic resection who is followed by [facility] and has been receiving Entyvio infusions every 8 weeks.  Last infusion was on 10/4/24 with next scheduled on 12/6/24.    Pt is afebrile, wbc normal, crp 18.  CT abd/pelvis with no convincing acute process within the abdomen or pelvis.  No definite sites of active inflammation within the small or large bowel.  Patient was treated for suspected Crohn's flare with steroids, PTA budesonide, bowel rest.  Her symptoms improved day by day.  GI was consulted who recommended discharge with prednisone 40 mg daily for 5-day course.  Patient was discharged home in stable condition with close follow-up with GI.          UTI:  Urine growing 50-100K e coli.  Complains of dysuria.  Ordered cipro x3 day course.      CKD  Hx RCC s/p Right Nephrectomy 2009:  Creatinine 1.04 is at baseline     Depression:  - continue Duloxetine and Nortriptyline       Clinically Significant Risk Factors     # Overweight: Estimated body mass index is 26.2 kg/m  as calculated  "from the following:    Height as of this encounter: 1.6 m (5' 3\").    Weight as of this encounter: 67.1 kg (147 lb 14.9 oz).       Follow-ups Needed After Discharge   Follow-up Appointments       Follow-up and recommended labs and tests       Follow up with primary care provider, Jeanette Betancourt, within 7 days for hospital follow- up.  No labs needed.                Unresulted Labs Ordered in the Past 30 Days of this Admission       No orders found from 10/30/2024 to 11/30/2024.        These results will be followed up by NA    Discharge Disposition   Discharged to home  Condition at discharge: Stable    Consultations This Hospital Stay   GASTROENTEROLOGY IP CONSULT  CARE MANAGEMENT / SOCIAL WORK IP CONSULT    Code Status   Full Code    Time Spent on this Encounter   I, Ruel Jose DO, personally saw the patient today and spent greater than 30 minutes discharging this patient.       Ruel Jose DO  [facility]  [address] [place] [address]  Phone: [phone]  Fax: [phone]  ______________________________________________________________________    Physical Exam   Vital Signs: Temp: 97.4  F (36.3  C) Temp src: Oral BP: 118/76 Pulse: 74   Resp: 16 SpO2: 94 % O2 Device: None (Room air)    Weight: 147 lbs 14.86 oz  General Appearance: Patient awake & alert.  No apparent distress.   Respiratory: Lungs are CTAB.  Work of breathing appears normal on room air.  Cardiovascular: Regular rate and rhythm.  No murmurs rubs or gallops.  There is no edema present.  GI: Benign.  Soft.  Mild ongoing TTP in the LLQ.  Bowel sounds active.   Skin: No rashes or lesions exposed skin.  Neuro:  The patient is moving all extremities.  No obvious focal asymmetries.   Other: Patient is appropriate and oriented x3.        Primary Care Physician   Jeanette Betancourt    Discharge Orders      Reason for your hospital stay    You were hospitalized for a crohn's flare.  You were treated with steroids and " bowel rest with improvement.     Follow-up and recommended labs and tests     Follow up with primary care provider, Jeanette Betancourt, within 7 days for hospital follow- up.  No labs needed.     Activity    Your activity upon discharge: activity as tolerated     Diet    Follow this diet upon discharge: Regular diet       Significant Results and Procedures   Most Recent 3 CBC's:  Recent Labs   Lab Test 11/29/24  1333 09/17/24  1930 06/26/24  0657   WBC 8.1 12.3* 10.4   HGB 14.5 15.0 12.5   MCV 90 88 89    267 277     Most Recent 3 BMP's:  Recent Labs   Lab Test 12/01/24  0842 11/30/24  0717 11/29/24  1333 09/17/24  1930 06/26/24  0657   NA  --   --  141 137 140   POTASSIUM 4.9 4.6 3.5 4.6 4.5   CHLORIDE  --   --  104 99 104   CO2  --   --  22 24 25   BUN  --   --  11.0 11.6 12.2   CR  --   --  1.04* 1.10* 0.96*   ANIONGAP  --   --  15 14 11   GIORGIO  --   --  8.9 9.9 9.2   GLC  --   --  106* 87 144*       Discharge Medications   Current Discharge Medication List        START taking these medications    Details   ciprofloxacin (CIPRO) 250 MG tablet Take 1 tablet (250 mg) by mouth every 12 hours for 2 days.  Qty: 4 tablet, Refills: 0    Associated Diagnoses: Acute cystitis without hematuria      !! HYDROcodone-acetaminophen (NORCO) 5-325 MG tablet Take 1 tablet by mouth every 6 hours as needed for severe pain. This medication includes acetaminophen (tylenol).  If you are also taking acetaminophen, you should not take more than 975 mg at once and no more than 4000mg in a day.  Qty: 10 tablet, Refills: 0    Associated Diagnoses: Crohn's disease of colon with complication (H)      predniSONE (DELTASONE) 20 MG tablet Take 2 tablets (40 mg) by mouth daily for 4 days.  Qty: 8 tablet, Refills: 0    Associated Diagnoses: Crohn's disease of colon with complication (H)       !! - Potential duplicate medications found. Please discuss with provider.        CONTINUE these medications which have NOT CHANGED    Details   acetaminophen  (TYLENOL) 500 MG tablet Take 1-2 tablets (500-1,000 mg) by mouth every 6 hours as needed for mild pain    Associated Diagnoses: RLQ abdominal pain      albuterol (PROAIR HFA/PROVENTIL HFA/VENTOLIN HFA) 108 (90 Base) MCG/ACT inhaler Inhale 2 puffs into the lungs every 6 hours as needed for shortness of breath, wheezing or cough      budesonide (ENTOCORT EC) 3 MG EC capsule Take 9 mg by mouth every morning.      cyanocobalamin (CYANOCOBALAMIN) 1000 MCG/ML injection Inject 1,000 mcg as directed every 30 days      dicyclomine (BENTYL) 20 MG tablet Take 20 mg by mouth 2 times daily.      DULoxetine (CYMBALTA) 30 MG capsule Take 30 mg by mouth 2 times daily.      esomeprazole (NEXIUM) 40 MG DR capsule Take 40 mg by mouth 2 times daily (before meals) Take 30-60 minutes before eating.      famotidine (PEPCID) 40 MG tablet Take 40 mg by mouth At Bedtime      !! HYDROcodone-acetaminophen (NORCO) 5-325 MG tablet Take 1 tablet by mouth every 4 hours as needed for severe pain.      l-lysine HCl 500 MG TABS tablet Take 1,000 mg by mouth daily      methocarbamol (ROBAXIN) 500 MG tablet Take 1 tablet (500 mg) by mouth 4 times daily as needed for muscle spasms  Qty: 15 tablet, Refills: 1    Associated Diagnoses: RLQ abdominal pain; Partial small bowel obstruction (H); IBD (inflammatory bowel disease); Nausea      montelukast (SINGULAIR) 10 MG tablet Take 10 mg by mouth At Bedtime      nortriptyline (PAMELOR) 25 MG capsule Take 25 mg by mouth At Bedtime      ondansetron (ZOFRAN ODT) 4 MG ODT tab Take 1 tablet (4 mg) by mouth every 8 hours as needed for nausea  Qty: 20 tablet, Refills: 0    Associated Diagnoses: Crohn's disease with complication, unspecified gastrointestinal tract location (H)      simethicone (MYLICON) 80 MG chewable tablet Take 1 tablet (80 mg) by mouth every 6 hours as needed for cramping  Qty: 30 tablet, Refills: 0    Associated Diagnoses: Intractable abdominal pain      Vedolizumab (ENTYVIO IV) Inject into the  vein once every eight weeks.      Vitamin D3 (CHOLECALCIFEROL) 25 mcg (1000 units) tablet Take 25 mcg by mouth daily      zolpidem (AMBIEN) 5 MG tablet Take 5 mg by mouth nightly as needed      medical cannabis (Patient's own supply) Take 1 Dose by mouth See Admin Instructions (The purpose of this order is to document that the patient reports taking medical cannabis.  This is not a prescription, and is not used to certify that the patient has a qualifying medical condition.)  25 MG EDIBLE       !! - Potential duplicate medications found. Please discuss with provider.        Allergies   Allergies   Allergen Reactions    Latex Hives    Oxycodone Anaphylaxis, Hives and Swelling     Throat swelling, per pt    Pt tolerates [place]/Vicodin (10/15/2023)    Penicillin G Anaphylaxis    Sumatriptan Palpitations    Aspirin Nausea and Vomiting    Demerol Hcl [Meperidine] Nausea and Vomiting     N/V    Percocet [Oxycodone-Acetaminophen] Nausea and Vomiting     N/V    Acetaminophen Nausea and Vomiting    Codeine Nausea and Vomiting    Mold

## 2024-12-01 NOTE — PLAN OF CARE
PRIMARY DIAGNOSIS: ACUTE PAIN  OUTPATIENT/OBSERVATION GOALS TO BE MET BEFORE DISCHARGE:  1. Pain Status: Improved-controlled with oral pain medications.    2. Return to near baseline physical activity: Yes    3. Cleared for discharge by consultants (if involved): No    Discharge Planner Nurse   Safe discharge environment identified: Yes  Barriers to discharge: No       Entered by: Nickolas Pimentel RN 12/01/2024 1:15 AM   Pt is AAOx4, on RA. Pt c/o mild pain. Prn tylenol given. On Reg diet. K, Mg. Phos protocol, AM blood draw. Independent in room.  Please review provider order for any additional goals.   Nurse to notify provider when observation goals have been met and patient is ready for discharge.

## 2024-12-01 NOTE — PLAN OF CARE
"VSS. A/Ox4. LS clear, on RA. Pt reporting mod abdominal pain, gave Prn tylenol, Norco, simethicone, and zofran. Pt reports mild relief. Abdomen tender. Pt instructed to eat bland diet until symptoms resolve. Discharge instructions gone over with pt and her . All questions answered. Discharge meds and pt belongings sent to home with pt. Pt discharged to home with  via car.      Goal Outcome Evaluation:      Plan of Care Reviewed With: patient    Overall Patient Progress: no changeOverall Patient Progress: no change    Outcome Evaluation: Pts pain is managed with oral pain medication. Plan to discharge to home.      Problem: Adult Inpatient Plan of Care  Goal: Plan of Care Review  Description: The Plan of Care Review/Shift note should be completed every shift.  The Outcome Evaluation is a brief statement about your assessment that the patient is improving, declining, or no change.  This information will be displayed automatically on your shift  note.  Outcome: Met  Goal: Patient-Specific Goal (Individualized)  Description: You can add care plan individualizations to a care plan. Examples of Individualization might be:  \"Parent requests to be called daily at 9am for status\", \"I have a hard time hearing out of my right ear\", or \"Do not touch me to wake me up as it startles  me\".  Outcome: Met  Goal: Absence of Hospital-Acquired Illness or Injury  Outcome: Met  Intervention: Identify and Manage Fall Risk  Recent Flowsheet Documentation  Taken 12/1/2024 0855 by Danielle Ching, RN  Safety Promotion/Fall Prevention:   safety round/check completed   room organization consistent   patient and family education   nonskid shoes/slippers when out of bed   clutter free environment maintained   assistive device/personal items within reach   activity supervised  Intervention: Prevent Skin Injury  Recent Flowsheet Documentation  Taken 12/1/2024 0855 by Danielle Ching, RN  Body Position: position changed " independently  Intervention: Prevent and Manage VTE (Venous Thromboembolism) Risk  Recent Flowsheet Documentation  Taken 12/1/2024 0855 by Danielle Ching RN  VTE Prevention/Management: SCDs off (sequential compression devices)  Intervention: Prevent Infection  Recent Flowsheet Documentation  Taken 12/1/2024 0855 by Danielle Ching RN  Infection Prevention:   single patient room provided   personal protective equipment utilized   hand hygiene promoted   equipment surfaces disinfected  Goal: Optimal Comfort and Wellbeing  Outcome: Met  Goal: Readiness for Transition of Care  Outcome: Met     Problem: Pain Acute  Goal: Optimal Pain Control and Function  Outcome: Met  Intervention: Prevent or Manage Pain  Recent Flowsheet Documentation  Taken 12/1/2024 0855 by Danielle Ching RN  Medication Review/Management: medications reviewed

## 2024-12-01 NOTE — PLAN OF CARE
"1900-2330: A&O. VSS. C/o abdominal pain- PRN Norco given x1. Independent in room.    Goal Outcome Evaluation:      Plan of Care Reviewed With: patient    Overall Patient Progress: no changeOverall Patient Progress: no change    Outcome Evaluation: continues to c/o abdominal pain      PRIMARY DIAGNOSIS: ACUTE PAIN  OUTPATIENT/OBSERVATION GOALS TO BE MET BEFORE DISCHARGE:  1. Pain Status: Improved-controlled with oral pain medications.    2. Return to near baseline physical activity: Yes    3. Cleared for discharge by consultants (if involved): No    Discharge Planner Nurse   Safe discharge environment identified: Yes  Barriers to discharge: Yes       Entered by: Estelle Huang RN 11/30/2024 9:35 PM     Please review provider order for any additional goals.   Nurse to notify provider when observation goals have been met and patient is ready for discharge.  Problem: Adult Inpatient Plan of Care  Goal: Plan of Care Review  Description: The Plan of Care Review/Shift note should be completed every shift.  The Outcome Evaluation is a brief statement about your assessment that the patient is improving, declining, or no change.  This information will be displayed automatically on your shift  note.  Outcome: Progressing  Flowsheets (Taken 11/30/2024 2134)  Outcome Evaluation: continues to c/o abdominal pain  Plan of Care Reviewed With: patient  Overall Patient Progress: no change  Goal: Patient-Specific Goal (Individualized)  Description: You can add care plan individualizations to a care plan. Examples of Individualization might be:  \"Parent requests to be called daily at 9am for status\", \"I have a hard time hearing out of my right ear\", or \"Do not touch me to wake me up as it startles  me\".  Outcome: Progressing  Goal: Absence of Hospital-Acquired Illness or Injury  Outcome: Progressing  Intervention: Identify and Manage Fall Risk  Recent Flowsheet Documentation  Taken 11/30/2024 1955 by Estelle Huang, RN  Safety " Promotion/Fall Prevention: safety round/check completed  Intervention: Prevent Skin Injury  Recent Flowsheet Documentation  Taken 11/30/2024 1955 by Estelle Huang RN  Body Position: position changed independently  Intervention: Prevent and Manage VTE (Venous Thromboembolism) Risk  Recent Flowsheet Documentation  Taken 11/30/2024 1955 by Estelle Huang RN  VTE Prevention/Management: SCDs off (sequential compression devices)  Intervention: Prevent Infection  Recent Flowsheet Documentation  Taken 11/30/2024 1955 by Estelle Huang RN  Infection Prevention:   rest/sleep promoted   hand hygiene promoted  Goal: Optimal Comfort and Wellbeing  Outcome: Progressing  Intervention: Monitor Pain and Promote Comfort  Recent Flowsheet Documentation  Taken 11/30/2024 2001 by Estelle Huang RN  Pain Management Interventions: medication (see MAR)  Taken 11/30/2024 1950 by Estelle Huang RN  Pain Management Interventions: heat applied  Goal: Readiness for Transition of Care  Outcome: Progressing

## 2024-12-01 NOTE — PROGRESS NOTES
PRIMARY DIAGNOSIS: ACUTE PAIN  OUTPATIENT/OBSERVATION GOALS TO BE MET BEFORE DISCHARGE:  1. Pain Status: Improved-controlled with oral pain medications.    2. Return to near baseline physical activity: Yes    3. Cleared for discharge by consultants (if involved): No    Discharge Planner Nurse   Safe discharge environment identified: Yes  Barriers to discharge: Yes       Entered by: Nickolas Pimentel RN 12/01/2024 5:48 AM     Please review provider order for any additional goals.   Nurse to notify provider when observation goals have been met and patient is ready for discharge.

## 2024-12-01 NOTE — PROGRESS NOTES
PRIMARY DIAGNOSIS: ACUTE PAIN  OUTPATIENT/OBSERVATION GOALS TO BE MET BEFORE DISCHARGE:  1. Pain Status: Improved but still requiring IV narcotics.    2. Return to near baseline physical activity: Yes    3. Cleared for discharge by consultants (if involved): No    Discharge Planner Nurse   Safe discharge environment identified: Yes  Barriers to discharge: Yes pt still requiring IV pain meds       Entered by: Danielle Ching RN 11/30/2024 6:01 PM     Please review provider order for any additional goals.   Nurse to notify provider when observation goals have been met and patient is ready for discharge.

## 2024-12-01 NOTE — PROGRESS NOTES
"GASTROENTEROLOGY PROGRESS NOTE      SUBJECTIVE:  She is feeling better, No concerns today.      OBJECTIVE:  General Appearance:  Not in distress  /76 (BP Location: Right arm)   Pulse 74   Temp 97.4  F (36.3  C) (Oral)   Resp 16   Ht 1.6 m (5' 3\")   Wt 67.1 kg (147 lb 14.9 oz)   SpO2 94%   BMI 26.20 kg/m    Temp (24hrs), Av.1  F (36.7  C), Min:97.4  F (36.3  C), Max:98.5  F (36.9  C)    Patient Vitals for the past 72 hrs:   Weight   24 1803 67.1 kg (147 lb 14.9 oz)   24 1215 67.1 kg (147 lb 14.9 oz)       Intake/Output Summary (Last 24 hours) at 2024 1013  Last data filed at 2024 1826  Gross per 24 hour   Intake 480 ml   Output --   Net 480 ml        PHYSICAL EXAM  General: alert, oriented, NAD  SKIN: no suspicious lesions, rashes, jaundice, or spider angiomas   EYES: No scleral icterus   RESPIRATORY: Non labored breathing  NEURO:Grossly WNL.    Additional Comments:  ROS, FH, SH: See initial GI consult for details.          Recent Labs   Lab Test 24  0657   WBC 8.1 12.3* 10.4   HGB 14.5 15.0 12.5   MCV 90 88 89    267 277     Recent Labs   Lab Test 24  0842 24  0717 24  0657   POTASSIUM 4.9 4.6 3.5 4.6 4.5   CHLORIDE  --   --  104 99 104   CO2  --   --     BUN  --   --  11.0 11.6 12.2   ANIONGAP  --   --  15 14 11     Recent Labs   Lab Test 24  1552 24  1333 24  0621 24  1502 10/09/23  1750 10/09/23  1737   ALBUMIN  --  4.4  --  4.6 4.0  --    < > 4.7   BILITOTAL  --  0.5  --  0.2 0.6  --    < > 0.4   ALT  --  11  --  14 19  --    < > 15   AST  --  19  --  16 19  --    < > 24   PROTEIN Negative  --  Negative  --   --  Negative   < >  --    LIPASE  --  30  --  64*  --   --   --  27    < > = values in this interval not displayed.        Imaging and procedures:    I have reviewed the patient's new clinical lab results    Problem " list pertaining to GI:  Abdominal pain  Diarrhea    Assessment: 64 year old female with a PMH of allergic Rhinitis, Grave's Disease, RCC s/p right Nephrectomy, Crohn's Disease s/p ileocolic reseaction 2023 who was admitted on 11/29  for an evaluation of abdominal pain and diarrhea. CT negative for any inflammation.     Plan:  - She can go home on Prednisone 40 mg daily for 5 day and stop   - Continue Budesonide  - Infusion as scheduled in December  - Follow-up in IBD clinic  - Okay to discharge from GI standpoint    I discussed  with Dr. Stokes    Time spent: 20 minutes, greater than 50% of the visit was spent in counseling/coordination of care.     Jaleesa Hoyt CNP  [facility] ([facility])  Mobile # [phone]

## 2024-12-03 ENCOUNTER — PATIENT OUTREACH (OUTPATIENT)
Dept: CARE COORDINATION | Facility: CLINIC | Age: 64
End: 2024-12-03
Payer: COMMERCIAL

## 2024-12-03 NOTE — PROGRESS NOTES
[facility] Resource Center:   [facility] Contact  [facility]/Voicemail     Clinical Data: Post-Discharge Outreach     Outreach attempted x 2.  Left message on patient's voicemail, providing [facility]'s central phone number of [phone] ([phone]) for questions/concerns and/or to schedule an appt with an [facility] provider, if they do not have a PCP.      Plan:  [facility] will do no further outreaches at this time.       Briana Cedeño  Community Health Worker  [facility], [facility]  [phone]      *Connected Care Resource Team does NOT follow patient ongoing. Referrals are identified based on internal discharge reports and the outreach is to ensure patient has an understanding of their discharge instructions.

## 2025-03-07 NOTE — PROGRESS NOTES
[facility]: Transitions of Care Outreach  Chief Complaint   Patient presents with    Clinic Care Coordination - Post Hospital       Most Recent Admission Date: 6/24/2024   Most Recent Admission Diagnosis: Exacerbation of Crohn's disease with complication (H) - K50.919     Most Recent Discharge Date: 6/26/2024   Most Recent Discharge Diagnosis: Exacerbation of Crohn's disease with complication (H) - K50.919     Transitions of Care Assessment    Discharge Assessment  How are you doing now that you are home?: Pretty good, she was constipated while inpatient, but isn't any longer.  How are your symptoms? (Red Flag symptoms escalate to triage hotline per guidelines): Improved  Do you know how to contact your clinic care team if you have future questions or changes to your health status? : Yes (Theresa is taking a 2 week trip to [place],  I explained an KETAN, and gave her the fax number in case she needs medical care while on her trip.)  Does the patient have their discharge instructions? : Yes  Does the patient have questions regarding their discharge instructions? : No  Were you started on any new medications or were there changes to any of your previous medications? : Yes  Does the patient have all of their medications?: Yes  Do you have questions regarding any of your medications? : No  Do you have all of your needed medical supplies or equipment (DME)?  (i.e. oxygen tank, CPAP, cane, etc.): Yes    Post-op ([facility] Only)  If the patient had a surgery or procedure, do they have any questions for a nurse?: No      Follow up Plan     Discharge Follow-Up  Discharge follow up appointment scheduled in alignment with recommended follow up timeframe or Transitions of Risk Category? (Low = within 30 days; Moderate= within 14 days; High= within 7 days): Yes  Discharge Follow Up Appointment Date: 07/10/24  Discharge Follow Up Appointment Scheduled with?: Specialty Care Provider    No future  appointments.    Outpatient Plan as outlined on AVS reviewed with patient.    For any urgent concerns, please contact our 24 hour nurse triage line: [phone] ([phone])       MONIE Pugh  [phone]  [facility]                    No assistance needed

## 2025-08-16 ENCOUNTER — HEALTH MAINTENANCE LETTER (OUTPATIENT)
Age: 65
End: 2025-08-16

## (undated) DEVICE — ESU CORD MONOPOLAR 10'  E0510

## (undated) DEVICE — SU VICRYL 0 UR-6 27" J603H

## (undated) DEVICE — SU VICRYL 2-0 CT-2 CR 8X18" J726D

## (undated) DEVICE — Device

## (undated) DEVICE — PROTECTOR ARM ONE-STEP TRENDELENBURG 40418

## (undated) DEVICE — BAG CLEAR TRASH 1.3M 39X33" P4040C

## (undated) DEVICE — SUCTION MANIFOLD NEPTUNE 2 SYS 4 PORT 0702-020-000

## (undated) DEVICE — TUBING SUCTION MEDI-VAC SOFT 3/16"X20' N520A

## (undated) DEVICE — SU DERMABOND ADVANCED .7ML DNX12

## (undated) DEVICE — SPONGE LAP 18X18" X8435

## (undated) DEVICE — ESU LIGASURE LAPAROSCOPIC BLUNT TIP SEALER 5MMX37CM LF1837

## (undated) DEVICE — LINEN HALF SHEET 5512

## (undated) DEVICE — SU MONOCRYL 4-0 PS-2 27" UND Y426H

## (undated) DEVICE — ESU GROUND PAD ADULT W/CORD E7507

## (undated) DEVICE — ENDO TROCAR SLEEVE KII Z-THREADED 05X100MM CTS02

## (undated) DEVICE — STPL LINEAR CUT 75MM TLC75

## (undated) DEVICE — KIT PATIENT POSITIONING PIGAZZI LATEX FREE 40580

## (undated) DEVICE — ESU PENCIL W/SMOKE EVAC CVPLP2000

## (undated) DEVICE — SU VICRYL 3-0 SH CR 8X18" J774

## (undated) DEVICE — STPL LINEAR 90 X 3.5MM TA9035S

## (undated) DEVICE — LINEN ORTHO ACL PACK 5447

## (undated) DEVICE — SOL NACL 0.9% IRRIG 1000ML BOTTLE 2F7124

## (undated) DEVICE — SUCTION IRR STRYKERFLOW II W/TIP 250-070-520

## (undated) DEVICE — SOL NACL 0.9% IRRIG 3000ML BAG 2B7477

## (undated) DEVICE — SUCTION TIP YANKAUER W/O VENT K86

## (undated) DEVICE — LINEN POUCH DBL 5427

## (undated) DEVICE — SU PDS II 0 CTX 60" Z990G

## (undated) DEVICE — GLOVE BIOGEL PI MICRO SZ 7.0 48570

## (undated) DEVICE — KIT ENDO TURNOVER/PROCEDURE W/CLEAN A SCOPE LINERS 103888

## (undated) DEVICE — LINEN FULL SHEET 5511

## (undated) DEVICE — DRAPE LEGGINGS 8421

## (undated) DEVICE — SUCTION TIP POOLE K770

## (undated) DEVICE — SU VICRYL 3-0 SH 27" UND J416H

## (undated) DEVICE — ENDO TROCAR FIRST ENTRY KII FIOS Z-THRD 05X100MM CTF03

## (undated) DEVICE — PREP CHLORAPREP 26ML TINTED HI-LITE ORANGE 930815

## (undated) DEVICE — DRAPE IOBAN INCISE 23X17" 6650EZ

## (undated) RX ORDER — FENTANYL CITRATE 50 UG/ML
INJECTION, SOLUTION INTRAMUSCULAR; INTRAVENOUS
Status: DISPENSED
Start: 2023-10-13

## (undated) RX ORDER — SCOLOPAMINE TRANSDERMAL SYSTEM 1 MG/1
PATCH, EXTENDED RELEASE TRANSDERMAL
Status: DISPENSED
Start: 2023-07-14

## (undated) RX ORDER — FENTANYL CITRATE 50 UG/ML
INJECTION, SOLUTION INTRAMUSCULAR; INTRAVENOUS
Status: DISPENSED
Start: 2023-07-14

## (undated) RX ORDER — ONDANSETRON 2 MG/ML
INJECTION INTRAMUSCULAR; INTRAVENOUS
Status: DISPENSED
Start: 2023-10-13

## (undated) RX ORDER — ONDANSETRON 2 MG/ML
INJECTION INTRAMUSCULAR; INTRAVENOUS
Status: DISPENSED
Start: 2023-07-12

## (undated) RX ORDER — INDOCYANINE GREEN AND WATER 25 MG
KIT INJECTION
Status: DISPENSED
Start: 2023-07-14

## (undated) RX ORDER — FENTANYL CITRATE-0.9 % NACL/PF 10 MCG/ML
PLASTIC BAG, INJECTION (ML) INTRAVENOUS
Status: DISPENSED
Start: 2023-07-14

## (undated) RX ORDER — BUPIVACAINE HYDROCHLORIDE AND EPINEPHRINE 2.5; 5 MG/ML; UG/ML
INJECTION, SOLUTION EPIDURAL; INFILTRATION; INTRACAUDAL; PERINEURAL
Status: DISPENSED
Start: 2023-07-14